# Patient Record
Sex: MALE | Race: WHITE | URBAN - METROPOLITAN AREA
[De-identification: names, ages, dates, MRNs, and addresses within clinical notes are randomized per-mention and may not be internally consistent; named-entity substitution may affect disease eponyms.]

---

## 2018-09-06 ENCOUNTER — INPATIENT (INPATIENT)
Facility: HOSPITAL | Age: 63
LOS: 0 days | Discharge: HOME | DRG: 74 | End: 2018-09-07
Attending: INTERNAL MEDICINE | Admitting: INTERNAL MEDICINE
Payer: COMMERCIAL

## 2018-09-06 VITALS
RESPIRATION RATE: 16 BRPM | SYSTOLIC BLOOD PRESSURE: 170 MMHG | HEART RATE: 72 BPM | TEMPERATURE: 98 F | OXYGEN SATURATION: 98 % | DIASTOLIC BLOOD PRESSURE: 90 MMHG | HEIGHT: 72 IN | WEIGHT: 279.99 LBS

## 2018-09-06 PROCEDURE — 70450 CT HEAD/BRAIN W/O DYE: CPT | Mod: 26

## 2018-09-07 ENCOUNTER — TRANSCRIPTION ENCOUNTER (OUTPATIENT)
Age: 63
End: 2018-09-07

## 2018-09-07 VITALS
HEART RATE: 94 BPM | RESPIRATION RATE: 18 BRPM | OXYGEN SATURATION: 100 % | TEMPERATURE: 97 F | DIASTOLIC BLOOD PRESSURE: 70 MMHG | SYSTOLIC BLOOD PRESSURE: 134 MMHG

## 2018-09-07 DIAGNOSIS — G45.9 TRANSIENT CEREBRAL ISCHEMIC ATTACK, UNSPECIFIED: ICD-10-CM

## 2018-09-07 DIAGNOSIS — Z29.9 ENCOUNTER FOR PROPHYLACTIC MEASURES, UNSPECIFIED: ICD-10-CM

## 2018-09-07 DIAGNOSIS — F17.200 NICOTINE DEPENDENCE, UNSPECIFIED, UNCOMPLICATED: ICD-10-CM

## 2018-09-07 LAB
ALBUMIN SERPL ELPH-MCNC: 3.5 G/DL — SIGNIFICANT CHANGE UP (ref 3.5–5)
ALBUMIN SERPL ELPH-MCNC: 3.6 G/DL — SIGNIFICANT CHANGE UP (ref 3.5–5)
ALP SERPL-CCNC: 71 U/L — SIGNIFICANT CHANGE UP (ref 40–120)
ALP SERPL-CCNC: 76 U/L — SIGNIFICANT CHANGE UP (ref 40–120)
ALT FLD-CCNC: 35 U/L DA — SIGNIFICANT CHANGE UP (ref 10–60)
ALT FLD-CCNC: 37 U/L DA — SIGNIFICANT CHANGE UP (ref 10–60)
ANION GAP SERPL CALC-SCNC: 10 MMOL/L — SIGNIFICANT CHANGE UP (ref 5–17)
ANION GAP SERPL CALC-SCNC: 9 MMOL/L — SIGNIFICANT CHANGE UP (ref 5–17)
APTT BLD: 28.1 SEC — SIGNIFICANT CHANGE UP (ref 27.5–37.4)
AST SERPL-CCNC: 26 U/L — SIGNIFICANT CHANGE UP (ref 10–40)
AST SERPL-CCNC: 36 U/L — SIGNIFICANT CHANGE UP (ref 10–40)
BASOPHILS # BLD AUTO: 0.1 K/UL — SIGNIFICANT CHANGE UP (ref 0–0.2)
BASOPHILS # BLD AUTO: 0.1 K/UL — SIGNIFICANT CHANGE UP (ref 0–0.2)
BASOPHILS NFR BLD AUTO: 1.3 % — SIGNIFICANT CHANGE UP (ref 0–2)
BASOPHILS NFR BLD AUTO: 1.7 % — SIGNIFICANT CHANGE UP (ref 0–2)
BILIRUB SERPL-MCNC: 0.3 MG/DL — SIGNIFICANT CHANGE UP (ref 0.2–1.2)
BILIRUB SERPL-MCNC: 0.6 MG/DL — SIGNIFICANT CHANGE UP (ref 0.2–1.2)
BUN SERPL-MCNC: 11 MG/DL — SIGNIFICANT CHANGE UP (ref 7–18)
BUN SERPL-MCNC: 13 MG/DL — SIGNIFICANT CHANGE UP (ref 7–18)
CALCIUM SERPL-MCNC: 8.7 MG/DL — SIGNIFICANT CHANGE UP (ref 8.4–10.5)
CALCIUM SERPL-MCNC: 9.1 MG/DL — SIGNIFICANT CHANGE UP (ref 8.4–10.5)
CHLORIDE SERPL-SCNC: 101 MMOL/L — SIGNIFICANT CHANGE UP (ref 96–108)
CHLORIDE SERPL-SCNC: 102 MMOL/L — SIGNIFICANT CHANGE UP (ref 96–108)
CHOLEST SERPL-MCNC: 179 MG/DL — SIGNIFICANT CHANGE UP (ref 10–199)
CO2 SERPL-SCNC: 24 MMOL/L — SIGNIFICANT CHANGE UP (ref 22–31)
CO2 SERPL-SCNC: 28 MMOL/L — SIGNIFICANT CHANGE UP (ref 22–31)
CREAT SERPL-MCNC: 0.96 MG/DL — SIGNIFICANT CHANGE UP (ref 0.5–1.3)
CREAT SERPL-MCNC: 1.02 MG/DL — SIGNIFICANT CHANGE UP (ref 0.5–1.3)
EOSINOPHIL # BLD AUTO: 0.1 K/UL — SIGNIFICANT CHANGE UP (ref 0–0.5)
EOSINOPHIL # BLD AUTO: 0.1 K/UL — SIGNIFICANT CHANGE UP (ref 0–0.5)
EOSINOPHIL NFR BLD AUTO: 1 % — SIGNIFICANT CHANGE UP (ref 0–6)
EOSINOPHIL NFR BLD AUTO: 1.6 % — SIGNIFICANT CHANGE UP (ref 0–6)
ETHANOL SERPL-MCNC: 41 MG/DL — HIGH (ref 0–10)
GLUCOSE SERPL-MCNC: 132 MG/DL — HIGH (ref 70–99)
GLUCOSE SERPL-MCNC: 140 MG/DL — HIGH (ref 70–99)
HBA1C BLD-MCNC: 6.5 % — HIGH (ref 4–5.6)
HCT VFR BLD CALC: 45.8 % — SIGNIFICANT CHANGE UP (ref 39–50)
HCT VFR BLD CALC: 46 % — SIGNIFICANT CHANGE UP (ref 39–50)
HDLC SERPL-MCNC: 45 MG/DL — SIGNIFICANT CHANGE UP
HGB BLD-MCNC: 13.8 G/DL — SIGNIFICANT CHANGE UP (ref 13–17)
HGB BLD-MCNC: 13.8 G/DL — SIGNIFICANT CHANGE UP (ref 13–17)
INR BLD: 0.94 RATIO — SIGNIFICANT CHANGE UP (ref 0.88–1.16)
LIPID PNL WITH DIRECT LDL SERPL: 90 MG/DL — SIGNIFICANT CHANGE UP
LYMPHOCYTES # BLD AUTO: 2.2 K/UL — SIGNIFICANT CHANGE UP (ref 1–3.3)
LYMPHOCYTES # BLD AUTO: 2.3 K/UL — SIGNIFICANT CHANGE UP (ref 1–3.3)
LYMPHOCYTES # BLD AUTO: 25.9 % — SIGNIFICANT CHANGE UP (ref 13–44)
LYMPHOCYTES # BLD AUTO: 29.1 % — SIGNIFICANT CHANGE UP (ref 13–44)
MAGNESIUM SERPL-MCNC: 2.4 MG/DL — SIGNIFICANT CHANGE UP (ref 1.6–2.6)
MCHC RBC-ENTMCNC: 21.9 PG — LOW (ref 27–34)
MCHC RBC-ENTMCNC: 22 PG — LOW (ref 27–34)
MCHC RBC-ENTMCNC: 30.1 GM/DL — LOW (ref 32–36)
MCHC RBC-ENTMCNC: 30.1 GM/DL — LOW (ref 32–36)
MCV RBC AUTO: 72.8 FL — LOW (ref 80–100)
MCV RBC AUTO: 73.1 FL — LOW (ref 80–100)
MONOCYTES # BLD AUTO: 0.6 K/UL — SIGNIFICANT CHANGE UP (ref 0–0.9)
MONOCYTES # BLD AUTO: 0.6 K/UL — SIGNIFICANT CHANGE UP (ref 0–0.9)
MONOCYTES NFR BLD AUTO: 6.5 % — SIGNIFICANT CHANGE UP (ref 2–14)
MONOCYTES NFR BLD AUTO: 7.4 % — SIGNIFICANT CHANGE UP (ref 2–14)
NEUTROPHILS # BLD AUTO: 4.8 K/UL — SIGNIFICANT CHANGE UP (ref 1.8–7.4)
NEUTROPHILS # BLD AUTO: 5.5 K/UL — SIGNIFICANT CHANGE UP (ref 1.8–7.4)
NEUTROPHILS NFR BLD AUTO: 60.6 % — SIGNIFICANT CHANGE UP (ref 43–77)
NEUTROPHILS NFR BLD AUTO: 64.8 % — SIGNIFICANT CHANGE UP (ref 43–77)
PCP SPEC-MCNC: SIGNIFICANT CHANGE UP
PHOSPHATE SERPL-MCNC: 2.6 MG/DL — SIGNIFICANT CHANGE UP (ref 2.5–4.5)
PLATELET # BLD AUTO: 195 K/UL — SIGNIFICANT CHANGE UP (ref 150–400)
PLATELET # BLD AUTO: 212 K/UL — SIGNIFICANT CHANGE UP (ref 150–400)
POTASSIUM SERPL-MCNC: 4.5 MMOL/L — SIGNIFICANT CHANGE UP (ref 3.5–5.3)
POTASSIUM SERPL-MCNC: 4.6 MMOL/L — SIGNIFICANT CHANGE UP (ref 3.5–5.3)
POTASSIUM SERPL-SCNC: 4.5 MMOL/L — SIGNIFICANT CHANGE UP (ref 3.5–5.3)
POTASSIUM SERPL-SCNC: 4.6 MMOL/L — SIGNIFICANT CHANGE UP (ref 3.5–5.3)
PROT SERPL-MCNC: 7.7 G/DL — SIGNIFICANT CHANGE UP (ref 6–8.3)
PROT SERPL-MCNC: 7.9 G/DL — SIGNIFICANT CHANGE UP (ref 6–8.3)
PROTHROM AB SERPL-ACNC: 10.2 SEC — SIGNIFICANT CHANGE UP (ref 9.8–12.7)
RBC # BLD: 6.29 M/UL — HIGH (ref 4.2–5.8)
RBC # BLD: 6.29 M/UL — HIGH (ref 4.2–5.8)
RBC # FLD: 13.7 % — SIGNIFICANT CHANGE UP (ref 10.3–14.5)
RBC # FLD: 13.7 % — SIGNIFICANT CHANGE UP (ref 10.3–14.5)
SODIUM SERPL-SCNC: 135 MMOL/L — SIGNIFICANT CHANGE UP (ref 135–145)
SODIUM SERPL-SCNC: 139 MMOL/L — SIGNIFICANT CHANGE UP (ref 135–145)
TOTAL CHOLESTEROL/HDL RATIO MEASUREMENT: 4 RATIO — SIGNIFICANT CHANGE UP (ref 3.4–9.6)
TRIGL SERPL-MCNC: 220 MG/DL — HIGH (ref 10–149)
TROPONIN I SERPL-MCNC: <0.015 NG/ML — SIGNIFICANT CHANGE UP (ref 0–0.04)
TROPONIN I SERPL-MCNC: <0.015 NG/ML — SIGNIFICANT CHANGE UP (ref 0–0.04)
TSH SERPL-MCNC: 1.58 UU/ML — SIGNIFICANT CHANGE UP (ref 0.34–4.82)
WBC # BLD: 7.9 K/UL — SIGNIFICANT CHANGE UP (ref 3.8–10.5)
WBC # BLD: 8.4 K/UL — SIGNIFICANT CHANGE UP (ref 3.8–10.5)
WBC # FLD AUTO: 7.9 K/UL — SIGNIFICANT CHANGE UP (ref 3.8–10.5)
WBC # FLD AUTO: 8.4 K/UL — SIGNIFICANT CHANGE UP (ref 3.8–10.5)

## 2018-09-07 PROCEDURE — 93005 ELECTROCARDIOGRAM TRACING: CPT

## 2018-09-07 PROCEDURE — 86900 BLOOD TYPING SEROLOGIC ABO: CPT

## 2018-09-07 PROCEDURE — 80307 DRUG TEST PRSMV CHEM ANLYZR: CPT

## 2018-09-07 PROCEDURE — 70450 CT HEAD/BRAIN W/O DYE: CPT

## 2018-09-07 PROCEDURE — 84443 ASSAY THYROID STIM HORMONE: CPT

## 2018-09-07 PROCEDURE — 86850 RBC ANTIBODY SCREEN: CPT

## 2018-09-07 PROCEDURE — 85730 THROMBOPLASTIN TIME PARTIAL: CPT

## 2018-09-07 PROCEDURE — 82962 GLUCOSE BLOOD TEST: CPT

## 2018-09-07 PROCEDURE — 85027 COMPLETE CBC AUTOMATED: CPT

## 2018-09-07 PROCEDURE — 99285 EMERGENCY DEPT VISIT HI MDM: CPT | Mod: 25

## 2018-09-07 PROCEDURE — 84484 ASSAY OF TROPONIN QUANT: CPT

## 2018-09-07 PROCEDURE — 80061 LIPID PANEL: CPT

## 2018-09-07 PROCEDURE — 85610 PROTHROMBIN TIME: CPT

## 2018-09-07 PROCEDURE — 99223 1ST HOSP IP/OBS HIGH 75: CPT

## 2018-09-07 PROCEDURE — 93880 EXTRACRANIAL BILAT STUDY: CPT

## 2018-09-07 PROCEDURE — 82607 VITAMIN B-12: CPT

## 2018-09-07 PROCEDURE — 93880 EXTRACRANIAL BILAT STUDY: CPT | Mod: 26

## 2018-09-07 PROCEDURE — 80053 COMPREHEN METABOLIC PANEL: CPT

## 2018-09-07 PROCEDURE — 83036 HEMOGLOBIN GLYCOSYLATED A1C: CPT

## 2018-09-07 PROCEDURE — 82746 ASSAY OF FOLIC ACID SERUM: CPT

## 2018-09-07 PROCEDURE — 99285 EMERGENCY DEPT VISIT HI MDM: CPT

## 2018-09-07 PROCEDURE — 93306 TTE W/DOPPLER COMPLETE: CPT

## 2018-09-07 PROCEDURE — 84100 ASSAY OF PHOSPHORUS: CPT

## 2018-09-07 PROCEDURE — 86901 BLOOD TYPING SEROLOGIC RH(D): CPT

## 2018-09-07 PROCEDURE — 83735 ASSAY OF MAGNESIUM: CPT

## 2018-09-07 RX ORDER — SODIUM CHLORIDE 9 MG/ML
1000 INJECTION, SOLUTION INTRAVENOUS
Qty: 0 | Refills: 0 | Status: DISCONTINUED | OUTPATIENT
Start: 2018-09-07 | End: 2018-09-07

## 2018-09-07 RX ORDER — ATORVASTATIN CALCIUM 80 MG/1
1 TABLET, FILM COATED ORAL
Qty: 30 | Refills: 0 | OUTPATIENT
Start: 2018-09-07 | End: 2018-10-06

## 2018-09-07 RX ORDER — NICOTINE POLACRILEX 2 MG
1 GUM BUCCAL
Qty: 30 | Refills: 0 | OUTPATIENT
Start: 2018-09-07 | End: 2018-10-06

## 2018-09-07 RX ORDER — THIAMINE MONONITRATE (VIT B1) 100 MG
100 TABLET ORAL DAILY
Qty: 0 | Refills: 0 | Status: DISCONTINUED | OUTPATIENT
Start: 2018-09-07 | End: 2018-09-07

## 2018-09-07 RX ORDER — ENOXAPARIN SODIUM 100 MG/ML
40 INJECTION SUBCUTANEOUS DAILY
Qty: 0 | Refills: 0 | Status: DISCONTINUED | OUTPATIENT
Start: 2018-09-07 | End: 2018-09-07

## 2018-09-07 RX ORDER — ASPIRIN/CALCIUM CARB/MAGNESIUM 324 MG
1 TABLET ORAL
Qty: 30 | Refills: 0 | OUTPATIENT
Start: 2018-09-07 | End: 2018-10-06

## 2018-09-07 RX ORDER — ASPIRIN/CALCIUM CARB/MAGNESIUM 324 MG
325 TABLET ORAL ONCE
Qty: 0 | Refills: 0 | Status: COMPLETED | OUTPATIENT
Start: 2018-09-07 | End: 2018-09-07

## 2018-09-07 RX ORDER — ASPIRIN/CALCIUM CARB/MAGNESIUM 324 MG
81 TABLET ORAL DAILY
Qty: 0 | Refills: 0 | Status: DISCONTINUED | OUTPATIENT
Start: 2018-09-07 | End: 2018-09-07

## 2018-09-07 RX ORDER — ATORVASTATIN CALCIUM 80 MG/1
40 TABLET, FILM COATED ORAL AT BEDTIME
Qty: 0 | Refills: 0 | Status: DISCONTINUED | OUTPATIENT
Start: 2018-09-07 | End: 2018-09-07

## 2018-09-07 RX ORDER — THIAMINE MONONITRATE (VIT B1) 100 MG
1 TABLET ORAL
Qty: 30 | Refills: 0 | OUTPATIENT
Start: 2018-09-07 | End: 2018-10-06

## 2018-09-07 RX ORDER — FOLIC ACID 0.8 MG
1 TABLET ORAL DAILY
Qty: 0 | Refills: 0 | Status: DISCONTINUED | OUTPATIENT
Start: 2018-09-07 | End: 2018-09-07

## 2018-09-07 RX ORDER — FLUTICASONE PROPIONATE 50 MCG
1 SPRAY, SUSPENSION NASAL
Qty: 0 | Refills: 0 | Status: DISCONTINUED | OUTPATIENT
Start: 2018-09-07 | End: 2018-09-07

## 2018-09-07 RX ORDER — NICOTINE POLACRILEX 2 MG
1 GUM BUCCAL DAILY
Qty: 0 | Refills: 0 | Status: DISCONTINUED | OUTPATIENT
Start: 2018-09-07 | End: 2018-09-07

## 2018-09-07 RX ORDER — FOLIC ACID 0.8 MG
1 TABLET ORAL
Qty: 30 | Refills: 0 | OUTPATIENT
Start: 2018-09-07 | End: 2018-10-06

## 2018-09-07 RX ORDER — SODIUM CHLORIDE 9 MG/ML
1000 INJECTION INTRAMUSCULAR; INTRAVENOUS; SUBCUTANEOUS
Qty: 0 | Refills: 0 | Status: DISCONTINUED | OUTPATIENT
Start: 2018-09-07 | End: 2018-09-07

## 2018-09-07 RX ADMIN — Medication 325 MILLIGRAM(S): at 02:13

## 2018-09-07 RX ADMIN — Medication 1 PATCH: at 12:05

## 2018-09-07 RX ADMIN — Medication 81 MILLIGRAM(S): at 12:05

## 2018-09-07 RX ADMIN — Medication 100 MILLIGRAM(S): at 12:06

## 2018-09-07 RX ADMIN — Medication 1 MILLIGRAM(S): at 12:05

## 2018-09-07 RX ADMIN — ENOXAPARIN SODIUM 40 MILLIGRAM(S): 100 INJECTION SUBCUTANEOUS at 12:05

## 2018-09-07 NOTE — DISCHARGE NOTE ADULT - CARE PROVIDER_API CALL
MD Christopher, Senthil  [address] [place] [address], [place] [address]  Phone: (   )    -  Fax: (   )    -

## 2018-09-07 NOTE — H&P ADULT - HISTORY OF PRESENT ILLNESS
63 yo M pmh of TIA and spinal stenosis presents from home c/o episode of numbness and weakness to LUE. Pt was watching TV and states that he could not use his Lt hand to use the remote. He called EMS and his symptoms had resolved. Patient said right now he is feeling fine. He had similar episode like that 8 years ago.  Stroke code was called, however given pt's rapidly improving symptoms and NIHSS of 0 he is not a tPA candidate. He currently denies focal weakness, numbness, blurred vision, vomiting, fever, urinary symptoms, chest pain, abdominal pain or any other complaints. He is smoking 1/2 PPD/35 years and willing to quit and endorses drinking 6 beers/night. NKDA     In Ed His vitals were stable.   NIHSS score is 0.   Alcohol in blood

## 2018-09-07 NOTE — ED ADULT NURSE NOTE - NSIMPLEMENTINTERV_GEN_ALL_ED
Implemented All Universal Safety Interventions:  [place] to call system. Call bell, personal items and telephone within reach. Instruct patient to call for assistance. Room bathroom lighting operational. Non-slip footwear when patient is off stretcher. Physically safe environment: no spills, clutter or unnecessary equipment. Stretcher in lowest position, wheels locked, appropriate side rails in place.

## 2018-09-07 NOTE — CONSULT NOTE ADULT - SUBJECTIVE AND OBJECTIVE BOX
Patient is a 62y old  Male who presents with a chief complaint of Transient Ischemic Attack (07 Sep 2018 03:50)      HPI:  Patient states he was drinking beer yesterday and fell asleep on his left side, when he awoke he had tingling and weakness left arm and hand from elbow to fingers.  This resolved after an hour.  He is now at baseline and without symptoms.  He denies visual changes, LE weakness or dysarthria.     PAST MEDICAL & SURGICAL HISTORY:  HTN (hypertension)  TIA (transient ischemic attack)      FAMILY HISTORY:        Social Hx:  Nonsmoker, no drug or alcohol use    MEDICATIONS  (STANDING):  aspirin  chewable 81 milliGRAM(s) Oral daily  atorvastatin 40 milliGRAM(s) Oral at bedtime  enoxaparin Injectable 40 milliGRAM(s) SubCutaneous daily  folic acid 1 milliGRAM(s) Oral daily  multivitamin/thiamine/folic acid in sodium chloride 0.9% 1000 milliLiter(s) (100 mL/Hr) IV Continuous <Continuous>  nicotine -  14 mG/24Hr(s) Patch 1 patch Transdermal daily  thiamine 100 milliGRAM(s) Oral daily       Allergies    No Known Allergies    Intolerances        ROS: Pertinent positives in HPI, all other ROS were reviewed and are negative.      Vital Signs Last 24 Hrs  T(C): 36.4 (07 Sep 2018 07:31), Max: 36.7 (06 Sep 2018 22:12)  T(F): 97.6 (07 Sep 2018 07:31), Max: 98 (06 Sep 2018 22:12)  HR: 78 (07 Sep 2018 07:31) (72 - 87)  BP: 180/68 (07 Sep 2018 07:31) (136/68 - 180/68)  BP(mean): --  RR: 18 (07 Sep 2018 07:31) (16 - 18)  SpO2: 100% (07 Sep 2018 07:31) (95% - 100%)        Constitutional: awake and alert.  HEENT: PERRLA, EOMI,   Neck: Supple.  Respiratory: Breath sounds are clear bilaterally  Cardiovascular: S1 and S2, regular / irregular rhythm  Gastrointestinal: soft, nontender  Extremities:  no edema  Vascular: Carotid Bruit - no  Musculoskeletal: no joint swelling/tenderness, no abnormal movements  Skin: No rashes    Neurological exam:  Mental status: A x O x 3. Appropriately interactive, normal affect. Speech fluent, No Aphasia or paraphasic errors. Naming /repetition intact   CN: PERRL, EOMI, VFF, facial sensation normal, no NLFD, tongue midline, Palate moves equally, SCM equal bilaterally  Motor: No pronator drift, Strength 5/5 in all 4 ext, normal bulk and tone, no tremor, rigidity or bradykinesia.    Sens: Intact to light touch, JPS, vib   Reflexes: Symmetric and normal, BJ 2+, TJ 2+, BR 2+, KJ 2+, AJ 2+, downgoing toes b/l  Coord:  No FNFA, dysmetria, SKYLAR intact   Gait/Balance: Normal/Cannot test    NIHSS:  0        Labs:                        13.8   8.4   )-----------( 212      ( 07 Sep 2018 00:46 )             46.0     09-07    135  |  101  |  13  ----------------------------<  132<H>  4.5   |  24  |  1.02    Ca    8.7      07 Sep 2018 00:46    TPro  7.9  /  Alb  3.5  /  TBili  0.3  /  DBili  x   /  AST  36  /  ALT  37  /  AlkPhos  71  09-07        PT/INR - ( 07 Sep 2018 00:46 )   PT: 10.2 sec;   INR: 0.94 ratio         PTT - ( 07 Sep 2018 00:46 )  PTT:28.1 sec    Radiology report:  - CT head:  < from: CT Brain Stroke Protocol (09.06.18 @ 23:21) >  INTERPRETATION:  CLINICAL INFORMATION: Left upper extremity diminished   sensation to light touch.    COMPARISON: None available.    PROCEDURE:   Noncontrast CT of the Head was performed from the skull base to the   vertex. Coronal and Sagittal reformats were obtained.    FINDINGS:    There is no acute intracranial hemorrhage, mass effect, midline shift,   extra-axial collection, hydrocephalus, or evidence of acute vascular   territorial infarction.    The visualized paranasal sinuses and mastoid air cells are clear. Status   post bilateral cataract surgery.    IMPRESSION:     No acute intracranial hemorrhage, mass effect, or evidence of acute   vascular territorial infarction.    If clinical symptoms persist or worsen, more sensitive evaluation with   brain MRI may be obtained, if no contraindications exist.    Dr. Garcia discussed the above findings with Dr. Strong at 11:20 PM on   9/6/2018 with read back.    < end of copied text >      A/P:  Patient likely had a "Saturday night palsy" due to prolonged compression of the distal brachial plexus or peripheral nerves in the left arm, due to ETOH use.  However, as he has risk factor of HTN and smoking, TIA is in DDx.   Would get carotid dopplers and continue ASA 81mg.   No need for MRI C spine as this was not a radiculopathy on clinical basis. home

## 2018-09-07 NOTE — DISCHARGE NOTE ADULT - PROVIDER TOKENS
FREE:[LAST:[MD Christopher],FIRST:[Senthil],PHONE:[(   )    -],FAX:[(   )    -],ADDRESS:[[address] [place] [address]]]

## 2018-09-07 NOTE — H&P ADULT - PROBLEM SELECTOR PLAN 2
- Patient refers smoking 1/2 PPD x 35 yrs  - Patient counseled about smoking cessation, along with changes in lifestyle including healthier diet (limited red meat and dairy consumption) and exercise. Patient is interested in smoking cessation and offered Nicotine Patch.

## 2018-09-07 NOTE — DISCHARGE NOTE ADULT - HOSPITAL COURSE
61 yo M pmh of TIA and spinal stenosis presented from home c/o episode of numbness and weakness to ENDERE. Pt was watching TV and stateed that he could not use his Lt hand to use the remote. He called EMS and his symptoms had resolved. Patient stated that currently he is feeling fine. He had similar episode like that 8 years ago.      In the ED, Stroke code was called, however, given pt's rapidly improving symptoms and NIHSS of 0, he was not a tPA candidate. He currently denied focal weakness, numbness, blurred vision, vomiting, fever, urinary symptoms, chest pain, abdominal pain or any other complaints. He is smokes 1/2 PPD/35 years and willing to quit and endorses drinking 6 beers/night. NKDA     In Ed His vitals were stable.   NIHSS score is 0.   Alcohol blood level was 41.  CIWA score 5.    Pt was admitted to telementry to rule out TIA and placed on a CIWA protocol for alcohol abuse.    TIA (transient ischemic attack).    - NIHSS 0 on admission  - CT head showed no acute intracranial hemorrhage, mass effect, or evidence of acute vascular territorial infarction.  - s/p asa 325mg x1 in ER  - c/w asa and statin  - Troponin x1 negative  - CT of brain was negative.    - Dr. Edwards, neurologist, consulted and stated that patient likely had a "Saturday night palsy" due to prolonged compression of the distal brachial plexus or peripheral nerves in the left arm, due to ETOH use.  However, as he has risk factor of HTN and smoking, TIA is in DDx.   He recommended carotid doppler and echo, but not an MRI of the brain/cervical spine.  He further stated to continue ASA 81mg.   - Carotid doppler showed no evidence of hemodynamically significant internal carotid stenosis.    - Pt requested to leave AMA without echo or followup with cardiologist.      Tobacco Abuse  - Pt counseled on smoking cessation.    - Nicotine patch prescribed.      Alcohol Withdraw  - Pt given MVI IV bag   - Ativan prn  - CIWA score 0    Prophylactic measure.    - IMPROVE VTE Score : 2  -need for DVT ppx, on Lovenox  - no need for GI ppx.     Pt requested to leave AMA without further studies or being seen by cardiology.  Pt was explained to followup with his doctor immediately upon discharge and was given a copy of studies performed to bring to his PCP. 61 yo M pmh of TIA and spinal stenosis presented from home c/o episode of numbness and weakness to ENDERE. Pt was watching TV and stateed that he could not use his Lt hand to use the remote. He called EMS and his symptoms had resolved. Patient stated that currently he is feeling fine. He had similar episode like that 8 years ago.      In the ED, Stroke code was called, however, given pt's rapidly improving symptoms and NIHSS of 0, he was not a tPA candidate. He currently denied focal weakness, numbness, blurred vision, vomiting, fever, urinary symptoms, chest pain, abdominal pain or any other complaints. He is smokes 1/2 PPD/35 years and willing to quit and endorses drinking 6 beers/night. NKDA     In Ed His vitals were stable.   NIHSS score is 0.   Alcohol blood level was 41.  CIWA score 5.    Pt was admitted to telementry to rule out TIA and placed on a CIWA protocol for alcohol abuse.    TIA (transient ischemic attack).    - NIHSS 0 on admission  - CT head showed no acute intracranial hemorrhage, mass effect, or evidence of acute vascular territorial infarction.  - s/p asa 325mg x1 in ER  - c/w asa and statin  - Troponin x1 negative  - CT of brain was negative.    - Dr. Edwards, neurologist, consulted and stated that patient likely had a "Saturday night palsy" due to prolonged compression of the distal brachial plexus or peripheral nerves in the left arm, due to ETOH use.  However, as he has risk factor of HTN and smoking, TIA is in DDx.   He recommended carotid doppler and echo, but not an MRI of the brain/cervical spine.  He further stated to continue ASA 81mg.   - Carotid doppler showed no evidence of hemodynamically significant internal carotid stenosis.    - Pt requested to leave AMA without echo results or followup with cardiologist.      Tobacco Abuse  - Pt counseled on smoking cessation.    - Nicotine patch prescribed.      Alcohol Withdraw  - Pt given MVI IV bag   - Ativan prn  - CIWA score 0    Prophylactic measure.    - IMPROVE VTE Score : 2  -need for DVT ppx, on Lovenox  - no need for GI ppx.     Pt requested to leave AMA without further studies/results or being seen by cardiology.  Pt was explained to followup with his doctor immediately upon discharge and was given a copy of studies/lab results performed to bring to his PCP. 61 yo M pmh of TIA and spinal stenosis presented from home c/o episode of numbness and weakness to ENDERE. Pt was watching TV and stateed that he could not use his Lt hand to use the remote. He called EMS and his symptoms had resolved. Patient stated that currently he is feeling fine. He had similar episode like that 8 years ago.      In the ED, Stroke code was called, however, given pt's rapidly improving symptoms and NIHSS of 0, he was not a tPA candidate. He currently denied focal weakness, numbness, blurred vision, vomiting, fever, urinary symptoms, chest pain, abdominal pain or any other complaints. He is smokes 1/2 PPD/35 years and willing to quit and endorses drinking 6 beers/night. NKDA     In Ed His vitals were stable.   NIHSS score is 0.   Alcohol blood level was 41.  CIWA score 5.    Pt was admitted to telementry to rule out TIA and placed on a CIWA protocol for alcohol abuse.    TIA (transient ischemic attack).    - NIHSS 0 on admission  - CT head showed no acute intracranial hemorrhage, mass effect, or evidence of acute vascular territorial infarction.  - s/p asa 325mg x1 in ER  - c/w asa and statin  - Troponin x2 negative  - CT of brain was negative.    - Dr. Edwards, neurologist, consulted and stated that patient likely had a "Saturday night palsy" due to prolonged compression of the distal brachial plexus or peripheral nerves in the left arm, due to ETOH use.  However, as he has risk factor of HTN and smoking, TIA is in DDx.   He recommended carotid doppler and echo, but not an MRI of the brain/cervical spine.  He further stated to continue ASA 81mg.   - Carotid doppler showed no evidence of hemodynamically significant internal carotid stenosis.    - Pt requested to leave AMA without echo results or followup with cardiologist.      Tobacco Abuse  - Pt counseled on smoking cessation.    - Nicotine patch prescribed.      Alcohol Withdraw  - Pt given MVI IV bag   - Ativan prn  - CIWA score 0    Prophylactic measure.    - IMPROVE VTE Score : 2  -need for DVT ppx, on Lovenox  - no need for GI ppx.     Pt requested to leave AMA without further studies/results or being seen by cardiology.  Pt was explained to followup with his doctor immediately upon discharge and was given a copy of studies/lab results performed to bring to his PCP.

## 2018-09-07 NOTE — ED PROVIDER NOTE - OBJECTIVE STATEMENT
61 yo M pmh of TIA, HTN, and spinal stenosis presents from home c/o episode of numbness to LUE. Pt was watching TV and states that he could not use his L hand to use the remote. He called EMS and his symptoms had resolved prior to my evaluation. Denies other acute complaints. Endorses drinking 6 beers tonight. Stroke code was called, however given pt's rapidly improving symptoms and NIHSS of 0 he is not a tPA candidate. 61 yo M pmh of TIA, HTN, and spinal stenosis presents from home c/o episode of numbness to LUE. Pt was watching TV and states that he could not use his L hand to use the remote. He called EMS and his symptoms had resolved prior to my evaluation. Denies other acute complaints. Endorses drinking 6 beers tonight. Stroke code was initially called, however given pt's rapidly improving symptoms and NIHSS of 0 he is not a tPA candidate at this time.

## 2018-09-07 NOTE — ED PROVIDER NOTE - PHYSICAL EXAMINATION
GENERAL: wells appearing, no acute distress, anxious   HEAD: atraumatic   EYES: EOMI, pink conjunctiva   ENT: moist oral mucosa   CARDIAC: RRR, no edema, distal pulses present   RESPIRATORY: lungs CTAB, no increased work of breathing   GASTROINTESTINAL: no abdominal tenderness, no rebound or guarding, bowel sounds presents  GENITOURINARY: no CVA tenderness   MUSCULOSKELETAL: no deformity   NEUROLOGICAL: AAOx3, CN's II-XII intact, strength 5/5 bilateral UE and LE, finger to nose intact, inconsistent sensory exam (initially decreased sensation LUE, then sensation intact and equal bilaterally)   SKIN: intact   PSYCHIATRIC: cooperative  HEME LYMPH: no lymphadenopathy

## 2018-09-07 NOTE — H&P ADULT - PROBLEM SELECTOR PLAN 3
IMPROVE VTE Individual Risk Assessment    RISK                                                          Points  [] Previous VTE                                           3  [] Thrombophilia                                        2  [] Lower limb paralysis                              2   [] Current Cancer                                       2   [x] Immobilization > 24 hrs                        1  [] ICU/CCU stay > 24 hours                       1  [x] Age > 60                                                   1    IMPROVE VTE Score : 2  need for DVT ppx, on Lovenox  no need for GI ppx.

## 2018-09-07 NOTE — ED PROVIDER NOTE - MEDICAL DECISION MAKING DETAILS
63 yo M with episode of LUE numbness that has now resolved. Concerning for stroke, TIA, hypoglycemia, intox, radiculopathy.     Plan - [facility], CT head, labs, admit

## 2018-09-07 NOTE — H&P ADULT - NSHPLABSRESULTS_GEN_ALL_CORE
LABS:                          13.8   8.4   )-----------( 212      ( 07 Sep 2018 00:46 )             46.0     09-07    135  |  101  |  13  ----------------------------<  132<H>  4.5   |  24  |  1.02    Ca    8.7      07 Sep 2018 00:46    TPro  7.9  /  Alb  3.5  /  TBili  0.3  /  DBili  x   /  AST  36  /  ALT  37  /  AlkPhos  71  09-07    PT/INR - ( 07 Sep 2018 00:46 )   PT: 10.2 sec;   INR: 0.94 ratio         PTT - ( 07 Sep 2018 00:46 )  PTT:28.1 sec    CAPILLARY BLOOD GLUCOSE      POCT Blood Glucose.: 142 mg/dL (06 Sep 2018 22:31)

## 2018-09-07 NOTE — H&P ADULT - ATTENDING COMMENTS
pt seen and evalauted  likely saturday night palsy  symptoms now resolved  neuro eval noted. no evid of cva or tia  f/u echo  f/u carotid duplex  dc planning if above negative and cleared by neuro and cards pt seen and evalauted  likely Saturday night palsy  symptoms now resolved  neuro eval noted. unlikely cva or tia  f/u echo  f/u carotid duplex  dc planning if above negative and cleared by neuro and cards    if pt refuses to stay for above studies, then he is leaving AMA

## 2018-09-07 NOTE — DISCHARGE NOTE ADULT - PLAN OF CARE
NO FURTHER EPISODES - NIHSS 0 on admission  - CT head showed no acute intracranial hemorrhage, mass effect, or evidence of acute vascular territorial infarction.  - s/p asa 325mg x1 in ER  - c/w asa and statin  - Troponin x1 negative  - CT of brain was negative.    - Dr. Edwards, neurologist, consulted and stated that patient likely had a "Saturday night palsy" due to prolonged compression of the distal brachial plexus or peripheral nerves in the left arm, due to ETOH use.  However, as he has risk factor of HTN and smoking, TIA is in DDx.   He recommended carotid doppler and echo, but not an MRI of the brain/cervical spine.  He further stated to continue ASA 81mg.   - Carotid doppler showed no evidence of hemodynamically significant internal carotid stenosis.    - Pt requested to leave AMA without echo or followup with cardiologist. Tobacco Abuse  - Pt counseled on smoking cessation.    - Nicotine patch prescribed. DASH diet  Followup with your PCP immediately upon discharge - NIHSS 0 on admission  - CT head showed no acute intracranial hemorrhage, mass effect, or evidence of acute vascular territorial infarction.  - s/p asa 325mg x1 in ER  - c/w asa and statin  - Troponin x1 negative  - CT of brain was negative.    - Dr. Edwards, neurologist, consulted and stated that patient likely had a "Saturday night palsy" due to prolonged compression of the distal brachial plexus or peripheral nerves in the left arm, due to ETOH use.  However, as he has risk factor of HTN and smoking, TIA is in DDx.   He recommended carotid doppler and echo, but not an MRI of the brain/cervical spine.  He further stated to continue ASA 81mg.   - Carotid doppler showed no evidence of hemodynamically significant internal carotid stenosis.  Echo was performed, but results not in.  - Pt requested to leave AMA without echo results or followup with cardiologist at hospital. - NIHSS 0 on admission  - CT head showed no acute intracranial hemorrhage, mass effect, or evidence of acute vascular territorial infarction.  - s/p asa 325mg x1 in ER  - c/w asa and statin  - Troponin x2 negative  - CT of brain was negative.    - Dr. Edwards, neurologist, consulted and stated that patient likely had a "Saturday night palsy" due to prolonged compression of the distal brachial plexus or peripheral nerves in the left arm, due to ETOH use.  However, as he has risk factor of HTN and smoking, TIA is in DDx.   He recommended carotid doppler and echo, but not an MRI of the brain/cervical spine.  He further stated to continue ASA 81mg.   - Carotid doppler showed no evidence of hemodynamically significant internal carotid stenosis.  Echo was performed, but results not in.  - Pt requested to leave AMA without echo results or followup with cardiologist at hospital.

## 2018-09-07 NOTE — DISCHARGE NOTE ADULT - MEDICATION SUMMARY - MEDICATIONS TO TAKE
I will START or STAY ON the medications listed below when I get home from the hospital:    aspirin 81 mg oral tablet, chewable  -- 1 tab(s) by mouth once a day  -- Indication: For TIA (transient ischemic attack)    atorvastatin 40 mg oral tablet  -- 1 tab(s) by mouth once a day (at bedtime)  -- Indication: For TIA (transient ischemic attack)    nicotine 14 mg/24 hr transdermal film, extended release  -- 1 patch by transdermal patch once a day  -- Indication: For Smoking    folic acid 1 mg oral tablet  -- 1 tab(s) by mouth once a day  -- Indication: For alcohol withdraw    thiamine 100 mg oral tablet  -- 1 tab(s) by mouth once a day  -- Indication: For alcohol withdraw

## 2018-09-07 NOTE — H&P ADULT - NSHPREVIEWOFSYSTEMS_GEN_ALL_CORE
REVIEW OF SYSTEMS :  * CONSTITUTIONAL      : No Fever, Weight loss, or Fatigue  * EYES                             : No eye pain , Visual disturbances or Discharge  * RESPIRATORY             : No Cough, Wheezing, Chills or Hemoptysis; No shortness of breath  * CARDIOVASCULAR     : No Chest pain, Palpitations, Dizziness, or Leg swelling  * GASTROINTESTINAL  : No Abdominal or Epigastric pain. No Nausea, Vomiting or Hematemesis; No Diarrhea or Constipation. No Melena or Hematochezia.  * GENITOURINARY        : No Dysuria , Frequency , Haematuria   * NEUROLOGICAL          : No Headaches, Memory loss, Loss of strength, Numbness, or Tremors  * MUSCULOSKELETAL   : No Joint pain  * PSYCHIATRY                 : No Depression or Anxiety   * HEME/LYMPH              : No Easy Bruising or Bleeding gums  * SKIN                               : No Itching, Burning, Rashes, or Lesions

## 2018-09-07 NOTE — DISCHARGE NOTE ADULT - PATIENT PORTAL LINK FT
You can access the [facility] Patient Portal, offered by [facility], by registering with the following website: [email or web address]

## 2018-09-07 NOTE — H&P ADULT - ASSESSMENT
61 yo M pmh of TIA and spinal stenosis presents from home c/o episode of numbness and weakness to LUE. He mentions that he could not use his Lt hand to use the remote. His symptoms had resolved.

## 2018-09-07 NOTE — ED ADULT NURSE NOTE - ED STAT RN HANDOFF DETAILS
Handover report given to Nurse Kwame Meza. Patient alert and awake. Remains being nursed on Tele Box O-SR noted. Dysphagia screen done. Patient is being admitted. awaiting bed availability.

## 2018-09-07 NOTE — ED PROVIDER NOTE - NS ED ROS FT
CONSTITUTIONAL: no fever, no chills   EYES: no visual changes, no eye pain   ENMT: no nasal congestion, no throat pain  CARDIOVASCULAR: no chest pain, no edema, no palpitations   RESPIRATORY: no shortness of breath, no cough   GASTROINTESTINAL: no abdominal pain, no nausea, no vomiting, no diarrhea, no constipation   GENITOURINARY: no dysuria, no frequency  MUSCULOSKELETAL: no joint pains, no myalgias, no back pain   SKIN: no rashes  NEUROLOGICAL: +weakness, no headache, no dizziness, no slurred speech, no syncope   PSYCHIATRIC: no known mental health illness   HEME/LYMPH: no lymphadenopathy      All other ROS negative except as per HPI

## 2018-09-07 NOTE — ED ADULT NURSE REASSESSMENT NOTE - NS ED NURSE REASSESS COMMENT FT1
Patient signed out AMA. Instructions discussed with patient. Verbalized understanding. AA&Ox3. IVHL removed.

## 2018-09-07 NOTE — H&P ADULT - NSHPSOCIALHISTORY_GEN_ALL_CORE
He is smoking 1/2 PPD/35 years and willing to quit and endorses drinking 6 beers/night.    but lives alone , retired was working as a

## 2018-09-07 NOTE — ED PROVIDER NOTE - PROGRESS NOTE DETAILS
CT head - negative for acute infarct or hemorrhage  labs - etoh 41, hyperglycemia   Will admit for possible TIA. Endorsed to MAR.

## 2018-09-07 NOTE — DISCHARGE NOTE ADULT - CARE PLAN
Principal Discharge DX:	TIA (transient ischemic attack)  Goal:	NO FURTHER EPISODES  Assessment and plan of treatment:	- NIHSS 0 on admission  - CT head showed no acute intracranial hemorrhage, mass effect, or evidence of acute vascular territorial infarction.  - s/p asa 325mg x1 in ER  - c/w asa and statin  - Troponin x1 negative  - CT of brain was negative.    - Dr. Edwards, neurologist, consulted and stated that patient likely had a "Saturday night palsy" due to prolonged compression of the distal brachial plexus or peripheral nerves in the left arm, due to ETOH use.  However, as he has risk factor of HTN and smoking, TIA is in DDx.   He recommended carotid doppler and echo, but not an MRI of the brain/cervical spine.  He further stated to continue ASA 81mg.   - Carotid doppler showed no evidence of hemodynamically significant internal carotid stenosis.    - Pt requested to leave AMA without echo or followup with cardiologist.  Secondary Diagnosis:	Smoking  Assessment and plan of treatment:	Tobacco Abuse  - Pt counseled on smoking cessation.    - Nicotine patch prescribed.  Secondary Diagnosis:	HTN (hypertension)  Assessment and plan of treatment:	DASH diet  Followup with your PCP immediately upon discharge Principal Discharge DX:	TIA (transient ischemic attack)  Goal:	NO FURTHER EPISODES  Assessment and plan of treatment:	- NIHSS 0 on admission  - CT head showed no acute intracranial hemorrhage, mass effect, or evidence of acute vascular territorial infarction.  - s/p asa 325mg x1 in ER  - c/w asa and statin  - Troponin x1 negative  - CT of brain was negative.    - Dr. Edwards, neurologist, consulted and stated that patient likely had a "Saturday night palsy" due to prolonged compression of the distal brachial plexus or peripheral nerves in the left arm, due to ETOH use.  However, as he has risk factor of HTN and smoking, TIA is in DDx.   He recommended carotid doppler and echo, but not an MRI of the brain/cervical spine.  He further stated to continue ASA 81mg.   - Carotid doppler showed no evidence of hemodynamically significant internal carotid stenosis.  Echo was performed, but results not in.  - Pt requested to leave AMA without echo results or followup with cardiologist at hospital.  Secondary Diagnosis:	Smoking  Assessment and plan of treatment:	Tobacco Abuse  - Pt counseled on smoking cessation.    - Nicotine patch prescribed.  Secondary Diagnosis:	HTN (hypertension)  Assessment and plan of treatment:	DASH diet  Followup with your PCP immediately upon discharge Principal Discharge DX:	TIA (transient ischemic attack)  Goal:	NO FURTHER EPISODES  Assessment and plan of treatment:	- NIHSS 0 on admission  - CT head showed no acute intracranial hemorrhage, mass effect, or evidence of acute vascular territorial infarction.  - s/p asa 325mg x1 in ER  - c/w asa and statin  - Troponin x2 negative  - CT of brain was negative.    - Dr. Edwards, neurologist, consulted and stated that patient likely had a "Saturday night palsy" due to prolonged compression of the distal brachial plexus or peripheral nerves in the left arm, due to ETOH use.  However, as he has risk factor of HTN and smoking, TIA is in DDx.   He recommended carotid doppler and echo, but not an MRI of the brain/cervical spine.  He further stated to continue ASA 81mg.   - Carotid doppler showed no evidence of hemodynamically significant internal carotid stenosis.  Echo was performed, but results not in.  - Pt requested to leave AMA without echo results or followup with cardiologist at hospital.  Secondary Diagnosis:	Smoking  Assessment and plan of treatment:	Tobacco Abuse  - Pt counseled on smoking cessation.    - Nicotine patch prescribed.  Secondary Diagnosis:	HTN (hypertension)  Assessment and plan of treatment:	DASH diet  Followup with your PCP immediately upon discharge

## 2018-09-07 NOTE — H&P ADULT - NSHPPHYSICALEXAM_GEN_ALL_CORE
PHYSICAL EXAM :  * GENERAL                 : NAD, Well-groomed, Well-developed  * HEAD                       :  Atraumatic, Normocephalic  * EYES                         : EOMI, PERRLA, Conjunctiva and Sclera clear  * ENT                           : Moist Mucous Membranes  * NECK                         : Supple, No JVD, Normal Thyroid  * CHEST/LUNG           : Clear to Auscultation bilaterally; No Rales, Rhonchi, Wheezing or Rubs  * HEART                       : Regular Rate and Rhythm; No murmurs, Rubs or gallops  * ABDOMEN                : Soft, Non-tender, Non-distended; Bowel Sounds present  * NERVOUS SYSTEM  :  Alert & Oriented X3, Good Concentration; Motor Strength 5/5 B/L UL LL ; DTRs 2+ Intact and Symmetric  * EXTREMITIES            :  2+ Peripheral Pulses, No clubbing, cyanosis, or edema  * SKIN                           : No Rashes or Lesions      Vital Signs Last 24 Hrs  T(C): 36.7 (07 Sep 2018 04:46), Max: 36.7 (06 Sep 2018 22:12)  T(F): 98 (07 Sep 2018 04:46), Max: 98 (06 Sep 2018 22:12)  HR: 87 (07 Sep 2018 04:46) (72 - 87)  BP: 148/69 (07 Sep 2018 04:46) (136/68 - 170/90)  BP(mean): --  RR: 16 (07 Sep 2018 04:46) (16 - 16)  SpO2: 95% (07 Sep 2018 04:46) (95% - 98%)

## 2018-09-07 NOTE — H&P ADULT - PROBLEM SELECTOR PLAN 1
- pt had an epidose episode of numbness and weakness to LUE for 10 mins then resolved.  - NIHSS 0 on admission  - CT head shows No acute intracranial hemorrhage, mass effect, or evidence of acute vascular territorial infarction.  - s/p asa 325mg x1 in ER  - c/w asa and statin  - Troponin x1 negative, f/u T2  - f/u ECHO, HbA1C, lipid profile,TSH, B12, Folate  - f/u MRI head , MRI Cervical spines , carotid doppler and TTE  - consider getting CT angio head and neck and EEG  - Neuro consult   - +- cardio Dr. Moran.  - +- on tele monitor - pt had an episode of numbness and weakness to LUE for 10 minutes then resolved.  - NIHSS 0 on admission  - CT head shows No acute intracranial hemorrhage, mass effect, or evidence of acute vascular territorial infarction.  - s/p asa 325mg x1 in ER  - c/w asa and statin  - Troponin x1 negative, f/u T2  - f/u ECHO, HbA1C, lipid profile, TSH, B12, Folate  - f/u MRI head , MRI Cervical spines , carotid doppler and TTE.  - Neuro consult   - consider getting CT angio head and neck and EEG  - +- Cardio Consult  - +- on Tele monitor - pt had an episode of numbness and weakness to LUE for 10 minutes then resolved.  - NIHSS 0 on admission  - CT head shows No acute intracranial hemorrhage, mass effect, or evidence of acute vascular territorial infarction.  - s/p asa 325mg x1 in ER  - c/w asa and statin  - Troponin x1 negative, f/u T2  - f/u ECHO, HbA1C, lipid profile, TSH, B12, Folate and US Abdomen for concerns of Liver Cirrhisis given history of Alcohol abuse can cause a lot of side effects in your body. It can cause cancer, liver failure, ulcer, pancreatitis, tremors, withdrawal effects, dependence, We strongly recommend to stop using alcohol, as it poses a lot of health effects abuse  - f/u MRI head , MRI Cervical spines , carotid doppler and TTE.  - Neuro consult   - Cardio Consult  - Tele monitor - pt had an episode of numbness and weakness to LUE for 10 minutes then resolved.  - NIHSS 0 on admission  - CT head shows No acute intracranial hemorrhage, mass effect, or evidence of acute vascular territorial infarction.  - s/p asa 325mg x1 in ER  - c/w asa and statin  - Troponin x1 negative, f/u T2  - f/u HbA1C, lipid profile, TSH, B12, Folate and US Abdomen for concerns of Liver Cirrhisis given history of Alcohol use  - f/u MRI head , MRI Cervical spines , carotid doppler and TTE.  - Neuro consult   - Cardio Consult  - Tele monitor - pt had an episode of numbness and weakness to LUE for 10 minutes then resolved.  - NIHSS 0 on admission  - CT head shows No acute intracranial hemorrhage, mass effect, or evidence of acute vascular territorial infarction.  - s/p asa 325mg x1 in ER  - c/w asa and statin  - Troponin x1 negative, f/u T2  - f/u HbA1C, lipid profile, TSH, B12, Folate and US Abdomen for concerns of Liver Cirrhisis given history of Alcohol use  - f/u MRI head , MRI Cervical spines , carotid doppler and TTE with bubble study  - Neuro consult Dr. Edwards  - Cardio Consult Dr. Moran  - Tele monitor

## 2018-09-07 NOTE — DISCHARGE NOTE ADULT - ADDITIONAL INSTRUCTIONS
Pt requested to leave AMA without further studies or being seen by cardiology.  Pt was explained to followup with his doctor immediately upon discharge and was given a copy of studies performed to bring to his PCP. Pt requested to leave AMA without further studies/results or being seen by cardiology.  Pt was explained to followup with his doctor immediately upon discharge and was given a copy of studies/lab results  performed to bring to his PCP. Pt requested to leave AMA without further studies/results or being seen by cardiology.   Pt was explained risks of refusing treatment at this time, and he verbalized understanding and requested AMA.  Pt was explained to followup with his doctor immediately upon discharge and was given a copy of studies/lab results  performed to bring to his PCP.

## 2018-09-08 LAB
FOLATE SERPL-MCNC: 6.4 NG/ML — SIGNIFICANT CHANGE UP
VIT B12 SERPL-MCNC: 255 PG/ML — SIGNIFICANT CHANGE UP (ref 232–1245)

## 2018-09-22 ENCOUNTER — EMERGENCY (EMERGENCY)
Facility: HOSPITAL | Age: 63
LOS: 1 days | Discharge: ANOTHER HOSPITAL | End: 2018-09-22
Attending: EMERGENCY MEDICINE
Payer: COMMERCIAL

## 2018-09-22 VITALS
SYSTOLIC BLOOD PRESSURE: 155 MMHG | TEMPERATURE: 98 F | WEIGHT: 255.07 LBS | HEART RATE: 88 BPM | OXYGEN SATURATION: 96 % | DIASTOLIC BLOOD PRESSURE: 84 MMHG | RESPIRATION RATE: 18 BRPM | HEIGHT: 72 IN

## 2018-09-22 PROCEDURE — 99292 CRITICAL CARE ADDL 30 MIN: CPT

## 2018-09-22 PROCEDURE — 99291 CRITICAL CARE FIRST HOUR: CPT

## 2018-09-22 NOTE — ED ADULT TRIAGE NOTE - CHIEF COMPLAINT QUOTE
biba from home with c/o s/p fall while fixing something in his house,etoh as per ems biba from home with c/o s/p fall while fixing something in his house,etoh as per ems ,fs 132 as per ems

## 2018-09-23 ENCOUNTER — APPOINTMENT (OUTPATIENT)
Dept: NEUROSURGERY | Facility: HOSPITAL | Age: 63
End: 2018-09-23
Payer: COMMERCIAL

## 2018-09-23 ENCOUNTER — INPATIENT (INPATIENT)
Facility: HOSPITAL | Age: 63
LOS: 15 days | Discharge: HOME | DRG: 24 | End: 2018-10-09
Attending: PSYCHIATRY & NEUROLOGY | Admitting: PSYCHIATRY & NEUROLOGY
Payer: COMMERCIAL

## 2018-09-23 VITALS
SYSTOLIC BLOOD PRESSURE: 191 MMHG | TEMPERATURE: 98 F | DIASTOLIC BLOOD PRESSURE: 102 MMHG | OXYGEN SATURATION: 99 % | HEART RATE: 89 BPM | RESPIRATION RATE: 22 BRPM

## 2018-09-23 DIAGNOSIS — I63.9 CEREBRAL INFARCTION, UNSPECIFIED: ICD-10-CM

## 2018-09-23 PROBLEM — G45.9 TRANSIENT CEREBRAL ISCHEMIC ATTACK, UNSPECIFIED: Chronic | Status: ACTIVE | Noted: 2018-09-07

## 2018-09-23 LAB
ALBUMIN SERPL ELPH-MCNC: 3.4 G/DL — LOW (ref 3.5–5)
ALBUMIN SERPL ELPH-MCNC: 3.7 G/DL — SIGNIFICANT CHANGE UP (ref 3.3–5)
ALBUMIN SERPL ELPH-MCNC: 4.2 G/DL — SIGNIFICANT CHANGE UP (ref 3.3–5)
ALP SERPL-CCNC: 71 U/L — SIGNIFICANT CHANGE UP (ref 40–120)
ALP SERPL-CCNC: 76 U/L — SIGNIFICANT CHANGE UP (ref 40–120)
ALP SERPL-CCNC: 77 U/L — SIGNIFICANT CHANGE UP (ref 40–120)
ALT FLD-CCNC: 22 U/L — SIGNIFICANT CHANGE UP (ref 10–45)
ALT FLD-CCNC: 24 U/L — SIGNIFICANT CHANGE UP (ref 10–45)
ALT FLD-CCNC: 34 U/L DA — SIGNIFICANT CHANGE UP (ref 10–60)
ANION GAP SERPL CALC-SCNC: 11 MMOL/L — SIGNIFICANT CHANGE UP (ref 5–17)
ANION GAP SERPL CALC-SCNC: 7 MMOL/L — SIGNIFICANT CHANGE UP (ref 5–17)
ANION GAP SERPL CALC-SCNC: 8 MMOL/L — SIGNIFICANT CHANGE UP (ref 5–17)
ANION GAP SERPL CALC-SCNC: 9 MMOL/L — SIGNIFICANT CHANGE UP (ref 5–17)
APTT BLD: 24.1 SEC — LOW (ref 27.5–37.4)
APTT BLD: 26.5 SEC — LOW (ref 27.5–37.4)
APTT BLD: 26.7 SEC — LOW (ref 27.5–37.4)
AST SERPL-CCNC: 26 U/L — SIGNIFICANT CHANGE UP (ref 10–40)
AST SERPL-CCNC: 36 U/L — SIGNIFICANT CHANGE UP (ref 10–40)
AST SERPL-CCNC: 36 U/L — SIGNIFICANT CHANGE UP (ref 10–40)
BASE EXCESS BLDA CALC-SCNC: -4.1 MMOL/L — LOW (ref -2–2)
BASOPHILS # BLD AUTO: 0.1 K/UL — SIGNIFICANT CHANGE UP (ref 0–0.2)
BASOPHILS # BLD AUTO: 0.1 K/UL — SIGNIFICANT CHANGE UP (ref 0–0.2)
BASOPHILS NFR BLD AUTO: 0.6 % — SIGNIFICANT CHANGE UP (ref 0–2)
BASOPHILS NFR BLD AUTO: 0.8 % — SIGNIFICANT CHANGE UP (ref 0–2)
BILIRUB DIRECT SERPL-MCNC: 0.2 MG/DL — SIGNIFICANT CHANGE UP (ref 0–0.2)
BILIRUB INDIRECT FLD-MCNC: 0.2 MG/DL — SIGNIFICANT CHANGE UP (ref 0.2–1)
BILIRUB SERPL-MCNC: 0.4 MG/DL — SIGNIFICANT CHANGE UP (ref 0.2–1.2)
BILIRUB SERPL-MCNC: 0.6 MG/DL — SIGNIFICANT CHANGE UP (ref 0.2–1.2)
BILIRUB SERPL-MCNC: 0.7 MG/DL — SIGNIFICANT CHANGE UP (ref 0.2–1.2)
BLD GP AB SCN SERPL QL: NEGATIVE — SIGNIFICANT CHANGE UP
BUN SERPL-MCNC: 10 MG/DL — SIGNIFICANT CHANGE UP (ref 7–23)
BUN SERPL-MCNC: 12 MG/DL — SIGNIFICANT CHANGE UP (ref 7–18)
CALCIUM SERPL-MCNC: 8.7 MG/DL — SIGNIFICANT CHANGE UP (ref 8.4–10.5)
CALCIUM SERPL-MCNC: 8.8 MG/DL — SIGNIFICANT CHANGE UP (ref 8.4–10.5)
CALCIUM SERPL-MCNC: 9.2 MG/DL — SIGNIFICANT CHANGE UP (ref 8.4–10.5)
CALCIUM SERPL-MCNC: 9.4 MG/DL — SIGNIFICANT CHANGE UP (ref 8.4–10.5)
CHLORIDE SERPL-SCNC: 91 MMOL/L — LOW (ref 96–108)
CHLORIDE SERPL-SCNC: 94 MMOL/L — LOW (ref 96–108)
CHLORIDE SERPL-SCNC: 97 MMOL/L — SIGNIFICANT CHANGE UP (ref 96–108)
CHLORIDE SERPL-SCNC: 99 MMOL/L — SIGNIFICANT CHANGE UP (ref 96–108)
CHOLEST SERPL-MCNC: 110 MG/DL — SIGNIFICANT CHANGE UP (ref 10–199)
CHOLEST SERPL-MCNC: 112 MG/DL — SIGNIFICANT CHANGE UP (ref 10–199)
CO2 BLDA-SCNC: 27 MMOL/L — SIGNIFICANT CHANGE UP (ref 22–30)
CO2 SERPL-SCNC: 25 MMOL/L — SIGNIFICANT CHANGE UP (ref 22–31)
CO2 SERPL-SCNC: 28 MMOL/L — SIGNIFICANT CHANGE UP (ref 22–31)
CO2 SERPL-SCNC: 30 MMOL/L — SIGNIFICANT CHANGE UP (ref 22–31)
CO2 SERPL-SCNC: 30 MMOL/L — SIGNIFICANT CHANGE UP (ref 22–31)
CREAT SERPL-MCNC: 0.85 MG/DL — SIGNIFICANT CHANGE UP (ref 0.5–1.3)
CREAT SERPL-MCNC: 0.88 MG/DL — SIGNIFICANT CHANGE UP (ref 0.5–1.3)
CREAT SERPL-MCNC: 0.9 MG/DL — SIGNIFICANT CHANGE UP (ref 0.5–1.3)
CREAT SERPL-MCNC: 0.94 MG/DL — SIGNIFICANT CHANGE UP (ref 0.5–1.3)
EOSINOPHIL # BLD AUTO: 0 K/UL — SIGNIFICANT CHANGE UP (ref 0–0.5)
EOSINOPHIL # BLD AUTO: 0.1 K/UL — SIGNIFICANT CHANGE UP (ref 0–0.5)
EOSINOPHIL NFR BLD AUTO: 0.3 % — SIGNIFICANT CHANGE UP (ref 0–6)
EOSINOPHIL NFR BLD AUTO: 0.4 % — SIGNIFICANT CHANGE UP (ref 0–6)
ETHANOL SERPL-MCNC: 7 MG/DL — SIGNIFICANT CHANGE UP (ref 0–10)
GAS PNL BLDA: SIGNIFICANT CHANGE UP
GLUCOSE SERPL-MCNC: 119 MG/DL — HIGH (ref 70–99)
GLUCOSE SERPL-MCNC: 125 MG/DL — HIGH (ref 70–99)
GLUCOSE SERPL-MCNC: 141 MG/DL — HIGH (ref 70–99)
GLUCOSE SERPL-MCNC: 154 MG/DL — HIGH (ref 70–99)
HBA1C BLD-MCNC: 6.6 % — HIGH (ref 4–5.6)
HCO3 BLDA-SCNC: 25 MMOL/L — SIGNIFICANT CHANGE UP (ref 21–29)
HCT VFR BLD CALC: 35.3 % — LOW (ref 39–50)
HCT VFR BLD CALC: 41.7 % — SIGNIFICANT CHANGE UP (ref 39–50)
HCT VFR BLD CALC: 42.5 % — SIGNIFICANT CHANGE UP (ref 39–50)
HCT VFR BLD CALC: 44.5 % — SIGNIFICANT CHANGE UP (ref 39–50)
HDLC SERPL-MCNC: 27 MG/DL — LOW
HDLC SERPL-MCNC: 36 MG/DL — LOW
HGB BLD-MCNC: 12 G/DL — LOW (ref 13–17)
HGB BLD-MCNC: 12.9 G/DL — LOW (ref 13–17)
HGB BLD-MCNC: 13.4 G/DL — SIGNIFICANT CHANGE UP (ref 13–17)
HGB BLD-MCNC: 13.8 G/DL — SIGNIFICANT CHANGE UP (ref 13–17)
HOROWITZ INDEX BLDA+IHG-RTO: 100 — SIGNIFICANT CHANGE UP
INR BLD: 1.03 RATIO — SIGNIFICANT CHANGE UP (ref 0.88–1.16)
INR BLD: 1.04 RATIO — SIGNIFICANT CHANGE UP (ref 0.88–1.16)
INR BLD: 1.09 RATIO — SIGNIFICANT CHANGE UP (ref 0.88–1.16)
LIPID PNL WITH DIRECT LDL SERPL: 17 MG/DL — SIGNIFICANT CHANGE UP
LIPID PNL WITH DIRECT LDL SERPL: SIGNIFICANT CHANGE UP
LYMPHOCYTES # BLD AUTO: 1.8 K/UL — SIGNIFICANT CHANGE UP (ref 1–3.3)
LYMPHOCYTES # BLD AUTO: 16 % — SIGNIFICANT CHANGE UP (ref 13–44)
LYMPHOCYTES # BLD AUTO: 2.7 K/UL — SIGNIFICANT CHANGE UP (ref 1–3.3)
LYMPHOCYTES # BLD AUTO: 21.7 % — SIGNIFICANT CHANGE UP (ref 13–44)
MAGNESIUM SERPL-MCNC: 2.2 MG/DL — SIGNIFICANT CHANGE UP (ref 1.6–2.6)
MAGNESIUM SERPL-MCNC: 2.2 MG/DL — SIGNIFICANT CHANGE UP (ref 1.6–2.6)
MCHC RBC-ENTMCNC: 21 PG — LOW (ref 27–34)
MCHC RBC-ENTMCNC: 21.9 PG — LOW (ref 27–34)
MCHC RBC-ENTMCNC: 22.8 PG — LOW (ref 27–34)
MCHC RBC-ENTMCNC: 23.4 PG — LOW (ref 27–34)
MCHC RBC-ENTMCNC: 30.2 GM/DL — LOW (ref 32–36)
MCHC RBC-ENTMCNC: 30.4 GM/DL — LOW (ref 32–36)
MCHC RBC-ENTMCNC: 33.1 GM/DL — SIGNIFICANT CHANGE UP (ref 32–36)
MCHC RBC-ENTMCNC: 34 GM/DL — SIGNIFICANT CHANGE UP (ref 32–36)
MCV RBC AUTO: 68.6 FL — LOW (ref 80–100)
MCV RBC AUTO: 68.7 FL — LOW (ref 80–100)
MCV RBC AUTO: 69 FL — LOW (ref 80–100)
MCV RBC AUTO: 72.4 FL — LOW (ref 80–100)
MONOCYTES # BLD AUTO: 0.7 K/UL — SIGNIFICANT CHANGE UP (ref 0–0.9)
MONOCYTES # BLD AUTO: 0.8 K/UL — SIGNIFICANT CHANGE UP (ref 0–0.9)
MONOCYTES NFR BLD AUTO: 6.1 % — SIGNIFICANT CHANGE UP (ref 2–14)
MONOCYTES NFR BLD AUTO: 6.5 % — SIGNIFICANT CHANGE UP (ref 2–14)
NEUTROPHILS # BLD AUTO: 8.8 K/UL — HIGH (ref 1.8–7.4)
NEUTROPHILS # BLD AUTO: 8.9 K/UL — HIGH (ref 1.8–7.4)
NEUTROPHILS NFR BLD AUTO: 70.8 % — SIGNIFICANT CHANGE UP (ref 43–77)
NEUTROPHILS NFR BLD AUTO: 76.9 % — SIGNIFICANT CHANGE UP (ref 43–77)
PCO2 BLDA: 66 MMHG — HIGH (ref 32–46)
PCP SPEC-MCNC: SIGNIFICANT CHANGE UP
PH BLDA: 7.2 — CRITICAL LOW (ref 7.35–7.45)
PHOSPHATE SERPL-MCNC: 3.7 MG/DL — SIGNIFICANT CHANGE UP (ref 2.5–4.5)
PHOSPHATE SERPL-MCNC: 5.1 MG/DL — HIGH (ref 2.5–4.5)
PLATELET # BLD AUTO: 145 K/UL — LOW (ref 150–400)
PLATELET # BLD AUTO: 190 K/UL — SIGNIFICANT CHANGE UP (ref 150–400)
PLATELET # BLD AUTO: 192 K/UL — SIGNIFICANT CHANGE UP (ref 150–400)
PLATELET # BLD AUTO: 206 K/UL — SIGNIFICANT CHANGE UP (ref 150–400)
PO2 BLDA: 263 MMHG — HIGH (ref 74–108)
POTASSIUM SERPL-MCNC: 4.1 MMOL/L — SIGNIFICANT CHANGE UP (ref 3.5–5.3)
POTASSIUM SERPL-MCNC: 4.3 MMOL/L — SIGNIFICANT CHANGE UP (ref 3.5–5.3)
POTASSIUM SERPL-MCNC: 4.6 MMOL/L — SIGNIFICANT CHANGE UP (ref 3.5–5.3)
POTASSIUM SERPL-MCNC: 5.1 MMOL/L — SIGNIFICANT CHANGE UP (ref 3.5–5.3)
POTASSIUM SERPL-SCNC: 4.1 MMOL/L — SIGNIFICANT CHANGE UP (ref 3.5–5.3)
POTASSIUM SERPL-SCNC: 4.3 MMOL/L — SIGNIFICANT CHANGE UP (ref 3.5–5.3)
POTASSIUM SERPL-SCNC: 4.6 MMOL/L — SIGNIFICANT CHANGE UP (ref 3.5–5.3)
POTASSIUM SERPL-SCNC: 5.1 MMOL/L — SIGNIFICANT CHANGE UP (ref 3.5–5.3)
PROT SERPL-MCNC: 6.5 G/DL — SIGNIFICANT CHANGE UP (ref 6–8.3)
PROT SERPL-MCNC: 6.9 G/DL — SIGNIFICANT CHANGE UP (ref 6–8.3)
PROT SERPL-MCNC: 7.2 G/DL — SIGNIFICANT CHANGE UP (ref 6–8.3)
PROTHROM AB SERPL-ACNC: 11.2 SEC — SIGNIFICANT CHANGE UP (ref 9.8–12.7)
PROTHROM AB SERPL-ACNC: 11.3 SEC — SIGNIFICANT CHANGE UP (ref 9.8–12.7)
PROTHROM AB SERPL-ACNC: 11.8 SEC — SIGNIFICANT CHANGE UP (ref 9.8–12.7)
RBC # BLD: 5.15 M/UL — SIGNIFICANT CHANGE UP (ref 4.2–5.8)
RBC # BLD: 6.07 M/UL — HIGH (ref 4.2–5.8)
RBC # BLD: 6.15 M/UL — HIGH (ref 4.2–5.8)
RBC # BLD: 6.16 M/UL — HIGH (ref 4.2–5.8)
RBC # FLD: 13.5 % — SIGNIFICANT CHANGE UP (ref 10.3–14.5)
RBC # FLD: 13.8 % — SIGNIFICANT CHANGE UP (ref 10.3–14.5)
RBC # FLD: 13.8 % — SIGNIFICANT CHANGE UP (ref 10.3–14.5)
RBC # FLD: 14.2 % — SIGNIFICANT CHANGE UP (ref 10.3–14.5)
RH IG SCN BLD-IMP: NEGATIVE — SIGNIFICANT CHANGE UP
SAO2 % BLDA: 99 % — HIGH (ref 92–96)
SODIUM SERPL-SCNC: 130 MMOL/L — LOW (ref 135–145)
SODIUM SERPL-SCNC: 132 MMOL/L — LOW (ref 135–145)
SODIUM SERPL-SCNC: 133 MMOL/L — LOW (ref 135–145)
SODIUM SERPL-SCNC: 134 MMOL/L — LOW (ref 135–145)
T3 SERPL-MCNC: 98 NG/DL — SIGNIFICANT CHANGE UP (ref 80–200)
T4 AB SER-ACNC: 6.1 UG/DL — SIGNIFICANT CHANGE UP (ref 4.6–12)
TOTAL CHOLESTEROL/HDL RATIO MEASUREMENT: 3.1 RATIO — LOW (ref 3.4–9.6)
TOTAL CHOLESTEROL/HDL RATIO MEASUREMENT: 4.1 RATIO — SIGNIFICANT CHANGE UP (ref 3.4–9.6)
TRIGL SERPL-MCNC: 293 MG/DL — HIGH (ref 10–149)
TRIGL SERPL-MCNC: 402 MG/DL — HIGH (ref 10–149)
TROPONIN I SERPL-MCNC: <0.015 NG/ML — SIGNIFICANT CHANGE UP (ref 0–0.04)
TROPONIN T, HIGH SENSITIVITY RESULT: 8 NG/L — SIGNIFICANT CHANGE UP (ref 0–51)
TSH SERPL-MCNC: 2.95 UIU/ML — SIGNIFICANT CHANGE UP (ref 0.27–4.2)
WBC # BLD: 11.6 K/UL — HIGH (ref 3.8–10.5)
WBC # BLD: 12.4 K/UL — HIGH (ref 3.8–10.5)
WBC # BLD: 13.2 K/UL — HIGH (ref 3.8–10.5)
WBC # BLD: 9.5 K/UL — SIGNIFICANT CHANGE UP (ref 3.8–10.5)
WBC # FLD AUTO: 11.6 K/UL — HIGH (ref 3.8–10.5)
WBC # FLD AUTO: 12.4 K/UL — HIGH (ref 3.8–10.5)
WBC # FLD AUTO: 13.2 K/UL — HIGH (ref 3.8–10.5)
WBC # FLD AUTO: 9.5 K/UL — SIGNIFICANT CHANGE UP (ref 3.8–10.5)

## 2018-09-23 PROCEDURE — 43752 NASAL/OROGASTRIC W/TUBE PLMT: CPT

## 2018-09-23 PROCEDURE — 93005 ELECTROCARDIOGRAM TRACING: CPT

## 2018-09-23 PROCEDURE — 99291 CRITICAL CARE FIRST HOUR: CPT | Mod: 25

## 2018-09-23 PROCEDURE — 0042T: CPT

## 2018-09-23 PROCEDURE — 70450 CT HEAD/BRAIN W/O DYE: CPT | Mod: 26,59

## 2018-09-23 PROCEDURE — 99285 EMERGENCY DEPT VISIT HI MDM: CPT | Mod: 25

## 2018-09-23 PROCEDURE — 82962 GLUCOSE BLOOD TEST: CPT

## 2018-09-23 PROCEDURE — 80307 DRUG TEST PRSMV CHEM ANLYZR: CPT

## 2018-09-23 PROCEDURE — 85027 COMPLETE CBC AUTOMATED: CPT

## 2018-09-23 PROCEDURE — 71045 X-RAY EXAM CHEST 1 VIEW: CPT | Mod: 26,76

## 2018-09-23 PROCEDURE — 61645 PERQ ART M-THROMBECT &/NFS: CPT | Mod: RT

## 2018-09-23 PROCEDURE — 70498 CT ANGIOGRAPHY NECK: CPT | Mod: 26

## 2018-09-23 PROCEDURE — 99292 CRITICAL CARE ADDL 30 MIN: CPT | Mod: 25

## 2018-09-23 PROCEDURE — 36620 INSERTION CATHETER ARTERY: CPT

## 2018-09-23 PROCEDURE — 84484 ASSAY OF TROPONIN QUANT: CPT

## 2018-09-23 PROCEDURE — 70496 CT ANGIOGRAPHY HEAD: CPT | Mod: 26

## 2018-09-23 PROCEDURE — 80053 COMPREHEN METABOLIC PANEL: CPT

## 2018-09-23 PROCEDURE — 99292 CRITICAL CARE ADDL 30 MIN: CPT

## 2018-09-23 PROCEDURE — 70496 CT ANGIOGRAPHY HEAD: CPT

## 2018-09-23 PROCEDURE — 85610 PROTHROMBIN TIME: CPT

## 2018-09-23 PROCEDURE — 70450 CT HEAD/BRAIN W/O DYE: CPT

## 2018-09-23 PROCEDURE — 93970 EXTREMITY STUDY: CPT | Mod: 26

## 2018-09-23 PROCEDURE — 70498 CT ANGIOGRAPHY NECK: CPT

## 2018-09-23 PROCEDURE — 85730 THROMBOPLASTIN TIME PARTIAL: CPT

## 2018-09-23 PROCEDURE — 70450 CT HEAD/BRAIN W/O DYE: CPT | Mod: 26,77

## 2018-09-23 RX ORDER — CALCIUM GLUCONATE 100 MG/ML
2 VIAL (ML) INTRAVENOUS ONCE
Qty: 0 | Refills: 0 | Status: COMPLETED | OUTPATIENT
Start: 2018-09-23 | End: 2018-09-23

## 2018-09-23 RX ORDER — INSULIN LISPRO 100/ML
VIAL (ML) SUBCUTANEOUS EVERY 6 HOURS
Qty: 0 | Refills: 0 | Status: DISCONTINUED | OUTPATIENT
Start: 2018-09-23 | End: 2018-09-24

## 2018-09-23 RX ORDER — DOCUSATE SODIUM 100 MG
100 CAPSULE ORAL THREE TIMES A DAY
Qty: 0 | Refills: 0 | Status: DISCONTINUED | OUTPATIENT
Start: 2018-09-23 | End: 2018-09-23

## 2018-09-23 RX ORDER — SODIUM CHLORIDE 9 MG/ML
1000 INJECTION INTRAMUSCULAR; INTRAVENOUS; SUBCUTANEOUS
Qty: 0 | Refills: 0 | Status: DISCONTINUED | OUTPATIENT
Start: 2018-09-23 | End: 2018-09-23

## 2018-09-23 RX ORDER — SODIUM CHLORIDE 5 G/100ML
1000 INJECTION, SOLUTION INTRAVENOUS
Qty: 0 | Refills: 0 | Status: DISCONTINUED | OUTPATIENT
Start: 2018-09-23 | End: 2018-09-24

## 2018-09-23 RX ORDER — PROPOFOL 10 MG/ML
10 INJECTION, EMULSION INTRAVENOUS
Qty: 1000 | Refills: 0 | Status: DISCONTINUED | OUTPATIENT
Start: 2018-09-23 | End: 2018-09-23

## 2018-09-23 RX ORDER — PANTOPRAZOLE SODIUM 20 MG/1
40 TABLET, DELAYED RELEASE ORAL DAILY
Qty: 0 | Refills: 0 | Status: DISCONTINUED | OUTPATIENT
Start: 2018-09-23 | End: 2018-09-24

## 2018-09-23 RX ORDER — DOCUSATE SODIUM 100 MG
100 CAPSULE ORAL
Qty: 0 | Refills: 0 | Status: DISCONTINUED | OUTPATIENT
Start: 2018-09-23 | End: 2018-09-26

## 2018-09-23 RX ORDER — FENTANYL CITRATE 50 UG/ML
25 INJECTION INTRAVENOUS
Qty: 0 | Refills: 0 | Status: DISCONTINUED | OUTPATIENT
Start: 2018-09-23 | End: 2018-09-23

## 2018-09-23 RX ORDER — DEXMEDETOMIDINE HYDROCHLORIDE IN 0.9% SODIUM CHLORIDE 4 UG/ML
0.3 INJECTION INTRAVENOUS
Qty: 200 | Refills: 0 | Status: DISCONTINUED | OUTPATIENT
Start: 2018-09-23 | End: 2018-09-24

## 2018-09-23 RX ORDER — THIAMINE MONONITRATE (VIT B1) 100 MG
100 TABLET ORAL DAILY
Qty: 0 | Refills: 0 | Status: DISCONTINUED | OUTPATIENT
Start: 2018-09-23 | End: 2018-09-26

## 2018-09-23 RX ORDER — SENNA PLUS 8.6 MG/1
2 TABLET ORAL AT BEDTIME
Qty: 0 | Refills: 0 | Status: DISCONTINUED | OUTPATIENT
Start: 2018-09-23 | End: 2018-09-26

## 2018-09-23 RX ORDER — ACETAMINOPHEN 500 MG
1000 TABLET ORAL ONCE
Qty: 0 | Refills: 0 | Status: COMPLETED | OUTPATIENT
Start: 2018-09-23 | End: 2018-09-23

## 2018-09-23 RX ORDER — FOLIC ACID 0.8 MG
1 TABLET ORAL DAILY
Qty: 0 | Refills: 0 | Status: DISCONTINUED | OUTPATIENT
Start: 2018-09-23 | End: 2018-09-26

## 2018-09-23 RX ORDER — FENTANYL CITRATE 50 UG/ML
50 INJECTION INTRAVENOUS
Qty: 0 | Refills: 0 | Status: DISCONTINUED | OUTPATIENT
Start: 2018-09-23 | End: 2018-09-24

## 2018-09-23 RX ADMIN — Medication 2: at 18:59

## 2018-09-23 RX ADMIN — Medication 400 MILLIGRAM(S): at 20:33

## 2018-09-23 RX ADMIN — Medication 1 MILLIGRAM(S): at 22:57

## 2018-09-23 RX ADMIN — FENTANYL CITRATE 50 MICROGRAM(S): 50 INJECTION INTRAVENOUS at 22:20

## 2018-09-23 RX ADMIN — Medication 1000 MILLIGRAM(S): at 20:48

## 2018-09-23 RX ADMIN — SODIUM CHLORIDE 75 MILLILITER(S): 5 INJECTION, SOLUTION INTRAVENOUS at 20:33

## 2018-09-23 RX ADMIN — PANTOPRAZOLE SODIUM 40 MILLIGRAM(S): 20 TABLET, DELAYED RELEASE ORAL at 16:33

## 2018-09-23 RX ADMIN — Medication 100 MILLIGRAM(S): at 22:57

## 2018-09-23 RX ADMIN — FENTANYL CITRATE 50 MICROGRAM(S): 50 INJECTION INTRAVENOUS at 22:35

## 2018-09-23 RX ADMIN — Medication 1 TABLET(S): at 22:57

## 2018-09-23 RX ADMIN — Medication 200 GRAM(S): at 20:57

## 2018-09-23 RX ADMIN — DEXMEDETOMIDINE HYDROCHLORIDE IN 0.9% SODIUM CHLORIDE 9.14 MICROGRAM(S)/KG/HR: 4 INJECTION INTRAVENOUS at 20:33

## 2018-09-23 NOTE — PROGRESS NOTE ADULT - SUBJECTIVE AND OBJECTIVE BOX
HPI:  63 y/o male with past medical history of HTN presented initially to [facility] ED for AMS with suspicion of EtOH intoxication. Patient's mental status did not improve and CT/CTA head was done demonstrating Large area acute Right MCA infarct with occluded proximal M1 segment R MCA. Patient subsequently transferred to [facility] for possible Endovascular intervention. LKN is unknown but suspected to be 11pm (9/22/2018). tPA deferred due to unclear LKN time. (23 Sep 2018 05:05)    SURGERY:   PAST MEDICAL HISTORY:   PAST SURGICAL HISTORY:   FAMILY HISTORY:    ALLERGIES: No Known Allergies    **************************************  **************************************    OVERNIGHT EVENTS: [] None  admitted to nsicu     ROS  Unobtainable due to mental status[x] Negative []  Positives:    ADMISSION SCORES: GCS: HH: MF: NIHSS: RASS: CAM-ICU: ICP:    ICU Vital Signs Last 24 Hrs  T(C): 36.7 (23 Sep 2018 07:00), Max: 36.9 (23 Sep 2018 04:15)  T(F): 98.1 (23 Sep 2018 07:00), Max: 98.5 (23 Sep 2018 04:15)  HR: 67 (23 Sep 2018 11:00) (65 - 89)  BP: 191/102 (23 Sep 2018 04:15) (191/102 - 191/102)  BP(mean): --  ABP: 106/60 (23 Sep 2018 11:00) (102/48 - 151/70)  ABP(mean): 76 (23 Sep 2018 11:00) (65 - 130)  RR: 18 (23 Sep 2018 11:00) (11 - 33)  SpO2: 100% (23 Sep 2018 11:00) (92% - 100%)     09-22 @ 07:01  -  09-23 @ 07:00  --------------------------------------------------------  IN: 70 mL / OUT: 125 mL / NET: -55 mL    09-23 @ 07:01  -  09-23 @ 11:15  --------------------------------------------------------  IN: 280 mL / OUT: 190 mL / NET: 90 mL       Mode: AC/ CMV (Assist Control/ Continuous Mandatory Ventilation)  RR (machine): 16  TV (machine): 600  FiO2: 100  PEEP: 8  ITime: 1  MAP: 15  PIP: 36      DEVICES: [] Restraints [] ALIYAH/HMV []LD [x] ET tube [] Trach [] Chest Tube [x] A-line [x] Strong [] NGT [] Rectal Tube [] EVD [] CVL  [] ICP/LiCOx    NEUROIMAGING:     EEG REPORT:     MEDICATIONS:  sodium chloride 0.9%. 1000 milliLiter(s) IV Continuous <Continuous>      PHYSICAL EXAM:  General: intubated  Neurological: eyes open with stimlation; right gaze preference; shows 2 fingers with right hand; left arm plegic; left leg triple flexion  pupils=  Lungs: clear  Heart: regular  Abdomen: soft  Extremities: no edema  Skin: no rash      LABS:                        12.9   13.2  )-----------( 206      ( 23 Sep 2018 08:33 )             42.5    09-23    133<L>  |  94<L>  |  10  ----------------------------<  154<H>  4.6   |  28  |  0.90    Ca    8.7      23 Sep 2018 08:33  Phos  5.1     09-23  Mg     2.2     09-23    TPro  6.9  /  Alb  4.2  /  TBili  0.7  /  DBili  x   /  AST  36  /  ALT  24  /  AlkPhos  76  09-23   ABG - ( 23 Sep 2018 08:17 )  pH, Arterial: 7.20  pH, Blood: x     /  pCO2: 66    /  pO2: 263   / HCO3: 25    / Base Excess: -4.1  /  SaO2: 99               Lipids and LFTs 09-23 @ 04:18  --  --  --  --  --  24  4.2  76  36  --  0.7  --  6.9

## 2018-09-23 NOTE — CHART NOTE - NSCHARTNOTEFT_GEN_A_CORE
Interventional Neuro Radiology  Pre-Procedure Note     This is a 62y ____ hand dominant Male      HPI:      NIH Stroke Scale:    Allergies: No Known Allergies      PAST MEDICAL & SURGICAL HISTORY:      Social History:     FAMILY HISTORY:        Current Medications:     Labs:               HCG:     Blood Bank:   Blood Available Until:    Assessment/Plan:   This is a 62y ____ hand dominant Male  presents with   Patient presents to neuro-IR for selective cerebral angiography and possible endovascular stroke intervention.   Procedure, goals, risks, benefits and alternatives were discussed with patient and patient's family.   Risks include but are not limited to stroke, vessel injury, hemorrhage,  groin hematoma.  All questions were  answered. Patient's family signed appropriate consent and consent is in the chart. Interventional Neuro Radiology  Pre-Procedure Note       HPI:  This is a 62 year-old  male with past medical history of HTN presented with AMS. Upon further evaluation by neurology he was found with mild left facial droop, left upper and lower extremity weakness, significant dysarthria and left hemibody hypoesthesia, NIHSS=8. Head CT showed area of hypodensity at right fronto parietal lobe and acute/ subacute hypodensity at the inferior temporal lobe middle cerebral artery territory, (ASPECTS score deemed to be 5). CT angiography demonstrated right middle cerebral artery proximal M1 segment occlusion. Patient not candidate for IV-rtPA given unknown last known well. He was transferred to [facility] for evaluation for endovascular therapy. CT perfusion showed a small core infarct with large area of penumbra. He was deemed candidate for endovascular therapy with mechanical thrombectomy.      NIH Stroke Scale: 10    1A: Level of consciousness —> 0 = Alert; keenly responsive  1B: Ask month and age —> 0 = Both questions right  1C: 'Blink eyes' & 'squeeze hands' —> 0 = Performs both tasks  2: Horizontal extraocular movements —> 0 = Normal  3: Visual fields —> 0 = No visual loss  4: Facial palsy —> 1 = Minor paralysis (flat nasolabial fold, smile asymetry)  5A: Left arm motor drift —> 4 = No movement  5B: Right arm motor drift —> 0 = No drift for 10 seconds  6A: Left leg motor drift —> 2 = Drift, hits bed  6B: Right leg motor drift —> 0 = No drift for 5 seconds  7: Limb Ataxia —> 0 = No ataxia  8: Sensation —> 2 = Complete loss: cannot sense being touched at all  9: Language/aphasia —> 0 = Normal; no aphasia  10: Dysarthria —> 1 = Mild-moderate dysarthria: slurring but can be understood  11: Extinction/inattention —> 0 = No abnormality      Allergies: No Known Allergies      PAST MEDICAL & SURGICAL HISTORY:  Hypertension      Social History: Drinks >6 beers daily      Assessment/Plan:   This is a 62 year-old  male with past medical history of HTN presented with AMS. He was found to have left side weakness, sensory loss and dysarthria, NIHSS=8. Head CT showed area of hypodensity at right fronto parietal lobe and acute/ subacute hypodensity at the inferior temporal lobe middle cerebral artery territory, (ASPECTS score deemed to be 5). CT angiography demonstrated right middle cerebral artery proximal M1 segment occlusion. Patient not candidate for IV-rtPA given unknown last known well. CT perfusion showed a small core infarct with large area of penumbra. He was deemed candidate for endovascular therapy with mechanical thrombectomy.  Patient presents to neuro-IR for selective cerebral angiography and possible endovascular stroke intervention.   Procedure, goals, risks, benefits and alternatives were discussed with patient and patient's family.   Risks include but are not limited to stroke, vessel injury, hemorrhage, groin hematoma.  All questions were  answered. Patient's wife Tresa signed appropriate consent and consent is in the chart.

## 2018-09-23 NOTE — PROGRESS NOTE ADULT - ASSESSMENT
ASSESSMENT/PLAN:  right M1 s/p embolectomy-TICI 2B with multiple risk factors    NEURO:hold propofol; precedex for sedation; fentanyl prn; start 2% at 75/hr;  stroke consulted; mechanism likely secondary to afib  check lipids; a1c  neuro checks q1hr    Activity: [] mobilize as tolerated [] Bedrest [] PT [] OT [] PMNR    PULM: repeat abg and cxr now; change to PS if hypercapnia has resolved    CV: echo  SBP goal 100-180; check enzymes; ecg  cardiology consulted    RENAL: cr normal; DC chanel  Fluids: 2%    GI: place NGT  Diet: NPO fopr now  GI prophylaxis [] not indicated [x] PPI [] other:  Bowel regimen [x] colace [x] senna [] other:    ENDO: hgba1c = 6.6; start SSI and checks FSBS  Goal euglycemia (-180)    HEME/ONC:  VTE prophylaxis: [x] SCDs [] chemoprophylaxis [x] high risk of DVT/PE on admission due to: stroke  will check LE dopplers for suspected DVT on admission  small contrast extravasation on CT-will start DVT prophylaxis tomorrow    ID: afebrile    MISC:    SOCIAL/FAMILY:  [] awaiting [x] updated at bedside [] family meeting    CODE STATUS:  [x Full Code [] DNR [] DNI [] Palliative/Comfort Care    DISPOSITION:  [x] ICU [] Stroke Unit [] Floor [] EMU [] RCU [] PCU    [x] Patient is at high risk of neurologic deterioration/death due to: acute ischemic stroke, afib, brain compression, cerebral edema    Time seen:  Time spent: _45__ [x] critical care minutes

## 2018-09-23 NOTE — CONSULT NOTE ADULT - SUBJECTIVE AND OBJECTIVE BOX
CHIEF COMPLAINT:Patient is a 62y old  Male who presents with a chief complaint of R MCA Infarct now s/p Endovascular (23 Sep 2018 05:05)      HISTORY OF PRESENT ILLNESS:    ROS is limited as pt currently sedated on ventilator.   history from chart and family   62 male with HTN admitted with AMS found to have R mCA cva s/p thrombectomy       PAST MEDICAL & SURGICAL HISTORY:  HTN        MEDICATIONS:    reviewed on sunrise           sodium chloride 0.9%. 1000 milliLiter(s) IV Continuous <Continuous>      FAMILY HISTORY:      Non-contributory    SOCIAL HISTORY:    etoh  Allergies    No Known Allergies    Intolerances    	    REVIEW OF SYSTEMS:  as above  The rest of the 14 points ROS reviewed and except above they are unremarkable.        PHYSICAL EXAM:  T(C): 36.7 (09-23-18 @ 07:00), Max: 36.9 (09-23-18 @ 04:15)  HR: 65 (09-23-18 @ 09:00) (65 - 89)  BP: 191/102 (09-23-18 @ 04:15) (191/102 - 191/102)  RR: 21 (09-23-18 @ 09:00) (15 - 33)  SpO2: 98% (09-23-18 @ 09:00) (92% - 100%)  Wt(kg): --  I&O's Summary    22 Sep 2018 07:01  -  23 Sep 2018 07:00  --------------------------------------------------------  IN: 70 mL / OUT: 125 mL / NET: -55 mL    23 Sep 2018 07:01  -  23 Sep 2018 09:48  --------------------------------------------------------  IN: 140 mL / OUT: 0 mL / NET: 140 mL      Appearance: on vent 	  Cardiovascular: Normal S1 S2,    Murmur:   Neck: JVP limited to be evaluated   Respiratory: Lungs few rhonchi   Gastrointestinal:  Soft  Skin: normal   Neuro: limited as pt on vent     LABS/DATA:    TELEMETRY: 	    ECG:  	   	  CARDIAC MARKERS:                        8 <<== 09-23-18 @ 04:18                              12.9   13.2  )-----------( 206      ( 23 Sep 2018 08:33 )             42.5     09-23    133<L>  |  94<L>  |  10  ----------------------------<  154<H>  4.6   |  28  |  0.90    Ca    8.7      23 Sep 2018 08:33  Phos  5.1     09-23  Mg     2.2     09-23    TPro  6.9  /  Alb  4.2  /  TBili  0.7  /  DBili  x   /  AST  36  /  ALT  24  /  AlkPhos  76  09-23    proBNP:   Lipid Profile:   HgA1c:   TSH:

## 2018-09-23 NOTE — STROKE CODE NOTE - SCRIBE SECTION
PROVIDER ASSESSMENT/DIAGNOSTIC ASSESSMENT/THROMBOLYTIC THERAPY/FOR ED PROVIDER ONLY/[facility] STROKES/INITIAL DYSPHAGIA SCREEN/COMPLETE BY RESIDENT OR NURSE/DISPOSITION

## 2018-09-23 NOTE — CHART NOTE - NSCHARTNOTEFT_GEN_A_CORE
The patient is seen and examined via telestroke. In brief he is a 62 year old gentleman who presents with L facial and LUE weakness and dysarthria. His LKN is unclear and he is intoxicated. Found down by neighbor at about 11 PM on 9/22/18. His presumed time of onset is between 6 hours ago His CTA has an RM1 occlusion with R parietal hypodensity and earlier signs of MCA infarction. Exam with moderate dysarthria L facial L UE 0/5. His NIHSS is about 8. Will defer IVtpa given unclear LKW and evidence of subacute infarct on CT. On transfer to NS perfusion with large penumbra. Discussed with Dr. Carlton plan for intervention.     No family present two physician consent needed. I concur that endovascular is indicated and potentially life saving in this patient with RM1 occlusion and large perfusion deficit.

## 2018-09-23 NOTE — ED ADULT NURSE NOTE - CHIEF COMPLAINT QUOTE
biba from home with c/o s/p fall while fixing something in his house,etoh as per ems ,fs 132 as per ems

## 2018-09-23 NOTE — PROGRESS NOTE ADULT - ASSESSMENT
R MCA stroke status post R M1 thrombectomy  - Stroke w/u  - CT head in AM  - ASA if heme versus contrast stable/improved on imaging in AM  - Wean to extubate    35 minutes critical care time

## 2018-09-23 NOTE — CHART NOTE - NSCHARTNOTEFT_GEN_A_CORE
Interventional Neuro Radiology  Pre-Procedure Note     HPI:  63 y/o male with past medical history of HTN presented initially to [facility] ED for AMS with suspicion of EtOH intoxication. Patient's mental status did not improve and CT/CTA head was done demonstrating Large area acute Right MCA infarct with occluded proximal M1 segment R MCA. Patient subsequently transferred to [facility] for possible Endovascular intervention. LKN is unknown but suspected to be 11pm (9/22/2018). tPA deferred due to unclear LKN time. (23 Sep 2018 05:05)      Neuro Exam: Awake and alert, oriented x3, fluent, normal naming and repetition, follows 3 step commands. Extraocular movements intact, no nystagmus, visual fields full, face symmetric, tongue midline. No drift, 5/5 power x 4 extremities. Normal sensation to LT. Normal finger-to-nose and rapid alternating movements.    PAST MEDICAL & SURGICAL HISTORY:      Social History:     FAMILY HISTORY:      Allergies: No Known Allergies      Current Medications: sodium chloride 0.9%. 1000 milliLiter(s) IV Continuous <Continuous>      Labs:                         13.8   12.4  )-----------( 190      ( 23 Sep 2018 04:18 )             41.7       09-23    130<L>  |  91<L>  |  10  ----------------------------<  119<H>  4.1   |  30  |  0.88    Ca    9.4      23 Sep 2018 04:18    TPro  6.9  /  Alb  4.2  /  TBili  0.7  /  DBili  x   /  AST  36  /  ALT  24  /  AlkPhos  76  09-23      HCG:     Blood Bank: 09-23-18  A  --  Negative      Assessment/Plan:   This is a 62y ____ hand dominant Male  presents with   Patient presents to neuro-IR for selective cerebral angiography.   Procedure/ risks/ benefits/ goals/ alternatives were explained. Risks include but are not limited to stroke/ vessel injury/ hemorrhage/ groin hematoma. All questions answered and concerns addressed. Informed content obtained from patient____ who verbaliezes /expresses full understanding. Consent placed in chart. Procedure: Selective Cerebral Angiography and Mechanical Thrombectomy    Pre-Procedure Diagnosis: Right middle cerebral artery M1 segment occlusion  Post-Procedure Diagnosis: Successful revascularization of right middle cerebral artery occlusion      : Justin Koo MD    Assistants: Lorene Carlton MD, Gautam Cash    RN: Miguel Simpson    Anesthesia: Hardik Poon MD (general anesthesia)    Sheath: 6 Tongan NeuronMax    Preliminary Report:  Under general anesthesia, using a 6Fr NeuroMax 90 long sheath to the right groin, examination of right internal carotid artery via selective cerebral angiography demonstrates proximal middle cerebral artery M1 segment occlusion. Under a  triaxial system a stent retriever was deployed across the occlusion and after the first pull partial recanalization was obtained. The M2 inferior division was still occluded. The stent retriever was deployed across the occluded area and after pulling the clot successful recanalization was attain. Resultant reperfusion TICI 2b(Official note to follow).  Patient tolerated procedure well, vital signs stable, hemodynamically stable, remained intubated as per anesthesia recommendations. Results discussed with neurosurgery patient's family. Groin sheath was discontinued using manual compression that was held to hemostasis. No active bleeding or hematoma observed. Quick clot and safeguard balloon dressing applied. Patient transferred to NSCU for further care and monitoring. Procedure: Selective Cerebral Angiography and Mechanical Thrombectomy    Pre-Procedure Diagnosis: Right middle cerebral artery M1 segment occlusion  Post-Procedure Diagnosis: Successful revascularization of right middle cerebral artery occlusion      : Justin Koo MD    Assistants: Lorene Carlton MD, Gautam Cash MD    RN: Miguel Simpson    Anesthesia: Hardik Poon MD (general anesthesia)    Sheath: 6 Turkish NeuronMax    Preliminary Report:  Under general anesthesia, using a 6Fr NeuroMax 90 long sheath to the right groin, examination of right internal carotid artery via selective cerebral angiography demonstrates proximal middle cerebral artery M1 segment occlusion. Under a  triaxial system a stent retriever was deployed across the occlusion and after the first pull partial recanalization was obtained. The M2 inferior division was still occluded. The stent retriever was deployed across the occluded area and after pulling the clot successful recanalization was attain. Resultant reperfusion TICI 2b(Official note to follow).  Patient tolerated procedure well, vital signs stable, hemodynamically stable, remained intubated as per anesthesia recommendations. Results discussed with neurosurgery patient's family. Groin sheath was discontinued using manual compression that was held to hemostasis. No active bleeding or hematoma observed. Quick clot and safeguard balloon dressing applied. Patient transferred to NSCU for further care and monitoring.

## 2018-09-23 NOTE — ED PROVIDER NOTE - OBJECTIVE STATEMENT
61yo M w HTN and high cholesterol, BIBEMS for poss alcohol intox, found down on the floor by neighbor, unable to get up. Pt states he had 6 beers before, felt weak, lower himself down to the ground but was unable to get back up. No trauma. Denies any other stx.

## 2018-09-23 NOTE — CONSULT NOTE ADULT - ASSESSMENT
Acute CVA  HTN    permissive hypertension as per neurology   echo recently at [facility] unremarkable  obtain AYO  will consider ILR if no source for etiology of cva found

## 2018-09-23 NOTE — ED PROVIDER NOTE - ATTENDING CONTRIBUTION TO CARE
62 yom pmhx hnt presents as transfer from [facility] after noted to have left sided weakness after presenting to ED w cc of ETOH intoxication. unkn last known normal. ct/cta was done at outside ED showing right mca infarct w likely occlusion - transferred to [facility] for poss endovascular intervention. tpa was not given at outside ED due to unk last known normal. pt unable to provide signif hx.     ros: unable to obtain due to ams    Physical Exam:   constitutional - ams, answers yes/ no questions , a&ox2, some dysarthria. follows commands.   head - no external evidence of trauma  cvs - rrr, no murmurs, no peripheral edema  resp - breath sounds clear and equal bilat  gi - abdomen soft and nontender, no rigidity, guarding or rebound, bowel sounds present  msk - moving all extremities spontaneously  neuro - + left facial droop. 2/5 strength left upper, 3/5 lower. right upper and lower 5/5.   skin- no jaundice, warm and dry  psych - mood and affect wnl, no apparent risk to self or others     code stroke called on arrival - ct/cta reviewed from outisde ED. ct perfusion ordered in concert w neuro recs. will admit for likely neurointerventional therapy. LORI Sumner MD

## 2018-09-23 NOTE — ED PROVIDER NOTE - MEDICAL DECISION MAKING DETAILS
61yo M w HTN and high cholesterol in the ER for alcohol intox and L sided hemiparesis. Stroke code called at 1.01. Able to protect airway, stable

## 2018-09-23 NOTE — H&P ADULT - ASSESSMENT
63 y/o male with past medical history of HTN presented initially to [facility] ED for AMS with suspicion of EtOH intoxication. Patient's mental status did not improve and CT/CTA head was done demonstrating Large area acute Right MCA infarct with occluded proximal M1 segment R MCA. Patient subsequently transferred to [facility] for possible Endovascular intervention. LKN is unknown but suspected to be 11pm (9/22/2018). tPA deferred due to unclear LKN time. NIHSS 12. Pre-MRS 0. Patient is a candidate endovascular procedure. CT Perfusion demonstrates Large right-sided penumbra  with a small infarct in the large mismatch   volume.     Impression: Acute R MCA stroke with R M1 Occlusion  [] Endovascular Mechanical Thrombectomy   [] Admit to STROKE Service (Dr. Wero Lewis) in the NSCU  [] BP goal<140/90 mmHg in this setting of recent successful revascularization as per protocol  [] MRI brain without contrast / MRA head and neck with and without contrast once extubated and medically stable  [] SCDs for DVT prophylaxis in the interim  [] Lipitor 80 mg at bedtime after establishing enteral access or passing swallow evaluation  [] TTE with bubble study and telemetry to screen for possible cardiac source of embolism  [] Prolonged cardiac monitoring with ICM to screen for occult cardiac arrythmia being the cardiac source of embolism, to be considered based on results of above mentioned tests   [] HbA1C and LDL, continue with aggressive vascular risk factors modifications   [] PT/OT/Speech and swallow evaluation   [] Supportive care including management of anticoagulation as per NSCU

## 2018-09-23 NOTE — ED PROVIDER NOTE - NS ED ROS FT
CONSTITUTIONAL: No fevers, no chills  Eyes: no visual changes  Ears: no ear drainage, no ear pain  Nose: no nasal congestion  Mouth/Throat: no sore throat  Cardiovascular: No Chest pain  Respiratory: No SOB  Gastrointestinal: No n/v/d, no abd pain  Genitourinary: no dysuria, no hematuria  SKIN: no rashes.  NEURO: +weakness

## 2018-09-23 NOTE — STROKE CODE NOTE - SUBJECTIVE
The patient is seen and examined via telestroke. In brief he is a 62 year old gentleman who presents with L facial and LUE weakness and dysarthria. His LKN is unclear and he is intoxicated. Found down by neighbor at about 11 PM on 9/22/18. His presumed time of onset is between 6 hours ago His CTA has an RM1 occlusion with R parietal hypodensity and earlier signs of MCA infarction. Exam with moderate dysarthria L facial L UE 0/5. His NIHSS is about 8. Will defer IVtpa given unclear LKW and evidence of subacute infarct on CT. Transfer to NS for perfusion and potential intervention.

## 2018-09-23 NOTE — ED PROVIDER NOTE - PROGRESS NOTE DETAILS
pt evaluated at bedside for AMS/intox, noted to have significant hemiplegia to L side, w L facial droop. Stroke code called at 1.03. Line placed, brought to CT CT w R MCA sign/stroke w a subacute component and poss petechial hemorrhages. D/w Dr Rwoe, Neuro attending, accepted to [facility] for poss endovascular intervention. No ASA/Heparin/tPA recommended at this point. Uncertain onset of stx pt evaluated at bedside for AMS/intox, noted to have significant hemiplegia to L side, w L facial droop. Stroke code called at 1.01. Line placed, brought to CT CT w R MCA sign/stroke w a subacute component and poss petechial hemorrhages. D/w Dr Rowe, Neuro attending, accepted to [facility] for poss endovascular intervention. No ASA/Heparin/tPA recommended at this point given uncertain onset of stx and confounding findings on CT

## 2018-09-23 NOTE — ED ADULT NURSE NOTE - OBJECTIVE STATEMENT
62 y.o M presents to the ED as a transfer from [facility] for MCA. As per EMS, the patient was found "down" at this home at 2300 on 9/22 and it was assumed he was drinking; patient was brought to [facility] for altered mental status. Patient transferred here for neuro evaluation.      Code stroke called at 0310- patient went straight to CT and then to IR.

## 2018-09-23 NOTE — ED PROVIDER NOTE - CHIEF COMPLAINT
The patient is a 62y Male complaining of The patient is a 62y Male complaining of left sided weakness

## 2018-09-23 NOTE — H&P ADULT - NSHPPHYSICALEXAM_GEN_ALL_CORE
Vital Signs Last 24 Hrs  T(C): 36.9 (23 Sep 2018 04:15), Max: 36.9 (23 Sep 2018 04:15)  T(F): 98.5 (23 Sep 2018 04:15), Max: 98.5 (23 Sep 2018 04:15)  HR: 89 (23 Sep 2018 04:15) (89 - 89)  BP: 191/102 (23 Sep 2018 04:15) (191/102 - 191/102)  BP(mean): --  RR: 22 (23 Sep 2018 04:15) (22 - 22)  SpO2: 99% (23 Sep 2018 04:15) (99% - 99%)    General Exam:   General appearance: +Mild distress and agitated                  Neurological Exam:  Mental Status: Orientated to self, date and place.  Attention intact.  +Moderate dysarthria. +Impaired Repeating.   Cranial Nerves:   +Left pupil 5mm, Right pupil 3 mm both reactive, EOMI, +No blink to threat on the left. No nystagmus. CN V1-3 intact to light touch. +Left facial droop. Tongue, uvula and palate midline.      Motor:   Tone: normal.                  Strength:     [] Upper extremity                                                                     R        5/5                                               L         0/5  [] Lower extremity                                                                      R        At least antigravity w/o drift                                               L         +Drift but doesn't hit bed  Pronator drift: none                 Dysmetria: unable to assess  Tremor: No resting, postural or action tremor.  No myoclonus.    Sensation: +Hemisensory loss on the L      Gait: Deferred

## 2018-09-23 NOTE — PROCEDURE NOTE - NSPOSTCAREGUIDE_GEN_A_CORE
Instructed patient/caregiver to follow-up with primary care physician/Instructed patient/caregiver regarding signs and symptoms of infection/Verbal/written post procedure instructions were given to patient/caregiver/Keep the cast/splint/dressing clean and dry

## 2018-09-23 NOTE — H&P ADULT - HISTORY OF PRESENT ILLNESS
63 y/o male with past medical history of HTN presented initially to [facility] ED for AMS with suspicion of EtOH intoxication. Patient's mental status did not improve and CT/CTA head was done demonstrating Large area acute Right MCA infarct with occluded proximal M1 segment R MCA. Patient subsequently transferred to [facility] for possible Endovascular intervention. LKN is unknown but suspected to be 11pm (9/22/2018). tPA deferred due to unclear LKN time.

## 2018-09-23 NOTE — CONSULT NOTE ADULT - SUBJECTIVE AND OBJECTIVE BOX
LALIT [id number] is a 62y old  Male who presents with a chief complaint of     HPI: 61 y/o male with past medical history of HTN presented initially to [facility] ED for AMS with suspicion of EtOH intoxication. Patient's mental status did not improve and CT/CTA head was done demonstrating Large area acute Right MCA infarct with occluded proximal M1 segment R MCA. Patient subsequently transferred to [facility] for possible Endovascular intervention. LKN is unknown.           MEDICATIONS  (STANDING):    MEDICATIONS  (PRN):    PAST MEDICAL & SURGICAL HISTORY:    FAMILY HISTORY:    Allergies    No Known Allergies    Intolerances        SHx - No smoking, No ETOH, No drug abuse      Review of Systems:  As per HPI        Vital Signs Last 24 Hrs  T(C): --  T(F): --  HR: --  BP: --  BP(mean): --  RR: --  SpO2: --    General Exam:   General appearance: No acute distress                    Neurological Exam:  Mental Status: Orientated to self, date and place.  Attention intact.  +Mild dysarthria. +Impaired Repeating.   Cranial Nerves:   PERRL, EOMI, VFF, no nystagmus.    CN V1-3 intact to light touch .  No facial asymmetry.  Hearing intact to finger rub bilaterally.  Tongue, uvula and palate midline.  Sternocleidomastoid and Trapezius intact bilaterally.    Motor:   Tone: normal.                  Strength:     [] Upper extremity                      Delt       Bicep    Tricep                                                  R                                                       L           [] Lower extremity                       HF          KE          KF        DF         PF                                               R        At least antigravity w/o drift                                               L         +Drift and hits the bed  Pronator drift: none                 Dysmetria: None to finger-nose-finger or heel-shin-heel  Tremor: No resting, postural or action tremor.  No myoclonus.    Sensation: +Hemisensory loss on the L    Deep Tendon Reflexes:     Biceps          Triceps      BR        Patellar        Ankle         Babinski                                         R       2+                   2+           2+            2+               2+           downgoing                                         L        2+                  2+           2+            2+               2+           downgoing    Gait: Deferred    Other:                Radiology    CT:   MRI  EKG:  tele:  TTE:  EEG: LALIT [id number] is a 62y old  Male who presents with a chief complaint of     HPI: 63 y/o male with past medical history of HTN presented initially to [facility] ED for AMS with suspicion of EtOH intoxication. Patient's mental status did not improve and CT/CTA head was done demonstrating Large area acute Right MCA infarct with occluded proximal M1 segment R MCA. Patient subsequently transferred to [facility] for possible Endovascular intervention. LKN is unknown but suspected to be 11pm (9/22/2018). tPA deferred due to unclear LKN time.          MEDICATIONS  (STANDING):    MEDICATIONS  (PRN):    PAST MEDICAL & SURGICAL HISTORY:    FAMILY HISTORY:    Allergies    No Known Allergies    Intolerances        SHx - No smoking, No ETOH, No drug abuse      Review of Systems:  As per HPI        Vital Signs Last 24 Hrs  T(C): --  T(F): --  HR: --  BP: --  BP(mean): --  RR: --  SpO2: --    General Exam:   General appearance: +Mild distress and agitated                  Neurological Exam:  Mental Status: Orientated to self, date and place.  Attention intact.  +Moderate dysarthria. +Impaired Repeating.   Cranial Nerves:   +Left pupil 5mm, Right pupil 3 mm both reactive, EOMI, +No blink to threat on the left. No nystagmus. CN V1-3 intact to light touch. +Left facial droop. Tongue, uvula and palate midline.      Motor:   Tone: normal.                  Strength:     [] Upper extremity                                                                     R        5/5                                               L         0/5  [] Lower extremity                                                                      R        At least antigravity w/o drift                                               L         +Drift but doesn't hit bed  Pronator drift: none                 Dysmetria: unable to assess  Tremor: No resting, postural or action tremor.  No myoclonus.    Sensation: +Hemisensory loss on the L      Gait: Deferred    Other:                Radiology    CT:   MRI  EKG:  tele:  TTE:  EEG:

## 2018-09-23 NOTE — H&P ADULT - NSHPLABSRESULTS_GEN_ALL_CORE
13.8   12.4  )-----------( 190      ( 23 Sep 2018 04:18 )             41.7   09-23    130<L>  |  91<L>  |  10  ----------------------------<  119<H>  4.1   |  30  |  0.88    Ca    9.4      23 Sep 2018 04:18    TPro  6.9  /  Alb  4.2  /  TBili  0.7  /  DBili  x   /  AST  36  /  ALT  24  /  AlkPhos  76  09-23    < from: CT Perfusion w/ Maps w/ IV Cont (09.23.18 @ 03:51) >    IMPRESSION:  Large right-sided penumbra  with a small infarct in the large mismatch   volume    < end of copied text >

## 2018-09-23 NOTE — PROGRESS NOTE ADULT - SUBJECTIVE AND OBJECTIVE BOX
SUMMARY:  62 year-old male smoker with history of HTN, alcohol abuse and prior TIA who was admitted for R MCA syndrome for which he underwent right M1 thrombectomy with resultant TICI 2b flow.    24 HOUR EVENTS:  Underwent thrombectomy.    VITALS/DATA/ORDERS: [x] Reviewed    EXAMINATION:  Follows on the right, LUE extends, LLE TF, full strength on the right

## 2018-09-23 NOTE — ED PROVIDER NOTE - OBJECTIVE STATEMENT
62 YOM pmh HTN transfer from [facility] with right MCA infarct. Pt initially presented to ED with possible ETOH intoxication. Then was found to have left sided weakness and AMS. Last known normal unknown. Pt had a Right MCA infarct with occluded proximal M1 segment R MCA. Sent to [facility] for possible Endovascular intervention.

## 2018-09-23 NOTE — CONSULT NOTE ADULT - ASSESSMENT
61 y/o male with past medical history of HTN presented initially to [facility] ED for AMS with suspicion of EtOH intoxication. Patient's mental status did not improve and CT/CTA head was done demonstrating Large area acute Right MCA infarct with occluded proximal M1 segment R MCA. Patient subsequently transferred to [facility] for possible Endovascular intervention. LKN is unknown but suspected to be 11pm (9/22/2018). tPA deferred due to unclear LKN time. NIHSS 12. Pre-MRS 0. Patient is a candidate endovascular procedure. CT Perfusion 63 y/o male with past medical history of HTN presented initially to [facility] ED for AMS with suspicion of EtOH intoxication. Patient's mental status did not improve and CT/CTA head was done demonstrating Large area acute Right MCA infarct with occluded proximal M1 segment R MCA. Patient subsequently transferred to [facility] for possible Endovascular intervention. LKN is unknown but suspected to be 11pm (9/22/2018). tPA deferred due to unclear LKN time. NIHSS 12. Pre-MRS 0. Patient is a candidate endovascular procedure. CT Perfusion demonstrates Large right-sided penumbra  with a small infarct in the large mismatch   volume.     Impression: Acute R MCA stroke with R M1 Occlusion    Recommendations:  [] Endovascular Mechanical Thrombectomy   [] Admit to STROKE Service (Dr. Wero Lewis) in the NSCU  [] BP goal<140/90 mmHg in this setting of recent successful revascularization as per protocol  [] MRI brain without contrast / MRA head and neck with and without contrast once extubated and medically stable  [] SCDs for DVT prophylaxis in the interim  [] Lipitor 80 mg at bedtime after establishing enteral access or passing swallow evaluation  [] TTE with bubble study and telemetry to screen for possible cardiac source of embolism  [] Prolonged cardiac monitoring with ICM to screen for occult cardiac arrythmia being the cardiac source of embolism, to be considered based on results of above mentioned tests   [] HbA1C and LDL, continue with aggressive vascular risk factors modifications   [] PT/OT/Speech and swallow evaluation   [] Supportive care including management of anticoagulation as per NSCU

## 2018-09-23 NOTE — H&P ADULT - ATTENDING COMMENTS
VASCULAR NEUROLOGY ATTENDING  The patient is seen and examined the history and imaging are reviewed. I agree with the resident note unless otherwise noted. In brief this is a 62 year-old  male with past medical history of HTN and ETOH abuse. On 9/23/18 he presented to [facility] with AMS and intoxication. Upon further evaluation by the ED he wasn't moving L arm. On telestroke eval he was found with  left facial droop, left upper extremity monoplegia and lower extremity weakness, significant dysarthria and left hemibody hypoesthesia. Head CT showed area of hypodensity at right fronto parietal lobe and acute/ subacute hypodensity at the inferior temporal/parietal lobe middle cerebral artery territory. CT angiography demonstrated right middle cerebral artery proximal M1 segment occlusion. Patient not candidate for IV-tPA given unknown last known well as well as appearance of subacute infarct. He was transferred to [facility] for evaluation for endovascular therapy. CT perfusion showed a small core infarct with large area of penumbra. He was deemed candidate for endovascular therapy with mechanical thrombectomy with TICI 2b recanalization. On exam this AM he is intubated heavy sedation. Minimal noted spontaneous movement of L UE. Impression: Presumed cardio embolism. Will need repeat CT head, MRI brain TTE/AYO. ASA SC for now. Possible ILR. PT/OT/SS evals. Supportive ICU care with sedation wean. [facility] protocol.

## 2018-09-24 LAB
ANION GAP SERPL CALC-SCNC: 8 MMOL/L — SIGNIFICANT CHANGE UP (ref 5–17)
ANION GAP SERPL CALC-SCNC: 9 MMOL/L — SIGNIFICANT CHANGE UP (ref 5–17)
BUN SERPL-MCNC: 11 MG/DL — SIGNIFICANT CHANGE UP (ref 7–23)
BUN SERPL-MCNC: 13 MG/DL — SIGNIFICANT CHANGE UP (ref 7–23)
CALCIUM SERPL-MCNC: 8.4 MG/DL — SIGNIFICANT CHANGE UP (ref 8.4–10.5)
CALCIUM SERPL-MCNC: 8.5 MG/DL — SIGNIFICANT CHANGE UP (ref 8.4–10.5)
CHLORIDE SERPL-SCNC: 106 MMOL/L — SIGNIFICANT CHANGE UP (ref 96–108)
CHLORIDE SERPL-SCNC: 106 MMOL/L — SIGNIFICANT CHANGE UP (ref 96–108)
CO2 SERPL-SCNC: 25 MMOL/L — SIGNIFICANT CHANGE UP (ref 22–31)
CO2 SERPL-SCNC: 26 MMOL/L — SIGNIFICANT CHANGE UP (ref 22–31)
CREAT SERPL-MCNC: 0.82 MG/DL — SIGNIFICANT CHANGE UP (ref 0.5–1.3)
CREAT SERPL-MCNC: 0.86 MG/DL — SIGNIFICANT CHANGE UP (ref 0.5–1.3)
GAS PNL BLDA: SIGNIFICANT CHANGE UP
GLUCOSE SERPL-MCNC: 104 MG/DL — HIGH (ref 70–99)
GLUCOSE SERPL-MCNC: 139 MG/DL — HIGH (ref 70–99)
HCT VFR BLD CALC: 35.2 % — LOW (ref 39–50)
HGB BLD-MCNC: 11.5 G/DL — LOW (ref 13–17)
MAGNESIUM SERPL-MCNC: 2.2 MG/DL — SIGNIFICANT CHANGE UP (ref 1.6–2.6)
MCHC RBC-ENTMCNC: 22.6 PG — LOW (ref 27–34)
MCHC RBC-ENTMCNC: 32.6 GM/DL — SIGNIFICANT CHANGE UP (ref 32–36)
MCV RBC AUTO: 69.3 FL — LOW (ref 80–100)
PHOSPHATE SERPL-MCNC: 2 MG/DL — LOW (ref 2.5–4.5)
PLATELET # BLD AUTO: 137 K/UL — LOW (ref 150–400)
POTASSIUM SERPL-MCNC: 3.9 MMOL/L — SIGNIFICANT CHANGE UP (ref 3.5–5.3)
POTASSIUM SERPL-MCNC: 4.3 MMOL/L — SIGNIFICANT CHANGE UP (ref 3.5–5.3)
POTASSIUM SERPL-SCNC: 3.9 MMOL/L — SIGNIFICANT CHANGE UP (ref 3.5–5.3)
POTASSIUM SERPL-SCNC: 4.3 MMOL/L — SIGNIFICANT CHANGE UP (ref 3.5–5.3)
RBC # BLD: 5.09 M/UL — SIGNIFICANT CHANGE UP (ref 4.2–5.8)
RBC # FLD: 13.8 % — SIGNIFICANT CHANGE UP (ref 10.3–14.5)
SODIUM SERPL-SCNC: 139 MMOL/L — SIGNIFICANT CHANGE UP (ref 135–145)
SODIUM SERPL-SCNC: 141 MMOL/L — SIGNIFICANT CHANGE UP (ref 135–145)
WBC # BLD: 9.5 K/UL — SIGNIFICANT CHANGE UP (ref 3.8–10.5)
WBC # FLD AUTO: 9.5 K/UL — SIGNIFICANT CHANGE UP (ref 3.8–10.5)

## 2018-09-24 PROCEDURE — 70450 CT HEAD/BRAIN W/O DYE: CPT | Mod: 26

## 2018-09-24 PROCEDURE — 93010 ELECTROCARDIOGRAM REPORT: CPT

## 2018-09-24 PROCEDURE — 71045 X-RAY EXAM CHEST 1 VIEW: CPT | Mod: 26

## 2018-09-24 PROCEDURE — 93306 TTE W/DOPPLER COMPLETE: CPT | Mod: 26

## 2018-09-24 PROCEDURE — 99291 CRITICAL CARE FIRST HOUR: CPT

## 2018-09-24 RX ORDER — FLUTICASONE PROPIONATE 50 MCG
1 SPRAY, SUSPENSION NASAL
Qty: 0 | Refills: 0 | Status: DISCONTINUED | OUTPATIENT
Start: 2018-09-24 | End: 2018-10-09

## 2018-09-24 RX ORDER — ATORVASTATIN CALCIUM 80 MG/1
80 TABLET, FILM COATED ORAL AT BEDTIME
Qty: 0 | Refills: 0 | Status: DISCONTINUED | OUTPATIENT
Start: 2018-09-24 | End: 2018-10-09

## 2018-09-24 RX ORDER — ENOXAPARIN SODIUM 100 MG/ML
30 INJECTION SUBCUTANEOUS EVERY 12 HOURS
Qty: 0 | Refills: 0 | Status: DISCONTINUED | OUTPATIENT
Start: 2018-09-24 | End: 2018-10-02

## 2018-09-24 RX ORDER — ASPIRIN/CALCIUM CARB/MAGNESIUM 324 MG
81 TABLET ORAL DAILY
Qty: 0 | Refills: 0 | Status: DISCONTINUED | OUTPATIENT
Start: 2018-09-24 | End: 2018-09-26

## 2018-09-24 RX ORDER — INSULIN LISPRO 100/ML
VIAL (ML) SUBCUTANEOUS
Qty: 0 | Refills: 0 | Status: DISCONTINUED | OUTPATIENT
Start: 2018-09-24 | End: 2018-09-25

## 2018-09-24 RX ORDER — POTASSIUM PHOSPHATE, MONOBASIC POTASSIUM PHOSPHATE, DIBASIC 236; 224 MG/ML; MG/ML
15 INJECTION, SOLUTION INTRAVENOUS ONCE
Qty: 0 | Refills: 0 | Status: COMPLETED | OUTPATIENT
Start: 2018-09-24 | End: 2018-09-25

## 2018-09-24 RX ORDER — INFLUENZA VIRUS VACCINE 15; 15; 15; 15 UG/.5ML; UG/.5ML; UG/.5ML; UG/.5ML
0.5 SUSPENSION INTRAMUSCULAR ONCE
Qty: 0 | Refills: 0 | Status: DISCONTINUED | OUTPATIENT
Start: 2018-09-24 | End: 2018-10-09

## 2018-09-24 RX ORDER — ROBINUL 0.2 MG/ML
0.1 INJECTION INTRAMUSCULAR; INTRAVENOUS ONCE
Qty: 0 | Refills: 0 | Status: COMPLETED | OUTPATIENT
Start: 2018-09-24 | End: 2018-09-24

## 2018-09-24 RX ORDER — IPRATROPIUM/ALBUTEROL SULFATE 18-103MCG
3 AEROSOL WITH ADAPTER (GRAM) INHALATION EVERY 6 HOURS
Qty: 0 | Refills: 0 | Status: DISCONTINUED | OUTPATIENT
Start: 2018-09-24 | End: 2018-10-05

## 2018-09-24 RX ORDER — DEXTROSE MONOHYDRATE, SODIUM CHLORIDE, AND POTASSIUM CHLORIDE 50; .745; 4.5 G/1000ML; G/1000ML; G/1000ML
1000 INJECTION, SOLUTION INTRAVENOUS
Qty: 0 | Refills: 0 | Status: DISCONTINUED | OUTPATIENT
Start: 2018-09-24 | End: 2018-09-29

## 2018-09-24 RX ORDER — SODIUM CHLORIDE 9 MG/ML
2 INJECTION INTRAMUSCULAR; INTRAVENOUS; SUBCUTANEOUS EVERY 6 HOURS
Qty: 0 | Refills: 0 | Status: DISCONTINUED | OUTPATIENT
Start: 2018-09-24 | End: 2018-10-03

## 2018-09-24 RX ADMIN — FENTANYL CITRATE 50 MICROGRAM(S): 50 INJECTION INTRAVENOUS at 02:15

## 2018-09-24 RX ADMIN — Medication 2: at 00:35

## 2018-09-24 RX ADMIN — FENTANYL CITRATE 50 MICROGRAM(S): 50 INJECTION INTRAVENOUS at 02:00

## 2018-09-24 RX ADMIN — ROBINUL 0.1 MILLIGRAM(S): 0.2 INJECTION INTRAMUSCULAR; INTRAVENOUS at 09:59

## 2018-09-24 RX ADMIN — Medication 1 SPRAY(S): at 18:32

## 2018-09-24 RX ADMIN — FENTANYL CITRATE 50 MICROGRAM(S): 50 INJECTION INTRAVENOUS at 05:45

## 2018-09-24 RX ADMIN — ENOXAPARIN SODIUM 30 MILLIGRAM(S): 100 INJECTION SUBCUTANEOUS at 18:30

## 2018-09-24 RX ADMIN — Medication 100 MILLIGRAM(S): at 05:28

## 2018-09-24 RX ADMIN — SODIUM CHLORIDE 2 GRAM(S): 9 INJECTION INTRAMUSCULAR; INTRAVENOUS; SUBCUTANEOUS at 05:29

## 2018-09-24 RX ADMIN — SENNA PLUS 2 TABLET(S): 8.6 TABLET ORAL at 21:23

## 2018-09-24 RX ADMIN — Medication 100 MILLIGRAM(S): at 18:31

## 2018-09-24 RX ADMIN — Medication 100 MILLIGRAM(S): at 18:30

## 2018-09-24 RX ADMIN — ATORVASTATIN CALCIUM 80 MILLIGRAM(S): 80 TABLET, FILM COATED ORAL at 21:23

## 2018-09-24 RX ADMIN — SODIUM CHLORIDE 2 GRAM(S): 9 INJECTION INTRAMUSCULAR; INTRAVENOUS; SUBCUTANEOUS at 18:30

## 2018-09-24 RX ADMIN — FENTANYL CITRATE 50 MICROGRAM(S): 50 INJECTION INTRAVENOUS at 07:45

## 2018-09-24 RX ADMIN — SODIUM CHLORIDE 100 MILLILITER(S): 5 INJECTION, SOLUTION INTRAVENOUS at 05:28

## 2018-09-24 RX ADMIN — DEXMEDETOMIDINE HYDROCHLORIDE IN 0.9% SODIUM CHLORIDE 9.14 MICROGRAM(S)/KG/HR: 4 INJECTION INTRAVENOUS at 05:29

## 2018-09-24 RX ADMIN — Medication 81 MILLIGRAM(S): at 18:31

## 2018-09-24 RX ADMIN — Medication 3 MILLILITER(S): at 23:23

## 2018-09-24 RX ADMIN — Medication 1 TABLET(S): at 18:31

## 2018-09-24 RX ADMIN — Medication 1 MILLIGRAM(S): at 18:30

## 2018-09-24 RX ADMIN — FENTANYL CITRATE 50 MICROGRAM(S): 50 INJECTION INTRAVENOUS at 05:30

## 2018-09-24 RX ADMIN — FENTANYL CITRATE 50 MICROGRAM(S): 50 INJECTION INTRAVENOUS at 07:30

## 2018-09-24 RX ADMIN — Medication 3 MILLILITER(S): at 17:19

## 2018-09-24 NOTE — PROGRESS NOTE ADULT - SUBJECTIVE AND OBJECTIVE BOX
HPI:  61 y/o male with past medical history of HTN presented initially to [facility] ED for AMS with suspicion of EtOH intoxication. Patient's mental status did not improve and CT/CTA head was done demonstrating Large area acute Right MCA infarct with occluded proximal M1 segment R MCA. Patient subsequently transferred to [facility] for possible Endovascular intervention. LKN is unknown but suspected to be 11pm (9/22/2018). tPA deferred due to unclear LKN time. (23 Sep 2018 05:05)    SURGERY:   PAST MEDICAL HISTORY:   PAST SURGICAL HISTORY:   FAMILY HISTORY:    ALLERGIES: No Known Allergies    **************************************    Overnight Events:     ROS: negative [] unable to obtain as patient is comatose/intubated/aphasic []   VITALS:   T(C): 36.8 (09-24-18 @ 03:00), Max: 37.1 (09-23-18 @ 19:00)  HR: 61 (09-24-18 @ 05:00) (59 - 89)  BP: --  RR: 16 (09-24-18 @ 05:00) (11 - 33)  SpO2: 99% (09-24-18 @ 05:00) (92% - 100%)    09-22-18 @ 07:01  -  09-23-18 @ 07:00  --------------------------------------------------------  IN: 70 mL / OUT: 125 mL / NET: -55 mL    09-23-18 @ 07:01  -  09-24-18 @ 06:00  --------------------------------------------------------  IN: 2178.3 mL / OUT: 880 mL / NET: 1298.3 mL      LABS:  ABG - ( 23 Sep 2018 21:43 )  pH, Arterial: 7.42  pH, Blood: x     /  pCO2: 41    /  pO2: 99    / HCO3: 26    / Base Excess: 2.1   /  SaO2: 97                              12.0   9.5   )-----------( 145      ( 23 Sep 2018 21:54 )             35.3     09-23    132<L>  |  99  |  10  ----------------------------<  141<H>  4.3   |  25  |  0.85    Ca    9.2      23 Sep 2018 21:54  Phos  3.7     09-23  Mg     2.2     09-23    TPro  6.5  /  Alb  3.7  /  TBili  0.4  /  DBili  0.2  /  AST  36  /  ALT  22  /  AlkPhos  71  09-23    PT/INR - ( 23 Sep 2018 08:33 )   PT: 11.8 sec;   INR: 1.09 ratio         PTT - ( 23 Sep 2018 08:33 )  PTT:26.7 sec  MEDS:  MEDICATIONS  (STANDING):  dexmedetomidine Infusion 0.3 MICROgram(s)/kG/Hr (9.143 mL/Hr) IV Continuous <Continuous>  docusate sodium Liquid 100 milliGRAM(s) Oral two times a day  folic acid 1 milliGRAM(s) Oral daily  insulin lispro (HumaLOG) corrective regimen sliding scale   SubCutaneous every 6 hours  multivitamin 1 Tablet(s) Oral daily  pantoprazole  Injectable 40 milliGRAM(s) IV Push daily  senna 2 Tablet(s) Oral at bedtime  sodium chloride 2 Gram(s) Oral every 6 hours  sodium chloride 2% . 1000 milliLiter(s) (100 mL/Hr) IV Continuous <Continuous>  thiamine 100 milliGRAM(s) Oral daily      [All pertinent recent Imaging/Reports reviewed]    DEVICES: [] Restraints [] ALIYAH/HMV []LD [] ET tube [] Trach [] A-line [] Strong [] NGT [] Rectal Tube   Mode: AC/ CMV (Assist Control/ Continuous Mandatory Ventilation)  RR (machine): 16  TV (machine): 600  FiO2: 30  PEEP: 5  ITime: 1  MAP: 11  PIP: 27          PHYSICAL EXAM:  General: intubated  Neurological: eyes open with stimlation; right gaze preference; shows 2 fingers with right hand; left arm plegic; left leg triple flexion  pupils=  Lungs: clear  Heart: regular  Abdomen: soft  Extremities: no edema  Skin: no rash HPI:  61 y/o male with past medical history of HTN presented initially to [facility] ED for AMS with suspicion of EtOH intoxication. Patient's mental status did not improve and CT/CTA head was done demonstrating Large area acute Right MCA infarct with occluded proximal M1 segment R MCA. Patient subsequently transferred to [facility] for possible Endovascular intervention. LKN is unknown but suspected to be 11pm (9/22/2018). tPA deferred due to unclear LKN time. (23 Sep 2018 05:05)    SURGERY:   PAST MEDICAL HISTORY:   PAST SURGICAL HISTORY:   FAMILY HISTORY:    ALLERGIES: No Known Allergies    **************************************    Overnight Events:     ROS: negative [] unable to obtain as patient is comatose/intubated/aphasic []   VITALS:   T(C): 36.8 (09-24-18 @ 03:00), Max: 37.1 (09-23-18 @ 19:00)  HR: 61 (09-24-18 @ 05:00) (59 - 89)  BP: --  RR: 16 (09-24-18 @ 05:00) (11 - 33)  SpO2: 99% (09-24-18 @ 05:00) (92% - 100%)    09-22-18 @ 07:01  -  09-23-18 @ 07:00  --------------------------------------------------------  IN: 70 mL / OUT: 125 mL / NET: -55 mL    09-23-18 @ 07:01  -  09-24-18 @ 06:00  --------------------------------------------------------  IN: 2178.3 mL / OUT: 880 mL / NET: 1298.3 mL      LABS:  ABG - ( 23 Sep 2018 21:43 )  pH, Arterial: 7.42  pH, Blood: x     /  pCO2: 41    /  pO2: 99    / HCO3: 26    / Base Excess: 2.1   /  SaO2: 97                              12.0   9.5   )-----------( 145      ( 23 Sep 2018 21:54 )             35.3     09-23    132<L>  |  99  |  10  ----------------------------<  141<H>  4.3   |  25  |  0.85    Ca    9.2      23 Sep 2018 21:54  Phos  3.7     09-23  Mg     2.2     09-23    TPro  6.5  /  Alb  3.7  /  TBili  0.4  /  DBili  0.2  /  AST  36  /  ALT  22  /  AlkPhos  71  09-23    PT/INR - ( 23 Sep 2018 08:33 )   PT: 11.8 sec;   INR: 1.09 ratio         PTT - ( 23 Sep 2018 08:33 )  PTT:26.7 sec  MEDS:  MEDICATIONS  (STANDING):  dexmedetomidine Infusion 0.3 MICROgram(s)/kG/Hr (9.143 mL/Hr) IV Continuous <Continuous>  docusate sodium Liquid 100 milliGRAM(s) Oral two times a day  folic acid 1 milliGRAM(s) Oral daily  insulin lispro (HumaLOG) corrective regimen sliding scale   SubCutaneous every 6 hours  multivitamin 1 Tablet(s) Oral daily  pantoprazole  Injectable 40 milliGRAM(s) IV Push daily  senna 2 Tablet(s) Oral at bedtime  sodium chloride 2 Gram(s) Oral every 6 hours  sodium chloride 2% . 1000 milliLiter(s) (100 mL/Hr) IV Continuous <Continuous>  thiamine 100 milliGRAM(s) Oral daily          PHYSICAL EXAM:  General: intubated  Neurological: eyes open with stimlation; A&O X 3. Moves b/l LE 5/5. LUE 0/5. RUE 5/5  pupils; Lt pupil 3mm, Rt pupil 2mm; reactive  Lungs: clear  Heart: regular  Abdomen: soft  Extremities: no edema  Skin: no rash

## 2018-09-24 NOTE — PROGRESS NOTE ADULT - SUBJECTIVE AND OBJECTIVE BOX
HPI:  61 y/o male with past medical history of HTN presented initially to [facility] ED for AMS with suspicion of EtOH intoxication. Patient's mental status did not improve and CT/CTA head was done demonstrating Large area acute Right MCA infarct with occluded proximal M1 segment R MCA. Patient subsequently transferred to [facility] for possible Endovascular intervention. LKN is unknown but suspected to be 11pm (9/22/2018). tPA deferred due to unclear LKN time. (23 Sep 2018 05:05)    EVENTS:    extubated today,     PHYSICAL EXAM:  General: tachypneic   Neurological: eyes open with stimlation; A&O X 3. Moves b/l LE 5/5. LUE 0/5 distally 2/5 biceps and triceps. RUE 5/5  pupils; Lt pupil 3mm, Rt pupil 2mm; reactive  Lungs: end expiratory wheezing throughout lung fields   Heart: regular  Abdomen: soft  Extremities: no edema  Skin: no rash

## 2018-09-24 NOTE — PROGRESS NOTE ADULT - SUBJECTIVE AND OBJECTIVE BOX
Vital Signs Last 24 Hrs  T(C): 37.1 (23 Sep 2018 19:00), Max: 37.1 (23 Sep 2018 19:00)  T(F): 98.8 (23 Sep 2018 19:00), Max: 98.8 (23 Sep 2018 19:00)  HR: 61 (24 Sep 2018 00:09) (59 - 89)  BP: 191/102 (23 Sep 2018 04:15) (191/102 - 191/102)  BP(mean): --  RR: 16 (24 Sep 2018 00:00) (11 - 33)  SpO2: 100% (24 Sep 2018 00:09) (92% - 100%)    EXAMINATION:  Follows on the right, LUE extends, LLE TF, full strength on the right

## 2018-09-24 NOTE — AIRWAY REMOVAL NOTE  ADULT & PEDS - ARTIFICAL AIRWAY REMOVAL COMMENTS
Pt was extubated as per MD order leak test was positive. Sp02 100% Pt on aerosol mask 40% 12 LPM RN Somesh was present

## 2018-09-24 NOTE — PROGRESS NOTE ADULT - ASSESSMENT
Acute CVA  HTN    permissive hypertension as per neurology   [facility]  obtain AYO  will consider ILR if no source for etiology of cva found   fu with stroke service

## 2018-09-24 NOTE — PROGRESS NOTE ADULT - ASSESSMENT
ASSESSMENT/PLAN:  right M1 s/p embolectomy-TICI 2B with multiple risk factors    NEURO:hold propofol; precedex for sedation; fentanyl prn; start 2% at 75/hr;  stroke consulted; mechanism likely secondary to afib  check lipids; a1c  neuro checks q1hr    Activity: [] mobilize as tolerated [] Bedrest [] PT [] OT [] PMNR    PULM: repeat abg and cxr now; change to PS if hypercapnia has resolved    CV: echo  SBP goal 100-180; check enzymes; ecg  cardiology consulted    RENAL: cr normal; DC chanel  Fluids: 2%    GI: place NGT  Diet: NPO fopr now  GI prophylaxis [] not indicated [x] PPI [] other:  Bowel regimen [x] colace [x] senna [] other:    ENDO: hgba1c = 6.6; start SSI and checks FSBS  Goal euglycemia (-180)    HEME/ONC:  VTE prophylaxis: [x] SCDs [] chemoprophylaxis [x] high risk of DVT/PE on admission due to: stroke  will check LE dopplers for suspected DVT on admission  small contrast extravasation on CT-will start DVT prophylaxis tomorrow    ID: afebrile    MISC:    SOCIAL/FAMILY:  [] awaiting [x] updated at bedside [] family meeting    CODE STATUS:  [x Full Code [] DNR [] DNI [] Palliative/Comfort Care    DISPOSITION:  [x] ICU [] Stroke Unit [] Floor [] EMU [] RCU [] PCU    [x] Patient is at high risk of neurologic deterioration/death due to: acute ischemic stroke, afib, brain compression, cerebral edema    Time seen:  Time spent: _45__ [x] critical care minutes ASSESSMENT/PLAN:  right M1 s/p embolectomy-TICI 2B with multiple risk factors    NEURO:  stroke consulted; mechanism likely secondary to afib  check lipids; a1c  Will start ASA 81, and Atorvastatin 80 mg po qHS  neuro checks q1hr    Activity: [] mobilize as tolerated [] Bedrest [] PT [] OT [] PMNR    PULM: repeat abg and cxr now; change to PS if hypercapnia has resolved    CV: echo  SBP goal 100-180; check enzymes; ecg  cardiology consulted    RENAL: cr normal; DC chanel  Fluids: 2%    GI: place NGT  Diet: NPO fopr now  GI prophylaxis [] not indicated [x] PPI [] other:  Bowel regimen [x] colace [x] senna [] other:    ENDO: hgba1c = 6.6; start SSI and checks FSBS  Goal euglycemia (-180)    HEME/ONC:  VTE prophylaxis: [x] SCDs [] chemoprophylaxis [x] high risk of DVT/PE on admission due to: stroke  will check LE dopplers for suspected DVT on admission  small contrast extravasation on CT-will start DVT prophylaxis tomorrow    ID: afebrile    MISC:    SOCIAL/FAMILY:  [] awaiting [x] updated at bedside [] family meeting    CODE STATUS:  [x Full Code [] DNR [] DNI [] Palliative/Comfort Care    DISPOSITION:  [x] ICU [] Stroke Unit [] Floor [] EMU [] RCU [] PCU    [x] Patient is at high risk of neurologic deterioration/death due to: acute ischemic stroke, afib, brain compression, cerebral edema    Time seen:  Time spent: _45__ [x] critical care minutes ASSESSMENT/PLAN:  right M1 s/p embolectomy-TICI 2B with multiple risk factors    NEURO:  stroke consulted; mechanism likely secondary to afib [Per cardiology, consider loop recorder if no A fib]  check lipids; a1c  Will start ASA 81, and Atorvastatin 80 mg po qHS  neuro checks q1hr  Activity: [X] mobilize as tolerated [] Bedrest [] PT [] OT [] PMNR  Hx of alcohol abuse - On folic acid and thiamine    PULM:   Extubated this AM      CV: echo  SBP goal 100-180;       RENAL: cr normal; DC chanel  Will DC 2%; and start patient on sodium chloride 2g po q6h    GI: place NGT  Diet: DM, Pureed diet w/ thin liquids  GI prophylaxis [X] not indicated [] PPI [] other:  Bowel regimen [x] colace [x] senna [] other:    ENDO: hgba1c = 6.6; start SSI and checks FSBS  Goal euglycemia (-180)    HEME/ONC:  VTE prophylaxis: [x] SCDs [X] chemoprophylaxis - start Lovenox 40 mg sc qHS [x] high risk of DVT/PE on admission due to: stroke  will check LE dopplers for suspected DVT on admission - No DVTs (R common femoral vein not visualized).     ID: afebrile    MISC:    SOCIAL/FAMILY:  [] awaiting [x] updated at bedside [] family meeting    CODE STATUS:  [x Full Code [] DNR [] DNI [] Palliative/Comfort Care    DISPOSITION:  [x] ICU [] Stroke Unit [] Floor [] EMU [] RCU [] PCU    [x] Patient is at high risk of neurologic deterioration/death due to: acute ischemic stroke, afib, brain compression, cerebral edema    Time seen:  Time spent: _45__ [x] critical care minutes

## 2018-09-24 NOTE — PROGRESS NOTE ADULT - SUBJECTIVE AND OBJECTIVE BOX
Subjective: Patient seen and examined. No new events except as noted.     SUBJECTIVE/ROS:  extubated  a bit obtunded       MEDICATIONS:  MEDICATIONS  (STANDING):  ALBUTerol/ipratropium for Nebulization 3 milliLiter(s) Nebulizer every 6 hours  aspirin  chewable 81 milliGRAM(s) Oral daily  atorvastatin 80 milliGRAM(s) Oral at bedtime  docusate sodium Liquid 100 milliGRAM(s) Oral two times a day  enoxaparin Injectable 30 milliGRAM(s) SubCutaneous every 12 hours  folic acid 1 milliGRAM(s) Oral daily  influenza   Vaccine 0.5 milliLiter(s) IntraMuscular once  insulin lispro (HumaLOG) corrective regimen sliding scale   SubCutaneous every 6 hours  multivitamin 1 Tablet(s) Oral daily  senna 2 Tablet(s) Oral at bedtime  sodium chloride 2 Gram(s) Oral every 6 hours  thiamine 100 milliGRAM(s) Oral daily      PHYSICAL EXAM:  T(C): 37.1 (09-24-18 @ 11:00), Max: 37.1 (09-23-18 @ 19:00)  HR: 68 (09-24-18 @ 12:00) (58 - 69)  BP: 150/70  RR: 24 (09-24-18 @ 12:00) (16 - 30)  SpO2: 100% (09-24-18 @ 12:00) (96% - 100%)  Wt(kg): --  I&O's Summary    23 Sep 2018 07:01  -  24 Sep 2018 07:00  --------------------------------------------------------  IN: 2420.9 mL / OUT: 1230 mL / NET: 1190.9 mL    24 Sep 2018 07:01  -  24 Sep 2018 12:33  --------------------------------------------------------  IN: 281.3 mL / OUT: 350 mL / NET: -68.7 mL      JVP: Normal  Neck: supple  Lung: upper airway rhonchi   CV: S1 S2 , Murmur:  Abd: soft  Ext: No edema  neuro: Awake   Psych: flat affect  Skin: normal       LABS/DATA:    CARDIAC MARKERS:                                12.0   9.5   )-----------( 145      ( 23 Sep 2018 21:54 )             35.3     09-24    139  |  106  |  11  ----------------------------<  139<H>  4.3   |  25  |  0.82    Ca    8.4      24 Sep 2018 06:16  Phos  3.7     09-23  Mg     2.2     09-23    TPro  6.5  /  Alb  3.7  /  TBili  0.4  /  DBili  0.2  /  AST  36  /  ALT  22  /  AlkPhos  71  09-23    proBNP:   Lipid Profile:   HgA1c:   TSH:     TELE:  EKG:

## 2018-09-24 NOTE — PROGRESS NOTE ADULT - ASSESSMENT
ASSESSMENT/PLAN:  right M1 s/p embolectomy-TICI 2B with multiple risk factors    NEURO:  neuro checks q1hr  stroke consulted; mechanism likely secondary to afib  check lipids; a1c  started on ASA 81, and Atorvastatin 80 mg po qHS  Activity: [X] mobilize as tolerated [] Bedrest [] PT [] OT [] PMNR  Hx of alcohol abuse - On folic acid and thiamine    PULM:   Extubated this AM, dyspneic - CXR, ABG PO2 69 - placed on high flow - repeat ABG in 1 hr  Duonebs for wheezing    CV: echo  SBP goal 100-180;     RENAL: cr normal; IVF as will be NPO until improved resp status     GI:   Diet: DM, Pureed - NPO as patient chokes with water, formal speech and swallow   GI prophylaxis [X] not indicated [] PPI [] other:  Bowel regimen [x] colace [x] senna [] other:    ENDO: hgba1c = 6.6; ISS   Goal euglycemia (-180)    HEME/ONC:  VTE prophylaxis: [x] SCDs [X] chemoprophylaxis - on Lovenox 40 mg sc qHS   [x] high risk of DVT/PE on admission due to: stroke  No DVTs (R common femoral vein not visualized).     ID: afebrile    SOCIAL/FAMILY:  [] awaiting [x] updated at bedside [] family meeting    CODE STATUS:  [x Full Code [] DNR [] DNI [] Palliative/Comfort Care    DISPOSITION:  [x] ICU [] Stroke Unit [] Floor [] EMU [] RCU [] PCU    [x] Patient is at high risk of neurologic deterioration/death due to: acute ischemic stroke, afib, brain compression, cerebral edema    Time seen:  Time spent: _45__ [x] critical care minutes ASSESSMENT/PLAN:  right M1 s/p embolectomy-TICI 2B with multiple risk factors    NEURO:  neuro checks q1hr  stroke consulted; mechanism likely secondary to afib  started on ASA 81, Atorvastatin 80 mg po qHS  Activity: [X] mobilize as tolerated [] Bedrest [] PT [] OT [] PMNR  Hx of alcohol abuse - On folic acid and thiamine    PULM:   Extubated this AM, dyspneic - CXR, ABG PO2 69 - placed on high flow - repeat ABG in 1 hr  Duonebs for wheezing    CV: echo  SBP goal 100-180;     RENAL: cr normal; IVF as will be NPO until improved resp status     GI:   Diet: DM, Pureed - NPO as patient chokes with water, formal speech and swallow   GI prophylaxis [X] not indicated [] PPI [] other:  Bowel regimen [x] colace [x] senna [] other:    ENDO: hgba1c = 6.6; ISS   Goal euglycemia (-180)    HEME/ONC:  VTE prophylaxis: [x] SCDs [X] chemoprophylaxis - on Lovenox 40 mg sc qHS   [x] high risk of DVT/PE on admission due to: stroke  No DVTs (R common femoral vein not visualized).     ID: afebrile    SOCIAL/FAMILY:  [] awaiting [x] updated at bedside [] family meeting    CODE STATUS:  [x Full Code [] DNR [] DNI [] Palliative/Comfort Care    DISPOSITION:  [x] ICU [] Stroke Unit [] Floor [] EMU [] RCU [] PCU    [x] Patient is at high risk of neurologic deterioration/death due to: acute ischemic stroke, afib, brain compression, cerebral edema    Time seen:  Time spent: _45__ [x] critical care minutes

## 2018-09-25 ENCOUNTER — TRANSCRIPTION ENCOUNTER (OUTPATIENT)
Age: 63
End: 2018-09-25

## 2018-09-25 DIAGNOSIS — J44.9 CHRONIC OBSTRUCTIVE PULMONARY DISEASE, UNSPECIFIED: ICD-10-CM

## 2018-09-25 DIAGNOSIS — F17.200 NICOTINE DEPENDENCE, UNSPECIFIED, UNCOMPLICATED: ICD-10-CM

## 2018-09-25 DIAGNOSIS — I63.9 CEREBRAL INFARCTION, UNSPECIFIED: ICD-10-CM

## 2018-09-25 LAB
ANION GAP SERPL CALC-SCNC: 7 MMOL/L — SIGNIFICANT CHANGE UP (ref 5–17)
BUN SERPL-MCNC: 13 MG/DL — SIGNIFICANT CHANGE UP (ref 7–23)
CALCIUM SERPL-MCNC: 8.6 MG/DL — SIGNIFICANT CHANGE UP (ref 8.4–10.5)
CHLORIDE SERPL-SCNC: 106 MMOL/L — SIGNIFICANT CHANGE UP (ref 96–108)
CO2 SERPL-SCNC: 27 MMOL/L — SIGNIFICANT CHANGE UP (ref 22–31)
CREAT SERPL-MCNC: 0.86 MG/DL — SIGNIFICANT CHANGE UP (ref 0.5–1.3)
GAS PNL BLDA: SIGNIFICANT CHANGE UP
GAS PNL BLDA: SIGNIFICANT CHANGE UP
GLUCOSE SERPL-MCNC: 107 MG/DL — HIGH (ref 70–99)
POTASSIUM SERPL-MCNC: 3.8 MMOL/L — SIGNIFICANT CHANGE UP (ref 3.5–5.3)
POTASSIUM SERPL-SCNC: 3.8 MMOL/L — SIGNIFICANT CHANGE UP (ref 3.5–5.3)
SODIUM SERPL-SCNC: 140 MMOL/L — SIGNIFICANT CHANGE UP (ref 135–145)

## 2018-09-25 PROCEDURE — 99255 IP/OBS CONSLTJ NEW/EST HI 80: CPT

## 2018-09-25 PROCEDURE — 71045 X-RAY EXAM CHEST 1 VIEW: CPT | Mod: 26

## 2018-09-25 PROCEDURE — 99233 SBSQ HOSP IP/OBS HIGH 50: CPT

## 2018-09-25 RX ORDER — ACETAMINOPHEN 500 MG
1000 TABLET ORAL ONCE
Qty: 0 | Refills: 0 | Status: COMPLETED | OUTPATIENT
Start: 2018-09-25 | End: 2018-09-25

## 2018-09-25 RX ORDER — NICOTINE POLACRILEX 2 MG
1 GUM BUCCAL DAILY
Qty: 0 | Refills: 0 | Status: DISCONTINUED | OUTPATIENT
Start: 2018-09-25 | End: 2018-10-09

## 2018-09-25 RX ORDER — CALCIUM GLUCONATE 100 MG/ML
2 VIAL (ML) INTRAVENOUS ONCE
Qty: 0 | Refills: 0 | Status: COMPLETED | OUTPATIENT
Start: 2018-09-25 | End: 2018-09-25

## 2018-09-25 RX ORDER — CALCIUM GLUCONATE 100 MG/ML
1 VIAL (ML) INTRAVENOUS ONCE
Qty: 0 | Refills: 0 | Status: DISCONTINUED | OUTPATIENT
Start: 2018-09-25 | End: 2018-09-25

## 2018-09-25 RX ORDER — INSULIN LISPRO 100/ML
VIAL (ML) SUBCUTANEOUS EVERY 6 HOURS
Qty: 0 | Refills: 0 | Status: DISCONTINUED | OUTPATIENT
Start: 2018-09-25 | End: 2018-09-25

## 2018-09-25 RX ORDER — ACETAMINOPHEN 500 MG
650 TABLET ORAL EVERY 6 HOURS
Qty: 0 | Refills: 0 | Status: DISCONTINUED | OUTPATIENT
Start: 2018-09-25 | End: 2018-10-09

## 2018-09-25 RX ORDER — ASPIRIN/CALCIUM CARB/MAGNESIUM 324 MG
300 TABLET ORAL ONCE
Qty: 0 | Refills: 0 | Status: COMPLETED | OUTPATIENT
Start: 2018-09-25 | End: 2018-09-25

## 2018-09-25 RX ORDER — CALCIUM GLUCONATE 100 MG/ML
2 VIAL (ML) INTRAVENOUS ONCE
Qty: 0 | Refills: 0 | Status: DISCONTINUED | OUTPATIENT
Start: 2018-09-25 | End: 2018-09-25

## 2018-09-25 RX ORDER — BUDESONIDE AND FORMOTEROL FUMARATE DIHYDRATE 160; 4.5 UG/1; UG/1
2 AEROSOL RESPIRATORY (INHALATION)
Qty: 0 | Refills: 0 | Status: DISCONTINUED | OUTPATIENT
Start: 2018-09-25 | End: 2018-10-09

## 2018-09-25 RX ADMIN — Medication 3 MILLILITER(S): at 17:50

## 2018-09-25 RX ADMIN — Medication 1 SPRAY(S): at 05:15

## 2018-09-25 RX ADMIN — Medication 1 SPRAY(S): at 17:19

## 2018-09-25 RX ADMIN — Medication 3 MILLILITER(S): at 12:49

## 2018-09-25 RX ADMIN — ENOXAPARIN SODIUM 30 MILLIGRAM(S): 100 INJECTION SUBCUTANEOUS at 17:17

## 2018-09-25 RX ADMIN — Medication 3 MILLILITER(S): at 07:03

## 2018-09-25 RX ADMIN — SODIUM CHLORIDE 2 GRAM(S): 9 INJECTION INTRAMUSCULAR; INTRAVENOUS; SUBCUTANEOUS at 00:53

## 2018-09-25 RX ADMIN — ENOXAPARIN SODIUM 30 MILLIGRAM(S): 100 INJECTION SUBCUTANEOUS at 05:21

## 2018-09-25 RX ADMIN — Medication 300 MILLIGRAM(S): at 21:36

## 2018-09-25 RX ADMIN — Medication 400 MILLIGRAM(S): at 08:10

## 2018-09-25 RX ADMIN — Medication 200 GRAM(S): at 05:15

## 2018-09-25 RX ADMIN — POTASSIUM PHOSPHATE, MONOBASIC POTASSIUM PHOSPHATE, DIBASIC 62.5 MILLIMOLE(S): 236; 224 INJECTION, SOLUTION INTRAVENOUS at 01:54

## 2018-09-25 RX ADMIN — Medication 1000 MILLIGRAM(S): at 08:30

## 2018-09-25 RX ADMIN — Medication 1 PATCH: at 14:00

## 2018-09-25 NOTE — CONSULT NOTE ADULT - PROBLEM SELECTOR RECOMMENDATION 9
R M1 stroke of unclear etiology  -Given his history of renal cancer, pulmonary nodules and current smoker would check CT chest non-contrast to eval for malignancy, also consider CT A/P  -Patient states his last CT chest was several months ago with his PCP, will need to call for report to compare results

## 2018-09-25 NOTE — PHYSICAL THERAPY INITIAL EVALUATION ADULT - PLANNED THERAPY INTERVENTIONS, PT EVAL
transfer training/GOAL: Pt will perform 10 stairs with or without U HR as needed within 3-4weeks./bed mobility training/gait training

## 2018-09-25 NOTE — PROGRESS NOTE ADULT - SUBJECTIVE AND OBJECTIVE BOX
Vital Signs Last 24 Hrs  T(C): 37.2 (24 Sep 2018 19:00), Max: 37.2 (24 Sep 2018 19:00)  T(F): 99 (24 Sep 2018 19:00), Max: 99 (24 Sep 2018 19:00)  HR: 80 (24 Sep 2018 23:23) (58 - 98)  BP: --  BP(mean): --  RR: 34 (24 Sep 2018 21:00) (16 - 34)  SpO2: 100% (24 Sep 2018 23:23) (95% - 100%)    PHYSICAL EXAM:  Neurological: eyes open with stimlation; A&O X 3. Moves b/l LE 5/5. LUE 0/5 distally 2/5 biceps and triceps. RUE 5/5  pupils; Lt pupil 3mm, Rt pupil 2mm; reactive

## 2018-09-25 NOTE — PROVIDER CONTACT NOTE (OTHER) - ACTION/TREATMENT ORDERED:
Patient was educated on the risk of not receiving certain medications. Patient verbalized understanding. PO medications to be switched to IV until formal speech and swallow evaluation.

## 2018-09-25 NOTE — PROGRESS NOTE ADULT - SUBJECTIVE AND OBJECTIVE BOX
HPI:  63 y/o male with past medical history of HTN presented initially to [facility] ED for AMS with suspicion of EtOH intoxication. Patient's mental status did not improve and CT/CTA head was done demonstrating Large area acute Right MCA infarct with occluded proximal M1 segment R MCA. Patient subsequently transferred to [facility] for possible Endovascular intervention. LKN is unknown but suspected to be 11pm (9/22/2018). tPA deferred due to unclear LKN time. (23 Sep 2018 05:05)    9/24: Patient extubated.     Overnight Events:  Patient was placed on CPAP overnight for suspected SHILO.     ROS: Negative unless specified.    VITALS:   T(C): 37.3 (09-25-18 @ 03:00), Max: 37.3 (09-25-18 @ 03:00)  HR: 81 (09-25-18 @ 07:03) (58 - 100)  BP: --  RR: 14 (09-25-18 @ 06:00) (14 - 39)  SpO2: 100% (09-25-18 @ 07:03) (94% - 100%)    09-24-18 @ 07:01  -  09-25-18 @ 07:00  --------------------------------------------------------  IN: 1291.3 mL / OUT: 2150 mL / NET: -858.7 mL      LABS:  ABG - ( 25 Sep 2018 02:58 )  pH, Arterial: 7.45  pH, Blood: x     /  pCO2: 41    /  pO2: 77    / HCO3: 28    / Base Excess: 4.1   /  SaO2: 96                       11.5   9.5   )-----------( 137      ( 24 Sep 2018 22:14 )             35.2     09-25    140  |  106  |  13  ----------------------------<  107<H>  3.8   |  27  |  0.86    Ca    8.6      25 Sep 2018 05:51  Phos  2.0     09-24  Mg     2.2     09-24    TPro  6.5  /  Alb  3.7  /  TBili  0.4  /  DBili  0.2  /  AST  36  /  ALT  22  /  AlkPhos  71  09-23    PT/INR - ( 23 Sep 2018 08:33 )   PT: 11.8 sec;   INR: 1.09 ratio         PTT - ( 23 Sep 2018 08:33 )  PTT:26.7 sec  MEDS:  MEDICATIONS  (STANDING):  ALBUTerol/ipratropium for Nebulization 3 milliLiter(s) Nebulizer every 6 hours  aspirin  chewable 81 milliGRAM(s) Oral daily  atorvastatin 80 milliGRAM(s) Oral at bedtime  docusate sodium Liquid 100 milliGRAM(s) Oral two times a day  enoxaparin Injectable 30 milliGRAM(s) SubCutaneous every 12 hours  fluticasone propionate 50 MICROgram(s)/spray Nasal Spray 1 Spray(s) Both Nostrils two times a day  folic acid 1 milliGRAM(s) Oral daily  influenza   Vaccine 0.5 milliLiter(s) IntraMuscular once  insulin lispro (HumaLOG) corrective regimen sliding scale   SubCutaneous every 6 hours  multivitamin 1 Tablet(s) Oral daily  nicotine -   7 mG/24Hr(s) Patch 1 patch Transdermal daily  senna 2 Tablet(s) Oral at bedtime  sodium chloride 2 Gram(s) Oral every 6 hours  sodium chloride 0.9% with potassium chloride 20 mEq/L 1000 milliLiter(s) (50 mL/Hr) IV Continuous <Continuous>  thiamine 100 milliGRAM(s) Oral daily        PHYSICAL EXAM:  General: tachypneic   Neurological: eyes open with stimlation; A&O X 3. Moves b/l LE 5/5. LUE 0/5 distally 2/5 biceps and triceps. RUE 5/5  pupils; Lt pupil 3mm, Rt pupil 2mm; reactive  Lungs: end expiratory wheezing throughout lung fields   Heart: regular  Abdomen: soft  Extremities: no edema  Skin: no rash HPI:  61 y/o male with past medical history of HTN presented initially to [facility] ED for AMS with suspicion of EtOH intoxication. Patient's mental status did not improve and CT/CTA head was done demonstrating Large area acute Right MCA infarct with occluded proximal M1 segment R MCA. Patient subsequently transferred to [facility] for possible Endovascular intervention. LKN is unknown but suspected to be 11pm (9/22/2018). tPA deferred due to unclear LKN time. (23 Sep 2018 05:05)    9/24: Patient extubated.     Overnight Events:  Patient was placed on hi-flow overnight.     ROS: Negative unless specified.    VITALS:   T(C): 37.3 (09-25-18 @ 03:00), Max: 37.3 (09-25-18 @ 03:00)  HR: 81 (09-25-18 @ 07:03) (58 - 100)  BP: --  RR: 14 (09-25-18 @ 06:00) (14 - 39)  SpO2: 100% (09-25-18 @ 07:03) (94% - 100%)    09-24-18 @ 07:01  -  09-25-18 @ 07:00  --------------------------------------------------------  IN: 1291.3 mL / OUT: 2150 mL / NET: -858.7 mL      LABS:  ABG - ( 25 Sep 2018 02:58 )  pH, Arterial: 7.45  pH, Blood: x     /  pCO2: 41    /  pO2: 77    / HCO3: 28    / Base Excess: 4.1   /  SaO2: 96                       11.5   9.5   )-----------( 137      ( 24 Sep 2018 22:14 )             35.2     09-25    140  |  106  |  13  ----------------------------<  107<H>  3.8   |  27  |  0.86    Ca    8.6      25 Sep 2018 05:51  Phos  2.0     09-24  Mg     2.2     09-24    TPro  6.5  /  Alb  3.7  /  TBili  0.4  /  DBili  0.2  /  AST  36  /  ALT  22  /  AlkPhos  71  09-23    PT/INR - ( 23 Sep 2018 08:33 )   PT: 11.8 sec;   INR: 1.09 ratio         PTT - ( 23 Sep 2018 08:33 )  PTT:26.7 sec  MEDS:  MEDICATIONS  (STANDING):  ALBUTerol/ipratropium for Nebulization 3 milliLiter(s) Nebulizer every 6 hours  aspirin  chewable 81 milliGRAM(s) Oral daily  atorvastatin 80 milliGRAM(s) Oral at bedtime  docusate sodium Liquid 100 milliGRAM(s) Oral two times a day  enoxaparin Injectable 30 milliGRAM(s) SubCutaneous every 12 hours  fluticasone propionate 50 MICROgram(s)/spray Nasal Spray 1 Spray(s) Both Nostrils two times a day  folic acid 1 milliGRAM(s) Oral daily  influenza   Vaccine 0.5 milliLiter(s) IntraMuscular once  insulin lispro (HumaLOG) corrective regimen sliding scale   SubCutaneous every 6 hours  multivitamin 1 Tablet(s) Oral daily  nicotine -   7 mG/24Hr(s) Patch 1 patch Transdermal daily  senna 2 Tablet(s) Oral at bedtime  sodium chloride 2 Gram(s) Oral every 6 hours  sodium chloride 0.9% with potassium chloride 20 mEq/L 1000 milliLiter(s) (50 mL/Hr) IV Continuous <Continuous>  thiamine 100 milliGRAM(s) Oral daily        PHYSICAL EXAM:  General: tachypneic   Neurological: eyes open with stimlation; A&O X 3. Moves b/l LE 5/5. LUE 0/5. RUE 5/5  PERRL.  Lungs: LCTAB. Anterior auscultation  Heart: regular  Abdomen: soft  Extremities: no edema  Skin: no rash

## 2018-09-25 NOTE — DISCHARGE NOTE ADULT - HOSPITAL COURSE
62 year-old  male with past medical history of HTN and ETOH abuse. On 9/23/18 he presented to [facility] with AMS and intoxication. Upon further evaluation by the ED he wasn't moving L arm. On telestroke eval he was found with  left facial droop, left upper extremity monoplegia and lower extremity weakness, significant dysarthria and left hemibody hypoesthesia. Head CT showed area of hypodensity at right fronto parietal lobe and acute/ subacute hypodensity at the inferior temporal/parietal lobe middle cerebral artery territory. CT angiography demonstrated right middle cerebral artery proximal M1 segment occlusion. Patient not candidate for IV-tPA given unknown last known well as well as appearance of subacute infarct. He was transferred to [facility] for evaluation for endovascular therapy. CT perfusion showed a small core infarct with large area of penumbra. He was deemed candidate for endovascular therapy with mechanical thrombectomy with TICI 2b recanalization. MRI brain subsequently showed right MCA distribution infarct with hemorrhagic transformation (HI 2). TTE did not show any obvious structural cardiac source of embolism. AYO did not show any obvious structural cardiac source of embolism nor showed any evidence of a PFO. 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      Impression:   Cerebral embolism with cerebral infarction. R MCA distribution stroke with mild hemorrhagic concentration (HI 2) - likely etiology being cryptogenic stroke, probably related to embolism from a proximal source like cardiac source of embolism.    Since admission patient has been neurologically overall with improvement, Continued close monitoring for neurologic deterioration, BP goal gradual normotension in the setting of recent successful revascularization, continue with home statin medication if applicable considering likely non-atheroembolic etiology of his stroke and age, repeat fasting lipid profile to better characterize LDL results, Physical therapy/OT - acute TBI.      Recommend aspirin for secondary stroke prevention 62 year-old  male with past medical history of HTN and ETOH abuse. On 9/23/18 he presented to [facility] with AMS and intoxication. Upon further evaluation by the ED he wasn't moving L arm. On telestroke eval he was found with  left facial droop, left upper extremity monoplegia and lower extremity weakness, significant dysarthria and left hemibody hypoesthesia. Head CT showed area of hypodensity at right fronto parietal lobe and acute/ subacute hypodensity at the inferior temporal/parietal lobe middle cerebral artery territory. CT angiography demonstrated right middle cerebral artery proximal M1 segment occlusion. Patient not candidate for IV-tPA given unknown last known well as well as appearance of subacute infarct. He was transferred to [facility] for evaluation for endovascular therapy. CT perfusion showed a small core infarct with large area of penumbra. He was deemed candidate for endovascular therapy with mechanical thrombectomy with TICI 2b recanalization. MRI brain subsequently showed right MCA distribution infarct with hemorrhagic transformation (HI 2). TTE did not show any obvious structural cardiac source of embolism. AYO did not show any obvious structural cardiac source of embolism nor showed any evidence of a PFO. 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      Impression:   Cerebral embolism with cerebral infarction. R MCA distribution stroke with mild hemorrhagic concentration (HI 2) - likely etiology being cryptogenic stroke, probably related to embolism from a proximal source like cardiac source of embolism.    Since admission patient has been neurologically overall with improvement, Continued close monitoring for neurologic deterioration, BP goal gradual normotension in the setting of recent successful revascularization, continue with home statin medication if applicable considering likely non-atheroembolic etiology of his stroke and age, repeat fasting lipid profile to better characterize LDL results, Physical therapy/OT - acute TBI.      Recommend aspirin for secondary stroke prevention. Pancreatic mass visualized on CT of the chest/abdomen/pelvis and EUS attempted but patietn desaturated and will now follow up as outpatient - with Dr. Pearl. Diagnosed with COPD and will require sleep study as an outpatient with Dr. Nguyen. Evaluated by PT/OT and will be discharged to acute rehab. Patient had ILR placed during hospitalization. Patient is stable for discharge to rehav.

## 2018-09-25 NOTE — DISCHARGE NOTE ADULT - PLAN OF CARE
secondary stroke prevention Patient should continue ASA 81mg daily Patient should continue ASA 81mg daily  -Continue all medications  -Follow up with Neurology  -Follow up with Pulmonology  -Follow up with Dr. Mckay Patient should continue ASA 81mg daily  -Continue all medications  -Follow up with Neurology  -Follow up with Pulmonology  Will require sleep study as outpatient  -Follow up with GI as outpatient for Pancreas biopsy  -Follow up with Dr. Mckay

## 2018-09-25 NOTE — PROVIDER CONTACT NOTE (CHANGE IN STATUS NOTIFICATION) - ACTION/TREATMENT ORDERED:
Provider notified, as per provider prior to intubation patient had left facial. No intervention at this time. Will continue to monitor

## 2018-09-25 NOTE — DISCHARGE NOTE ADULT - CARE PROVIDERS DIRECT ADDRESSES
[email or web address],DirectAddress_Unknown,DirectAddress_Unknown,DirectAddress_Unknown

## 2018-09-25 NOTE — PROGRESS NOTE ADULT - ASSESSMENT
ASSESSMENT/PLAN:  right M1 s/p embolectomy-TICI 2B with multiple risk factors    NEURO:  neuro checks q1hr  stroke consulted; mechanism likely secondary to afib  started on ASA 81, Atorvastatin 80 mg po qHS  Activity: [X] mobilize as tolerated [] Bedrest [] PT [] OT [] PMNR  Hx of alcohol abuse - On folic acid and thiamine    PULM:   Extubated this AM, dyspneic - CXR, ABG PO2 69 - placed on high flow - repeat ABG in 1 hr  Duonebs for wheezing    CV: echo  SBP goal 100-180;     RENAL: cr normal; IVF as will be NPO until improved resp status     GI:   Diet: DM, Pureed - NPO as patient chokes with water, formal speech and swallow   GI prophylaxis [X] not indicated [] PPI [] other:  Bowel regimen [x] colace [x] senna [] other:    ENDO: hgba1c = 6.6; ISS   Goal euglycemia (-180)    HEME/ONC:  VTE prophylaxis: [x] SCDs [X] chemoprophylaxis - on Lovenox 40 mg sc qHS   [x] high risk of DVT/PE on admission due to: stroke  No DVTs (R common femoral vein not visualized).     ID: afebrile    SOCIAL/FAMILY:  [] awaiting [x] updated at bedside [] family meeting    CODE STATUS:  [x Full Code [] DNR [] DNI [] Palliative/Comfort Care    DISPOSITION:  [x] ICU [] Stroke Unit [] Floor [] EMU [] RCU [] PCU    [x] Patient is at high risk of neurologic deterioration/death due to: acute ischemic stroke, afib, brain compression, cerebral edema    Time seen:  Time spent: _45__ [x] critical care minutes ASSESSMENT/PLAN:  right M1 s/p embolectomy-TICI 2B with multiple risk factors. Extubated 9/24.     NEURO:  neuro checks q1hr  stroke consulted; mechanism likely secondary to afib  started on ASA 81, Atorvastatin 80 mg po qHS  Activity: [X] mobilize as tolerated [] Bedrest [X] PT [X] OT [] PMNR  Hx of alcohol abuse - On folic acid and thiamine    PULM:   CPAP/BiPAP qHS for suspected SHILO; however secretions may be an issue  Will consult Pulm for assistance with patient's SHILO/secretions  Duonebs for wheezing    CV: echo  SBP goal 100-180;     RENAL: cr normal; IVF as will be NPO     GI:   Diet: NPO; awaiting formal speech and swallow   GI prophylaxis [X] not indicated [] PPI [] other:  Bowel regimen [x] colace [x] senna [] other:    ENDO: hgba1c = 6.6; ISS   Goal euglycemia (-180)    HEME/ONC:  VTE prophylaxis: [x] SCDs [X] chemoprophylaxis - on Lovenox 40 mg sc qHS   [x] high risk of DVT/PE on admission due to: stroke  No DVTs (R common femoral vein not visualized).     ID: afebrile    SOCIAL/FAMILY:  [] awaiting [x] updated at bedside [] family meeting    CODE STATUS:  [x Full Code [] DNR [] DNI [] Palliative/Comfort Care    DISPOSITION:  [] ICU [X] Stroke Unit [] Floor [] EMU [] RCU [] PCU    [x] Patient is at high risk of neurologic deterioration/death due to: acute ischemic stroke, afib, brain compression, cerebral edema    Time seen:  Time spent: _45__ [x] critical care minutes ASSESSMENT/PLAN:  right M1 s/p embolectomy-TICI 2B with multiple risk factors. Extubated 9/24.     NEURO:  neuro checks q1hr  stroke consulted; mechanism likely secondary to afib  started on ASA 81, Atorvastatin 80 mg po qHS  Activity: [X] mobilize as tolerated [] Bedrest [X] PT [X] OT [] PMNR  Hx of alcohol abuse - On folic acid and thiamine    PULM:   CPAP/BiPAP qHS for suspected SHILO; however secretions may be an issue  Will consult Pulm for assistance with patient's SHILO/secretions  Duonebs for wheezing  On flonase for nasal congestion    CV: echo  SBP goal 100-180;     RENAL: cr normal; IVF as will be NPO     GI:   Diet: NPO; awaiting formal speech and swallow   GI prophylaxis [X] not indicated [] PPI [] other:  Bowel regimen [x] colace [x] senna [] other:    ENDO: hgba1c = 6.6; ISS   Goal euglycemia (-180)    HEME/ONC:  VTE prophylaxis: [x] SCDs [X] chemoprophylaxis - on Lovenox 40 mg sc qHS   [x] high risk of DVT/PE on admission due to: stroke  No DVTs (R common femoral vein not visualized).     ID: afebrile    SOCIAL/FAMILY:  [] awaiting [x] updated at bedside [] family meeting    CODE STATUS:  [x Full Code [] DNR [] DNI [] Palliative/Comfort Care    DISPOSITION:  [] ICU [X] Stroke Unit [] Floor [] EMU [] RCU [] PCU    [x] Patient is at high risk of neurologic deterioration/death due to: acute ischemic stroke, afib, brain compression, cerebral edema    Time seen:  Time spent: _45__ [x] critical care minutes ASSESSMENT/PLAN:  right M1 s/p embolectomy-TICI 2B with multiple risk factors. Extubated 9/24.     NEURO:  neuro checks q1hr  stroke consulted; mechanism likely secondary to afib  started on ASA 81, Atorvastatin 80 mg po qHS  Activity: [X] mobilize as tolerated [] Bedrest [X] PT [X] OT [] PMNR  Hx of alcohol abuse - On folic acid and thiamine    PULM:   CPAP/BiPAP qHS for suspected SHILO; however secretions may be an issue  Will consult Pulm for assistance with patient's SHILO/secretions  Duonebs for wheezing  On flonase for nasal congestion    CV: echo  SBP goal 100-180;     RENAL: cr normal; IVF as will be NPO     GI:   Diet: NPO; awaiting formal speech and swallow   GI prophylaxis [X] not indicated [] PPI [] other:  Bowel regimen [x] colace [x] senna [] other:    ENDO: hgba1c = 6.6; ISS   Goal euglycemia (-180)    HEME/ONC:  VTE prophylaxis: [x] SCDs [X] chemoprophylaxis - on Lovenox 40 mg sc qHS   [x] high risk of DVT/PE on admission due to: stroke  No DVTs (R common femoral vein not visualized).     ID: afebrile    SOCIAL/FAMILY:  [] awaiting [x] updated at bedside [] family meeting    CODE STATUS:  [x Full Code [] DNR [] DNI [] Palliative/Comfort Care    DISPOSITION:  [] ICU [X] Stroke Unit [] Floor [] EMU [] RCU [] PCU

## 2018-09-25 NOTE — DISCHARGE NOTE ADULT - MEDICATION SUMMARY - MEDICATIONS TO STOP TAKING
I will STOP taking the medications listed below when I get home from the hospital:    ARIPiprazole 5 mg oral tablet  -- 1 tab(s) by mouth once a day at bedtime    escitalopram 20 mg oral tablet  -- 1 tab(s) by mouth once a day    gabapentin 300 mg oral capsule  -- 1 cap(s) by mouth once a day (at bedtime)

## 2018-09-25 NOTE — DISCHARGE NOTE ADULT - PATIENT PORTAL LINK FT
You can access the [facility] Patient Portal, offered by [facility], by registering with the following website: [email or web address]

## 2018-09-25 NOTE — PROVIDER CONTACT NOTE (OTHER) - SITUATION
Patient is NPO pending speech and swallow evaluation, but will not be seen today as per Speech and Swallow. Patient is refusing NG tube placement for medication administration.

## 2018-09-25 NOTE — PROGRESS NOTE ADULT - ASSESSMENT
62 year-old  male with past medical history of HTN and ETOH abuse. On 9/23/18 he presented to [facility] with AMS and intoxication. Upon further evaluation by the ED he wasn't moving L arm. On telestroke eval he was found with  left facial droop, left upper extremity monoplegia and lower extremity weakness, significant dysarthria and left hemibody hypoesthesia. Head CT showed area of hypodensity at right fronto parietal lobe and acute/ subacute hypodensity at the inferior temporal/parietal lobe middle cerebral artery territory. CT angiography demonstrated right middle cerebral artery proximal M1 segment occlusion. Patient not candidate for IV-tPA given unknown last known well as well as appearance of subacute infarct. He was transferred to [facility] for evaluation for endovascular therapy. CT perfusion showed a small core infarct with large area of penumbra. He was deemed candidate for endovascular therapy with mechanical thrombectomy with TICI 2b recanalization.     Impression: Cerebral embolism with cerebral infarction - R MCA distribution infarct with M1 occlusion.  Etiology unknown, cardioembolic needs to be ruled out.     NEURO: Neurologically with improvement, remains with left hemiparesis.  Continue close monitoring for neurologic deterioration, BP goal <140/90, no need for statin as admission LDL is 17. Physical therapy/OT - recommends acute TBI. Speech eval/treatment - pending.     ANTITHROMBOTIC THERAPY: Aspirin MS    PULMONARY: CXR clear (09.25), protecting airway, saturating well     CARDIOVASCULAR: TTE showed - EF: 63%, Grossly normal left ventricular internal dimensions and wall thicknesses. Grossly hyperdynamic left ventricular systolic function. Reversal of the E-A  waves of the mitral inflow pattern is consistent with diastolic LV dysfunction. Grossly normal right ventricular size and function. Cardiac monitoring without any documented events.                            SBP goal: <140/90    GASTROINTESTINAL:  dysphagia screen - failed, speech and swallow evaluation pending.      Diet: NPO    RENAL: BUN/Cr without acute change, good urine output      Na Goal: Greater than 135     Strong: yes    HEMATOLOGY: H/H without acute change, Platelets 137, no signs or symptoms of active bleeding.      DVT ppx: Heparin s.c [] LMWH [x]     ID: afebrile, no leukocytosis, no signs or symptoms of infection.      OTHER: All questions and concerns addressed.     DISPOSITION: Acute TBI once stable and workup is complete.    CORE MEASURES:        Admission NIHSS: 12     TPA: [] YES [x] NO      LDL/HDL: 17/36     Depression Screen: p     Statin Therapy: yes     Dysphagia Screen: [] PASS [x] FAIL     Smoking [] YES [x] NO      Afib [] YES [x] NO     Stroke Education [] YES [] NO - p 62 year-old  male with past medical history of HTN and ETOH abuse. On 9/23/18 he presented to [facility] with AMS and intoxication. Upon further evaluation by the ED he wasn't moving L arm. On telestroke eval he was found with  left facial droop, left upper extremity monoplegia and lower extremity weakness, significant dysarthria and left hemibody hypoesthesia. Head CT showed area of hypodensity at right fronto parietal lobe and acute/ subacute hypodensity at the inferior temporal/parietal lobe middle cerebral artery territory. CT angiography demonstrated right middle cerebral artery proximal M1 segment occlusion. Patient not candidate for IV-tPA given unknown last known well as well as appearance of subacute infarct. He was transferred to [facility] for evaluation for endovascular therapy. CT perfusion showed a small core infarct with large area of penumbra. He was deemed candidate for endovascular therapy with mechanical thrombectomy with TICI 2b recanalization.     Impression: Cerebral embolism with cerebral infarction - R MCA distribution infarct with M1 occlusion.  Etiology unknown, cardioembolism needs to be ruled out.     NEURO: Neurologically with improvement, remains with left hemiparesis.  Continue close monitoring for neurologic deterioration, BP goal <140/90, no need for statin as admission LDL is 17. Physical therapy/OT - recommends acute TBI. Speech eval/treatment - pending.     ANTITHROMBOTIC THERAPY: Aspirin SD    PULMONARY: CXR clear (09.25), protecting airway, saturating well     CARDIOVASCULAR: TTE showed - EF: 63%, Grossly normal left ventricular internal dimensions and wall thicknesses. Grossly hyperdynamic left ventricular systolic function. Reversal of the E-A  waves of the mitral inflow pattern is consistent with diastolic LV dysfunction. Grossly normal right ventricular size and function. Plan for AYO. Cardiac monitoring without any documented events.                            SBP goal: <140/90    GASTROINTESTINAL:  dysphagia screen - failed, speech and swallow evaluation pending.      Diet: NPO    RENAL: BUN/Cr without acute change, good urine output      Na Goal: Greater than 135     Strong: yes    HEMATOLOGY: H/H without acute change, Platelets 137, no signs or symptoms of active bleeding.      DVT ppx: Heparin s.c [] LMWH [x]     ID: afebrile, no leukocytosis, no signs or symptoms of infection.      OTHER: All questions and concerns addressed.     DISPOSITION: Acute TBI once stable and workup is complete.    CORE MEASURES:        Admission NIHSS: 12     TPA: [] YES [x] NO      LDL/HDL: 17/36     Depression Screen: p     Statin Therapy: yes     Dysphagia Screen: [] PASS [x] FAIL     Smoking [] YES [x] NO      Afib [] YES [x] NO     Stroke Education [] YES [] NO - p

## 2018-09-25 NOTE — DISCHARGE NOTE ADULT - CARE PLAN
Principal Discharge DX:	CVA (cerebral vascular accident)  Goal:	secondary stroke prevention  Assessment and plan of treatment:	Patient should continue ASA 81mg daily Principal Discharge DX:	CVA (cerebral vascular accident)  Goal:	secondary stroke prevention  Assessment and plan of treatment:	Patient should continue ASA 81mg daily  -Continue all medications  -Follow up with Neurology  -Follow up with Pulmonology  -Follow up with Dr. Mckay Principal Discharge DX:	CVA (cerebral vascular accident)  Goal:	secondary stroke prevention  Assessment and plan of treatment:	Patient should continue ASA 81mg daily  -Continue all medications  -Follow up with Neurology  -Follow up with Pulmonology  Will require sleep study as outpatient  -Follow up with GI as outpatient for Pancreas biopsy  -Follow up with Dr. Mckay

## 2018-09-25 NOTE — PHYSICAL THERAPY INITIAL EVALUATION ADULT - PERTINENT HX OF CURRENT PROBLEM, REHAB EVAL
Pt is a 62 y.o. Male s/p embolectomy-TICI 2B. presented initially to [facility] ED for AMS with suspicion of EtOH intoxication. Patient's mental status did not improve and CT/CTA head was done demonstrating Large area acute Right MCA infarct with occluded proximal M1 segment R MCA. Patient subsequently transferred to [facility] for possible Endovascular intervention. Pt is a 62 y.o. Male s/p embolectomy-TICI 2B. presented initially to [facility] ED for AMS with suspicion of EtOH intoxication. Patient's mental status did not improve and CT/CTA head was done demonstrating Large area acute Right MCA infarct with occluded proximal M1 segment R MCA. Patient subsequently transferred to [facility] for possible Endovascular intervention. continued below:

## 2018-09-25 NOTE — CONSULT NOTE ADULT - PROBLEM SELECTOR RECOMMENDATION 4
7/8 positive on STOP-Bang questionnaire: High risk for SHILO  -No evidence of hypercarbia on ABG  -Outpt PSG

## 2018-09-25 NOTE — PROGRESS NOTE ADULT - ASSESSMENT
Acute CVA  HTN    permissive hypertension as per neurology   obtain AYO if deemed needed as per stroke service   will consider ILR if no source for etiology of cva found   fu with stroke service

## 2018-09-25 NOTE — DISCHARGE NOTE ADULT - CARE PROVIDER_API CALL
Alexey Morrison), Neurology; Vascular Neurology  [address] [place] [address]  Phone: [phone]  Fax: [phone] Alexey Morrison), Neurology; Vascular Neurology  [address]  [place] [address]  Phone: [phone]  Fax: [phone]    Oleg Nguyen (DO), Critical Care Medicine; Internal Medicine; Pulmonary Disease  [address]  [place] [address]  Phone: [phone]  Fax: [phone]    William Al), Cardiovascular Disease; Internal Medicine  [address]  [place] [address]  Phone: [phone]  Fax: [phone]    Noe Pearl), Medicine  [address] [place] [address]  Phone: [phone]  Fax: [phone]

## 2018-09-25 NOTE — CONSULT NOTE ADULT - ASSESSMENT
61 y/o M with PMH of HTN, HLD, [place] [facility] [place] current smoker (45 pack yrs), depression, anxiety presented to [facility] 9/23 with AMS and found to have R M1 CVA s/p embolectomy. Extubated 9/24. 63 y/o M with PMH of HTN, HLD, renal cancer s/p partial R nephrectomy [place] [facility] [place] denies having received chemo/XRT), current smoker (45 pack yrs), depression, anxiety presented to [facility] 9/23 with AMS and found to have R M1 CVA s/p embolectomy. Extubated 9/24.

## 2018-09-25 NOTE — PROVIDER CONTACT NOTE (CHANGE IN STATUS NOTIFICATION) - ASSESSMENT
See assessment and intervention flowsheet for neurostatus. Prior assessments, patient did not have left facial

## 2018-09-25 NOTE — PROGRESS NOTE ADULT - SUBJECTIVE AND OBJECTIVE BOX
Subjective: Patient seen and examined. No new events except as noted.     SUBJECTIVE/ROS:  awake  denies any chest pain, sob, palpitation, dizziness or syncope.       MEDICATIONS:  MEDICATIONS  (STANDING):  ALBUTerol/ipratropium for Nebulization 3 milliLiter(s) Nebulizer every 6 hours  aspirin  chewable 81 milliGRAM(s) Oral daily  atorvastatin 80 milliGRAM(s) Oral at bedtime  docusate sodium Liquid 100 milliGRAM(s) Oral two times a day  enoxaparin Injectable 30 milliGRAM(s) SubCutaneous every 12 hours  fluticasone propionate 50 MICROgram(s)/spray Nasal Spray 1 Spray(s) Both Nostrils two times a day  folic acid 1 milliGRAM(s) Oral daily  influenza   Vaccine 0.5 milliLiter(s) IntraMuscular once  insulin lispro (HumaLOG) corrective regimen sliding scale   SubCutaneous every 6 hours  multivitamin 1 Tablet(s) Oral daily  nicotine -   7 mG/24Hr(s) Patch 1 patch Transdermal daily  senna 2 Tablet(s) Oral at bedtime  sodium chloride 2 Gram(s) Oral every 6 hours  sodium chloride 0.9% with potassium chloride 20 mEq/L 1000 milliLiter(s) (50 mL/Hr) IV Continuous <Continuous>  thiamine 100 milliGRAM(s) Oral daily      PHYSICAL EXAM:  T(C): 37 (09-25-18 @ 07:00), Max: 37.3 (09-25-18 @ 03:00)  HR: 81 (09-25-18 @ 07:03) (63 - 100)  BP: --  RR: 16 (09-25-18 @ 07:00) (14 - 39)  SpO2: 100% (09-25-18 @ 07:03) (94% - 100%)  Wt(kg): --  I&O's Summary    24 Sep 2018 07:01  -  25 Sep 2018 07:00  --------------------------------------------------------  IN: 1291.3 mL / OUT: 2150 mL / NET: -858.7 mL        JVP: Normal  Neck: supple  Lung: clear   CV: S1 S2 , Murmur:  Abd: soft  Ext: No edema  neuro: Awake / alert  Psych: flat affect  Skin: normal       LABS/DATA:    CARDIAC MARKERS:  CARDIAC MARKERS ( 23 Sep 2018 01:17 )  <0.015 ng/mL / x     / x     / x     / x                                    11.5   9.5   )-----------( 137      ( 24 Sep 2018 22:14 )             35.2     09-25    140  |  106  |  13  ----------------------------<  107<H>  3.8   |  27  |  0.86    Ca    8.6      25 Sep 2018 05:51  Phos  2.0     09-24  Mg     2.2     09-24    TPro  6.5  /  Alb  3.7  /  TBili  0.4  /  DBili  0.2  /  AST  36  /  ALT  22  /  AlkPhos  71  09-23    proBNP:   Lipid Profile:   HgA1c:   TSH:     TELE:  EKG:    < from: Transthoracic Echocardiogram (09.24.18 @ 10:26) >  ------------------------------------------------------------------------  Conclusions:  1. Grossly normal left ventricular internal dimensions and  wall thicknesses.  2. Endocardium poorly visualized, but grossly hyperdynamic  left ventricular systolic function.  3. Reversal of the E-A  waves of the mitral inflow pattern  is consistent with diastolic LV dysfunction.  4. Grossly normal right ventricular size and function.  *** No previous Echo exam.    < end of copied text >

## 2018-09-25 NOTE — CONSULT NOTE ADULT - SUBJECTIVE AND OBJECTIVE BOX
PULMONARY CONSULT    HPI: 61 y/o M with PMH of HTN, HLD, renal cancer s/p partial R nephrectomy (2016 at [facility], patient denies having received chemo/XRT), current smoker (45 pack yrs), depression, anxiety presented to [facility] 9/23 with AMS found to have R M1 CVA s/p embolectomy. Extubated 9/24. Called to eval for SHILO risk factors, secretions after extubation. Patient having productive cough with white/yellow secretions. Bilateral wheeze on exam. Denies shortness of breath, CP. 99% on RA.         PAST MEDICAL & SURGICAL HISTORY:  HTN (hypertension)  TIA (transient ischemic attack)    Allergies    No Known Allergies    Intolerances      FAMILY HISTORY: Non-contributory     Social history: Current smoker  45 pack yrs  Retired      Review of Systems:  CONSTITUTIONAL: No fever, chills, or fatigue  EYES: No eye pain, visual disturbances, or discharge  ENMT:  No difficulty hearing, tinnitus, vertigo; No sinus or throat pain  NECK: No pain or stiffness  RESPIRATORY: Per above  CARDIOVASCULAR: No chest pain, palpitations, dizziness, or leg swelling  GASTROINTESTINAL: No abdominal or epigastric pain. No nausea, vomiting, or hematemesis; No diarrhea or constipation. No melena or hematochezia.  GENITOURINARY: No dysuria, frequency, hematuria, or incontinence  NEUROLOGICAL: No headaches, memory loss, loss of strength, numbness, or tremors  SKIN: No itching, burning, rashes, or lesions   MUSCULOSKELETAL: No joint pain or swelling; No muscle, back, or extremity pain  PSYCHIATRIC: No depression, anxiety, mood swings, or difficulty sleeping      Medications:  MEDICATIONS  (STANDING):  ALBUTerol/ipratropium for Nebulization 3 milliLiter(s) Nebulizer every 6 hours  aspirin  chewable 81 milliGRAM(s) Oral daily  aspirin Suppository 300 milliGRAM(s) Rectal once  atorvastatin 80 milliGRAM(s) Oral at bedtime  docusate sodium Liquid 100 milliGRAM(s) Oral two times a day  enoxaparin Injectable 30 milliGRAM(s) SubCutaneous every 12 hours  fluticasone propionate 50 MICROgram(s)/spray Nasal Spray 1 Spray(s) Both Nostrils two times a day  folic acid 1 milliGRAM(s) Oral daily  influenza   Vaccine 0.5 milliLiter(s) IntraMuscular once  insulin lispro (HumaLOG) corrective regimen sliding scale   SubCutaneous every 6 hours  multivitamin 1 Tablet(s) Oral daily  nicotine -   7 mG/24Hr(s) Patch 1 patch Transdermal daily  senna 2 Tablet(s) Oral at bedtime  sodium chloride 2 Gram(s) Oral every 6 hours  sodium chloride 0.9% with potassium chloride 20 mEq/L 1000 milliLiter(s) (75 mL/Hr) IV Continuous <Continuous>  thiamine 100 milliGRAM(s) Oral daily    MEDICATIONS  (PRN):  acetaminophen   Tablet .. 650 milliGRAM(s) Oral every 6 hours PRN Temp greater or equal to 38C (100.4F), Mild Pain (1 - 3)            Vital Signs Last 24 Hrs  T(C): 36.9 (25 Sep 2018 15:00), Max: 37.3 (25 Sep 2018 03:00)  T(F): 98.5 (25 Sep 2018 15:00), Max: 99.1 (25 Sep 2018 03:00)  HR: 71 (25 Sep 2018 17:00) (65 - 100)  BP: 152/80 (25 Sep 2018 17:00) (142/89 - 152/80)  BP(mean): 102 (25 Sep 2018 17:00) (102 - 106)  RR: 31 (25 Sep 2018 17:00) (14 - 39)  SpO2: 100% (25 Sep 2018 17:00) (94% - 100%) on RA    ABG - ( 25 Sep 2018 02:58 )  pH, Arterial: 7.45  pH, Blood: x     /  pCO2: 41    /  pO2: 77    / HCO3: 28    / Base Excess: 4.1   /  SaO2: 96                      09-24 @ 07:01  -  09-25 @ 07:00  --------------------------------------------------------  IN: 1291.3 mL / OUT: 2150 mL / NET: -858.7 mL          LABS:                        11.5   9.5   )-----------( 137      ( 24 Sep 2018 22:14 )             35.2     09-25    140  |  106  |  13  ----------------------------<  107<H>  3.8   |  27  |  0.86    Ca    8.6      25 Sep 2018 05:51  Phos  2.0     09-24  Mg     2.2     09-24            CAPILLARY BLOOD GLUCOSE      POCT Blood Glucose.: 110 mg/dL (25 Sep 2018 13:49)                      CULTURES: (if applicable)        Physical Examination:    General: No acute distress.      HEENT: Pupils equal, reactive to light.  Symmetric.    PULM: Exp wheeze bilaterally     CVS: S1, S2    ABD: Soft, nondistended, nontender, normoactive bowel sounds, no masses    EXT: No edema, nontender    SKIN: Warm and well perfused, no rashes noted.    NEURO: Alert, oriented, interactive, LUE plegia     RADIOLOGY REVIEWED  CXR: Low lung volumes, grossly clear PULMONARY CONSULT    HPI: 61 y/o M with PMH of HTN, HLD, renal cancer s/p partial R nephrectomy (2016 at [facility], patient denies having received chemo/XRT), current smoker (45 pack yrs), depression, anxiety presented to [facility] 9/23 with AMS found to have R M1 CVA s/p embolectomy. Extubated 9/24. Called to eval for SHILO risk factors, secretions after extubation. Patient having productive cough with white/yellow secretions. Bilateral wheeze on exam. Denies shortness of breath, CP. 99% on RA.     PAST MEDICAL & SURGICAL HISTORY:  HTN (hypertension)  TIA (transient ischemic attack)    Allergies    No Known Allergies    Intolerances      FAMILY HISTORY: Non-contributory     Social history: Current smoker  45 pack yrs  Retired      Review of Systems:  CONSTITUTIONAL: No fever, chills, or fatigue  EYES: No eye pain, visual disturbances, or discharge  ENMT:  No difficulty hearing, tinnitus, vertigo; No sinus or throat pain  NECK: No pain or stiffness  RESPIRATORY: Per above  CARDIOVASCULAR: No chest pain, palpitations, dizziness, or leg swelling  GASTROINTESTINAL: No abdominal or epigastric pain. No nausea, vomiting, or hematemesis; No diarrhea or constipation. No melena or hematochezia.  GENITOURINARY: No dysuria, frequency, hematuria, or incontinence  NEUROLOGICAL: No headaches, memory loss, loss of strength, numbness, or tremors  SKIN: No itching, burning, rashes, or lesions   MUSCULOSKELETAL: No joint pain or swelling; No muscle, back, or extremity pain  PSYCHIATRIC: No depression, anxiety, mood swings, or difficulty sleeping      Medications:  MEDICATIONS  (STANDING):  ALBUTerol/ipratropium for Nebulization 3 milliLiter(s) Nebulizer every 6 hours  aspirin  chewable 81 milliGRAM(s) Oral daily  aspirin Suppository 300 milliGRAM(s) Rectal once  atorvastatin 80 milliGRAM(s) Oral at bedtime  docusate sodium Liquid 100 milliGRAM(s) Oral two times a day  enoxaparin Injectable 30 milliGRAM(s) SubCutaneous every 12 hours  fluticasone propionate 50 MICROgram(s)/spray Nasal Spray 1 Spray(s) Both Nostrils two times a day  folic acid 1 milliGRAM(s) Oral daily  influenza   Vaccine 0.5 milliLiter(s) IntraMuscular once  insulin lispro (HumaLOG) corrective regimen sliding scale   SubCutaneous every 6 hours  multivitamin 1 Tablet(s) Oral daily  nicotine -   7 mG/24Hr(s) Patch 1 patch Transdermal daily  senna 2 Tablet(s) Oral at bedtime  sodium chloride 2 Gram(s) Oral every 6 hours  sodium chloride 0.9% with potassium chloride 20 mEq/L 1000 milliLiter(s) (75 mL/Hr) IV Continuous <Continuous>  thiamine 100 milliGRAM(s) Oral daily    MEDICATIONS  (PRN):  acetaminophen   Tablet .. 650 milliGRAM(s) Oral every 6 hours PRN Temp greater or equal to 38C (100.4F), Mild Pain (1 - 3)    Vital Signs Last 24 Hrs  T(C): 36.9 (25 Sep 2018 15:00), Max: 37.3 (25 Sep 2018 03:00)  T(F): 98.5 (25 Sep 2018 15:00), Max: 99.1 (25 Sep 2018 03:00)  HR: 71 (25 Sep 2018 17:00) (65 - 100)  BP: 152/80 (25 Sep 2018 17:00) (142/89 - 152/80)  BP(mean): 102 (25 Sep 2018 17:00) (102 - 106)  RR: 31 (25 Sep 2018 17:00) (14 - 39)  SpO2: 100% (25 Sep 2018 17:00) (94% - 100%) on RA    ABG - ( 25 Sep 2018 02:58 )  pH, Arterial: 7.45  pH, Blood: x     /  pCO2: 41    /  pO2: 77    / HCO3: 28    / Base Excess: 4.1   /  SaO2: 96                      09-24 @ 07:01  -  09-25 @ 07:00  --------------------------------------------------------  IN: 1291.3 mL / OUT: 2150 mL / NET: -858.7 mL    LABS:                        11.5   9.5   )-----------( 137      ( 24 Sep 2018 22:14 )             35.2     09-25    140  |  106  |  13  ----------------------------<  107<H>  3.8   |  27  |  0.86    Ca    8.6      25 Sep 2018 05:51  Phos  2.0     09-24  Mg     2.2     09-24    CAPILLARY BLOOD GLUCOSE      POCT Blood Glucose.: 110 mg/dL (25 Sep 2018 13:49)    CULTURES: (if applicable)        Physical Examination:    General: No acute distress.      HEENT: Pupils equal, reactive to light.  Symmetric.    PULM: Exp wheeze bilaterally     CVS: S1, S2    ABD: Soft, nondistended, nontender, normoactive bowel sounds, no masses    EXT: No edema, nontender    SKIN: Warm and well perfused, no rashes noted.    NEURO: Alert, oriented, interactive, LUE plegia     RADIOLOGY REVIEWED  CXR: Low lung volumes, grossly clear

## 2018-09-25 NOTE — PHYSICAL THERAPY INITIAL EVALUATION ADULT - ADDITIONAL COMMENTS
As per CM note, pt lives alone in a private house w/ 10 steps to enter. PTA pt was independent w/ all ADLs and mobility.

## 2018-09-25 NOTE — PROVIDER CONTACT NOTE (OTHER) - RECOMMENDATIONS
Highly recommended NG tube placement, but patient is refusing. Change PO medications to IV until formal speech and swallow evaluation.

## 2018-09-25 NOTE — CONSULT NOTE ADULT - PROBLEM SELECTOR RECOMMENDATION 3
+white-yellow secretions post extubation  -Patient had AMS on admission ?aspiration event  -f/u CT chest non-contrast to r/o PNA

## 2018-09-25 NOTE — DISCHARGE NOTE ADULT - PROVIDER TOKENS
ELIZABETH:'[id number]' TOKEN:'[id number]:'[id number]:'[id number]'

## 2018-09-25 NOTE — PHYSICAL THERAPY INITIAL EVALUATION ADULT - PRECAUTIONS/LIMITATIONS, REHAB EVAL
fall precautions continued from above: CTH 9/23: Redemonstration of evolving right MCA territory infarct, faint areas of/fall precautions/sternal precautions

## 2018-09-25 NOTE — DISCHARGE NOTE ADULT - NS AS DC STROKE ED MATERIALS
Prescribed Medications/Risk Factors for Stroke/Stroke Education Booklet/Stroke Warning Signs and Symptoms/Call 911 for Stroke/Need for Followup After Discharge

## 2018-09-25 NOTE — PROGRESS NOTE ADULT - SUBJECTIVE AND OBJECTIVE BOX
THE PATIENT WAS SEEN AND EXAMINED BY ME WITH THE HOUSESTAFF AND STROKE TEAM DURING MORNING ROUNDS.     62 year-old  male with past medical history of HTN and ETOH abuse. On 9/23/18 he presented to [facility] with AMS and intoxication. Upon further evaluation by the ED he wasn't moving L arm. On telestroke eval he was found with  left facial droop, left upper extremity monoplegia and lower extremity weakness, significant dysarthria and left hemibody hypoesthesia. Head CT showed area of hypodensity at right fronto parietal lobe and acute/ subacute hypodensity at the inferior temporal/parietal lobe middle cerebral artery territory. CT angiography demonstrated right middle cerebral artery proximal M1 segment occlusion. Patient not candidate for IV-tPA given unknown last known well as well as appearance of subacute infarct. He was transferred to [facility] for evaluation for endovascular therapy. CT perfusion showed a small core infarct with large area of penumbra. He was deemed candidate for endovascular therapy with mechanical thrombectomy with TICI 2b recanalization.     SUBJECTIVE: No events overnight.  No new neurologic complaints.      acetaminophen   Tablet .. 650 milliGRAM(s) Oral every 6 hours PRN  ALBUTerol/ipratropium for Nebulization 3 milliLiter(s) Nebulizer every 6 hours  aspirin  chewable 81 milliGRAM(s) Oral daily  aspirin Suppository 300 milliGRAM(s) Rectal once  atorvastatin 80 milliGRAM(s) Oral at bedtime  docusate sodium Liquid 100 milliGRAM(s) Oral two times a day  enoxaparin Injectable 30 milliGRAM(s) SubCutaneous every 12 hours  fluticasone propionate 50 MICROgram(s)/spray Nasal Spray 1 Spray(s) Both Nostrils two times a day  folic acid 1 milliGRAM(s) Oral daily  influenza   Vaccine 0.5 milliLiter(s) IntraMuscular once  insulin lispro (HumaLOG) corrective regimen sliding scale   SubCutaneous every 6 hours  multivitamin 1 Tablet(s) Oral daily  nicotine -   7 mG/24Hr(s) Patch 1 patch Transdermal daily  senna 2 Tablet(s) Oral at bedtime  sodium chloride 2 Gram(s) Oral every 6 hours  sodium chloride 0.9% with potassium chloride 20 mEq/L 1000 milliLiter(s) IV Continuous <Continuous>  thiamine 100 milliGRAM(s) Oral daily    PHYSICAL EXAM:   Vital Signs Last 24 Hrs  T(C): 36.9 (25 Sep 2018 15:00), Max: 37.3 (25 Sep 2018 03:00)  T(F): 98.5 (25 Sep 2018 15:00), Max: 99.1 (25 Sep 2018 03:00)  HR: 70 (25 Sep 2018 17:50) (65 - 100)  BP: 152/80 (25 Sep 2018 17:00) (142/89 - 152/80)  BP(mean): 102 (25 Sep 2018 17:00) (102 - 106)  RR: 31 (25 Sep 2018 17:00) (14 - 39)  SpO2: 100% (25 Sep 2018 17:50) (94% - 100%)    General: No acute distress  HEENT: EOM intact, visual fields full, partial left gaze palsy, incomplete saccade to right   Abdomen: Soft, nontender, nondistended   Extremities: No edema    NEUROLOGICAL EXAM:  Mental status: Awake, alert, oriented to hospital, year, month, no neglect - aware of deficit.   Cranial Nerves: Moderate left facial palsy, no nystagmus, no dysarthria, tongue midline  Motor exam: Normal tone, RUE 5/5,  RLE 5/5, LUE 1/5, trace movement, not against gravity, LLE 4+/5, small drift  Sensation: Intact to light touch   Coordination/ Gait: No dysmetria, gait deferred.     LABS:                        11.5   9.5   )-----------( 137      ( 24 Sep 2018 22:14 )             35.2    09-25    140  |  106  |  13  ----------------------------<  107<H>  3.8   |  27  |  0.86    Ca    8.6      25 Sep 2018 05:51  Phos  2.0     09-24  Mg     2.2     09-24    Hemoglobin A1C, Whole Blood: 6.6 % (09-23 @ 11:05)  Hemoglobin A1C, Whole Blood: 6.5 % (09-07 @ 18:01)    IMAGING: Reviewed by me.     CT Head No Cont (09.24.18):  Previously noted acute right MCA infarct has demonstrated expected evolutionary changes with areas of hemorrhagic transformation again seen.    CT Head No Cont (09.23.18):  Redemonstration of evolving right MCA territory infarct, faint areas of increasing attenuation may reflect contrast staining, tiny foci of petechial hemorrhage not excluded.    CT perfusion w/ Maps w/ IV Cont (09.23.18):   Large right-sided penumbra  with a small infarct in the large mismatch volume.    Noncontrast head CT (09.23.18):   Large area acute right MCA territory infarct. Posterior temporal lobe infarct appears slightly more subacute with questionable petechial hemorrhage.    CTA neck (09.23.18):   No major vessel occlusion or hemodynamically significant stenosis. No evidence of dissection.    CTA head (09.23.18):  Occluded proximal M1 segment right middle cerebral artery.   Remainder of the intracranial circulation is patent. THE PATIENT WAS SEEN AND EXAMINED BY ME WITH THE HOUSESTAFF AND STROKE TEAM DURING MORNING ROUNDS.     62 year-old  male with past medical history of HTN and ETOH abuse. On 9/23/18 he presented to [facility] with AMS and intoxication. Upon further evaluation by the ED he wasn't moving L arm. On telestroke eval he was found with  left facial droop, left upper extremity plegia and lower extremity weakness, significant dysarthria and left hemibody hypoesthesia. Head CT showed area of hypodensity at right fronto parietal lobe and acute/ subacute hypodensity at the inferior temporal/parietal lobe middle cerebral artery territory. CT angiography demonstrated right middle cerebral artery proximal M1 segment occlusion. Patient not candidate for IV-tPA given unknown last known well as well as appearance of subacute infarct. He was transferred to [facility] for evaluation for endovascular therapy. CT perfusion showed a small core infarct with large area of penumbra. He was deemed candidate for endovascular therapy with mechanical thrombectomy with TICI 2b recanalization.     SUBJECTIVE: No events overnight.  No new neurologic complaints.      acetaminophen   Tablet .. 650 milliGRAM(s) Oral every 6 hours PRN  ALBUTerol/ipratropium for Nebulization 3 milliLiter(s) Nebulizer every 6 hours  aspirin  chewable 81 milliGRAM(s) Oral daily  aspirin Suppository 300 milliGRAM(s) Rectal once  atorvastatin 80 milliGRAM(s) Oral at bedtime  docusate sodium Liquid 100 milliGRAM(s) Oral two times a day  enoxaparin Injectable 30 milliGRAM(s) SubCutaneous every 12 hours  fluticasone propionate 50 MICROgram(s)/spray Nasal Spray 1 Spray(s) Both Nostrils two times a day  folic acid 1 milliGRAM(s) Oral daily  influenza   Vaccine 0.5 milliLiter(s) IntraMuscular once  insulin lispro (HumaLOG) corrective regimen sliding scale   SubCutaneous every 6 hours  multivitamin 1 Tablet(s) Oral daily  nicotine -   7 mG/24Hr(s) Patch 1 patch Transdermal daily  senna 2 Tablet(s) Oral at bedtime  sodium chloride 2 Gram(s) Oral every 6 hours  sodium chloride 0.9% with potassium chloride 20 mEq/L 1000 milliLiter(s) IV Continuous <Continuous>  thiamine 100 milliGRAM(s) Oral daily    PHYSICAL EXAM:   Vital Signs Last 24 Hrs  T(C): 36.9 (25 Sep 2018 15:00), Max: 37.3 (25 Sep 2018 03:00)  T(F): 98.5 (25 Sep 2018 15:00), Max: 99.1 (25 Sep 2018 03:00)  HR: 70 (25 Sep 2018 17:50) (65 - 100)  BP: 152/80 (25 Sep 2018 17:00) (142/89 - 152/80)  BP(mean): 102 (25 Sep 2018 17:00) (102 - 106)  RR: 31 (25 Sep 2018 17:00) (14 - 39)  SpO2: 100% (25 Sep 2018 17:50) (94% - 100%)    General: No acute distress  HEENT: EOM intact, visual fields full, partial left gaze palsy, incomplete saccade to right   Abdomen: Soft, nontender, nondistended   Extremities: No edema    NEUROLOGICAL EXAM:  Mental status: Awake, alert, oriented to hospital, year, month, no neglect - aware of deficit.   Cranial Nerves: Moderate left facial palsy, no nystagmus, no dysarthria, tongue midline  Motor exam: Normal tone, RUE 5/5,  RLE 5/5, LUE 1/5, trace movement, not against gravity, LLE 4+/5, small drift  Sensation: Intact to light touch   Coordination/ Gait: No dysmetria, gait deferred.     LABS:                        11.5   9.5   )-----------( 137      ( 24 Sep 2018 22:14 )             35.2    09-25    140  |  106  |  13  ----------------------------<  107<H>  3.8   |  27  |  0.86    Ca    8.6      25 Sep 2018 05:51  Phos  2.0     09-24  Mg     2.2     09-24    Hemoglobin A1C, Whole Blood: 6.6 % (09-23 @ 11:05)  Hemoglobin A1C, Whole Blood: 6.5 % (09-07 @ 18:01)    IMAGING: Reviewed by me.     CT Head No Cont (09.24.18):  Previously noted acute right MCA infarct has demonstrated expected evolutionary changes with areas of hemorrhagic transformation again seen.    CT Head No Cont (09.23.18):  Redemonstration of evolving right MCA territory infarct, faint areas of increasing attenuation may reflect contrast staining, tiny foci of petechial hemorrhage not excluded.    CT perfusion w/ Maps w/ IV Cont (09.23.18):   Large right-sided penumbra  with a small infarct in the large mismatch volume.    Noncontrast head CT (09.23.18):   Large area acute right MCA territory infarct. Posterior temporal lobe infarct appears slightly more subacute with questionable petechial hemorrhage.    CTA neck (09.23.18):   No major vessel occlusion or hemodynamically significant stenosis. No evidence of dissection.    CTA head (09.23.18):  Occluded proximal M1 segment right middle cerebral artery.   Remainder of the intracranial circulation is patent.

## 2018-09-25 NOTE — DISCHARGE NOTE ADULT - MEDICATION SUMMARY - MEDICATIONS TO TAKE
I will START or STAY ON the medications listed below when I get home from the hospital:    aspirin 81 mg oral tablet, chewable  -- 1 tab(s) by mouth once a day  -- Indication: For Stroke prevention    FLUoxetine 20 mg oral capsule  -- 1 cap(s) by mouth once a day  -- Indication: For Motor recovery/Depression    atorvastatin 80 mg oral tablet  -- 1 tab(s) by mouth once a day (at bedtime)  -- Indication: For High Cholesterol    albuterol 90 mcg/inh inhalation aerosol  -- 2 puff(s) inhaled every 6 hours, As needed, Shortness of Breath and/or Wheezing  -- Indication: For COPD (chronic obstructive pulmonary disease)    Symbicort 80 mcg-4.5 mcg/inh inhalation aerosol  -- 2 puff(s) inhaled 2 times a day  -- Indication: For COPD (chronic obstructive pulmonary disease)    tiotropium 18 mcg inhalation capsule  -- 1 cap(s) inhaled once a day  -- Indication: For COPD (chronic obstructive pulmonary disease)    fluticasone 50 mcg/inh nasal spray  -- 1 spray(s) into nose 2 times a day  -- Indication: For COPD (chronic obstructive pulmonary disease)    pantoprazole 40 mg oral delayed release tablet  -- 1 tab(s) by mouth once a day (before a meal)  -- Indication: For Acid reflux    nicotine 7 mg/24 hr transdermal film, extended release  -- 1 patch by transdermal patch once a day  -- Indication: For Smoking Cessation    folic acid 1 mg oral tablet  -- 1 tab(s) by mouth once a day  -- Indication: For Low folic acid    thiamine 100 mg oral tablet  -- 1 tab(s) by mouth once a day  -- Indication: For Low thiamine    Vitamin D3 50,000 intl units oral capsule  -- 1 cap(s) by mouth once a week  -- Indication: For Low Vitamin D

## 2018-09-25 NOTE — PROGRESS NOTE ADULT - ASSESSMENT
ASSESSMENT/PLAN:  right M1 s/p embolectomy-TICI 2B with multiple risk factors    NEURO:  neuro checks q1hr  stroke consulted; mechanism likely secondary to afib  started on ASA 81, Atorvastatin 80 mg po qHS

## 2018-09-26 DIAGNOSIS — R91.1 SOLITARY PULMONARY NODULE: ICD-10-CM

## 2018-09-26 LAB
ALBUMIN SERPL ELPH-MCNC: 3.4 G/DL — SIGNIFICANT CHANGE UP (ref 3.3–5)
ALP SERPL-CCNC: 61 U/L — SIGNIFICANT CHANGE UP (ref 40–120)
ALT FLD-CCNC: 17 U/L — SIGNIFICANT CHANGE UP (ref 10–45)
ANION GAP SERPL CALC-SCNC: 8 MMOL/L — SIGNIFICANT CHANGE UP (ref 5–17)
AST SERPL-CCNC: 23 U/L — SIGNIFICANT CHANGE UP (ref 10–40)
BILIRUB DIRECT SERPL-MCNC: 0.2 MG/DL — SIGNIFICANT CHANGE UP (ref 0–0.2)
BILIRUB INDIRECT FLD-MCNC: 0.4 MG/DL — SIGNIFICANT CHANGE UP (ref 0.2–1)
BILIRUB SERPL-MCNC: 0.6 MG/DL — SIGNIFICANT CHANGE UP (ref 0.2–1.2)
BUN SERPL-MCNC: 10 MG/DL — SIGNIFICANT CHANGE UP (ref 7–23)
CALCIUM SERPL-MCNC: 8.9 MG/DL — SIGNIFICANT CHANGE UP (ref 8.4–10.5)
CHLORIDE SERPL-SCNC: 105 MMOL/L — SIGNIFICANT CHANGE UP (ref 96–108)
CO2 SERPL-SCNC: 25 MMOL/L — SIGNIFICANT CHANGE UP (ref 22–31)
CREAT SERPL-MCNC: 0.74 MG/DL — SIGNIFICANT CHANGE UP (ref 0.5–1.3)
GLUCOSE SERPL-MCNC: 94 MG/DL — SIGNIFICANT CHANGE UP (ref 70–99)
HCT VFR BLD CALC: 35.2 % — LOW (ref 39–50)
HGB BLD-MCNC: 11.3 G/DL — LOW (ref 13–17)
MAGNESIUM SERPL-MCNC: 2 MG/DL — SIGNIFICANT CHANGE UP (ref 1.6–2.6)
MCHC RBC-ENTMCNC: 22.3 PG — LOW (ref 27–34)
MCHC RBC-ENTMCNC: 32.1 GM/DL — SIGNIFICANT CHANGE UP (ref 32–36)
MCV RBC AUTO: 69.5 FL — LOW (ref 80–100)
PHOSPHATE SERPL-MCNC: 2.7 MG/DL — SIGNIFICANT CHANGE UP (ref 2.5–4.5)
PLATELET # BLD AUTO: 158 K/UL — SIGNIFICANT CHANGE UP (ref 150–400)
POTASSIUM SERPL-MCNC: 3.7 MMOL/L — SIGNIFICANT CHANGE UP (ref 3.5–5.3)
POTASSIUM SERPL-SCNC: 3.7 MMOL/L — SIGNIFICANT CHANGE UP (ref 3.5–5.3)
PROT SERPL-MCNC: 6.3 G/DL — SIGNIFICANT CHANGE UP (ref 6–8.3)
RBC # BLD: 5.07 M/UL — SIGNIFICANT CHANGE UP (ref 4.2–5.8)
RBC # FLD: 13.8 % — SIGNIFICANT CHANGE UP (ref 10.3–14.5)
SODIUM SERPL-SCNC: 138 MMOL/L — SIGNIFICANT CHANGE UP (ref 135–145)
WBC # BLD: 8.4 K/UL — SIGNIFICANT CHANGE UP (ref 3.8–10.5)
WBC # FLD AUTO: 8.4 K/UL — SIGNIFICANT CHANGE UP (ref 3.8–10.5)

## 2018-09-26 PROCEDURE — 71250 CT THORAX DX C-: CPT | Mod: 26

## 2018-09-26 PROCEDURE — 99222 1ST HOSP IP/OBS MODERATE 55: CPT

## 2018-09-26 PROCEDURE — 99233 SBSQ HOSP IP/OBS HIGH 50: CPT

## 2018-09-26 RX ORDER — POTASSIUM CHLORIDE 20 MEQ
10 PACKET (EA) ORAL
Qty: 0 | Refills: 0 | Status: COMPLETED | OUTPATIENT
Start: 2018-09-26 | End: 2018-09-26

## 2018-09-26 RX ORDER — ASPIRIN/CALCIUM CARB/MAGNESIUM 324 MG
300 TABLET ORAL DAILY
Qty: 0 | Refills: 0 | Status: DISCONTINUED | OUTPATIENT
Start: 2018-09-26 | End: 2018-09-27

## 2018-09-26 RX ORDER — ASPIRIN/CALCIUM CARB/MAGNESIUM 324 MG
300 TABLET ORAL ONCE
Qty: 0 | Refills: 0 | Status: COMPLETED | OUTPATIENT
Start: 2018-09-26 | End: 2018-09-26

## 2018-09-26 RX ORDER — THIAMINE MONONITRATE (VIT B1) 100 MG
100 TABLET ORAL DAILY
Qty: 0 | Refills: 0 | Status: DISCONTINUED | OUTPATIENT
Start: 2018-09-26 | End: 2018-10-09

## 2018-09-26 RX ORDER — ACETAMINOPHEN 500 MG
1000 TABLET ORAL ONCE
Qty: 0 | Refills: 0 | Status: COMPLETED | OUTPATIENT
Start: 2018-09-26 | End: 2018-10-06

## 2018-09-26 RX ADMIN — DEXTROSE MONOHYDRATE, SODIUM CHLORIDE, AND POTASSIUM CHLORIDE 75 MILLILITER(S): 50; .745; 4.5 INJECTION, SOLUTION INTRAVENOUS at 05:37

## 2018-09-26 RX ADMIN — Medication 3 MILLILITER(S): at 00:32

## 2018-09-26 RX ADMIN — Medication 300 MILLIGRAM(S): at 12:53

## 2018-09-26 RX ADMIN — Medication 1 PATCH: at 11:30

## 2018-09-26 RX ADMIN — Medication 100 MILLIEQUIVALENT(S): at 02:09

## 2018-09-26 RX ADMIN — Medication 3 MILLILITER(S): at 12:15

## 2018-09-26 RX ADMIN — Medication 1 SPRAY(S): at 05:20

## 2018-09-26 RX ADMIN — Medication 1 PATCH: at 11:29

## 2018-09-26 RX ADMIN — BUDESONIDE AND FORMOTEROL FUMARATE DIHYDRATE 2 PUFF(S): 160; 4.5 AEROSOL RESPIRATORY (INHALATION) at 07:37

## 2018-09-26 RX ADMIN — Medication 1 SPRAY(S): at 18:11

## 2018-09-26 RX ADMIN — Medication 100 MILLIEQUIVALENT(S): at 03:17

## 2018-09-26 RX ADMIN — ENOXAPARIN SODIUM 30 MILLIGRAM(S): 100 INJECTION SUBCUTANEOUS at 05:20

## 2018-09-26 RX ADMIN — ENOXAPARIN SODIUM 30 MILLIGRAM(S): 100 INJECTION SUBCUTANEOUS at 18:11

## 2018-09-26 RX ADMIN — Medication 100 MILLIEQUIVALENT(S): at 04:46

## 2018-09-26 RX ADMIN — Medication 3 MILLILITER(S): at 05:17

## 2018-09-26 NOTE — PROGRESS NOTE ADULT - SUBJECTIVE AND OBJECTIVE BOX
61 y/o male with past medical history of HTN presented initially to [facility] ED for AMS with suspicion of EtOH intoxication. Patient's mental status did not improve and CT/CTA head was done demonstrating Large area acute Right MCA infarct with occluded proximal M1 segment R MCA. Patient subsequently transferred to [facility] for possible Endovascular intervention. LKN is unknown but suspected to be 11pm (9/22/2018). tPA deferred due to unclear LKN time. (23 Sep 2018 05:05).  S?p TICI 2B thrombectomy.  Daily ICU data reviewed.      PHYSICAL EXAM:  General: NAD.  Neurological: eyes open with verbal stimulation A&O X 3. Moves b/l LE 5/5. LUE 0/5. RUE 5/5  PERRL.  Lungs: LCTAB. Anterior auscultation  Heart: regular  Abdomen: soft  Extremities: no edema  Skin: no rash

## 2018-09-26 NOTE — SWALLOW BEDSIDE ASSESSMENT ADULT - SLP GENERAL OBSERVATIONS
Pt asleep upon contact, alerts to verbal stimulation, but initially kept eyes closed. Oriented x4, able to follow commands and answer questions. Denied dysphagia PTA, but did endorse hx of GERD, last scoped approx 5 years ago, with negative exam, per Pt. On PPI, but still gets breakthrough symptoms. Speech noted to be moderately dysarthric - slow rate and articulatory distortions. Pt states that this is his ? baseline. Noted to have cough Pt asleep upon contact, alerts to verbal stimulation, but initially kept eyes closed. Oriented x4, able to follow commands and answer questions. Denied dysphagia PTA, but did endorse hx of GERD, last scoped approx 5 years ago, with negative exam, per Pt. On PPI, but still gets breakthrough symptoms. Speech noted to be moderately dysarthric - slow rate and articulatory distortions. Impaired diadochokinetic rates and accuracy for /p/, /t/, /k/ and /ptk/. Pt states that this is his ? baseline. Noted to have cough

## 2018-09-26 NOTE — OCCUPATIONAL THERAPY INITIAL EVALUATION ADULT - VISUAL ACUITY
not tested/pt noted to prefer to the look to the right side, with verbal cues, he is able to scan to the left but limited attention is noted.

## 2018-09-26 NOTE — PROGRESS NOTE ADULT - SUBJECTIVE AND OBJECTIVE BOX
Vital Signs Last 24 Hrs  T(C): 36.6 (26 Sep 2018 00:00), Max: 37.3 (25 Sep 2018 03:00)  T(F): 97.8 (26 Sep 2018 00:00), Max: 99.1 (25 Sep 2018 03:00)  HR: 90 (26 Sep 2018 00:00) (65 - 100)  BP: 166/102 (26 Sep 2018 00:00) (142/89 - 166/102)  BP(mean): 122 (26 Sep 2018 00:00) (102 - 122)  RR: 15 (26 Sep 2018 00:00) (14 - 32)  SpO2: 98% (26 Sep 2018 00:00) (94% - 100%)    PHYSICAL EXAM:  Neurological: eyes open with stimlation; A&O X 3. Moves b/l LE 5/5. LUE 0/5 distally 2/5 biceps and triceps. RUE 5/5  pupils; Lt pupil 3mm, Rt pupil 2mm; reactive

## 2018-09-26 NOTE — CONSULT NOTE ADULT - SUBJECTIVE AND OBJECTIVE BOX
Cc: Weakness, difficulty walking    HPI:  63 y/o male with past medical history of HTN presented initially to [facility] ED for AMS with suspicion of EtOH intoxication. Patient's mental status did not improve and CT/CTA head was done demonstrating Large area acute Right MCA infarct with occluded proximal M1 segment R MCA. Patient subsequently transferred to [facility] for possible Endovascular intervention. LKN is unknown but suspected to be 11pm (9/22/2018). tPA deferred due to unclear LKN time. (23 Sep 2018 05:05)  Patient underwent thrombectomy. Still with functional deficits.       REVIEW OF SYSTEMS: No chest pain, shortness of breath, nausea, vomiting or diarhea; other ROS neg     PAST MEDICAL & SURGICAL HISTORY  HTN (hypertension)  TIA (transient ischemic attack)    FUNCTIONAL HISTORY:   Lives alone in apartment with 10 SURAJ.  PTA Independent with Ambulation and ADLs    CURRENT FUNCTIONAL STATUS:  CG    FAMILY HISTORY   N/C    RECENT LABS/IMAGING  CBC Full  -  ( 26 Sep 2018 00:53 )  WBC Count : 8.4 K/uL  Hemoglobin : 11.3 g/dL  Hematocrit : 35.2 %  Platelet Count - Automated : 158 K/uL  Mean Cell Volume : 69.5 fl  Mean Cell Hemoglobin : 22.3 pg  Mean Cell Hemoglobin Concentration : 32.1 gm/dL  Auto Neutrophil # : x  Auto Lymphocyte # : x  Auto Monocyte # : x  Auto Eosinophil # : x  Auto Basophil # : x  Auto Neutrophil % : x  Auto Lymphocyte % : x  Auto Monocyte % : x  Auto Eosinophil % : x  Auto Basophil % : x    09-26    138  |  105  |  10  ----------------------------<  94  3.7   |  25  |  0.74    Ca    8.9      26 Sep 2018 00:53  Phos  2.7     09-26  Mg     2.0     09-26    TPro  6.3  /  Alb  3.4  /  TBili  0.6  /  DBili  0.2  /  AST  23  /  ALT  17  /  AlkPhos  61  09-26        VITALS  T(C): 36.7 (09-26-18 @ 11:00), Max: 36.9 (09-25-18 @ 15:00)  HR: 68 (09-26-18 @ 12:17) (65 - 90)  BP: 158/88 (09-26-18 @ 11:00) (140/110 - 168/92)  RR: 20 (09-26-18 @ 11:00) (14 - 33)  SpO2: 99% (09-26-18 @ 12:17) (96% - 100%)  Wt(kg): --    ALLERGIES  No Known Allergies      MEDICATIONS   acetaminophen   Tablet .. 650 milliGRAM(s) Oral every 6 hours PRN  ALBUTerol/ipratropium for Nebulization 3 milliLiter(s) Nebulizer every 6 hours  aspirin  chewable 81 milliGRAM(s) Oral daily  atorvastatin 80 milliGRAM(s) Oral at bedtime  buDESOnide  80 MICROgram(s)/formoterol 4.5 MICROgram(s) Inhaler 2 Puff(s) Inhalation two times a day  docusate sodium Liquid 100 milliGRAM(s) Oral two times a day  enoxaparin Injectable 30 milliGRAM(s) SubCutaneous every 12 hours  fluticasone propionate 50 MICROgram(s)/spray Nasal Spray 1 Spray(s) Both Nostrils two times a day  folic acid 1 milliGRAM(s) Oral daily  influenza   Vaccine 0.5 milliLiter(s) IntraMuscular once  multivitamin 1 Tablet(s) Oral daily  nicotine -   7 mG/24Hr(s) Patch 1 patch Transdermal daily  senna 2 Tablet(s) Oral at bedtime  sodium chloride 2 Gram(s) Oral every 6 hours  sodium chloride 0.9% with potassium chloride 20 mEq/L 1000 milliLiter(s) IV Continuous <Continuous>  thiamine 100 milliGRAM(s) Oral daily      ----------------------------------------------------------------------------------------  PHYSICAL EXAM  Constitutional - NAD, Comfortable  HEENT - NCAT, EOMI  Neck - Supple, No limited ROM  Chest - CTA bilaterally, No wheeze, No rhonchi, No crackles  Cardiovascular - RRR, S1S2, No murmurs  Abdomen - BS+, Soft, NTND  Extremities - No C/C/E, No calf tenderness   Neurologic Exam -                    Cognitive - Awake, Alert, AAO to self, place, date, year, situation     Communication - Fluent, No dysarthria, no aphasia     Cranial Nerves - CN 2-12 intact     Motor - No focal deficits                       Sensory - Intact to LT     Reflexes - DTR Intact, No primitive reflexive     Balance - WNL Static  Psychiatric - Mood stable, Affect WNL      Impression:   61 yo with CVA with gait dysfunction.    Plan:  PT- ROM, Bed Mob, Transfers, Amb w AD and bracing as needed  OT- ADLs, bracing  SLP- Dysphagia eval and treat  Prec- Falls, Cardiac  DVT Prophylaxis- Lovenox  Skin- Turn q2 h  Dispo- Cc: Weakness, difficulty walking    HPI:  61 y/o male with past medical history of HTN presented initially to [facility] ED for AMS with suspicion of EtOH intoxication. Patient's mental status did not improve and CT/CTA head was done demonstrating Large area acute Right MCA infarct with occluded proximal M1 segment R MCA. Patient subsequently transferred to [facility] for possible Endovascular intervention. LKN is unknown but suspected to be 11pm (9/22/2018). tPA deferred due to unclear LKN time. (23 Sep 2018 05:05)  Patient underwent thrombectomy. Still with functional deficits. Has had some improvement of left sided weakness.       REVIEW OF SYSTEMS: L weakness (improving)No chest pain, shortness of breath, nausea, vomiting or diarhea; other ROS neg     PAST MEDICAL & SURGICAL HISTORY  HTN (hypertension)  TIA (transient ischemic attack)    FUNCTIONAL HISTORY:   Lives alone in apartment with 10 SURAJ.  PTA Independent with Ambulation and ADLs    CURRENT FUNCTIONAL STATUS:  CG    FAMILY HISTORY   N/C    RECENT LABS/IMAGING  CBC Full  -  ( 26 Sep 2018 00:53 )  WBC Count : 8.4 K/uL  Hemoglobin : 11.3 g/dL  Hematocrit : 35.2 %  Platelet Count - Automated : 158 K/uL  Mean Cell Volume : 69.5 fl  Mean Cell Hemoglobin : 22.3 pg  Mean Cell Hemoglobin Concentration : 32.1 gm/dL  Auto Neutrophil # : x  Auto Lymphocyte # : x  Auto Monocyte # : x  Auto Eosinophil # : x  Auto Basophil # : x  Auto Neutrophil % : x  Auto Lymphocyte % : x  Auto Monocyte % : x  Auto Eosinophil % : x  Auto Basophil % : x    09-26    138  |  105  |  10  ----------------------------<  94  3.7   |  25  |  0.74    Ca    8.9      26 Sep 2018 00:53  Phos  2.7     09-26  Mg     2.0     09-26    TPro  6.3  /  Alb  3.4  /  TBili  0.6  /  DBili  0.2  /  AST  23  /  ALT  17  /  AlkPhos  61  09-26        VITALS  T(C): 36.7 (09-26-18 @ 11:00), Max: 36.9 (09-25-18 @ 15:00)  HR: 68 (09-26-18 @ 12:17) (65 - 90)  BP: 158/88 (09-26-18 @ 11:00) (140/110 - 168/92)  RR: 20 (09-26-18 @ 11:00) (14 - 33)  SpO2: 99% (09-26-18 @ 12:17) (96% - 100%)  Wt(kg): --    ALLERGIES  No Known Allergies      MEDICATIONS   acetaminophen   Tablet .. 650 milliGRAM(s) Oral every 6 hours PRN  ALBUTerol/ipratropium for Nebulization 3 milliLiter(s) Nebulizer every 6 hours  aspirin  chewable 81 milliGRAM(s) Oral daily  atorvastatin 80 milliGRAM(s) Oral at bedtime  buDESOnide  80 MICROgram(s)/formoterol 4.5 MICROgram(s) Inhaler 2 Puff(s) Inhalation two times a day  docusate sodium Liquid 100 milliGRAM(s) Oral two times a day  enoxaparin Injectable 30 milliGRAM(s) SubCutaneous every 12 hours  fluticasone propionate 50 MICROgram(s)/spray Nasal Spray 1 Spray(s) Both Nostrils two times a day  folic acid 1 milliGRAM(s) Oral daily  influenza   Vaccine 0.5 milliLiter(s) IntraMuscular once  multivitamin 1 Tablet(s) Oral daily  nicotine -   7 mG/24Hr(s) Patch 1 patch Transdermal daily  senna 2 Tablet(s) Oral at bedtime  sodium chloride 2 Gram(s) Oral every 6 hours  sodium chloride 0.9% with potassium chloride 20 mEq/L 1000 milliLiter(s) IV Continuous <Continuous>  thiamine 100 milliGRAM(s) Oral daily      ----------------------------------------------------------------------------------------  PHYSICAL EXAM  Constitutional - NAD, Comfortable  HEENT - NCAT, EOMI  Neck - Supple, No limited ROM  Chest - CTA bilaterally, No wheeze, No rhonchi, No crackles  Cardiovascular - RRR, S1S2, No murmurs  Abdomen - BS+, Soft, NTND  Extremities - No C/C/E, No calf tenderness   Neurologic Exam -                    Cognitive - Awake, Alert, AAO to self, place, date, year, situation     Slight dysarthria but intelligible     Motor - LUE 3/5 sh abd and EF, 0/5 FF, LLE 4+/5; right side 5/5  Psychiatric - Mood stable, Affect WNL      Impression:   61 yo with CVA with left sided weakness and gait dysfunction.    Plan:  PT- ROM, Bed Mob, Transfers, Amb w AD and bracing as needed  OT- ADLs, bracing  SLP- Dysphagia eval and treat  Prec- Falls, Cardiac  DVT Prophylaxis- Lovenox  Skin- Turn q2 h  Dispo- Acute Rehab- can tolerate 3h/d PT/OT/SLP and requires daily physician visits

## 2018-09-26 NOTE — SWALLOW BEDSIDE ASSESSMENT ADULT - ORAL PHASE
Decreased anterior-posterior movement of the bolus/Delayed oral transit time/minimal oral residue for thick fluids and purees/Palatal stasis/Stasis in lateral sulci/Lingual stasis

## 2018-09-26 NOTE — OCCUPATIONAL THERAPY INITIAL EVALUATION ADULT - DIAGNOSIS, OT EVAL
Pt presents with decreased strength, transfers, endurance, vision which affects ADL/IADL performance.

## 2018-09-26 NOTE — PROGRESS NOTE ADULT - ASSESSMENT
61 y/o M with PMH of HTN, HLD, renal cancer s/p partial R nephrectomy (10/2017 at [facility], patient denies having received chemo/XRT), current smoker (45 pack yrs), depression, anxiety presented to [facility] 9/23 with AMS and found to have R MCA CVA s/p embolectomy. Extubated 9/24.

## 2018-09-26 NOTE — OCCUPATIONAL THERAPY INITIAL EVALUATION ADULT - ORIENTATION, REHAB EVAL
oriented to person, place, time and situation/reports 9/25 instead of 9/26 however all other questions correct.

## 2018-09-26 NOTE — SWALLOW BEDSIDE ASSESSMENT ADULT - NS ASR SWALLOW FINDINGS DISCUS
d/w Pt at length, reviewed with RN and Neuro service Dr. Fan d/w Pt at length, reviewed with RN and Neuro service Dr. Fan as well as CELESTE Leigh

## 2018-09-26 NOTE — PROGRESS NOTE ADULT - SUBJECTIVE AND OBJECTIVE BOX
Subjective: Patient seen and examined. No new events except as noted.     SUBJECTIVE/ROS:  feels ok       MEDICATIONS:  MEDICATIONS  (STANDING):  ALBUTerol/ipratropium for Nebulization 3 milliLiter(s) Nebulizer every 6 hours  aspirin  chewable 81 milliGRAM(s) Oral daily  atorvastatin 80 milliGRAM(s) Oral at bedtime  buDESOnide  80 MICROgram(s)/formoterol 4.5 MICROgram(s) Inhaler 2 Puff(s) Inhalation two times a day  docusate sodium Liquid 100 milliGRAM(s) Oral two times a day  enoxaparin Injectable 30 milliGRAM(s) SubCutaneous every 12 hours  fluticasone propionate 50 MICROgram(s)/spray Nasal Spray 1 Spray(s) Both Nostrils two times a day  folic acid 1 milliGRAM(s) Oral daily  influenza   Vaccine 0.5 milliLiter(s) IntraMuscular once  multivitamin 1 Tablet(s) Oral daily  nicotine -   7 mG/24Hr(s) Patch 1 patch Transdermal daily  senna 2 Tablet(s) Oral at bedtime  sodium chloride 2 Gram(s) Oral every 6 hours  sodium chloride 0.9% with potassium chloride 20 mEq/L 1000 milliLiter(s) (75 mL/Hr) IV Continuous <Continuous>  thiamine 100 milliGRAM(s) Oral daily      PHYSICAL EXAM:  T(C): 36.9 (09-26-18 @ 07:00), Max: 36.9 (09-25-18 @ 15:00)  HR: 72 (09-26-18 @ 07:00) (65 - 90)  BP: 157/95 (09-26-18 @ 07:00) (140/110 - 168/92)  RR: 27 (09-26-18 @ 07:00) (14 - 33)  SpO2: 99% (09-26-18 @ 07:00) (94% - 100%)  Wt(kg): --  I&O's Summary    25 Sep 2018 07:01  -  26 Sep 2018 07:00  --------------------------------------------------------  IN: 2050 mL / OUT: 850 mL / NET: 1200 mL        JVP: Normal  Neck: supple  Lung: clear   CV: S1 S2 , Murmur:  Abd: soft  Ext: No edema  neuro: Awake / alert  Psych: flat affect  Skin: normal        LABS/DATA:    CARDIAC MARKERS:                                11.3   8.4   )-----------( 158      ( 26 Sep 2018 00:53 )             35.2     09-26    138  |  105  |  10  ----------------------------<  94  3.7   |  25  |  0.74    Ca    8.9      26 Sep 2018 00:53  Phos  2.7     09-26  Mg     2.0     09-26    TPro  6.3  /  Alb  3.4  /  TBili  0.6  /  DBili  0.2  /  AST  23  /  ALT  17  /  AlkPhos  61  09-26    proBNP:   Lipid Profile:   HgA1c:   TSH:     TELE:  EKG:

## 2018-09-26 NOTE — PROGRESS NOTE ADULT - ASSESSMENT
Acute CVA  HTN    permissive hypertension as per neurology   obtain AYO if deemed needed as per stroke service   will consider ILR if no source for etiology of cva found   fu with stroke service     History of tobacco  on nicotine patch

## 2018-09-26 NOTE — SWALLOW BEDSIDE ASSESSMENT ADULT - DIET PRIOR TO ADMI
Take all prescribed antibiotics as directed.  Ok to try over-the counter nasal saline sprays or cold medications and decongestants unless you have a problem with high blood pressure.  Take over the counter cough medication as needed or the prescribed cough medications as directed.  Take Tylenol or Advil for headache pains or muscle aches.  May take Mucinex as directed, it is recommended that you purchase PLAIN Mucinex, not a combination medication.  Take plenty of fluids with this medication.    If you cannot find plain Mucinex, [employer] sells a product called MUCOUS RELIEF which is plain guiafenesin.    Expect improvement in one week.  If symptoms worsen or you have other concerns, contact your Primary Care Provider.     regular

## 2018-09-26 NOTE — OCCUPATIONAL THERAPY INITIAL EVALUATION ADULT - COGNITIVE, VISUAL PERCEPTUAL, OT EVAL
GOAL: Pt will independently scan environment to left side to safely navigate environment and complete ADLs 2* visual field cut in 4 weeks

## 2018-09-26 NOTE — SWALLOW BEDSIDE ASSESSMENT ADULT - PHARYNGEAL PHASE
Delayed pharyngeal swallow/increse in cough frequency and severity after above (multiple intakes) and wet vocal quality after thin liquids. Pt denying dysphagia, despite counseling that aforementioned s/s present and may be reflective of dysphagia/aspiration/Multiple swallows/Decreased laryngeal elevation

## 2018-09-26 NOTE — SWALLOW BEDSIDE ASSESSMENT ADULT - SWALLOW EVAL: DIAGNOSIS
Patient presents with ni-pharyngeal dysphagia with impaired oral management, evidence of delay in trigger of the swallow reflex/pharyngeal residue/aspiration 2) Moderate dysarthria Patient presents with: 1) ni-pharyngeal dysphagia with impaired oral management, evidence of delay in trigger of the swallow reflex/pharyngeal residue/aspiration 2) Moderate dysarthria

## 2018-09-26 NOTE — SWALLOW BEDSIDE ASSESSMENT ADULT - ASR SWALLOW RECOMMEND DIAG
Discussed with pt, who declined a FEES exam and wishes to proceed with modified barium swallow study. MD, please enter order. Will schedule exam upon d/c from [facility]

## 2018-09-26 NOTE — PROGRESS NOTE ADULT - SUBJECTIVE AND OBJECTIVE BOX
HPI:  63 y/o male with past medical history of HTN presented initially to [facility] ED for AMS with suspicion of EtOH intoxication. Patient's mental status did not improve and CT/CTA head was done demonstrating Large area acute Right MCA infarct with occluded proximal M1 segment R MCA. Patient subsequently transferred to [facility] for possible Endovascular intervention. LKN is unknown but suspected to be 11pm (9/22/2018). tPA deferred due to unclear LKN time. (23 Sep 2018 05:05)    9/24: Patient extubated.       Overnight Events:     ROS: negative [] unable to obtain as patient is comatose/intubated/aphasic []   VITALS:   T(C): 36.6 (09-26-18 @ 00:00), Max: 36.9 (09-25-18 @ 15:00)  HR: 85 (09-26-18 @ 06:00) (65 - 90)  BP: 140/110 (09-26-18 @ 06:00) (140/110 - 168/92)  RR: 23 (09-26-18 @ 06:00) (14 - 33)  SpO2: 100% (09-26-18 @ 06:00) (94% - 100%)    09-25-18 @ 07:01  -  09-26-18 @ 07:00  --------------------------------------------------------  IN: 2050 mL / OUT: 850 mL / NET: 1200 mL      LABS:  ABG - ( 25 Sep 2018 02:58 )  pH, Arterial: 7.45  pH, Blood: x     /  pCO2: 41    /  pO2: 77    / HCO3: 28    / Base Excess: 4.1   /  SaO2: 96                          11.3   8.4   )-----------( 158      ( 26 Sep 2018 00:53 )             35.2     09-26    138  |  105  |  10  ----------------------------<  94  3.7   |  25  |  0.74    Ca    8.9      26 Sep 2018 00:53  Phos  2.7     09-26  Mg     2.0     09-26    TPro  6.3  /  Alb  3.4  /  TBili  0.6  /  DBili  0.2  /  AST  23  /  ALT  17  /  AlkPhos  61  09-26      MEDS:  MEDICATIONS  (STANDING):  ALBUTerol/ipratropium for Nebulization 3 milliLiter(s) Nebulizer every 6 hours  aspirin  chewable 81 milliGRAM(s) Oral daily  atorvastatin 80 milliGRAM(s) Oral at bedtime  buDESOnide  80 MICROgram(s)/formoterol 4.5 MICROgram(s) Inhaler 2 Puff(s) Inhalation two times a day  docusate sodium Liquid 100 milliGRAM(s) Oral two times a day  enoxaparin Injectable 30 milliGRAM(s) SubCutaneous every 12 hours  fluticasone propionate 50 MICROgram(s)/spray Nasal Spray 1 Spray(s) Both Nostrils two times a day  folic acid 1 milliGRAM(s) Oral daily  influenza   Vaccine 0.5 milliLiter(s) IntraMuscular once  multivitamin 1 Tablet(s) Oral daily  nicotine -   7 mG/24Hr(s) Patch 1 patch Transdermal daily  senna 2 Tablet(s) Oral at bedtime  sodium chloride 2 Gram(s) Oral every 6 hours  sodium chloride 0.9% with potassium chloride 20 mEq/L 1000 milliLiter(s) (75 mL/Hr) IV Continuous <Continuous>  thiamine 100 milliGRAM(s) Oral daily        PHYSICAL EXAM:  General: tachypneic   Neurological: eyes open with stimlation; A&O X 3. Moves b/l LE 5/5. LUE 0/5. RUE 5/5  PERRL.  Lungs: LCTAB. Anterior auscultation  Heart: regular  Abdomen: soft  Extremities: no edema  Skin: no rash

## 2018-09-26 NOTE — PROGRESS NOTE ADULT - ASSESSMENT
ASSESSMENT/PLAN:  right M1 s/p embolectomy-TICI 2B with multiple risk factors. Extubated 9/24.     NEURO:  neuro checks q1hr  stroke consulted; mechanism likely secondary to afib  started on ASA 81, Atorvastatin 80 mg po qHS  Activity: [X] mobilize as tolerated [] Bedrest [X] PT [X] OT [] PMNR  Hx of alcohol abuse - On folic acid and thiamine    PULM:   CPAP/BiPAP qHS for suspected SHILO; however secretions may be an issue  Will consult Pulm for assistance with patient's SHILO/secretions  Duonebs for wheezing  On flonase for nasal congestion    CV: echo  SBP goal 100-180;     RENAL: cr normal; IVF as will be NPO     GI:   Diet: NPO; awaiting formal speech and swallow   GI prophylaxis [X] not indicated [] PPI [] other:  Bowel regimen [x] colace [x] senna [] other:    ENDO: hgba1c = 6.6; ISS   Goal euglycemia (-180)    HEME/ONC:  VTE prophylaxis: [x] SCDs [X] chemoprophylaxis - on Lovenox 40 mg sc qHS   [x] high risk of DVT/PE on admission due to: stroke  No DVTs (R common femoral vein not visualized).     ID: afebrile    SOCIAL/FAMILY:  [] awaiting [x] updated at bedside [] family meeting    CODE STATUS:  [x Full Code [] DNR [] DNI [] Palliative/Comfort Care    DISPOSITION:  [] ICU [X] Stroke Unit [] Floor [] EMU [] RCU [] PCU ASSESSMENT/PLAN:  right M1 s/p embolectomy-TICI 2B with multiple risk factors. Extubated 9/24.     NEURO:  neuro checks q1hr  stroke consulted; mechanism likely secondary to afib  on ASA 81, Atorvastatin 80 mg po qHS  Activity: [X] mobilize as tolerated [] Bedrest [X] PT [X] OT [] PMNR  Hx of alcohol abuse - On folic acid and thiamine    PULM:   CT Chest w/o contrast ordered per Pulm to follow up on pulm nodules.  Duonebs for wheezing  On flonase for nasal congestion    CV: echo  SBP goal 100-180;     RENAL: cr normal; IVF until patient is on feeds    GI:   Diet: Awaiting formal speech and swallow. Refusing NG tube.   GI prophylaxis [X] not indicated [] PPI [] other:  Bowel regimen [x] colace [x] senna [] other:    ENDO: hgba1c = 6.6; ISS   Goal euglycemia (-180)    HEME/ONC:  VTE prophylaxis: [x] SCDs [X] chemoprophylaxis - on Lovenox 40 mg sc qHS   [x] high risk of DVT/PE on admission due to: stroke  No DVTs (R common femoral vein not visualized).     ID: afebrile    SOCIAL/FAMILY:  [] awaiting [x] updated at bedside [] family meeting    CODE STATUS:  [x Full Code [] DNR [] DNI [] Palliative/Comfort Care    DISPOSITION:  [] ICU [] Stroke Unit [X] Floor [] EMU [] RCU [] PCU

## 2018-09-26 NOTE — PROGRESS NOTE ADULT - ASSESSMENT
62 year-old  male with past medical history of HTN and ETOH abuse. On 9/23/18 he presented to [facility] with AMS and intoxication. Upon further evaluation by the ED he wasn't moving L arm. On telestroke eval he was found with  left facial droop, left upper extremity monoplegia and lower extremity weakness, significant dysarthria and left hemibody hypoesthesia. Head CT showed area of hypodensity at right fronto parietal lobe and acute/ subacute hypodensity at the inferior temporal/parietal lobe middle cerebral artery territory. CT angiography demonstrated right middle cerebral artery proximal M1 segment occlusion. Patient not candidate for IV-tPA given unknown last known well as well as appearance of subacute infarct. He was transferred to [facility] for evaluation for endovascular therapy. CT perfusion showed a small core infarct with large area of penumbra. He was deemed candidate for endovascular therapy with mechanical thrombectomy with TICI 2b recanalization.     Impression: Cerebral embolism with cerebral infarction - R MCA distribution infarct with M1 occlusion.  Etiology unknown, cardioembolism needs to be ruled out.     NEURO: Neurologically with improvement, remains with left hemiparesis.  Continue close monitoring for neurologic deterioration, BP goal <140/90, no need for statin as admission LDL is 17. Physical therapy/OT - recommends acute TBI. Speech eval/treatment - pending.     ANTITHROMBOTIC THERAPY: Aspirin ND    PULMONARY: CXR on 9/26 Right lower lobe nodules, largest of which measures 5 mm, pulm following- plan for repeat CT chest in 6 months, probable COPD, wheezing    CARDIOVASCULAR: TTE showed - EF: 63%, Grossly normal left ventricular internal dimensions and wall thicknesses. Grossly hyperdynamic left ventricular systolic function. Reversal of the E-A  waves of the mitral inflow pattern is consistent with diastolic LV dysfunction. Grossly normal right ventricular size and function.  Cardiac monitoring without any documented events.                            SBP goal: <140/90    GASTROINTESTINAL:  dysphagia screen - failed, speech and swallow evaluation pending.      Diet: NPO    RENAL: BUN/Cr without acute change, good urine output      Na Goal: Greater than 135     Strong: yes    HEMATOLOGY: H/H without acute change, Platelets 158, no signs or symptoms of active bleeding.      DVT ppx: Heparin s.c [] LMWH [x]     ID: afebrile, no leukocytosis, no signs or symptoms of infection.      OTHER: All questions and concerns addressed.     DISPOSITION: Acute TBI once stable and workup is complete.    CORE MEASURES:        Admission NIHSS: 12     TPA: [] YES [x] NO      LDL/HDL: 17/36     Depression Screen: p     Statin Therapy: yes     Dysphagia Screen: [] PASS [x] FAIL     Smoking [] YES [x] NO      Afib [] YES [x] NO     Stroke Education [] YES [] NO - p

## 2018-09-26 NOTE — PROGRESS NOTE ADULT - SUBJECTIVE AND OBJECTIVE BOX
THE PATIENT WAS SEEN AND EXAMINED BY ME WITH THE HOUSESTAFF AND STROKE TEAM DURING MORNING ROUNDS.   HPI:62 year-old  male with past medical history of HTN and ETOH abuse. On 9/23/18 he presented to [facility] with AMS and intoxication. Upon further evaluation by the ED he wasn't moving L arm. On telestroke eval he was found with  left facial droop, left upper extremity plegia and lower extremity weakness, significant dysarthria and left hemibody hypoesthesia. Head CT showed area of hypodensity at right fronto parietal lobe and acute/ subacute hypodensity at the inferior temporal/parietal lobe middle cerebral artery territory. CT angiography demonstrated right middle cerebral artery proximal M1 segment occlusion. Patient not candidate for IV-tPA given unknown last known well as well as appearance of subacute infarct. He was transferred to [facility] for evaluation for endovascular therapy. CT perfusion showed a small core infarct with large area of penumbra. He was deemed candidate for endovascular therapy with mechanical thrombectomy with TICI 2b recanalization.     SUBJECTIVE: No events overnight.  No new neurologic complaints. " The Same "     acetaminophen   Tablet .. 650 milliGRAM(s) Oral every 6 hours PRN  ALBUTerol/ipratropium for Nebulization 3 milliLiter(s) Nebulizer every 6 hours  aspirin  chewable 81 milliGRAM(s) Oral daily  atorvastatin 80 milliGRAM(s) Oral at bedtime  buDESOnide  80 MICROgram(s)/formoterol 4.5 MICROgram(s) Inhaler 2 Puff(s) Inhalation two times a day  docusate sodium Liquid 100 milliGRAM(s) Oral two times a day  enoxaparin Injectable 30 milliGRAM(s) SubCutaneous every 12 hours  fluticasone propionate 50 MICROgram(s)/spray Nasal Spray 1 Spray(s) Both Nostrils two times a day  folic acid 1 milliGRAM(s) Oral daily  influenza   Vaccine 0.5 milliLiter(s) IntraMuscular once  multivitamin 1 Tablet(s) Oral daily  nicotine -   7 mG/24Hr(s) Patch 1 patch Transdermal daily  senna 2 Tablet(s) Oral at bedtime  sodium chloride 2 Gram(s) Oral every 6 hours  sodium chloride 0.9% with potassium chloride 20 mEq/L 1000 milliLiter(s) IV Continuous <Continuous>  thiamine 100 milliGRAM(s) Oral daily      PHYSICAL EXAM:   Vital Signs Last 24 Hrs  T(C): 36.9 (26 Sep 2018 15:00), Max: 36.9 (26 Sep 2018 07:00)  T(F): 98.4 (26 Sep 2018 15:00), Max: 98.4 (26 Sep 2018 07:00)  HR: 68 (26 Sep 2018 16:05) (65 - 90)  BP: 161/93 (26 Sep 2018 16:05) (140/110 - 168/92)  BP(mean): 105 (26 Sep 2018 16:00) (102 - 122)  RR: 28 (26 Sep 2018 16:00) (14 - 33)  SpO2: 100% (26 Sep 2018 16:05) (95% - 100%)    General: No acute distress  HEENT: EOM intact, visual fields full, partial left gaze palsy, incomplete saccade to right   Abdomen: Soft, nontender, nondistended   Extremities: No edema    NEUROLOGICAL EXAM:  Mental status: Awake, alert, oriented to hospital, year, month, no neglect - aware of deficit.   Cranial Nerves: Moderate left facial palsy, no nystagmus, no dysarthria, tongue midline  Motor exam: Normal tone, RUE 5/5,  RLE 5/5, L deltoid 3/5 and 0/5 L wrist extensor, LLE 4+/5, small drift  Sensation: Intact to light touch   Coordination/ Gait: No dysmetria, gait deferred.     LABS:                        11.3   8.4   )-----------( 158      ( 26 Sep 2018 00:53 )             35.2    09-26    138  |  105  |  10  ----------------------------<  94  3.7   |  25  |  0.74    Ca    8.9      26 Sep 2018 00:53  Phos  2.7     09-26  Mg     2.0     09-26    TPro  6.3  /  Alb  3.4  /  TBili  0.6  /  DBili  0.2  /  AST  23  /  ALT  17  /  AlkPhos  61  09-26    Hemoglobin A1C, Whole Blood: 6.6 % (09-23 @ 11:05)  Hemoglobin A1C, Whole Blood: 6.5 % (09-07 @ 18:01)      IMAGING: Reviewed by me.   CT Head No Cont (09.24.18):  Previously noted acute right MCA infarct has demonstrated expected evolutionary changes with areas of hemorrhagic transformation again seen.    CT Head No Cont (09.23.18):  Redemonstration of evolving right MCA territory infarct, faint areas of increasing attenuation may reflect contrast staining, tiny foci of petechial hemorrhage not excluded.    CT perfusion w/ Maps w/ IV Cont (09.23.18):   Large right-sided penumbra  with a small infarct in the large mismatch volume.    Noncontrast head CT (09.23.18):   Large area acute right MCA territory infarct. Posterior temporal lobe infarct appears slightly more subacute with questionable petechial hemorrhage.    CTA neck (09.23.18):   No major vessel occlusion or hemodynamically significant stenosis. No evidence of dissection.    CTA head (09.23.18):  Occluded proximal M1 segment right middle cerebral artery.   Remainder of the intracranial circulation is patent. THE PATIENT WAS SEEN AND EXAMINED BY ME WITH THE HOUSESTAFF AND STROKE TEAM DURING MORNING ROUNDS.   HPI:62 year-old  male with past medical history of HTN and ETOH abuse. On 9/23/18 he presented to [facility] with AMS and intoxication. Upon further evaluation by the ED he wasn't moving L arm. On telestroke eval he was found with  left facial droop, left upper extremity plegia and lower extremity weakness, significant dysarthria and left hemibody hypoesthesia. Head CT showed area of hypodensity at right fronto parietal lobe and acute/ subacute hypodensity at the inferior temporal/parietal lobe middle cerebral artery territory. CT angiography demonstrated right middle cerebral artery proximal M1 segment occlusion. Patient not candidate for IV-tPA given unknown last known well as well as appearance of subacute infarct. He was transferred to [facility] for evaluation for endovascular therapy. CT perfusion showed a small core infarct with large area of penumbra. He was deemed candidate for endovascular therapy with mechanical thrombectomy with TICI 2b recanalization.     SUBJECTIVE: No events overnight.  No new neurologic complaints. " The Same "     acetaminophen   Tablet .. 650 milliGRAM(s) Oral every 6 hours PRN  ALBUTerol/ipratropium for Nebulization 3 milliLiter(s) Nebulizer every 6 hours  aspirin  chewable 81 milliGRAM(s) Oral daily  atorvastatin 80 milliGRAM(s) Oral at bedtime  buDESOnide  80 MICROgram(s)/formoterol 4.5 MICROgram(s) Inhaler 2 Puff(s) Inhalation two times a day  docusate sodium Liquid 100 milliGRAM(s) Oral two times a day  enoxaparin Injectable 30 milliGRAM(s) SubCutaneous every 12 hours  fluticasone propionate 50 MICROgram(s)/spray Nasal Spray 1 Spray(s) Both Nostrils two times a day  folic acid 1 milliGRAM(s) Oral daily  influenza   Vaccine 0.5 milliLiter(s) IntraMuscular once  multivitamin 1 Tablet(s) Oral daily  nicotine -   7 mG/24Hr(s) Patch 1 patch Transdermal daily  senna 2 Tablet(s) Oral at bedtime  sodium chloride 2 Gram(s) Oral every 6 hours  sodium chloride 0.9% with potassium chloride 20 mEq/L 1000 milliLiter(s) IV Continuous <Continuous>  thiamine 100 milliGRAM(s) Oral daily      PHYSICAL EXAM:   Vital Signs Last 24 Hrs  T(C): 36.9 (26 Sep 2018 15:00), Max: 36.9 (26 Sep 2018 07:00)  T(F): 98.4 (26 Sep 2018 15:00), Max: 98.4 (26 Sep 2018 07:00)  HR: 68 (26 Sep 2018 16:05) (65 - 90)  BP: 161/93 (26 Sep 2018 16:05) (140/110 - 168/92)  BP(mean): 105 (26 Sep 2018 16:00) (102 - 122)  RR: 28 (26 Sep 2018 16:00) (14 - 33)  SpO2: 100% (26 Sep 2018 16:05) (95% - 100%)    General: No acute distress  HEENT: EOM intact, visual fields full, partial left gaze palsy, incomplete saccade to right   Abdomen: Soft, nontender, nondistended   Extremities: No edema    NEUROLOGICAL EXAM:  Mental status: Awake, alert, oriented to hospital, year, month, no neglect - aware of deficit.   Cranial Nerves: Moderate left facial palsy, no nystagmus, no dysarthria, tongue midline  Motor exam: Normal tone, RUE 5/5,  RLE 5/5, L deltoid 3-/5 and 0/5 L wrist extensor, LLE 4+/5, small drift  Sensation: Intact to light touch   Coordination/ Gait: No dysmetria, gait deferred.     LABS:                        11.3   8.4   )-----------( 158      ( 26 Sep 2018 00:53 )             35.2    09-26    138  |  105  |  10  ----------------------------<  94  3.7   |  25  |  0.74    Ca    8.9      26 Sep 2018 00:53  Phos  2.7     09-26  Mg     2.0     09-26    TPro  6.3  /  Alb  3.4  /  TBili  0.6  /  DBili  0.2  /  AST  23  /  ALT  17  /  AlkPhos  61  09-26    Hemoglobin A1C, Whole Blood: 6.6 % (09-23 @ 11:05)  Hemoglobin A1C, Whole Blood: 6.5 % (09-07 @ 18:01)      IMAGING: Reviewed by me.   CT Head No Cont (09.24.18):  Previously noted acute right MCA infarct has demonstrated expected evolutionary changes with areas of hemorrhagic transformation again seen.    CT Head No Cont (09.23.18):  Redemonstration of evolving right MCA territory infarct, faint areas of increasing attenuation may reflect contrast staining, tiny foci of petechial hemorrhage not excluded.    CT perfusion w/ Maps w/ IV Cont (09.23.18):   Large right-sided penumbra  with a small infarct in the large mismatch volume.    Noncontrast head CT (09.23.18):   Large area acute right MCA territory infarct. Posterior temporal lobe infarct appears slightly more subacute with questionable petechial hemorrhage.    CTA neck (09.23.18):   No major vessel occlusion or hemodynamically significant stenosis. No evidence of dissection.    CTA head (09.23.18):  Occluded proximal M1 segment right middle cerebral artery.   Remainder of the intracranial circulation is patent.

## 2018-09-26 NOTE — OCCUPATIONAL THERAPY INITIAL EVALUATION ADULT - RANGE OF MOTION EXAMINATION, UPPER EXTREMITY
Right UE Passive ROM was WFL  (within functional limits)/Left UE Active ROM was WFL (within functional limits)/L shoulder to 20* AROM, elbow to 15* AROM, no AROM at wrist or digits

## 2018-09-26 NOTE — OCCUPATIONAL THERAPY INITIAL EVALUATION ADULT - PHYSICAL ASSIST/NONPHYSICAL ASSIST: BED TO CHAIR, REHAB EVAL
verbal cues/cues required to scan to the left side to locate chair/nonverbal cues (demo/gestures)/1 person assist

## 2018-09-26 NOTE — PROGRESS NOTE ADULT - ASSESSMENT
ASSESSMENT/PLAN:  right M1 s/p embolectomy-TICI 2B with multiple risk factors.   COPD     neuro checks q1hr  on ASA and statins  accepted by Stroke team, pending a bed  Duonebs for wheezing, started on Symbicort  NPO; awaiting formal speech and swallow   VTE prophylaxis: [x] SCDs [X] chemoprophylaxis - on Lovenox 40 mg sc qHS   [x] high risk of DVT/PE on admission due to: stroke    CODE STATUS:  [x Full Code [] DNR [] DNI [] Palliative/Comfort Care    DISPOSITION:  [] ICU [X] Stroke Unit [] Floor [] EMU [] RCU [] PCU    Critical care time 35 min.

## 2018-09-26 NOTE — OCCUPATIONAL THERAPY INITIAL EVALUATION ADULT - PERTINENT HX OF CURRENT PROBLEM, REHAB EVAL
61 y/o M presents to [facility] for altered mental status with suspicion of EtOH intoxication. CT/CTA was done demonstrating large area acute R MCA infarct with occluded proximal M1 segment R MCA. Pt transferred for further management. CT head: large area acute R MCA territory infarct, posterior temporal lobe infarct appears slightly more subacute. CTA neack: (-). CTA head: occluded proximal M1 segment R MCA. DVT (-)

## 2018-09-26 NOTE — PROGRESS NOTE ADULT - ASSESSMENT
ASSESSMENT/PLAN:  right M1 s/p embolectomy-TICI 2B with multiple risk factors    NEURO:  neuro checks q1hr  tx stroke

## 2018-09-26 NOTE — PROGRESS NOTE ADULT - SUBJECTIVE AND OBJECTIVE BOX
Follow-up Pulm Progress Note    No new respiratory events overnight.  Denies SOB/CP.   Snoring loudly while sleeping  100% on RA  Still having productive cough     Medications:  MEDICATIONS  (STANDING):  ALBUTerol/ipratropium for Nebulization 3 milliLiter(s) Nebulizer every 6 hours  aspirin  chewable 81 milliGRAM(s) Oral daily  atorvastatin 80 milliGRAM(s) Oral at bedtime  buDESOnide  80 MICROgram(s)/formoterol 4.5 MICROgram(s) Inhaler 2 Puff(s) Inhalation two times a day  docusate sodium Liquid 100 milliGRAM(s) Oral two times a day  enoxaparin Injectable 30 milliGRAM(s) SubCutaneous every 12 hours  fluticasone propionate 50 MICROgram(s)/spray Nasal Spray 1 Spray(s) Both Nostrils two times a day  folic acid 1 milliGRAM(s) Oral daily  influenza   Vaccine 0.5 milliLiter(s) IntraMuscular once  multivitamin 1 Tablet(s) Oral daily  nicotine -   7 mG/24Hr(s) Patch 1 patch Transdermal daily  senna 2 Tablet(s) Oral at bedtime  sodium chloride 2 Gram(s) Oral every 6 hours  sodium chloride 0.9% with potassium chloride 20 mEq/L 1000 milliLiter(s) (75 mL/Hr) IV Continuous <Continuous>  thiamine 100 milliGRAM(s) Oral daily    MEDICATIONS  (PRN):  acetaminophen   Tablet .. 650 milliGRAM(s) Oral every 6 hours PRN Temp greater or equal to 38C (100.4F), Mild Pain (1 - 3)          Vital Signs Last 24 Hrs  T(C): 36.9 (26 Sep 2018 15:00), Max: 36.9 (26 Sep 2018 07:00)  T(F): 98.4 (26 Sep 2018 15:00), Max: 98.4 (26 Sep 2018 07:00)  HR: 70 (26 Sep 2018 15:00) (67 - 90)  BP: 164/93 (26 Sep 2018 15:00) (140/110 - 168/92)  BP(mean): 118 (26 Sep 2018 06:00) (102 - 122)  RR: 20 (26 Sep 2018 11:00) (14 - 33)  SpO2: 95% (26 Sep 2018 15:00) (95% - 100%) on RA    ABG - ( 25 Sep 2018 02:58 )  pH, Arterial: 7.45  pH, Blood: x     /  pCO2: 41    /  pO2: 77    / HCO3: 28    / Base Excess: 4.1   /  SaO2: 96                    09-25 @ 07:01  -  09-26 @ 07:00  --------------------------------------------------------  IN: 2125 mL / OUT: 850 mL / NET: 1275 mL          LABS:                        11.3   8.4   )-----------( 158      ( 26 Sep 2018 00:53 )             35.2     09-26    138  |  105  |  10  ----------------------------<  94  3.7   |  25  |  0.74    Ca    8.9      26 Sep 2018 00:53  Phos  2.7     09-26  Mg     2.0     09-26    TPro  6.3  /  Alb  3.4  /  TBili  0.6  /  DBili  0.2  /  AST  23  /  ALT  17  /  AlkPhos  61  09-26          CAPILLARY BLOOD GLUCOSE      POCT Blood Glucose.: 106 mg/dL (25 Sep 2018 18:55)          Physical Examination:  PULM: Decreased BS  CVS: S1, S2 heard    RADIOLOGY REVIEWED  CT chest: < from: CT Chest No Cont (09.26.18 @ 12:01) >  FINDINGS:     Tubes/Lines: None.    Lungs And Airways: Small triangular density (series 4 image 252) is   present, which may represent atelectasis.    The nodules measure:   *  A right lower lobe nodule measures 4 mm (series 4 image 213).  *  A right lower lobe nodule measures 5 mm (series 3 image 100)    The remainder of the lungs are clear. The airways are unremarkable.      Pleura:No pleural effusion.  No pneumothorax.    Mediastinum: There are no enlarged chest lymph nodes. The visualized   portion of the thyroid gland is unremarkable. The esophagus is   unremarkable.     Heart and Vasculature: The heart is normal in size.There is no   pericardial effusion. Coronary artery disease with calcified plaque   involving the left anterior descending and right coronary arteries.  The   main pulmonary artery is normal in caliber. Left-sided aortic arch and   left-sided descending thoracic aorta. There is no aortic aneurysm.     Upper Abdomen: The upper abdomen is unremarkable.    Bones And Soft Tissues: The bones are unremarkable.  The soft tissues are   unremarkable.      IMPRESSION:     1.  Right lower lobe nodules, largest of which measures 5 mm.    < end of copied text > Follow-up Pulm Progress Note    No new respiratory events overnight.  Denies SOB/CP.   Snoring loudly while sleeping  100% on RA  Still having productive cough     Medications:  MEDICATIONS  (STANDING):  ALBUTerol/ipratropium for Nebulization 3 milliLiter(s) Nebulizer every 6 hours  aspirin  chewable 81 milliGRAM(s) Oral daily  atorvastatin 80 milliGRAM(s) Oral at bedtime  buDESOnide  80 MICROgram(s)/formoterol 4.5 MICROgram(s) Inhaler 2 Puff(s) Inhalation two times a day  docusate sodium Liquid 100 milliGRAM(s) Oral two times a day  enoxaparin Injectable 30 milliGRAM(s) SubCutaneous every 12 hours  fluticasone propionate 50 MICROgram(s)/spray Nasal Spray 1 Spray(s) Both Nostrils two times a day  folic acid 1 milliGRAM(s) Oral daily  influenza   Vaccine 0.5 milliLiter(s) IntraMuscular once  multivitamin 1 Tablet(s) Oral daily  nicotine -   7 mG/24Hr(s) Patch 1 patch Transdermal daily  senna 2 Tablet(s) Oral at bedtime  sodium chloride 2 Gram(s) Oral every 6 hours  sodium chloride 0.9% with potassium chloride 20 mEq/L 1000 milliLiter(s) (75 mL/Hr) IV Continuous <Continuous>  thiamine 100 milliGRAM(s) Oral daily    MEDICATIONS  (PRN):  acetaminophen   Tablet .. 650 milliGRAM(s) Oral every 6 hours PRN Temp greater or equal to 38C (100.4F), Mild Pain (1 - 3)    Vital Signs Last 24 Hrs  T(C): 36.9 (26 Sep 2018 15:00), Max: 36.9 (26 Sep 2018 07:00)  T(F): 98.4 (26 Sep 2018 15:00), Max: 98.4 (26 Sep 2018 07:00)  HR: 70 (26 Sep 2018 15:00) (67 - 90)  BP: 164/93 (26 Sep 2018 15:00) (140/110 - 168/92)  BP(mean): 118 (26 Sep 2018 06:00) (102 - 122)  RR: 20 (26 Sep 2018 11:00) (14 - 33)  SpO2: 95% (26 Sep 2018 15:00) (95% - 100%) on RA    ABG - ( 25 Sep 2018 02:58 )  pH, Arterial: 7.45  pH, Blood: x     /  pCO2: 41    /  pO2: 77    / HCO3: 28    / Base Excess: 4.1   /  SaO2: 96          09-25 @ 07:01  -  09-26 @ 07:00  --------------------------------------------------------  IN: 2125 mL / OUT: 850 mL / NET: 1275 mL      LABS:                        11.3   8.4   )-----------( 158      ( 26 Sep 2018 00:53 )             35.2     09-26    138  |  105  |  10  ----------------------------<  94  3.7   |  25  |  0.74    Ca    8.9      26 Sep 2018 00:53  Phos  2.7     09-26  Mg     2.0     09-26    TPro  6.3  /  Alb  3.4  /  TBili  0.6  /  DBili  0.2  /  AST  23  /  ALT  17  /  AlkPhos  61  09-26    CAPILLARY BLOOD GLUCOSE    POCT Blood Glucose.: 106 mg/dL (25 Sep 2018 18:55)    Physical Examination:  PULM: Decreased BS  CVS: S1, S2 heard    RADIOLOGY REVIEWED  CT chest: < from: CT Chest No Cont (09.26.18 @ 12:01) >  FINDINGS:     Tubes/Lines: None.    Lungs And Airways: Small triangular density (series 4 image 252) is   present, which may represent atelectasis.    The nodules measure:   *  A right lower lobe nodule measures 4 mm (series 4 image 213).  *  A right lower lobe nodule measures 5 mm (series 3 image 100)    The remainder of the lungs are clear. The airways are unremarkable.      Pleura:No pleural effusion.  No pneumothorax.    Mediastinum: There are no enlarged chest lymph nodes. The visualized   portion of the thyroid gland is unremarkable. The esophagus is   unremarkable.     Heart and Vasculature: The heart is normal in size.There is no   pericardial effusion. Coronary artery disease with calcified plaque   involving the left anterior descending and right coronary arteries.  The   main pulmonary artery is normal in caliber. Left-sided aortic arch and   left-sided descending thoracic aorta. There is no aortic aneurysm.     Upper Abdomen: The upper abdomen is unremarkable.    Bones And Soft Tissues: The bones are unremarkable.  The soft tissues are   unremarkable.      IMPRESSION:     1.  Right lower lobe nodules, largest of which measures 5 mm.    < end of copied text >

## 2018-09-27 LAB
ANION GAP SERPL CALC-SCNC: 8 MMOL/L — SIGNIFICANT CHANGE UP (ref 5–17)
BUN SERPL-MCNC: 11 MG/DL — SIGNIFICANT CHANGE UP (ref 7–23)
CALCIUM SERPL-MCNC: 8.7 MG/DL — SIGNIFICANT CHANGE UP (ref 8.4–10.5)
CHLORIDE SERPL-SCNC: 103 MMOL/L — SIGNIFICANT CHANGE UP (ref 96–108)
CO2 SERPL-SCNC: 25 MMOL/L — SIGNIFICANT CHANGE UP (ref 22–31)
CREAT SERPL-MCNC: 0.74 MG/DL — SIGNIFICANT CHANGE UP (ref 0.5–1.3)
GLUCOSE SERPL-MCNC: 91 MG/DL — SIGNIFICANT CHANGE UP (ref 70–99)
HCT VFR BLD CALC: 35.5 % — LOW (ref 39–50)
HGB BLD-MCNC: 11.5 G/DL — LOW (ref 13–17)
MAGNESIUM SERPL-MCNC: 2 MG/DL — SIGNIFICANT CHANGE UP (ref 1.6–2.6)
MCHC RBC-ENTMCNC: 22.4 PG — LOW (ref 27–34)
MCHC RBC-ENTMCNC: 32.5 GM/DL — SIGNIFICANT CHANGE UP (ref 32–36)
MCV RBC AUTO: 69.1 FL — LOW (ref 80–100)
PHOSPHATE SERPL-MCNC: 3.3 MG/DL — SIGNIFICANT CHANGE UP (ref 2.5–4.5)
PLATELET # BLD AUTO: 160 K/UL — SIGNIFICANT CHANGE UP (ref 150–400)
POTASSIUM SERPL-MCNC: 4 MMOL/L — SIGNIFICANT CHANGE UP (ref 3.5–5.3)
POTASSIUM SERPL-SCNC: 4 MMOL/L — SIGNIFICANT CHANGE UP (ref 3.5–5.3)
RBC # BLD: 5.14 M/UL — SIGNIFICANT CHANGE UP (ref 4.2–5.8)
RBC # FLD: 13.9 % — SIGNIFICANT CHANGE UP (ref 10.3–14.5)
SODIUM SERPL-SCNC: 136 MMOL/L — SIGNIFICANT CHANGE UP (ref 135–145)
WBC # BLD: 7.7 K/UL — SIGNIFICANT CHANGE UP (ref 3.8–10.5)
WBC # FLD AUTO: 7.7 K/UL — SIGNIFICANT CHANGE UP (ref 3.8–10.5)

## 2018-09-27 PROCEDURE — 74230 X-RAY XM SWLNG FUNCJ C+: CPT | Mod: 26

## 2018-09-27 PROCEDURE — 93010 ELECTROCARDIOGRAM REPORT: CPT

## 2018-09-27 PROCEDURE — 99233 SBSQ HOSP IP/OBS HIGH 50: CPT

## 2018-09-27 RX ORDER — ASPIRIN/CALCIUM CARB/MAGNESIUM 324 MG
81 TABLET ORAL DAILY
Qty: 0 | Refills: 0 | Status: DISCONTINUED | OUTPATIENT
Start: 2018-09-27 | End: 2018-10-09

## 2018-09-27 RX ADMIN — Medication 1 SPRAY(S): at 05:13

## 2018-09-27 RX ADMIN — Medication 3 MILLILITER(S): at 17:45

## 2018-09-27 RX ADMIN — BUDESONIDE AND FORMOTEROL FUMARATE DIHYDRATE 2 PUFF(S): 160; 4.5 AEROSOL RESPIRATORY (INHALATION) at 05:20

## 2018-09-27 RX ADMIN — Medication 1 PATCH: at 16:51

## 2018-09-27 RX ADMIN — DEXTROSE MONOHYDRATE, SODIUM CHLORIDE, AND POTASSIUM CHLORIDE 75 MILLILITER(S): 50; .745; 4.5 INJECTION, SOLUTION INTRAVENOUS at 18:33

## 2018-09-27 RX ADMIN — Medication 81 MILLIGRAM(S): at 22:53

## 2018-09-27 RX ADMIN — Medication 1 SPRAY(S): at 18:32

## 2018-09-27 RX ADMIN — Medication 3 MILLILITER(S): at 00:41

## 2018-09-27 RX ADMIN — Medication 100 MILLIGRAM(S): at 18:31

## 2018-09-27 RX ADMIN — SODIUM CHLORIDE 2 GRAM(S): 9 INJECTION INTRAMUSCULAR; INTRAVENOUS; SUBCUTANEOUS at 18:35

## 2018-09-27 RX ADMIN — ENOXAPARIN SODIUM 30 MILLIGRAM(S): 100 INJECTION SUBCUTANEOUS at 18:32

## 2018-09-27 RX ADMIN — Medication 3 MILLILITER(S): at 05:20

## 2018-09-27 RX ADMIN — Medication 3 MILLILITER(S): at 23:41

## 2018-09-27 RX ADMIN — ATORVASTATIN CALCIUM 80 MILLIGRAM(S): 80 TABLET, FILM COATED ORAL at 22:50

## 2018-09-27 RX ADMIN — Medication 3 MILLILITER(S): at 11:50

## 2018-09-27 RX ADMIN — ENOXAPARIN SODIUM 30 MILLIGRAM(S): 100 INJECTION SUBCUTANEOUS at 05:12

## 2018-09-27 RX ADMIN — BUDESONIDE AND FORMOTEROL FUMARATE DIHYDRATE 2 PUFF(S): 160; 4.5 AEROSOL RESPIRATORY (INHALATION) at 17:48

## 2018-09-27 NOTE — SWALLOW VFSS/MBS ASSESSMENT ADULT - RECOMMENDED CONSISTENCY
Dysphagia I with nectar thickened liquids. MD/team Please enter the following as provider to RN orders : 1) Full assist with meals, 2) Crush meds or provide via alternate source, 3) Provide small single bites and sips at slow rate 4) Encourage successive swallows for oral and pharyngeal clearance 5) Alternate food and liquids to aid in oral and pharyngeal clearance 6) Aspiration precautions. Monitor for s/s aspiration/laryngeal penetration. If noted:  D/C p.o. intake, provide non-oral nutrition/hydration/meds Dysphagia I with nectar thickened liquids. MD/team Please enter the following as provider to RN orders : 1) Full assist with meals, 2) Crush meds or provide via alternate source, 3) Provide small single bites and sips at slow rate 4) Encourage successive swallows for oral and pharyngeal clearance 5) Alternate food and liquids to aid in oral and pharyngeal clearance 6) Aspiration precautions. Monitor for s/s aspiration/laryngeal penetration. If noted:  D/C p.o. intake, provide non-oral nutrition/hydration/meds. (Note: pt with baseline cough makes behavioral analysis of swallow function difficult)

## 2018-09-27 NOTE — PROGRESS NOTE ADULT - SUBJECTIVE AND OBJECTIVE BOX
Subjective: Patient seen and examined. No new events except as noted.     SUBJECTIVE/ROS:  he is lethargic   denies any cp or sob       MEDICATIONS:  MEDICATIONS  (STANDING):  ALBUTerol/ipratropium for Nebulization 3 milliLiter(s) Nebulizer every 6 hours  aspirin Suppository 300 milliGRAM(s) Rectal daily  atorvastatin 80 milliGRAM(s) Oral at bedtime  buDESOnide  80 MICROgram(s)/formoterol 4.5 MICROgram(s) Inhaler 2 Puff(s) Inhalation two times a day  enoxaparin Injectable 30 milliGRAM(s) SubCutaneous every 12 hours  fluticasone propionate 50 MICROgram(s)/spray Nasal Spray 1 Spray(s) Both Nostrils two times a day  influenza   Vaccine 0.5 milliLiter(s) IntraMuscular once  nicotine -   7 mG/24Hr(s) Patch 1 patch Transdermal daily  sodium chloride 2 Gram(s) Oral every 6 hours  sodium chloride 0.9% with potassium chloride 20 mEq/L 1000 milliLiter(s) (75 mL/Hr) IV Continuous <Continuous>  thiamine Injectable 100 milliGRAM(s) IV Push daily      PHYSICAL EXAM:  T(C): 36.6 (09-27-18 @ 07:00), Max: 36.9 (09-26-18 @ 15:00)  HR: 64 (09-27-18 @ 07:00) (62 - 75)  BP: 128/60 (09-27-18 @ 07:00) (128/60 - 175/84)  RR: 25 (09-27-18 @ 07:00) (20 - 30)  SpO2: 93% (09-27-18 @ 07:00) (93% - 100%)  Wt(kg): --  I&O's Summary    26 Sep 2018 07:01  -  27 Sep 2018 07:00  --------------------------------------------------------  IN: 1800 mL / OUT: 1445 mL / NET: 355 mL    27 Sep 2018 07:01  -  27 Sep 2018 10:05  --------------------------------------------------------  IN: 0 mL / OUT: 275 mL / NET: -275 mL        JVP: Normal  Neck: supple  Lung: clear   CV: S1 S2 , Murmur:  Abd: soft  Ext: No edema  neuro: Awake / alert  Psych: flat affect  Skin: normal        LABS/DATA:    CARDIAC MARKERS:                                11.5   7.7   )-----------( 160      ( 27 Sep 2018 00:28 )             35.5     09-27    136  |  103  |  11  ----------------------------<  91  4.0   |  25  |  0.74    Ca    8.7      27 Sep 2018 00:28  Phos  3.3     09-27  Mg     2.0     09-27    TPro  6.3  /  Alb  3.4  /  TBili  0.6  /  DBili  0.2  /  AST  23  /  ALT  17  /  AlkPhos  61  09-26    proBNP:   Lipid Profile:   HgA1c:   TSH:     TELE:  EKG:

## 2018-09-27 NOTE — PROGRESS NOTE ADULT - SUBJECTIVE AND OBJECTIVE BOX
Follow-up Pulm Progress Note    No new respiratory events overnight.  Denies SOB/CP.     Medications:  MEDICATIONS  (STANDING):  ALBUTerol/ipratropium for Nebulization 3 milliLiter(s) Nebulizer every 6 hours  aspirin  chewable 81 milliGRAM(s) Oral daily  atorvastatin 80 milliGRAM(s) Oral at bedtime  buDESOnide  80 MICROgram(s)/formoterol 4.5 MICROgram(s) Inhaler 2 Puff(s) Inhalation two times a day  enoxaparin Injectable 30 milliGRAM(s) SubCutaneous every 12 hours  fluticasone propionate 50 MICROgram(s)/spray Nasal Spray 1 Spray(s) Both Nostrils two times a day  influenza   Vaccine 0.5 milliLiter(s) IntraMuscular once  nicotine -   7 mG/24Hr(s) Patch 1 patch Transdermal daily  sodium chloride 2 Gram(s) Oral every 6 hours  sodium chloride 0.9% with potassium chloride 20 mEq/L 1000 milliLiter(s) (75 mL/Hr) IV Continuous <Continuous>  thiamine Injectable 100 milliGRAM(s) IV Push daily    MEDICATIONS  (PRN):  acetaminophen   Tablet .. 650 milliGRAM(s) Oral every 6 hours PRN Temp greater or equal to 38C (100.4F), Mild Pain (1 - 3)  acetaminophen  IVPB .. 1000 milliGRAM(s) IV Intermittent once PRN Mild Pain (1 - 3), Moderate Pain (4 - 6), Severe Pain (7 - 10)    Vital Signs Last 24 Hrs  T(C): 36.7 (27 Sep 2018 17:00), Max: 36.7 (27 Sep 2018 05:00)  T(F): 98.1 (27 Sep 2018 17:00), Max: 98.1 (27 Sep 2018 05:00)  HR: 83 (27 Sep 2018 19:00) (62 - 83)  BP: 143/82 (27 Sep 2018 19:00) (128/60 - 175/84)  BP(mean): 106 (27 Sep 2018 19:00) (86 - 121)  RR: 25 (27 Sep 2018 19:00) (17 - 30)  SpO2: 93% (27 Sep 2018 19:00) (92% - 99%)    09-26 @ 07:01  -  09-27 @ 07:00  --------------------------------------------------------  IN: 1800 mL / OUT: 1445 mL / NET: 355 mL    LABS:                        11.5   7.7   )-----------( 160      ( 27 Sep 2018 00:28 )             35.5     09-27    136  |  103  |  11  ----------------------------<  91  4.0   |  25  |  0.74    Ca    8.7      27 Sep 2018 00:28  Phos  3.3     09-27  Mg     2.0     09-27    TPro  6.3  /  Alb  3.4  /  TBili  0.6  /  DBili  0.2  /  AST  23  /  ALT  17  /  AlkPhos  61  09-26    CAPILLARY BLOOD GLUCOSE    CULTURES: (if applicable)    Physical Examination:  PULM: Clear to auscultation bilaterally, no significant sputum production  CVS: S1, S2 heard    RADIOLOGY REVIEWED  CXR:     CT chest:    TTE:

## 2018-09-27 NOTE — PROGRESS NOTE ADULT - ASSESSMENT
Acute CVA  HTN    permissive hypertension as per neurology   obtain AYO if deemed needed as per stroke service   will consider ILR if no source for etiology of cva found   fu with stroke service     History of tobacco  on nicotine patch Acute CVA  HTN    permissive hypertension as per neurology   obtain AYO if deemed needed as per stroke service   will consider ILR if no source for etiology of cva found   fu with stroke service   on asa     History of tobacco  on nicotine patch

## 2018-09-27 NOTE — SWALLOW VFSS/MBS ASSESSMENT ADULT - ADDITIONAL INFORMATION
Thyroid calcification. There was evidence of small anterior cervical osteophytes.     Disorders: noted to be impulsive with intakes of large volumes at a rapid rate, reduced awareness of deficits, reduced oral competence, reduced lingual strength/ROM/Rate of motion, reduced BOT to posterior pharyngeal wall contact with a narrow column of contrast between structures, delay in trigger of the swallow reflex with low trigger points, reduced anterior hyoid excursion, reduced laryngeal elevation, incomplete laryngeal vestibule closure, evidence of reduced velar to posterior pharyngeal wall contact, reduced supraglottic sensation, reduced subglottic sensation, evidence suggestive of a fatigue factor with decompensation of swallow function when multiple back-to-back trials were presented (this took place off fluoro to assess for this issues) Etiology of esophageal dysphagia is unclear - suggest GI consult for assessment. Thyroid calcification. There was evidence of small anterior cervical osteophytes.     Disorders: noted to be impulsive with intakes of large volumes at a rapid rate, reduced awareness of deficits, reduced oral competence, reduced lingual strength/ROM/Rate of motion, reduced BOT to posterior pharyngeal wall contact with a narrow column of contrast between structures, delay in trigger of the swallow reflex with low trigger points,, reduced laryngeal elevation, incomplete laryngeal vestibule closure, reduced supraglottic sensation, reduced subglottic sensation.     Strategies: due to impulsive nature and impaired safety awareness, pt generated swallow strategies not utilized.

## 2018-09-27 NOTE — SWALLOW VFSS/MBS ASSESSMENT ADULT - ORAL PHASE COMMENTS
Oral transit time noted to be 2.4  seconds. There was minimal lingual residue post swallow Oral transit time noted to be  2.5 seconds. There was minimal oral residue post swallow. Pt generated a successive swallow which aided in reduction of residue. Total BRIAN was 7.5 sec. Moderate spillover to the level of the valleculae Oral transit time noted to be 1.6 seconds. Moderate spillover to the level of the valleculae Oral transit time noted to be 2  seconds.  Maximal spillover to the level of the valleculae and moderate passive overflow to the hypopharynx Oral transit time noted to be  1.6 seconds. Maximal spillover to the level of the valleculae. Minimal post swallow residue was evident. Pt was noted to transport unmasticated solids to the pharynx. There was mild to moderate diffuse oral residue

## 2018-09-27 NOTE — PROGRESS NOTE ADULT - ASSESSMENT
61 y/o M with PMH of HTN, HLD, renal cancer s/p partial R nephrectomy (10/2017 at [facility] [place], patient denies having received chemo/XRT), current smoker (45 pack yrs), depression, anxiety presented to [facility] 9/23 with AMS and found to have R MCA CVA s/p embolectomy. Extubated 9/24.

## 2018-09-27 NOTE — PROGRESS NOTE ADULT - ASSESSMENT
62 year-old  male with past medical history of HTN and ETOH abuse. On 9/23/18 he presented to [facility] with AMS and intoxication. Upon further evaluation by the ED he wasn't moving L arm. On telestroke eval he was found with  left facial droop, left upper extremity monoplegia and lower extremity weakness, significant dysarthria and left hemibody hypoesthesia. Head CT showed area of hypodensity at right fronto parietal lobe and acute/ subacute hypodensity at the inferior temporal/parietal lobe middle cerebral artery territory. CT angiography demonstrated right middle cerebral artery proximal M1 segment occlusion. Patient not candidate for IV-tPA given unknown last known well as well as appearance of subacute infarct. He was transferred to [facility] for evaluation for endovascular therapy. CT perfusion showed a small core infarct with large area of penumbra. He was deemed candidate for endovascular therapy with mechanical thrombectomy with TICI 2b recanalization.     Impression: Cerebral embolism with cerebral infarction - R MCA distribution infarct with M1 occlusion.  Etiology unknown, cardioembolism needs to be ruled out.     NEURO: Neurologically with improvement, remains with left hemiparesis.  Continue close monitoring for neurologic deterioration, BP goal <140/90, no need for statin as admission LDL is 17. Physical therapy/OT - recommends acute TBI. Speech eval/treatment - recommend MBS    ANTITHROMBOTIC THERAPY: Aspirin WA    PULMONARY: CXR on 9/26 Right lower lobe nodules, largest of which measures 5 mm, pulm following- plan for repeat CT chest in 6 months, probable COPD, wheezing    CARDIOVASCULAR: TTE showed - EF: 63%, Grossly normal left ventricular internal dimensions and wall thicknesses. Grossly hyperdynamic left ventricular systolic function. Reversal of the E-A  waves of the mitral inflow pattern is consistent with diastolic LV dysfunction. Grossly normal right ventricular size and function.  Cardiac monitoring without any documented events.                            SBP goal: <140/90    GASTROINTESTINAL:  dysphagia screen - failed, speech and swallow failed, plan for MBS later  today      Diet: NPO    RENAL: BUN/Cr without acute change, good urine output      Na Goal: Greater than 135     Strong: yes    HEMATOLOGY: H/H without acute change, Platelets 158, no signs or symptoms of active bleeding.      DVT ppx: Heparin s.c [] LMWH [x]     ID: afebrile, no leukocytosis, no signs or symptoms of infection.      OTHER: All questions and concerns addressed.     DISPOSITION: Acute TBI once stable and workup is complete.    CORE MEASURES:        Admission NIHSS: 12     TPA: [] YES [x] NO      LDL/HDL: 17/36     Depression Screen: p     Statin Therapy: yes     Dysphagia Screen: [] PASS [x] FAIL     Smoking [] YES [x] NO      Afib [] YES [x] NO     Stroke Education [] YES [] NO - p

## 2018-09-27 NOTE — SWALLOW VFSS/MBS ASSESSMENT ADULT - ORAL PREPARATORY PHASE
Anterior loss/Poor bolus formation Poor bolus formation/Anterior loss Poor bolus formation Insufficient mastication/Anterior loss/Poor bolus formation

## 2018-09-27 NOTE — SWALLOW VFSS/MBS ASSESSMENT ADULT - DIAGNOSTIC IMPRESSIONS
Patient presents with ni-pharyngeal dysphagia with impaired oral management (most pronounced for solids and thin liquids), delay in trigger of the swallow reflex, impaired propulsion with post swallow oral and pharyngeal residue, and impaired a/w protection with silent aspiration of thin liquids and laryngeal penetration of solids.   Note: pt very impulsive and needs full assist for safety. Patient presents with ni-pharyngeal dysphagia with impaired oral management (most pronounced for solids and thin liquids), delay in trigger of the swallow reflex, impaired propulsion with post swallow oral and pharyngeal residue, and impaired a/w protection with silent aspiration of thin liquids and laryngeal penetration of solids. Note: pt very impulsive and needs full assist for safety. A small anterior esophageal filling defect was evident which may be suggestive of a web. Consider GI consult for assessment.

## 2018-09-27 NOTE — PROGRESS NOTE ADULT - SUBJECTIVE AND OBJECTIVE BOX
THE PATIENT WAS SEEN AND EXAMINED BY ME WITH THE HOUSESTAFF AND STROKE TEAM DURING MORNING ROUNDS.   HPI:62 year-old  male with past medical history of HTN and ETOH abuse. On 9/23/18 he presented to [facility] with AMS and intoxication. Upon further evaluation by the ED he wasn't moving L arm. On telestroke eval he was found with  left facial droop, left upper extremity plegia and lower extremity weakness, significant dysarthria and left hemibody hypoesthesia. Head CT showed area of hypodensity at right fronto parietal lobe and acute/ subacute hypodensity at the inferior temporal/parietal lobe middle cerebral artery territory. CT angiography demonstrated right middle cerebral artery proximal M1 segment occlusion. Patient not candidate for IV-tPA given unknown last known well as well as appearance of subacute infarct. He was transferred to [facility] for evaluation for endovascular therapy. CT perfusion showed a small core infarct with large area of penumbra. He was deemed candidate for endovascular therapy with mechanical thrombectomy with TICI 2b recanalization.     SUBJECTIVE: No events overnight.  No new neurologic complaints.      acetaminophen   Tablet .. 650 milliGRAM(s) Oral every 6 hours PRN  acetaminophen  IVPB .. 1000 milliGRAM(s) IV Intermittent once PRN  ALBUTerol/ipratropium for Nebulization 3 milliLiter(s) Nebulizer every 6 hours  aspirin Suppository 300 milliGRAM(s) Rectal daily  atorvastatin 80 milliGRAM(s) Oral at bedtime  buDESOnide  80 MICROgram(s)/formoterol 4.5 MICROgram(s) Inhaler 2 Puff(s) Inhalation two times a day  enoxaparin Injectable 30 milliGRAM(s) SubCutaneous every 12 hours  fluticasone propionate 50 MICROgram(s)/spray Nasal Spray 1 Spray(s) Both Nostrils two times a day  influenza   Vaccine 0.5 milliLiter(s) IntraMuscular once  nicotine -   7 mG/24Hr(s) Patch 1 patch Transdermal daily  sodium chloride 2 Gram(s) Oral every 6 hours  sodium chloride 0.9% with potassium chloride 20 mEq/L 1000 milliLiter(s) IV Continuous <Continuous>  thiamine Injectable 100 milliGRAM(s) IV Push daily      PHYSICAL EXAM:   Vital Signs Last 24 Hrs  T(C): 36.6 (27 Sep 2018 07:00), Max: 36.7 (26 Sep 2018 19:00)  T(F): 97.9 (27 Sep 2018 07:00), Max: 98.1 (26 Sep 2018 19:00)  HR: 71 (27 Sep 2018 17:46) (62 - 81)  BP: 130/73 (27 Sep 2018 17:00) (128/60 - 175/84)  BP(mean): 96 (27 Sep 2018 17:00) (86 - 121)  RR: 28 (27 Sep 2018 17:00) (17 - 30)  SpO2: 98% (27 Sep 2018 17:46) (92% - 99%)    General: No acute distress  HEENT: EOM intact, visual fields full, partial left gaze palsy, incomplete saccade to right   Abdomen: Soft, nontender, nondistended   Extremities: No edema    NEUROLOGICAL EXAM:  Mental status: Awake, alert, oriented to hospital, year, month, no neglect - aware of deficit.   Cranial Nerves: Moderate left facial palsy, no nystagmus, no dysarthria, tongue midline  Motor exam: Normal tone, RUE 5/5,  RLE 5/5, L deltoid 3-/5 and hard to test L wrist extensor as splint in place, LLE 4+/5, small drift  Sensation: Intact to light touch   Coordination/ Gait: No dysmetria, gait deferred.     LABS:                        11.5   7.7   )-----------( 160      ( 27 Sep 2018 00:28 )             35.5    09-27    136  |  103  |  11  ----------------------------<  91  4.0   |  25  |  0.74    Ca    8.7      27 Sep 2018 00:28  Phos  3.3     09-27  Mg     2.0     09-27    TPro  6.3  /  Alb  3.4  /  TBili  0.6  /  DBili  0.2  /  AST  23  /  ALT  17  /  AlkPhos  61  09-26    Hemoglobin A1C, Whole Blood: 6.6 % (09-23 @ 11:05)  Hemoglobin A1C, Whole Blood: 6.5 % (09-07 @ 18:01)      IMAGING: Reviewed by me.   CT Head No Cont (09.24.18):  Previously noted acute right MCA infarct has demonstrated expected evolutionary changes with areas of hemorrhagic transformation again seen.    CT Head No Cont (09.23.18):  Redemonstration of evolving right MCA territory infarct, faint areas of increasing attenuation may reflect contrast staining, tiny foci of petechial hemorrhage not excluded.    CT perfusion w/ Maps w/ IV Cont (09.23.18):   Large right-sided penumbra  with a small infarct in the large mismatch volume.    Noncontrast head CT (09.23.18):   Large area acute right MCA territory infarct. Posterior temporal lobe infarct appears slightly more subacute with questionable petechial hemorrhage.    CTA neck (09.23.18):   No major vessel occlusion or hemodynamically significant stenosis. No evidence of dissection.    CTA head (09.23.18):  Occluded proximal M1 segment right middle cerebral artery.   Remainder of the intracranial circulation is patent.

## 2018-09-27 NOTE — SWALLOW VFSS/MBS ASSESSMENT ADULT - ORAL PHASE
Delayed oral transit time/Residue in oral cavity/Incomplete tongue to palate contact/Reduced anterior - posterior transport Uncontrolled bolus / spillover in ni-pharynx/Reduced anterior - posterior transport/Residue in oral cavity/Incomplete tongue to palate contact/Delayed oral transit time Reduced anterior - posterior transport/Delayed oral transit time/Uncontrolled bolus / spillover in ni-pharynx/Residue in oral cavity/Incomplete tongue to palate contact Uncontrolled bolus / spillover in ni-pharynx/Delayed oral transit time/Reduced anterior - posterior transport/Residue in oral cavity/Incomplete tongue to palate contact/Uncontrolled bolus / spillover in hypopharynx Reduced anterior - posterior transport/Residue in oral cavity/Incomplete tongue to palate contact/Uncontrolled bolus / spillover in ni-pharynx Delayed oral transit time/Residue in oral cavity/Reduced anterior - posterior transport/Incomplete tongue to palate contact

## 2018-09-28 LAB
ANION GAP SERPL CALC-SCNC: 10 MMOL/L — SIGNIFICANT CHANGE UP (ref 5–17)
BUN SERPL-MCNC: 13 MG/DL — SIGNIFICANT CHANGE UP (ref 7–23)
CALCIUM SERPL-MCNC: 8.9 MG/DL — SIGNIFICANT CHANGE UP (ref 8.4–10.5)
CHLORIDE SERPL-SCNC: 106 MMOL/L — SIGNIFICANT CHANGE UP (ref 96–108)
CO2 SERPL-SCNC: 22 MMOL/L — SIGNIFICANT CHANGE UP (ref 22–31)
CREAT SERPL-MCNC: 0.75 MG/DL — SIGNIFICANT CHANGE UP (ref 0.5–1.3)
GLUCOSE SERPL-MCNC: 112 MG/DL — HIGH (ref 70–99)
HCT VFR BLD CALC: 36.8 % — LOW (ref 39–50)
HGB BLD-MCNC: 11.8 G/DL — LOW (ref 13–17)
MAGNESIUM SERPL-MCNC: 1.9 MG/DL — SIGNIFICANT CHANGE UP (ref 1.6–2.6)
MCHC RBC-ENTMCNC: 22.4 PG — LOW (ref 27–34)
MCHC RBC-ENTMCNC: 32.2 GM/DL — SIGNIFICANT CHANGE UP (ref 32–36)
MCV RBC AUTO: 69.7 FL — LOW (ref 80–100)
PHOSPHATE SERPL-MCNC: 3.3 MG/DL — SIGNIFICANT CHANGE UP (ref 2.5–4.5)
PLATELET # BLD AUTO: 165 K/UL — SIGNIFICANT CHANGE UP (ref 150–400)
POTASSIUM SERPL-MCNC: 3.9 MMOL/L — SIGNIFICANT CHANGE UP (ref 3.5–5.3)
POTASSIUM SERPL-SCNC: 3.9 MMOL/L — SIGNIFICANT CHANGE UP (ref 3.5–5.3)
RBC # BLD: 5.28 M/UL — SIGNIFICANT CHANGE UP (ref 4.2–5.8)
RBC # FLD: 14.3 % — SIGNIFICANT CHANGE UP (ref 10.3–14.5)
SODIUM SERPL-SCNC: 138 MMOL/L — SIGNIFICANT CHANGE UP (ref 135–145)
WBC # BLD: 6.8 K/UL — SIGNIFICANT CHANGE UP (ref 3.8–10.5)
WBC # FLD AUTO: 6.8 K/UL — SIGNIFICANT CHANGE UP (ref 3.8–10.5)

## 2018-09-28 PROCEDURE — 70553 MRI BRAIN STEM W/O & W/DYE: CPT | Mod: 26

## 2018-09-28 PROCEDURE — 99233 SBSQ HOSP IP/OBS HIGH 50: CPT

## 2018-09-28 PROCEDURE — 70549 MR ANGIOGRAPH NECK W/O&W/DYE: CPT | Mod: 26

## 2018-09-28 PROCEDURE — 70544 MR ANGIOGRAPHY HEAD W/O DYE: CPT | Mod: 26,59

## 2018-09-28 RX ADMIN — ENOXAPARIN SODIUM 30 MILLIGRAM(S): 100 INJECTION SUBCUTANEOUS at 18:10

## 2018-09-28 RX ADMIN — Medication 1 PATCH: at 16:21

## 2018-09-28 RX ADMIN — DEXTROSE MONOHYDRATE, SODIUM CHLORIDE, AND POTASSIUM CHLORIDE 75 MILLILITER(S): 50; .745; 4.5 INJECTION, SOLUTION INTRAVENOUS at 21:11

## 2018-09-28 RX ADMIN — SODIUM CHLORIDE 2 GRAM(S): 9 INJECTION INTRAMUSCULAR; INTRAVENOUS; SUBCUTANEOUS at 01:13

## 2018-09-28 RX ADMIN — Medication 100 MILLIGRAM(S): at 11:36

## 2018-09-28 RX ADMIN — BUDESONIDE AND FORMOTEROL FUMARATE DIHYDRATE 2 PUFF(S): 160; 4.5 AEROSOL RESPIRATORY (INHALATION) at 17:16

## 2018-09-28 RX ADMIN — SODIUM CHLORIDE 2 GRAM(S): 9 INJECTION INTRAMUSCULAR; INTRAVENOUS; SUBCUTANEOUS at 11:36

## 2018-09-28 RX ADMIN — ATORVASTATIN CALCIUM 80 MILLIGRAM(S): 80 TABLET, FILM COATED ORAL at 21:11

## 2018-09-28 RX ADMIN — Medication 1 PATCH: at 11:35

## 2018-09-28 RX ADMIN — SODIUM CHLORIDE 2 GRAM(S): 9 INJECTION INTRAMUSCULAR; INTRAVENOUS; SUBCUTANEOUS at 05:12

## 2018-09-28 RX ADMIN — BUDESONIDE AND FORMOTEROL FUMARATE DIHYDRATE 2 PUFF(S): 160; 4.5 AEROSOL RESPIRATORY (INHALATION) at 06:09

## 2018-09-28 RX ADMIN — SODIUM CHLORIDE 2 GRAM(S): 9 INJECTION INTRAMUSCULAR; INTRAVENOUS; SUBCUTANEOUS at 18:10

## 2018-09-28 RX ADMIN — Medication 81 MILLIGRAM(S): at 11:36

## 2018-09-28 RX ADMIN — Medication 3 MILLILITER(S): at 17:16

## 2018-09-28 RX ADMIN — Medication 3 MILLILITER(S): at 06:09

## 2018-09-28 RX ADMIN — ENOXAPARIN SODIUM 30 MILLIGRAM(S): 100 INJECTION SUBCUTANEOUS at 05:13

## 2018-09-28 RX ADMIN — Medication 1 SPRAY(S): at 18:11

## 2018-09-28 RX ADMIN — Medication 1 SPRAY(S): at 05:14

## 2018-09-28 RX ADMIN — DEXTROSE MONOHYDRATE, SODIUM CHLORIDE, AND POTASSIUM CHLORIDE 75 MILLILITER(S): 50; .745; 4.5 INJECTION, SOLUTION INTRAVENOUS at 18:17

## 2018-09-28 RX ADMIN — Medication 3 MILLILITER(S): at 11:50

## 2018-09-28 NOTE — PROGRESS NOTE ADULT - SUBJECTIVE AND OBJECTIVE BOX
Follow-up Pulm Progress Note    No new respiratory events overnight.  Denies SOB/CP.     Medications:  MEDICATIONS  (STANDING):  ALBUTerol/ipratropium for Nebulization 3 milliLiter(s) Nebulizer every 6 hours  aspirin  chewable 81 milliGRAM(s) Oral daily  atorvastatin 80 milliGRAM(s) Oral at bedtime  buDESOnide  80 MICROgram(s)/formoterol 4.5 MICROgram(s) Inhaler 2 Puff(s) Inhalation two times a day  enoxaparin Injectable 30 milliGRAM(s) SubCutaneous every 12 hours  fluticasone propionate 50 MICROgram(s)/spray Nasal Spray 1 Spray(s) Both Nostrils two times a day  influenza   Vaccine 0.5 milliLiter(s) IntraMuscular once  nicotine -   7 mG/24Hr(s) Patch 1 patch Transdermal daily  sodium chloride 2 Gram(s) Oral every 6 hours  sodium chloride 0.9% with potassium chloride 20 mEq/L 1000 milliLiter(s) (75 mL/Hr) IV Continuous <Continuous>  thiamine Injectable 100 milliGRAM(s) IV Push daily    MEDICATIONS  (PRN):  acetaminophen   Tablet .. 650 milliGRAM(s) Oral every 6 hours PRN Temp greater or equal to 38C (100.4F), Mild Pain (1 - 3)  acetaminophen  IVPB .. 1000 milliGRAM(s) IV Intermittent once PRN Mild Pain (1 - 3), Moderate Pain (4 - 6), Severe Pain (7 - 10)    Vital Signs Last 24 Hrs  T(C): 36.8 (28 Sep 2018 08:00), Max: 36.8 (28 Sep 2018 08:00)  T(F): 98.2 (28 Sep 2018 08:00), Max: 98.2 (28 Sep 2018 08:00)  HR: 66 (28 Sep 2018 09:00) (59 - 94)  BP: 142/70 (28 Sep 2018 09:00) (128/58 - 177/77)  BP(mean): 100 (28 Sep 2018 09:00) (84 - 125)  RR: 26 (28 Sep 2018 09:00) (15 - 31)  SpO2: 95% (28 Sep 2018 09:00) (88% - 99%)    09-27 @ 07:01  -  09-28 @ 07:00  --------------------------------------------------------  IN: 1650 mL / OUT: 1125 mL / NET: 525 mL    LABS:                        11.8   6.8   )-----------( 165      ( 28 Sep 2018 02:34 )             36.8     09-28    138  |  106  |  13  ----------------------------<  112<H>  3.9   |  22  |  0.75    Ca    8.9      28 Sep 2018 02:34  Phos  3.3     09-28  Mg     1.9     09-28    CAPILLARY BLOOD GLUCOSE    CULTURES: (if applicable)    Physical Examination:  PULM: Decreased BS at bases  CVS: S1, S2 heard    RADIOLOGY REVIEWED  CXR:     CT chest:    TTE:

## 2018-09-28 NOTE — PROGRESS NOTE ADULT - ASSESSMENT
Acute CVA  HTN    permissive hypertension as per neurology   AYO is recommended   ILR   fu with stroke service   on asa     History of tobacco  on nicotine patch

## 2018-09-28 NOTE — PROGRESS NOTE ADULT - ASSESSMENT
61 y/o M with PMH of HTN, HLD, renal cancer s/p partial R nephrectomy (10/2017 at [facility], patient denies having received chemo/XRT), current smoker (45 pack yrs), depression, anxiety presented to [facility] 9/23 with AMS and found to have R MCA CVA s/p embolectomy. Extubated 9/24.

## 2018-09-28 NOTE — PROGRESS NOTE ADULT - SUBJECTIVE AND OBJECTIVE BOX
Subjective: Patient seen and examined. No new events except as noted.     SUBJECTIVE/ROS:  feels ok       MEDICATIONS:  MEDICATIONS  (STANDING):  ALBUTerol/ipratropium for Nebulization 3 milliLiter(s) Nebulizer every 6 hours  aspirin  chewable 81 milliGRAM(s) Oral daily  atorvastatin 80 milliGRAM(s) Oral at bedtime  buDESOnide  80 MICROgram(s)/formoterol 4.5 MICROgram(s) Inhaler 2 Puff(s) Inhalation two times a day  enoxaparin Injectable 30 milliGRAM(s) SubCutaneous every 12 hours  fluticasone propionate 50 MICROgram(s)/spray Nasal Spray 1 Spray(s) Both Nostrils two times a day  influenza   Vaccine 0.5 milliLiter(s) IntraMuscular once  nicotine -   7 mG/24Hr(s) Patch 1 patch Transdermal daily  sodium chloride 2 Gram(s) Oral every 6 hours  sodium chloride 0.9% with potassium chloride 20 mEq/L 1000 milliLiter(s) (75 mL/Hr) IV Continuous <Continuous>  thiamine Injectable 100 milliGRAM(s) IV Push daily      PHYSICAL EXAM:  T(C): 36.8 (09-28-18 @ 08:00), Max: 36.8 (09-28-18 @ 08:00)  HR: 66 (09-28-18 @ 09:00) (59 - 94)  BP: 142/70 (09-28-18 @ 09:00) (128/58 - 177/77)  RR: 26 (09-28-18 @ 09:00) (15 - 31)  SpO2: 95% (09-28-18 @ 09:00) (88% - 99%)  Wt(kg): --  I&O's Summary    27 Sep 2018 07:01  -  28 Sep 2018 07:00  --------------------------------------------------------  IN: 1650 mL / OUT: 1125 mL / NET: 525 mL    28 Sep 2018 07:01  -  28 Sep 2018 09:35  --------------------------------------------------------  IN: 150 mL / OUT: 300 mL / NET: -150 mL        JVP: Normal  Neck: supple  Lung: clear   CV: S1 S2 , Murmur:  Abd: soft  Ext: No edema  neuro: Awake / alert  Psych: flat affect  Skin: normal        LABS/DATA:    CARDIAC MARKERS:                                11.8   6.8   )-----------( 165      ( 28 Sep 2018 02:34 )             36.8     09-28    138  |  106  |  13  ----------------------------<  112<H>  3.9   |  22  |  0.75    Ca    8.9      28 Sep 2018 02:34  Phos  3.3     09-28  Mg     1.9     09-28      proBNP:   Lipid Profile:   HgA1c:   TSH:     TELE:  EKG:

## 2018-09-28 NOTE — PROGRESS NOTE ADULT - ASSESSMENT
62 year-old  male with past medical history of HTN and ETOH abuse. On 9/23/18 he presented to [facility] with AMS and intoxication. Upon further evaluation by the ED he wasn't moving L arm. On telestroke eval he was found with  left facial droop, left upper extremity monoplegia and lower extremity weakness, significant dysarthria and left hemibody hypoesthesia. Head CT showed area of hypodensity at right fronto parietal lobe and acute/ subacute hypodensity at the inferior temporal/parietal lobe middle cerebral artery territory. CT angiography demonstrated right middle cerebral artery proximal M1 segment occlusion. Patient not candidate for IV-tPA given unknown last known well as well as appearance of subacute infarct. He was transferred to [facility] for evaluation for endovascular therapy. CT perfusion showed a small core infarct with large area of penumbra. He was deemed candidate for endovascular therapy with mechanical thrombectomy with TICI 2b recanalization.     Impression: Cerebral embolism with cerebral infarction - R MCA distribution infarct with M1 occlusion.  Etiology unknown, cardioembolism needs to be ruled out.     NEURO: Neurologically with improvement, remains with left hemiparesis.  Continue close monitoring for neurologic deterioration, BP goal <140/90, no need for statin as admission LDL is 17. Physical therapy/OT - recommends acute TBI. Speech eval/treatment - recommend MBS    ANTITHROMBOTIC THERAPY: Aspirin AL    PULMONARY: CXR on 9/26 Right lower lobe nodules, largest of which measures 5 mm, pulm following- plan for repeat CT chest in 6 months, probable COPD, wheezing    CARDIOVASCULAR: TTE showed - EF: 63%, Grossly normal left ventricular internal dimensions and wall thicknesses. Grossly hyperdynamic left ventricular systolic function. Reversal of the E-A  waves of the mitral inflow pattern is consistent with diastolic LV dysfunction. Grossly normal right ventricular size and function.  Cardiac monitoring without any documented events.                            SBP goal: <140/90    GASTROINTESTINAL:  dysphagia screen - failed, speech and swallow failed, MBS done on 9/27, diet recommended, tolerating diet      Diet: Dysphagia I with nectar thickened liquids.    RENAL: BUN/Cr without acute change, good urine output      Na Goal: Greater than 135     Strong: yes    HEMATOLOGY: H/H without acute change, Platelets 165, no signs or symptoms of active bleeding.      DVT ppx: Heparin s.c [] LMWH [x]     ID: afebrile, no leukocytosis, no signs or symptoms of infection.      OTHER: Wife at bedside, patient and wife reports that he has been depressed and wants to see psychiatry while in hospital, consult placed. We also discussed with patient and wife that he needs a sleep study as an outpatient as we suspect he has SHILO. All questions and concerns addressed.     DISPOSITION: Acute TBI once stable and workup is complete.    CORE MEASURES:        Admission NIHSS: 12     TPA: [] YES [x] NO      LDL/HDL: 17/36     Depression Screen: p     Statin Therapy: yes     Dysphagia Screen: [] PASS [x] FAIL     Smoking [] YES [x] NO      Afib [] YES [x] NO     Stroke Education [] YES [] NO - p 62 year-old  male with past medical history of HTN and ETOH abuse. On 9/23/18 he presented to [facility] with AMS and intoxication. Upon further evaluation by the ED he wasn't moving L arm. On telestroke eval he was found with  left facial droop, left upper extremity monoplegia and lower extremity weakness, significant dysarthria and left hemibody hypoesthesia. Head CT showed area of hypodensity at right fronto parietal lobe and acute/ subacute hypodensity at the inferior temporal/parietal lobe middle cerebral artery territory. CT angiography demonstrated right middle cerebral artery proximal M1 segment occlusion. Patient not candidate for IV-tPA given unknown last known well as well as appearance of subacute infarct. He was transferred to [facility] for evaluation for endovascular therapy. CT perfusion showed a small core infarct with large area of penumbra. He was deemed candidate for endovascular therapy with mechanical thrombectomy with TICI 2b recanalization.     Impression: Cerebral embolism with cerebral infarction - R MCA distribution infarct with M1 occlusion.  Etiology unknown, cardioembolism needs to be ruled out.     NEURO: Neurologically with improvement, remains with left hemiparesis.  Continue close monitoring for neurologic deterioration, BP goal <140/90, no need for statin as admission LDL is 17. Physical therapy/OT - recommends acute TBI. Speech eval/treatment - recommend MBS    ANTITHROMBOTIC THERAPY: Aspirin OR    PULMONARY: CXR on 9/26 Right lower lobe nodules, largest of which measures 5 mm, pulm following- plan for repeat CT chest in 6 months, probable COPD, wheezing    CARDIOVASCULAR: TTE showed - EF: 63%, Grossly normal left ventricular internal dimensions and wall thicknesses. Grossly hyperdynamic left ventricular systolic function. Reversal of the E-A  waves of the mitral inflow pattern is consistent with diastolic LV dysfunction. Grossly normal right ventricular size and function.  Cardiac monitoring without any documented events.   Plan for AYO and possible ILR.                         SBP goal: <140/90    GASTROINTESTINAL:  dysphagia screen - failed, speech and swallow failed, MBS done on 9/27, diet recommended, tolerating diet      Diet: Dysphagia I with nectar thickened liquids.    RENAL: BUN/Cr without acute change, good urine output      Na Goal: Greater than 135     Strong: yes    HEMATOLOGY: H/H without acute change, Platelets 165, no signs or symptoms of active bleeding.      DVT ppx: Heparin s.c [] LMWH [x]     ID: afebrile, no leukocytosis, no signs or symptoms of infection.      OTHER: Wife at bedside, patient and wife reports that he has been depressed and wants to see psychiatry while in hospital, consult placed. We also discussed with patient and wife that he needs a sleep study as an outpatient as we suspect he has SHILO. All questions and concerns addressed.     DISPOSITION: Acute TBI once stable and workup is complete.    CORE MEASURES:        Admission NIHSS: 12     TPA: [] YES [x] NO      LDL/HDL: 17/36     Depression Screen: p     Statin Therapy: yes     Dysphagia Screen: [] PASS [x] FAIL     Smoking [] YES [x] NO      Afib [] YES [x] NO     Stroke Education [] YES [] NO - p

## 2018-09-28 NOTE — PROGRESS NOTE ADULT - SUBJECTIVE AND OBJECTIVE BOX
THE PATIENT WAS SEEN AND EXAMINED BY ME WITH THE HOUSESTAFF AND STROKE TEAM DURING MORNING ROUNDS.   HPI:62 year-old  male with past medical history of HTN and ETOH abuse. On 9/23/18 he presented to [facility] with AMS and intoxication. Upon further evaluation by the ED he wasn't moving L arm. On telestroke eval he was found with  left facial droop, left upper extremity plegia and lower extremity weakness, significant dysarthria and left hemibody hypoesthesia. Head CT showed area of hypodensity at right fronto parietal lobe and acute/ subacute hypodensity at the inferior temporal/parietal lobe middle cerebral artery territory. CT angiography demonstrated right middle cerebral artery proximal M1 segment occlusion. Patient not candidate for IV-tPA given unknown last known well as well as appearance of subacute infarct. He was transferred to [facility] for evaluation for endovascular therapy. CT perfusion showed a small core infarct with large area of penumbra. He was deemed candidate for endovascular therapy with mechanical thrombectomy with TICI 2b recanalization.   THE PATIENT WAS SEEN AND EXAMINED BY ME WITH THE HOUSESTAFF AND STROKE TEAM DURING MORNING ROUNDS.   HPI:  61 y/o male with past medical history of HTN presented initially to [facility] ED for AMS with suspicion of EtOH intoxication. Patient's mental status did not improve and CT/CTA head was done demonstrating Large area acute Right MCA infarct with occluded proximal M1 segment R MCA. Patient subsequently transferred to [facility] for possible Endovascular intervention. LKN is unknown but suspected to be 11pm (9/22/2018). tPA deferred due to unclear LKN time. (23 Sep 2018 05:05)      SUBJECTIVE: No events overnight.  No new neurologic complaints.      acetaminophen   Tablet .. 650 milliGRAM(s) Oral every 6 hours PRN  acetaminophen  IVPB .. 1000 milliGRAM(s) IV Intermittent once PRN  ALBUTerol/ipratropium for Nebulization 3 milliLiter(s) Nebulizer every 6 hours  aspirin  chewable 81 milliGRAM(s) Oral daily  atorvastatin 80 milliGRAM(s) Oral at bedtime  buDESOnide  80 MICROgram(s)/formoterol 4.5 MICROgram(s) Inhaler 2 Puff(s) Inhalation two times a day  enoxaparin Injectable 30 milliGRAM(s) SubCutaneous every 12 hours  fluticasone propionate 50 MICROgram(s)/spray Nasal Spray 1 Spray(s) Both Nostrils two times a day  influenza   Vaccine 0.5 milliLiter(s) IntraMuscular once  nicotine -   7 mG/24Hr(s) Patch 1 patch Transdermal daily  sodium chloride 2 Gram(s) Oral every 6 hours  sodium chloride 0.9% with potassium chloride 20 mEq/L 1000 milliLiter(s) IV Continuous <Continuous>  thiamine Injectable 100 milliGRAM(s) IV Push daily      PHYSICAL EXAM:   Vital Signs Last 24 Hrs  T(C): 37.1 (28 Sep 2018 12:00), Max: 37.1 (28 Sep 2018 12:00)  T(F): 98.7 (28 Sep 2018 12:00), Max: 98.7 (28 Sep 2018 12:00)  HR: 78 (28 Sep 2018 15:00) (59 - 94)  BP: 159/72 (28 Sep 2018 15:00) (128/58 - 177/77)  BP(mean): 100 (28 Sep 2018 15:00) (84 - 125)  RR: 26 (28 Sep 2018 15:00) (15 - 31)  SpO2: 90% (28 Sep 2018 15:00) (88% - 98%)    General: No acute distress  HEENT: EOM intact, visual fields full, partial left gaze palsy, incomplete saccade to right   Abdomen: Soft, nontender, nondistended   Extremities: No edema    NEUROLOGICAL EXAM:  Mental status: Awake, alert, oriented to hospital, year, month, no neglect - aware of deficit.   Cranial Nerves: Moderate left facial palsy, no nystagmus, no dysarthria, tongue midline  Motor exam: Normal tone, RUE 5/5,  RLE 5/5, L deltoid 3-/5 and hard to test L wrist extensor as splint in place, LLE 4+/5, small drift  Sensation: Intact to light touch   Coordination/ Gait: No dysmetria, gait deferred.       LABS:                        11.8   6.8   )-----------( 165      ( 28 Sep 2018 02:34 )             36.8    09-28    138  |  106  |  13  ----------------------------<  112<H>  3.9   |  22  |  0.75    Ca    8.9      28 Sep 2018 02:34  Phos  3.3     09-28  Mg     1.9     09-28      Hemoglobin A1C, Whole Blood: 6.6 % (09-23 @ 11:05)  Hemoglobin A1C, Whole Blood: 6.5 % (09-07 @ 18:01)      IMAGING: Reviewed by me.   CT Head No Cont (09.24.18):  Previously noted acute right MCA infarct has demonstrated expected evolutionary changes with areas of hemorrhagic transformation again seen.    CT Head No Cont (09.23.18):  Redemonstration of evolving right MCA territory infarct, faint areas of increasing attenuation may reflect contrast staining, tiny foci of petechial hemorrhage not excluded.    CT perfusion w/ Maps w/ IV Cont (09.23.18):   Large right-sided penumbra  with a small infarct in the large mismatch volume.    Noncontrast head CT (09.23.18):   Large area acute right MCA territory infarct. Posterior temporal lobe infarct appears slightly more subacute with questionable petechial hemorrhage.    CTA neck (09.23.18):   No major vessel occlusion or hemodynamically significant stenosis. No evidence of dissection.    CTA head (09.23.18):  Occluded proximal M1 segment right middle cerebral artery.   Remainder of the intracranial circulation is patent.

## 2018-09-28 NOTE — DIETITIAN INITIAL EVALUATION ADULT. - ENERGY NEEDS
Ht: 72"  Wt: 268  BMI: 36.4 kg/m2   IBW: 178 (+/-10%)     150% IBW  Edema: 1+ generalized       Skin: no pressure injuries documented

## 2018-09-28 NOTE — DIETITIAN INITIAL EVALUATION ADULT. - OTHER INFO
Pt seen for:  MICU Length Of Stay  Adm dx: CVA S/P embolectomy    GI issues: denies N/V  Last BM:  9/26   Food allergies: NKFA    Vit/supplement PTA: multivitamin, vitamin D

## 2018-09-29 LAB
ANION GAP SERPL CALC-SCNC: 11 MMOL/L — SIGNIFICANT CHANGE UP (ref 5–17)
BUN SERPL-MCNC: 12 MG/DL — SIGNIFICANT CHANGE UP (ref 7–23)
CALCIUM SERPL-MCNC: 9 MG/DL — SIGNIFICANT CHANGE UP (ref 8.4–10.5)
CHLORIDE SERPL-SCNC: 105 MMOL/L — SIGNIFICANT CHANGE UP (ref 96–108)
CO2 SERPL-SCNC: 22 MMOL/L — SIGNIFICANT CHANGE UP (ref 22–31)
CREAT SERPL-MCNC: 0.79 MG/DL — SIGNIFICANT CHANGE UP (ref 0.5–1.3)
GLUCOSE SERPL-MCNC: 105 MG/DL — HIGH (ref 70–99)
HCT VFR BLD CALC: 35.9 % — LOW (ref 39–50)
HGB BLD-MCNC: 11.6 G/DL — LOW (ref 13–17)
MAGNESIUM SERPL-MCNC: 2 MG/DL — SIGNIFICANT CHANGE UP (ref 1.6–2.6)
MCHC RBC-ENTMCNC: 22.5 PG — LOW (ref 27–34)
MCHC RBC-ENTMCNC: 32.3 GM/DL — SIGNIFICANT CHANGE UP (ref 32–36)
MCV RBC AUTO: 69.7 FL — LOW (ref 80–100)
PHOSPHATE SERPL-MCNC: 3.5 MG/DL — SIGNIFICANT CHANGE UP (ref 2.5–4.5)
PLATELET # BLD AUTO: 184 K/UL — SIGNIFICANT CHANGE UP (ref 150–400)
POTASSIUM SERPL-MCNC: 4 MMOL/L — SIGNIFICANT CHANGE UP (ref 3.5–5.3)
POTASSIUM SERPL-SCNC: 4 MMOL/L — SIGNIFICANT CHANGE UP (ref 3.5–5.3)
RBC # BLD: 5.15 M/UL — SIGNIFICANT CHANGE UP (ref 4.2–5.8)
RBC # FLD: 14 % — SIGNIFICANT CHANGE UP (ref 10.3–14.5)
SODIUM SERPL-SCNC: 138 MMOL/L — SIGNIFICANT CHANGE UP (ref 135–145)
WBC # BLD: 7 K/UL — SIGNIFICANT CHANGE UP (ref 3.8–10.5)
WBC # FLD AUTO: 7 K/UL — SIGNIFICANT CHANGE UP (ref 3.8–10.5)

## 2018-09-29 PROCEDURE — 99233 SBSQ HOSP IP/OBS HIGH 50: CPT

## 2018-09-29 RX ORDER — DEXTROSE MONOHYDRATE, SODIUM CHLORIDE, AND POTASSIUM CHLORIDE 50; .745; 4.5 G/1000ML; G/1000ML; G/1000ML
1000 INJECTION, SOLUTION INTRAVENOUS
Qty: 0 | Refills: 0 | Status: DISCONTINUED | OUTPATIENT
Start: 2018-09-29 | End: 2018-09-30

## 2018-09-29 RX ORDER — FLUOXETINE HCL 10 MG
20 CAPSULE ORAL DAILY
Qty: 0 | Refills: 0 | Status: DISCONTINUED | OUTPATIENT
Start: 2018-09-29 | End: 2018-10-09

## 2018-09-29 RX ADMIN — ENOXAPARIN SODIUM 30 MILLIGRAM(S): 100 INJECTION SUBCUTANEOUS at 18:03

## 2018-09-29 RX ADMIN — ENOXAPARIN SODIUM 30 MILLIGRAM(S): 100 INJECTION SUBCUTANEOUS at 06:23

## 2018-09-29 RX ADMIN — Medication 1 PATCH: at 12:40

## 2018-09-29 RX ADMIN — SODIUM CHLORIDE 2 GRAM(S): 9 INJECTION INTRAMUSCULAR; INTRAVENOUS; SUBCUTANEOUS at 18:04

## 2018-09-29 RX ADMIN — BUDESONIDE AND FORMOTEROL FUMARATE DIHYDRATE 2 PUFF(S): 160; 4.5 AEROSOL RESPIRATORY (INHALATION) at 18:17

## 2018-09-29 RX ADMIN — SODIUM CHLORIDE 2 GRAM(S): 9 INJECTION INTRAMUSCULAR; INTRAVENOUS; SUBCUTANEOUS at 23:49

## 2018-09-29 RX ADMIN — Medication 3 MILLILITER(S): at 18:11

## 2018-09-29 RX ADMIN — Medication 81 MILLIGRAM(S): at 12:42

## 2018-09-29 RX ADMIN — Medication 3 MILLILITER(S): at 11:46

## 2018-09-29 RX ADMIN — SODIUM CHLORIDE 2 GRAM(S): 9 INJECTION INTRAMUSCULAR; INTRAVENOUS; SUBCUTANEOUS at 12:42

## 2018-09-29 RX ADMIN — DEXTROSE MONOHYDRATE, SODIUM CHLORIDE, AND POTASSIUM CHLORIDE 30 MILLILITER(S): 50; .745; 4.5 INJECTION, SOLUTION INTRAVENOUS at 23:49

## 2018-09-29 RX ADMIN — Medication 3 MILLILITER(S): at 23:49

## 2018-09-29 RX ADMIN — Medication 1 SPRAY(S): at 06:24

## 2018-09-29 RX ADMIN — BUDESONIDE AND FORMOTEROL FUMARATE DIHYDRATE 2 PUFF(S): 160; 4.5 AEROSOL RESPIRATORY (INHALATION) at 05:15

## 2018-09-29 RX ADMIN — SODIUM CHLORIDE 2 GRAM(S): 9 INJECTION INTRAMUSCULAR; INTRAVENOUS; SUBCUTANEOUS at 06:23

## 2018-09-29 RX ADMIN — Medication 1 PATCH: at 12:41

## 2018-09-29 RX ADMIN — SODIUM CHLORIDE 2 GRAM(S): 9 INJECTION INTRAMUSCULAR; INTRAVENOUS; SUBCUTANEOUS at 00:01

## 2018-09-29 RX ADMIN — ATORVASTATIN CALCIUM 80 MILLIGRAM(S): 80 TABLET, FILM COATED ORAL at 21:03

## 2018-09-29 RX ADMIN — Medication 100 MILLIGRAM(S): at 12:42

## 2018-09-29 RX ADMIN — Medication 1 SPRAY(S): at 18:04

## 2018-09-29 RX ADMIN — Medication 3 MILLILITER(S): at 05:14

## 2018-09-29 NOTE — PROGRESS NOTE ADULT - ASSESSMENT
Acute CVA  HTN    permissive hypertension as per neurology   AYO is recommended   ILR   fu with stroke service     History of tobacco  on nicotine patch

## 2018-09-29 NOTE — PROGRESS NOTE ADULT - ASSESSMENT
62 year-old  male with past medical history of HTN and ETOH abuse. On 9/23/18 [facility]. Upon further evaluation by the ED he wasn't moving L arm. On telestroke eval he was found with  left facial droop, left upper extremity monoplegia and lower extremity weakness, significant dysarthria and left hemibody hypoesthesia. Head CT showed area of hypodensity at right fronto parietal lobe and acute/ subacute hypodensity at the inferior temporal/parietal lobe middle cerebral artery territory. CT angiography demonstrated right middle cerebral artery proximal M1 segment occlusion. Patient not candidate for IV-tPA given unknown last known well as well as appearance of subacute infarct. He was transferred to [facility] for evaluation for endovascular therapy. CT perfusion showed a small core infarct with large area of penumbra. He was deemed candidate for endovascular therapy with mechanical thrombectomy with TICI 2b recanalization.     Impression: Cerebral embolism with cerebral infarction - R MCA distribution infarct with M1 occlusion.  Etiology unknown, cardioembolism needs to be ruled out.     NEURO: Neurologically with improvement, remains with left hemiparesis.  Continue close monitoring for neurologic deterioration, BP goal <140/90, no need for statin as admission LDL is 17. Physical therapy/OT - recommends acute TBI. Speech eval/treatment - recommend MBS    ANTITHROMBOTIC THERAPY: Aspirin WI    PULMONARY: CXR on 9/26 Right lower lobe nodules, largest of which measures 5 mm, pulm following- plan for repeat CT chest in 6 months, probable COPD, wheezing    CARDIOVASCULAR: TTE showed - EF: 63%, Grossly normal left ventricular internal dimensions and wall thicknesses. Grossly hyperdynamic left ventricular systolic function. Reversal of the E-A  waves of the mitral inflow pattern is consistent with diastolic LV dysfunction. Grossly normal right ventricular size and function.  Cardiac monitoring without any documented events.   Plan for AYO Sunday Night and ILR to follow                        SBP goal: <140/90    GASTROINTESTINAL:  dysphagia screen - failed, speech and swallow failed, MBS done on 9/27, diet recommended, tolerating diet      Diet: Dysphagia I with nectar thickened liquids.    RENAL: BUN/Cr without acute change, good urine output      Na Goal: Greater than 135     Strong: yes    HEMATOLOGY: H/H without acute change, Platelets 165, no signs or symptoms of active bleeding.      DVT ppx: Heparin s.c [] LMWH [x]     ID: afebrile, no leukocytosis, no signs or symptoms of infection.      OTHER: Psychiatry Consult called/pending for depression confirmed by patients wife. We also discussed with patient and wife that he needs a sleep study as an outpatient as we suspect he has SHILO. All questions and concerns addressed.     DISPOSITION: Acute TBI once stable and workup is complete.    CORE MEASURES:        Admission NIHSS: 12     TPA: [] YES [x] NO      LDL/HDL: 17/36     Depression Screen: p     Statin Therapy: yes     Dysphagia Screen: [] PASS [x] FAIL     Smoking [] YES [x] NO      Afib [] YES [x] NO     Stroke Education [] YES [] NO - p 62 year-old  male with past medical history of HTN and ETOH abuse. On 9/23/18 he presented to [facility] with AMS and intoxication. Upon further evaluation by the ED he wasn't moving L arm. On telestroke eval he was found with  left facial droop, left upper extremity monoplegia and lower extremity weakness, significant dysarthria and left hemibody hypoesthesia. Head CT showed area of hypodensity at right fronto parietal lobe and acute/ subacute hypodensity at the inferior temporal/parietal lobe middle cerebral artery territory. CT angiography demonstrated right middle cerebral artery proximal M1 segment occlusion. Patient not candidate for IV-tPA given unknown last known well as well as appearance of subacute infarct. He was transferred to [facility] for evaluation for endovascular therapy. CT perfusion showed a small core infarct with large area of penumbra. He was deemed candidate for endovascular therapy with mechanical thrombectomy with TICI 2b recanalization. MRI brain subsequently showedright MCA distribution infarct with hemorrhagic transformation (HI 2). TTE did not show any obvious structural cardiac source of embolism.      Impression:   Cerebral embolism with cerebral infarction. R MCA distribution stroke with mild hemorrhagic concentration (HI 2) - likely etiology being cryptogenic stroke, probably related to embolism from a proximal source like cardiac/paradoxical source of embolism.    NEURO: Neurologically with improvement, remains with left hemiparesis. Continue close monitoring for neurologic deterioration, BP goal gradual normotension in the setting of recent successful revascularization, continue with home statin medication if applicable considering likely non-at that time with etiology of his stroke and age, repeat fasting lipid profile to better characterize LDL results, Physical therapy/OT - recommends acute TBI. Speech eval/treatment - recommend [facility]    ANTITHROMBOTIC THERAPY: aspirin for secondary stroke prevention    PULMONARY: CXR on 9/26 Right lower lobe nodules, largest of which measures 5 mm, pulm following- plan for repeat CT chest in 6 months, probable COPD, wheezing    CARDIOVASCULAR: TTE showed - EF: 63%, Grossly normal left ventricular internal dimensions and wall thicknesses. Grossly hyperdynamic left ventricular systolic function. Reversal of the E-A  waves of the mitral inflow pattern is consistent with diastolic LV dysfunction. Grossly normal right ventricular size and function.  Cardiac monitoring without any documented events. Plan for AYO to rule out any structural cardiac source of embolism and consider prolonged cardiac monitoring with ICM to screen for occult cardiac arrhythmias and atrial fibrillation during the cardiac source of embolism based on AYO results     SBP goal: <140/90 mmHg    GASTROINTESTINAL:  dysphagia screen - failed, speech and swallow failed, MBS done on 9/27, diet recommended, tolerating diet      Diet: Dysphagia I with nectar thickened liquids.    RENAL: BUN/Cr without acute change, good urine output      Na Goal: Greater than 135     Strong: yes    HEMATOLOGY: H/H without acute change, Platelets 165, no signs or symptoms of active bleeding.      DVT ppx: Heparin s.c [] LMWH [x]     ID: afebrile, no leukocytosis, no signs or symptoms of infection.      OTHER: Psychiatry Consult called/pending for depression confirmed by patients wife. We also discussed with patient and wife that he needs a sleep study as an outpatient as we suspect he has SHILO. All questions and concerns addressed.     DISPOSITION: Acute TBI once stable and workup is complete.    CORE MEASURES:        Admission NIHSS: 12     TPA: [] YES [x] NO      LDL/HDL: 17/36     Depression Screen: p     Statin Therapy: yes     Dysphagia Screen: [] PASS [x] FAIL     Smoking [] YES [x] NO      Afib [] YES [x] NO     Stroke Education [] YES [] NO - p

## 2018-09-29 NOTE — PROGRESS NOTE ADULT - SUBJECTIVE AND OBJECTIVE BOX
THE PATIENT WAS SEEN AND EXAMINED BY ME WITH THE HOUSESTAFF AND STROKE TEAM DURING MORNING ROUNDS.   HPI:62 year-old  male with past medical history of HTN and ETOH abuse. On 9/23/18 he presented to [facility] with AMS and intoxication. Upon further evaluation by the ED he wasn't moving L arm. On telestroke eval he was found with  left facial droop, left upper extremity plegia and lower extremity weakness, significant dysarthria and left hemibody hypoesthesia. Head CT showed area of hypodensity at right fronto parietal lobe and acute/ subacute hypodensity at the inferior temporal/parietal lobe middle cerebral artery territory. CT angiography demonstrated right middle cerebral artery proximal M1 segment occlusion. Patient not candidate for IV-tPA given unknown last known well as well as appearance of subacute infarct. He was transferred to [facility] for evaluation for endovascular therapy. CT perfusion showed a small core infarct with large area of penumbra. He was deemed candidate for endovascular therapy with mechanical thrombectomy with TICI 2b recanalization.   THE PATIENT WAS SEEN AND EXAMINED BY ME WITH THE HOUSESTAFF AND STROKE TEAM DURING MORNING ROUNDS.   HPI:  63 y/o male with past medical history of HTN presented initially to [facility] ED for AMS with suspicion of EtOH intoxication. Patient's mental status did not improve and CT/CTA head was done demonstrating Large area acute Right MCA infarct with occluded proximal M1 segment R MCA. Patient subsequently transferred to [facility] for possible Endovascular intervention. LKN is unknown but suspected to be 11pm (9/22/2018). tPA deferred due to unclear LKN time. (23 Sep 2018 05:05)      SUBJECTIVE: No events overnight.  No new neurologic complaints.      acetaminophen   Tablet .. 650 milliGRAM(s) Oral every 6 hours PRN  acetaminophen  IVPB .. 1000 milliGRAM(s) IV Intermittent once PRN  ALBUTerol/ipratropium for Nebulization 3 milliLiter(s) Nebulizer every 6 hours  aspirin  chewable 81 milliGRAM(s) Oral daily  atorvastatin 80 milliGRAM(s) Oral at bedtime  buDESOnide  80 MICROgram(s)/formoterol 4.5 MICROgram(s) Inhaler 2 Puff(s) Inhalation two times a day  enoxaparin Injectable 30 milliGRAM(s) SubCutaneous every 12 hours  fluticasone propionate 50 MICROgram(s)/spray Nasal Spray 1 Spray(s) Both Nostrils two times a day  influenza   Vaccine 0.5 milliLiter(s) IntraMuscular once  nicotine -   7 mG/24Hr(s) Patch 1 patch Transdermal daily  sodium chloride 2 Gram(s) Oral every 6 hours  sodium chloride 0.9% with potassium chloride 20 mEq/L 1000 milliLiter(s) IV Continuous <Continuous>  thiamine Injectable 100 milliGRAM(s) IV Push daily      PHYSICAL EXAM:   Vital Signs Last 24 Hrs  T(C): 37 (29 Sep 2018 16:00), Max: 37.2 (29 Sep 2018 00:00)  T(F): 98.6 (29 Sep 2018 16:00), Max: 98.9 (29 Sep 2018 00:00)  HR: 66 (29 Sep 2018 16:00) (62 - 101)  BP: 137/79 (29 Sep 2018 16:00) (137/79 - 175/83)  BP(mean): 101 (29 Sep 2018 16:00) (96 - 119)  RR: 22 (29 Sep 2018 16:00) (13 - 38)  SpO2: 94% (29 Sep 2018 16:00) (88% - 99%)    General: No acute distress  HEENT: EOM intact, visual fields full, partial left gaze palsy, incomplete saccade to right   Abdomen: Soft, nontender, nondistended   Extremities: No edema    NEUROLOGICAL EXAM:  Mental status: Awake, alert, oriented to hospital, year, month, no neglect - aware of deficit.   Cranial Nerves: Moderate left facial palsy, no nystagmus, no dysarthria, tongue midline  Motor exam: Normal tone, RUE 5/5,  RLE 5/5, L deltoid 3-/5 and hard to test L wrist extensor as splint in place, LLE 4+/5, small drift  Sensation: Intact to light touch   Coordination/ Gait: No dysmetria, gait deferred.       LABS:                                   11.6   7.0   )-----------( 184      ( 29 Sep 2018 02:08 )             35.9     09-29    138  |  105  |  12  ----------------------------<  105<H>  4.0   |  22  |  0.79    Ca    9.0      29 Sep 2018 02:08  Phos  3.5     09-29  Mg     2.0     09-29      CAPILLARY BLOOD GLUCOSE            I&O's Summary    28 Sep 2018 07:01  -  29 Sep 2018 07:00  --------------------------------------------------------  IN: 1545 mL / OUT: 1825 mL / NET: -280 mL    29 Sep 2018 07:01  -  29 Sep 2018 16:31  --------------------------------------------------------  IN: 400 mL / OUT: 655 mL / NET: -255 mL      IMAGING: Reviewed by me.   CT Head No Cont (09.24.18):  Previously noted acute right MCA infarct has demonstrated expected evolutionary changes with areas of hemorrhagic transformation again seen.    CT Head No Cont (09.23.18):  Redemonstration of evolving right MCA territory infarct, faint areas of increasing attenuation may reflect contrast staining, tiny foci of petechial hemorrhage not excluded.    CT perfusion w/ Maps w/ IV Cont (09.23.18):   Large right-sided penumbra  with a small infarct in the large mismatch volume.    Noncontrast head CT (09.23.18):   Large area acute right MCA territory infarct. Posterior temporal lobe infarct appears slightly more subacute with questionable petechial hemorrhage.    CTA neck (09.23.18):   No major vessel occlusion or hemodynamically significant stenosis. No evidence of dissection.    CTA head (09.23.18):  Occluded proximal M1 segment right middle cerebral artery.   Remainder of the intracranial circulation is patent. THE PATIENT WAS SEEN AND EXAMINED BY ME WITH THE HOUSESTAFF AND STROKE TEAM DURING MORNING ROUNDS.     HPI:  63 y/o male with past medical history of HTN presented initially to [facility] ED for AMS with suspicion of EtOH intoxication. Patient's mental status did not improve and CT/CTA head was done demonstrating Large area acute Right MCA infarct with occluded proximal M1 segment R MCA. Patient subsequently transferred to [facility] for possible Endovascular intervention. LKN is unknown but suspected to be 11pm (9/22/2018). tPA deferred due to unclear LKN time. (23 Sep 2018 05:05)    SUBJECTIVE: No events overnight.  No new neurologic complaints.      ROS: All negative except documented above.    acetaminophen   Tablet .. 650 milliGRAM(s) Oral every 6 hours PRN  acetaminophen  IVPB .. 1000 milliGRAM(s) IV Intermittent once PRN  ALBUTerol/ipratropium for Nebulization 3 milliLiter(s) Nebulizer every 6 hours  aspirin  chewable 81 milliGRAM(s) Oral daily  atorvastatin 80 milliGRAM(s) Oral at bedtime  buDESOnide  80 MICROgram(s)/formoterol 4.5 MICROgram(s) Inhaler 2 Puff(s) Inhalation two times a day  enoxaparin Injectable 30 milliGRAM(s) SubCutaneous every 12 hours  fluticasone propionate 50 MICROgram(s)/spray Nasal Spray 1 Spray(s) Both Nostrils two times a day  influenza   Vaccine 0.5 milliLiter(s) IntraMuscular once  nicotine -   7 mG/24Hr(s) Patch 1 patch Transdermal daily  sodium chloride 2 Gram(s) Oral every 6 hours  sodium chloride 0.9% with potassium chloride 20 mEq/L 1000 milliLiter(s) IV Continuous <Continuous>  thiamine Injectable 100 milliGRAM(s) IV Push daily      PHYSICAL EXAM:   Vital Signs Last 24 Hrs  T(C): 37 (29 Sep 2018 16:00), Max: 37.2 (29 Sep 2018 00:00)  T(F): 98.6 (29 Sep 2018 16:00), Max: 98.9 (29 Sep 2018 00:00)  HR: 66 (29 Sep 2018 16:00) (62 - 101)  BP: 137/79 (29 Sep 2018 16:00) (137/79 - 175/83)  BP(mean): 101 (29 Sep 2018 16:00) (96 - 119)  RR: 22 (29 Sep 2018 16:00) (13 - 38)  SpO2: 94% (29 Sep 2018 16:00) (88% - 99%)    General: No acute distress  HEENT: EOM intact, visual fields full, partial left gaze palsy, incomplete saccade to right   Abdomen: Soft, nontender, nondistended   Extremities: No edema    NEUROLOGICAL EXAM:  Mental status: Awake, alert, oriented to hospital, year, month, no neglect - aware of deficit.   Cranial Nerves: Moderate left facial palsy, no nystagmus, no dysarthria, tongue midline  Motor exam: Normal tone, RUE 5/5,  RLE 5/5, L deltoid 3-/5 and hard to test L wrist extensor as splint in place, LLE 4+/5, small drift  Sensation: Intact to light touch   Coordination/ Gait: No dysmetria, gait deferred.       LABS:                                   11.6   7.0   )-----------( 184      ( 29 Sep 2018 02:08 )             35.9     09-29    138  |  105  |  12  ----------------------------<  105<H>  4.0   |  22  |  0.79    Ca    9.0      29 Sep 2018 02:08  Phos  3.5     09-29  Mg     2.0     09-29      CAPILLARY BLOOD GLUCOSE            I&O's Summary    28 Sep 2018 07:01  -  29 Sep 2018 07:00  --------------------------------------------------------  IN: 1545 mL / OUT: 1825 mL / NET: -280 mL    29 Sep 2018 07:01  -  29 Sep 2018 16:31  --------------------------------------------------------  IN: 400 mL / OUT: 655 mL / NET: -255 mL      IMAGING: Reviewed by me.   CT Head No Cont (09.24.18):  Previously noted acute right MCA infarct has demonstrated expected evolutionary changes with areas of hemorrhagic transformation again seen.    CT Head No Cont (09.23.18):  Redemonstration of evolving right MCA territory infarct, faint areas of increasing attenuation may reflect contrast staining, tiny foci of petechial hemorrhage not excluded.    CT perfusion w/ Maps w/ IV Cont (09.23.18):   Large right-sided penumbra  with a small infarct in the large mismatch volume.    Noncontrast head CT (09.23.18):   Large area acute right MCA territory infarct. Posterior temporal lobe infarct appears slightly more subacute with questionable petechial hemorrhage.    CTA neck (09.23.18):   No major vessel occlusion or hemodynamically significant stenosis. No evidence of dissection.    CTA head (09.23.18):  Occluded proximal M1 segment right middle cerebral artery.   Remainder of the intracranial circulation is patent.

## 2018-09-29 NOTE — PROGRESS NOTE ADULT - SUBJECTIVE AND OBJECTIVE BOX
Subjective: Patient seen and examined. No new events except as noted.     SUBJECTIVE/ROS:  awake  no comp    MEDICATIONS:  MEDICATIONS  (STANDING):  ALBUTerol/ipratropium for Nebulization 3 milliLiter(s) Nebulizer every 6 hours  aspirin  chewable 81 milliGRAM(s) Oral daily  atorvastatin 80 milliGRAM(s) Oral at bedtime  buDESOnide  80 MICROgram(s)/formoterol 4.5 MICROgram(s) Inhaler 2 Puff(s) Inhalation two times a day  enoxaparin Injectable 30 milliGRAM(s) SubCutaneous every 12 hours  fluticasone propionate 50 MICROgram(s)/spray Nasal Spray 1 Spray(s) Both Nostrils two times a day  influenza   Vaccine 0.5 milliLiter(s) IntraMuscular once  nicotine -   7 mG/24Hr(s) Patch 1 patch Transdermal daily  sodium chloride 2 Gram(s) Oral every 6 hours  sodium chloride 0.9% with potassium chloride 20 mEq/L 1000 milliLiter(s) (30 mL/Hr) IV Continuous <Continuous>  thiamine Injectable 100 milliGRAM(s) IV Push daily      PHYSICAL EXAM:  T(C): 36.9 (09-29-18 @ 12:00), Max: 37.2 (09-29-18 @ 00:00)  HR: 65 (09-29-18 @ 12:00) (62 - 101)  BP: 138/73 (09-29-18 @ 12:00) (137/67 - 175/83)  RR: 24 (09-29-18 @ 12:00) (13 - 38)  SpO2: 97% (09-29-18 @ 12:00) (88% - 99%)  Wt(kg): --  I&O's Summary    28 Sep 2018 07:01  -  29 Sep 2018 07:00  --------------------------------------------------------  IN: 1545 mL / OUT: 1825 mL / NET: -280 mL    29 Sep 2018 07:01  -  29 Sep 2018 14:53  --------------------------------------------------------  IN: 340 mL / OUT: 655 mL / NET: -315 mL        JVP: Normal  Neck: supple  Lung: clear   CV: S1 S2 , Murmur:  Abd: soft  Ext: No edema  neuro: Awake / alert  Psych: flat affect  Skin: normal      LABS/DATA:    CARDIAC MARKERS:                                11.6   7.0   )-----------( 184      ( 29 Sep 2018 02:08 )             35.9     09-29    138  |  105  |  12  ----------------------------<  105<H>  4.0   |  22  |  0.79    Ca    9.0      29 Sep 2018 02:08  Phos  3.5     09-29  Mg     2.0     09-29      proBNP:   Lipid Profile:   HgA1c:   TSH:     TELE:  EKG:

## 2018-09-30 PROCEDURE — 99233 SBSQ HOSP IP/OBS HIGH 50: CPT

## 2018-09-30 RX ORDER — KETOROLAC TROMETHAMINE 30 MG/ML
15 SYRINGE (ML) INJECTION EVERY 4 HOURS
Qty: 0 | Refills: 0 | Status: DISCONTINUED | OUTPATIENT
Start: 2018-09-30 | End: 2018-10-03

## 2018-09-30 RX ADMIN — Medication 1 SPRAY(S): at 17:30

## 2018-09-30 RX ADMIN — ATORVASTATIN CALCIUM 80 MILLIGRAM(S): 80 TABLET, FILM COATED ORAL at 22:18

## 2018-09-30 RX ADMIN — BUDESONIDE AND FORMOTEROL FUMARATE DIHYDRATE 2 PUFF(S): 160; 4.5 AEROSOL RESPIRATORY (INHALATION) at 17:32

## 2018-09-30 RX ADMIN — SODIUM CHLORIDE 2 GRAM(S): 9 INJECTION INTRAMUSCULAR; INTRAVENOUS; SUBCUTANEOUS at 23:41

## 2018-09-30 RX ADMIN — Medication 3 MILLILITER(S): at 12:22

## 2018-09-30 RX ADMIN — SODIUM CHLORIDE 2 GRAM(S): 9 INJECTION INTRAMUSCULAR; INTRAVENOUS; SUBCUTANEOUS at 12:23

## 2018-09-30 RX ADMIN — Medication 1 PATCH: at 12:23

## 2018-09-30 RX ADMIN — BUDESONIDE AND FORMOTEROL FUMARATE DIHYDRATE 2 PUFF(S): 160; 4.5 AEROSOL RESPIRATORY (INHALATION) at 06:24

## 2018-09-30 RX ADMIN — Medication 100 MILLIGRAM(S): at 12:23

## 2018-09-30 RX ADMIN — Medication 15 MILLIGRAM(S): at 23:45

## 2018-09-30 RX ADMIN — Medication 3 MILLILITER(S): at 23:41

## 2018-09-30 RX ADMIN — Medication 1 SPRAY(S): at 06:04

## 2018-09-30 RX ADMIN — Medication 3 MILLILITER(S): at 17:33

## 2018-09-30 RX ADMIN — Medication 81 MILLIGRAM(S): at 12:23

## 2018-09-30 RX ADMIN — SODIUM CHLORIDE 2 GRAM(S): 9 INJECTION INTRAMUSCULAR; INTRAVENOUS; SUBCUTANEOUS at 06:03

## 2018-09-30 RX ADMIN — SODIUM CHLORIDE 2 GRAM(S): 9 INJECTION INTRAMUSCULAR; INTRAVENOUS; SUBCUTANEOUS at 17:30

## 2018-09-30 RX ADMIN — ENOXAPARIN SODIUM 30 MILLIGRAM(S): 100 INJECTION SUBCUTANEOUS at 17:30

## 2018-09-30 RX ADMIN — Medication 20 MILLIGRAM(S): at 12:22

## 2018-09-30 RX ADMIN — ENOXAPARIN SODIUM 30 MILLIGRAM(S): 100 INJECTION SUBCUTANEOUS at 06:03

## 2018-09-30 RX ADMIN — Medication 3 MILLILITER(S): at 06:03

## 2018-09-30 NOTE — PROGRESS NOTE ADULT - ASSESSMENT
62 year-old  male with past medical history of HTN and ETOH abuse. On 9/23/18 he presented to [facility] with AMS and intoxication. Upon further evaluation by the ED he wasn't moving L arm. On telestroke eval he was found with  left facial droop, left upper extremity monoplegia and lower extremity weakness, significant dysarthria and left hemibody hypoesthesia. Head CT showed area of hypodensity at right fronto parietal lobe and acute/ subacute hypodensity at the inferior temporal/parietal lobe middle cerebral artery territory. CT angiography demonstrated right middle cerebral artery proximal M1 segment occlusion. Patient not candidate for IV-tPA given unknown last known well as well as appearance of subacute infarct. He was transferred to [facility] for evaluation for endovascular therapy. CT perfusion showed a small core infarct with large area of penumbra. He was deemed candidate for endovascular therapy with mechanical thrombectomy with TICI 2b recanalization. MRI brain subsequently showedright MCA distribution infarct with hemorrhagic transformation (HI 2). TTE did not show any obvious structural cardiac source of embolism.      Impression:   Cerebral embolism with cerebral infarction. R MCA distribution stroke with mild hemorrhagic concentration (HI 2) - likely etiology being cryptogenic stroke, probably related to embolism from a proximal source like cardiac/paradoxical source of embolism.    NEURO: Neurologically with improvement, remains with left hemiparesis. Continue close monitoring for neurologic deterioration, BP goal gradual normotension in the setting of recent successful revascularization, continue with home statin medication if applicable considering likely non-at that time with etiology of his stroke and age, repeat fasting lipid profile to better characterize LDL results, Physical therapy/OT - recommends acute TBI. Speech eval/treatment - recommend [facility] [place]    ANTITHROMBOTIC THERAPY: aspirin for secondary stroke prevention    PULMONARY: CXR on 9/26 Right lower lobe nodules, largest of which measures 5 mm, pulm following- plan for repeat CT chest in 6 months, probable COPD, wheezing    CARDIOVASCULAR: TTE showed - EF: 63%, Grossly normal left ventricular internal dimensions and wall thicknesses. Grossly hyperdynamic left ventricular systolic function. Reversal of the E-A  waves of the mitral inflow pattern is consistent with diastolic LV dysfunction. Grossly normal right ventricular size and function.  Cardiac monitoring without any documented events. Plan for AYO tomorrow to rule out any structural cardiac source of embolism and consider prolonged cardiac monitoring with ICM to screen for occult cardiac arrhythmias and atrial fibrillation during the cardiac source of embolism based on AYO results     SBP goal: <140/90 mmHg    GASTROINTESTINAL:  dysphagia screen - failed, speech and swallow failed, MBS done on 9/27, diet recommended, tolerating diet      Diet: Dysphagia I with nectar thickened liquids.    RENAL: BUN/Cr without acute change, good urine output      Na Goal: Greater than 135     Strong: yes    HEMATOLOGY: H/H without acute change, Platelets 165, no signs or symptoms of active bleeding.      DVT ppx: Heparin s.c [] LMWH [x]     ID: afebrile, no leukocytosis, no signs or symptoms of infection.      OTHER: Psychiatry Consult pending for depression confirmed by patients wife. We also discussed with patient and wife that he needs a sleep study as an outpatient as we suspect he has SHILO. All questions and concerns addressed. Patient reports chronic Low Back Pain and takes Gabapentin for this reason and chronic Neuropathy. His wife plans to bring in his home dose so he can restart this medication.     DISPOSITION: Acute TBI once stable and workup is complete.    CORE MEASURES:        Admission NIHSS: 12     TPA: [] YES [x] NO      LDL/HDL: 17/36     Depression Screen: p     Statin Therapy: yes     Dysphagia Screen: [] PASS [x] FAIL     Smoking [] YES [x] NO      Afib [] YES [x] NO     Stroke Education [] YES [] NO - p 62 year-old  male with past medical history of HTN and ETOH abuse. On 9/23/18 he presented to [facility] with AMS and intoxication. Upon further evaluation by the ED he wasn't moving L arm. On telestroke eval he was found with  left facial droop, left upper extremity monoplegia and lower extremity weakness, significant dysarthria and left hemibody hypoesthesia. Head CT showed area of hypodensity at right fronto parietal lobe and acute/ subacute hypodensity at the inferior temporal/parietal lobe middle cerebral artery territory. CT angiography demonstrated right middle cerebral artery proximal M1 segment occlusion. Patient not candidate for IV-tPA given unknown last known well as well as appearance of subacute infarct. He was transferred to [facility] for evaluation for endovascular therapy. CT perfusion showed a small core infarct with large area of penumbra. He was deemed candidate for endovascular therapy with mechanical thrombectomy with TICI 2b recanalization. MRI brain subsequently showedright MCA distribution infarct with hemorrhagic transformation (HI 2). TTE did not show any obvious structural cardiac source of embolism.      Impression:   Cerebral embolism with cerebral infarction. R MCA distribution stroke with mild hemorrhagic concentration (HI 2) - likely etiology being cryptogenic stroke, probably related to embolism from a proximal source like cardiac/paradoxical source of embolism.    NEURO: Neurologically with improvement, remains with left hemiparesis. Continue close monitoring for neurologic deterioration, BP goal gradual normotension in the setting of recent successful revascularization, continue with home statin medication if applicable considering likely non-at that time with etiology of his stroke and age, repeat fasting lipid profile to better characterize LDL results, Physical therapy/OT - recommends acute TBI. Speech eval/treatment - recommend [facility] [place]    ANTITHROMBOTIC THERAPY: aspirin for secondary stroke prevention    PULMONARY: CXR on 9/26 Right lower lobe nodules, largest of which measures 5 mm, pulm following- plan for repeat CT chest in 6 months, probable COPD, wheezing    CARDIOVASCULAR: TTE showed - LVEF: 63%, Grossly normal left ventricular internal dimensions and wall thicknesses. Grossly hyperdynamic left ventricular systolic function. Reversal of the E-A  waves of the mitral inflow pattern is consistent with diastolic LV dysfunction. Grossly normal right ventricular size and function.  Cardiac monitoring without any documented events. Plan for AYO tomorrow to rule out any structural cardiac source of embolism and consider prolonged cardiac monitoring with ICM to screen for occult cardiac arrhythmias and atrial fibrillation during the cardiac source of embolism based on AYO results     SBP goal: <140/90 mmHg    GASTROINTESTINAL:  dysphagia screen - failed, speech and swallow failed, MBS done on 9/27, diet recommended, tolerating diet      Diet: Dysphagia I with nectar thickened liquids.    RENAL: BUN/Cr without acute change, good urine output      Na Goal: Greater than 135     Strong: yes    HEMATOLOGY: H/H without acute change, Platelets 165, no signs or symptoms of active bleeding.      DVT ppx: Heparin s.c [] LMWH [x]     ID: afebrile, no leukocytosis, no signs or symptoms of infection.      OTHER: Psychiatry Consult pending for depression confirmed by patients wife. We also discussed with patient and wife that he needs a sleep study as an outpatient as we suspect he has SHILO. All questions and concerns addressed. Patient reports chronic Low Back Pain and takes Gabapentin for this reason and chronic Neuropathy. His wife plans to bring in his home dose so he can restart this medication.     DISPOSITION: Acute TBI once stable and workup is complete.    CORE MEASURES:        Admission NIHSS: 12     TPA: [] YES [x] NO      LDL/HDL: 17/36     Depression Screen: p     Statin Therapy: yes     Dysphagia Screen: [] PASS [x] FAIL     Smoking [] YES [x] NO      Afib [] YES [x] NO     Stroke Education [] YES [] NO - p

## 2018-09-30 NOTE — PROGRESS NOTE ADULT - ASSESSMENT
Acute CVA  HTN    BP control once deemed safe as per neurology   AYO is recommended   ILR   fu with stroke service     History of tobacco  on nicotine patch

## 2018-09-30 NOTE — PROGRESS NOTE ADULT - SUBJECTIVE AND OBJECTIVE BOX
THE PATIENT WAS SEEN AND EXAMINED BY ME WITH THE HOUSESTAFF AND STROKE TEAM DURING MORNING ROUNDS.     HPI:  63 y/o male with past medical history of HTN presented initially to [facility] ED for AMS with suspicion of EtOH intoxication. Patient's mental status did not improve and CT/CTA head was done demonstrating Large area acute Right MCA infarct with occluded proximal M1 segment R MCA. Patient subsequently transferred to [facility] for possible Endovascular intervention. LKN is unknown but suspected to be 11pm (9/22/2018). tPA deferred due to unclear LKN time. (23 Sep 2018 05:05)    SUBJECTIVE: No events overnight.  No new neurologic complaints.      ROS: All negative except documented above.    acetaminophen   Tablet .. 650 milliGRAM(s) Oral every 6 hours PRN  acetaminophen  IVPB .. 1000 milliGRAM(s) IV Intermittent once PRN  ALBUTerol/ipratropium for Nebulization 3 milliLiter(s) Nebulizer every 6 hours  aspirin  chewable 81 milliGRAM(s) Oral daily  atorvastatin 80 milliGRAM(s) Oral at bedtime  buDESOnide  80 MICROgram(s)/formoterol 4.5 MICROgram(s) Inhaler 2 Puff(s) Inhalation two times a day  enoxaparin Injectable 30 milliGRAM(s) SubCutaneous every 12 hours  fluticasone propionate 50 MICROgram(s)/spray Nasal Spray 1 Spray(s) Both Nostrils two times a day  influenza   Vaccine 0.5 milliLiter(s) IntraMuscular once  nicotine -   7 mG/24Hr(s) Patch 1 patch Transdermal daily  sodium chloride 2 Gram(s) Oral every 6 hours  sodium chloride 0.9% with potassium chloride 20 mEq/L 1000 milliLiter(s) IV Continuous <Continuous>  thiamine Injectable 100 milliGRAM(s) IV Push daily      PHYSICAL EXAM:   Vital Signs Last 24 Hrs  T(C): 36.1 (30 Sep 2018 08:00), Max: 37 (29 Sep 2018 16:00)  T(F): 97 (30 Sep 2018 08:00), Max: 98.6 (29 Sep 2018 16:00)  HR: 74 (30 Sep 2018 08:00) (66 - 78)  BP: 151/75 (30 Sep 2018 08:00) (137/79 - 158/83)  BP(mean): 101 (29 Sep 2018 20:00) (101 - 101)  RR: 189 (30 Sep 2018 08:00) (20 - 189)  SpO2: 97% (30 Sep 2018 08:00) (93% - 97%)    General: No acute distress  HEENT: EOM intact, visual fields full, partial left gaze palsy, incomplete saccade to right   Abdomen: Soft, nontender, nondistended   Extremities: No edema    NEUROLOGICAL EXAM:  Mental status: Awake, alert, oriented to hospital, year, month, no neglect - aware of deficit.   Cranial Nerves: Moderate left facial palsy, no nystagmus, no dysarthria, tongue midline  Motor exam: Normal tone, RUE 5/5,  RLE 5/5, L deltoid 3-/5 , Left upper strength distally 2-/5,  LLE 4+/5, small drift  Sensation: Intact to light touch   Coordination/ Gait: No dysmetria, gait deferred.       LABS:                                   11.6   7.0   )-----------( 184      ( 29 Sep 2018 02:08 )             35.9     09-29    138  |  105  |  12  ----------------------------<  105<H>  4.0   |  22  |  0.79    Ca    9.0      29 Sep 2018 02:08  Phos  3.5     09-29  Mg     2.0     09-29      CAPILLARY BLOOD GLUCOSE            I&O's Summary    29 Sep 2018 07:01  -  30 Sep 2018 07:00  --------------------------------------------------------  IN: 1170 mL / OUT: 2055 mL / NET: -885 mL    30 Sep 2018 07:01  -  30 Sep 2018 15:58  --------------------------------------------------------  IN: 360 mL / OUT: 0 mL / NET: 360 mL          IMAGING: Reviewed by me.   CT Head No Cont (09.24.18):  Previously noted acute right MCA infarct has demonstrated expected evolutionary changes with areas of hemorrhagic transformation again seen.    CT Head No Cont (09.23.18):  Redemonstration of evolving right MCA territory infarct, faint areas of increasing attenuation may reflect contrast staining, tiny foci of petechial hemorrhage not excluded.    CT perfusion w/ Maps w/ IV Cont (09.23.18):   Large right-sided penumbra  with a small infarct in the large mismatch volume.    Noncontrast head CT (09.23.18):   Large area acute right MCA territory infarct. Posterior temporal lobe infarct appears slightly more subacute with questionable petechial hemorrhage.    CTA neck (09.23.18):   No major vessel occlusion or hemodynamically significant stenosis. No evidence of dissection.    CTA head (09.23.18):  Occluded proximal M1 segment right middle cerebral artery.   Remainder of the intracranial circulation is patent.

## 2018-09-30 NOTE — PROGRESS NOTE ADULT - SUBJECTIVE AND OBJECTIVE BOX
Subjective: Patient seen and examined. No new events except as noted.     SUBJECTIVE/ROS:        MEDICATIONS:  MEDICATIONS  (STANDING):  ALBUTerol/ipratropium for Nebulization 3 milliLiter(s) Nebulizer every 6 hours  aspirin  chewable 81 milliGRAM(s) Oral daily  atorvastatin 80 milliGRAM(s) Oral at bedtime  buDESOnide  80 MICROgram(s)/formoterol 4.5 MICROgram(s) Inhaler 2 Puff(s) Inhalation two times a day  enoxaparin Injectable 30 milliGRAM(s) SubCutaneous every 12 hours  FLUoxetine 20 milliGRAM(s) Oral daily  fluticasone propionate 50 MICROgram(s)/spray Nasal Spray 1 Spray(s) Both Nostrils two times a day  influenza   Vaccine 0.5 milliLiter(s) IntraMuscular once  nicotine -   7 mG/24Hr(s) Patch 1 patch Transdermal daily  sodium chloride 2 Gram(s) Oral every 6 hours  sodium chloride 0.9% with potassium chloride 20 mEq/L 1000 milliLiter(s) (30 mL/Hr) IV Continuous <Continuous>  thiamine Injectable 100 milliGRAM(s) IV Push daily      PHYSICAL EXAM:  T(C): 36.1 (09-30-18 @ 08:00), Max: 37 (09-29-18 @ 16:00)  HR: 74 (09-30-18 @ 08:00) (64 - 78)  BP: 151/75 (09-30-18 @ 08:00) (137/79 - 158/83)  RR: 189 (09-30-18 @ 08:00) (20 - 189)  SpO2: 97% (09-30-18 @ 08:00) (93% - 97%)  Wt(kg): --  I&O's Summary    29 Sep 2018 07:01  -  30 Sep 2018 07:00  --------------------------------------------------------  IN: 1170 mL / OUT: 2055 mL / NET: -885 mL        JVP: Normal  Neck: supple  Lung: clear   CV: S1 S2 , Murmur:  Abd: soft  Ext: No edema  neuro: Awake / alert  Psych: flat affect  Skin: normal        LABS/DATA:    CARDIAC MARKERS:                                11.6   7.0   )-----------( 184      ( 29 Sep 2018 02:08 )             35.9     09-29    138  |  105  |  12  ----------------------------<  105<H>  4.0   |  22  |  0.79    Ca    9.0      29 Sep 2018 02:08  Phos  3.5     09-29  Mg     2.0     09-29      proBNP:   Lipid Profile:   HgA1c:   TSH:     TELE:  EKG:

## 2018-10-01 DIAGNOSIS — R13.10 DYSPHAGIA, UNSPECIFIED: ICD-10-CM

## 2018-10-01 DIAGNOSIS — F32.9 MAJOR DEPRESSIVE DISORDER, SINGLE EPISODE, UNSPECIFIED: ICD-10-CM

## 2018-10-01 PROBLEM — Z00.00 ENCOUNTER FOR PREVENTIVE HEALTH EXAMINATION: Status: ACTIVE | Noted: 2018-10-01

## 2018-10-01 PROCEDURE — 99222 1ST HOSP IP/OBS MODERATE 55: CPT

## 2018-10-01 PROCEDURE — 99233 SBSQ HOSP IP/OBS HIGH 50: CPT

## 2018-10-01 RX ADMIN — Medication 1 PATCH: at 13:16

## 2018-10-01 RX ADMIN — Medication 15 MILLIGRAM(S): at 09:27

## 2018-10-01 RX ADMIN — Medication 20 MILLIGRAM(S): at 13:43

## 2018-10-01 RX ADMIN — Medication 3 MILLILITER(S): at 06:10

## 2018-10-01 RX ADMIN — Medication 100 MILLIGRAM(S): at 13:16

## 2018-10-01 RX ADMIN — Medication 15 MILLIGRAM(S): at 21:30

## 2018-10-01 RX ADMIN — Medication 15 MILLIGRAM(S): at 08:27

## 2018-10-01 RX ADMIN — SODIUM CHLORIDE 2 GRAM(S): 9 INJECTION INTRAMUSCULAR; INTRAVENOUS; SUBCUTANEOUS at 06:10

## 2018-10-01 RX ADMIN — BUDESONIDE AND FORMOTEROL FUMARATE DIHYDRATE 2 PUFF(S): 160; 4.5 AEROSOL RESPIRATORY (INHALATION) at 06:10

## 2018-10-01 RX ADMIN — ATORVASTATIN CALCIUM 80 MILLIGRAM(S): 80 TABLET, FILM COATED ORAL at 21:23

## 2018-10-01 RX ADMIN — SODIUM CHLORIDE 2 GRAM(S): 9 INJECTION INTRAMUSCULAR; INTRAVENOUS; SUBCUTANEOUS at 13:43

## 2018-10-01 RX ADMIN — Medication 81 MILLIGRAM(S): at 13:43

## 2018-10-01 RX ADMIN — BUDESONIDE AND FORMOTEROL FUMARATE DIHYDRATE 2 PUFF(S): 160; 4.5 AEROSOL RESPIRATORY (INHALATION) at 17:05

## 2018-10-01 RX ADMIN — Medication 1 SPRAY(S): at 17:05

## 2018-10-01 RX ADMIN — Medication 3 MILLILITER(S): at 22:59

## 2018-10-01 RX ADMIN — Medication 3 MILLILITER(S): at 13:15

## 2018-10-01 RX ADMIN — SODIUM CHLORIDE 2 GRAM(S): 9 INJECTION INTRAMUSCULAR; INTRAVENOUS; SUBCUTANEOUS at 22:59

## 2018-10-01 RX ADMIN — ENOXAPARIN SODIUM 30 MILLIGRAM(S): 100 INJECTION SUBCUTANEOUS at 06:10

## 2018-10-01 RX ADMIN — ENOXAPARIN SODIUM 30 MILLIGRAM(S): 100 INJECTION SUBCUTANEOUS at 17:04

## 2018-10-01 RX ADMIN — Medication 1 SPRAY(S): at 06:10

## 2018-10-01 RX ADMIN — Medication 3 MILLILITER(S): at 17:04

## 2018-10-01 RX ADMIN — SODIUM CHLORIDE 2 GRAM(S): 9 INJECTION INTRAMUSCULAR; INTRAVENOUS; SUBCUTANEOUS at 17:05

## 2018-10-01 RX ADMIN — Medication 15 MILLIGRAM(S): at 22:00

## 2018-10-01 RX ADMIN — Medication 15 MILLIGRAM(S): at 00:30

## 2018-10-01 NOTE — BEHAVIORAL HEALTH ASSESSMENT NOTE - NSBHSUICPROTECTFACT_PSY_A_CORE
Responsibility to family and others/Identifies reasons for living/Future oriented/Supportive social network or family/Fear of death or dying due to pain/suffering

## 2018-10-01 NOTE — BEHAVIORAL HEALTH ASSESSMENT NOTE - HPI (INCLUDE ILLNESS QUALITY, SEVERITY, DURATION, TIMING, CONTEXT, MODIFYING FACTORS, ASSOCIATED SIGNS AND SYMPTOMS)
61 y/o  male, domiciled with wife, hx depression, was on zoloft prior to admission, was in tx with psychiatrist Dr. Diaz few weeks ago, no prior psych admissions, with past medical history of HTN presented initially to [facility] ED for AMS with suspicion of EtOH intoxication. Patient's mental status did not improve and CT/CTA head was done demonstrating Large area acute Right MCA infarct with occluded proximal M1 segment R MCA. Patient subsequently transferred to [facility] for possible Endovascular intervention, seen for depression. Pt states he has been feeling depressed since he stopped working 2 yrs ago, suffered back pain, worsening spinal stenosis. Pt feels worse since he has the stroke, used to be very independent the last few years, mild anhedonia, decrease sleep, fair appetite. Pt denies si/hi. pt also reports long hx anxiety, panic attacks, no recent panic attacks here in hospital. pt denies psychosis, mare. pt was drinking 6 pack beer every night to help him sleep, no other drug use.

## 2018-10-01 NOTE — BEHAVIORAL HEALTH ASSESSMENT NOTE - SUMMARY
61 y/o  male, domiciled with wife, hx depression, was on zoloft prior to admission, was in tx with psychiatrist Dr. Diaz few weeks ago, no prior psych admissions, with past medical history of HTN presented initially to [facility] ED for AMS with suspicion of EtOH intoxication. Patient's mental status did not improve and CT/CTA head was done demonstrating Large area acute Right MCA infarct with occluded proximal M1 segment R MCA. Patient subsequently transferred to [facility] for possible Endovascular intervention, seen for depression. Pt reported multiple depressive sx, no si/hi, anxiety, on prozac currently, c/o dec sleep.

## 2018-10-01 NOTE — CONSULT NOTE ADULT - ASSESSMENT
61 y/o M with PMhx of HTN [facility] with suspicion of EtOH intoxication found to have RMCA infarct. GI consulted for EGD prior to AYO.    1)Concern for esophageal web on MBS 61 y/o M with PMhx of HTN presented initially to [facility] ED for AMS with suspicion of EtOH intoxication found to have RMCA infarct. GI consulted for EGD prior to AYO.    1)Concern for esophageal web on MBS  2) RMCA infarct    -please obtain esophagram for evaluation of esophageal web  - diet as per S&S  - rest of plan as per primary team

## 2018-10-01 NOTE — CONSULT NOTE ADULT - SUBJECTIVE AND OBJECTIVE BOX
Chief Complaint:  Patient is a 62y old  Male who presents with a chief complaint of R MCA Infarct now s/p Endovascular (01 Oct 2018 08:26)      HPI:  63 y/o M with PMhx of HTN presented initially to [facility] ED for AMS with suspicion of EtOH intoxication found to have RMCA infarct. GI consulted for EGD prior to AYO.    At OSH, CT/CTA head was done demonstrating R MCA infarct with occluded proximal M1 segment R MCA. Patient subsequently transferred to [facility] for possible Endovascular intervention. TPA was not given because LKN was unknown.    During admission, S&S eval was done. MBS was recommended (results below). Patient is planned for AYO to rule out cardiac anatomic abnormality. Concern for esophageal web on MBS. GI consulted for EGD prior to MBS.      Allergies:  No Known Allergies      Home Medications:    Hospital Medications:  acetaminophen   Tablet .. 650 milliGRAM(s) Oral every 6 hours PRN  acetaminophen  IVPB .. 1000 milliGRAM(s) IV Intermittent once PRN  ALBUTerol/ipratropium for Nebulization 3 milliLiter(s) Nebulizer every 6 hours  aspirin  chewable 81 milliGRAM(s) Oral daily  atorvastatin 80 milliGRAM(s) Oral at bedtime  buDESOnide  80 MICROgram(s)/formoterol 4.5 MICROgram(s) Inhaler 2 Puff(s) Inhalation two times a day  enoxaparin Injectable 30 milliGRAM(s) SubCutaneous every 12 hours  FLUoxetine 20 milliGRAM(s) Oral daily  fluticasone propionate 50 MICROgram(s)/spray Nasal Spray 1 Spray(s) Both Nostrils two times a day  influenza   Vaccine 0.5 milliLiter(s) IntraMuscular once  ketorolac   Injectable 15 milliGRAM(s) IV Push every 4 hours PRN  nicotine -   7 mG/24Hr(s) Patch 1 patch Transdermal daily  sodium chloride 2 Gram(s) Oral every 6 hours  thiamine Injectable 100 milliGRAM(s) IV Push daily      PMHX/PSHX:  HTN (hypertension)  TIA (transient ischemic attack)      Family history:      Social History:     ROS:   as above      PHYSICAL EXAM:     GENERAL:  NAD  HEENT:  sclera anicteric  CHEST:  CTA bl  HEART:  S1 S2  ABDOMEN:  Soft, non-tender, non-distended, normoactive bowel sounds,  no masses ,  EXTREMITIES:  no cyanosis,clubbing or edema  SKIN:  No rash/erythema/ecchymoses/petechiae/wounds/abscess/warm/dry  NEURO:  Alert, oriented    Vital Signs:  Vital Signs Last 24 Hrs  T(C): 36.5 (01 Oct 2018 08:35), Max: 37.2 (30 Sep 2018 20:15)  T(F): 97.7 (01 Oct 2018 08:35), Max: 98.9 (30 Sep 2018 20:15)  HR: 67 (01 Oct 2018 08:35) (67 - 78)  BP: 116/79 (01 Oct 2018 08:35) (116/72 - 155/88)  BP(mean): --  RR: 20 (01 Oct 2018 08:35) (18 - 20)  SpO2: 95% (01 Oct 2018 08:35) (93% - 96%)  Daily     Daily     LABS:                    Imaging:  MBS  FINDINGS:    There is silent aspiration with thin liquids. There is penetration with   solids.    IMPRESSION:       There is silent aspiration with thin liquids. There is penetration with   solids.    For further information and recommendations, please refer to the speech   pathologist final report which is available for review in the electronic   medical record. Chief Complaint:  Patient is a 62y old  Male who presents with a chief complaint of R MCA Infarct now s/p Endovascular (01 Oct 2018 08:26)      HPI:  61 y/o M with PMhx of HTN presented initially to [facility] ED for AMS with suspicion of EtOH intoxication found to have RMCA infarct. GI consulted for EGD prior to AYO.    At OSH, CT/CTA head was done demonstrating R MCA infarct with occluded proximal M1 segment R MCA. Patient subsequently transferred to [facility] for possible Endovascular intervention. TPA was not given because LKN was unknown.    During admission, S&S eval was done. MBS was recommended (results below). Patient is planned for AYO to rule out cardiac anatomic abnormality. Concern for esophageal web on MBS. GI consulted for EGD prior to MBS.    Patient denies any feeling of dysphagia or odynophagia. He never had issues with swallowing in the past. He had an EGD done a couple years ago which he reports was normal.      Allergies:  No Known Allergies      Home Medications:    Hospital Medications:  acetaminophen   Tablet .. 650 milliGRAM(s) Oral every 6 hours PRN  acetaminophen  IVPB .. 1000 milliGRAM(s) IV Intermittent once PRN  ALBUTerol/ipratropium for Nebulization 3 milliLiter(s) Nebulizer every 6 hours  aspirin  chewable 81 milliGRAM(s) Oral daily  atorvastatin 80 milliGRAM(s) Oral at bedtime  buDESOnide  80 MICROgram(s)/formoterol 4.5 MICROgram(s) Inhaler 2 Puff(s) Inhalation two times a day  enoxaparin Injectable 30 milliGRAM(s) SubCutaneous every 12 hours  FLUoxetine 20 milliGRAM(s) Oral daily  fluticasone propionate 50 MICROgram(s)/spray Nasal Spray 1 Spray(s) Both Nostrils two times a day  influenza   Vaccine 0.5 milliLiter(s) IntraMuscular once  ketorolac   Injectable 15 milliGRAM(s) IV Push every 4 hours PRN  nicotine -   7 mG/24Hr(s) Patch 1 patch Transdermal daily  sodium chloride 2 Gram(s) Oral every 6 hours  thiamine Injectable 100 milliGRAM(s) IV Push daily      PMHX/PSHX:  HTN (hypertension)  TIA (transient ischemic attack)      Family history:      Social History:     ROS:   as above      PHYSICAL EXAM:     GENERAL:  NAD  HEENT:  sclera anicteric  CHEST:  CTA bl  HEART:  S1 S2  ABDOMEN:  Soft, non-tender, non-distended, normoactive bowel sounds,  no masses ,  EXTREMITIES:  no cyanosis,clubbing or edema  SKIN:  No rash/erythema/ecchymoses/petechiae/wounds/abscess/warm/dry  NEURO:  Alert, oriented    Vital Signs:  Vital Signs Last 24 Hrs  T(C): 36.5 (01 Oct 2018 08:35), Max: 37.2 (30 Sep 2018 20:15)  T(F): 97.7 (01 Oct 2018 08:35), Max: 98.9 (30 Sep 2018 20:15)  HR: 67 (01 Oct 2018 08:35) (67 - 78)  BP: 116/79 (01 Oct 2018 08:35) (116/72 - 155/88)  BP(mean): --  RR: 20 (01 Oct 2018 08:35) (18 - 20)  SpO2: 95% (01 Oct 2018 08:35) (93% - 96%)  Daily     Daily     LABS:                    Imaging:  MBS  FINDINGS:    There is silent aspiration with thin liquids. There is penetration with   solids.    IMPRESSION:       There is silent aspiration with thin liquids. There is penetration with   solids.    For further information and recommendations, please refer to the speech   pathologist final report which is available for review in the electronic   medical record.

## 2018-10-01 NOTE — BEHAVIORAL HEALTH ASSESSMENT NOTE - RISK ASSESSMENT
pt overall low risk for suicide attempt, no si/hi, no hx attempts, future oriented, risk factors include recent medical issue

## 2018-10-01 NOTE — PROGRESS NOTE ADULT - ASSESSMENT
63 y/o M with PMH of HTN, HLD, renal cancer s/p partial R nephrectomy (10/[place] [facility] [place] denies having received chemo/XRT), current smoker (45 pack yrs), depression, anxiety presented to [facility] 9/23 with AMS and found to have R MCA CVA s/p embolectomy. Extubated 9/24.

## 2018-10-01 NOTE — BEHAVIORAL HEALTH ASSESSMENT NOTE - THOUGHT CONTENT
Preoccupations

## 2018-10-01 NOTE — BEHAVIORAL HEALTH ASSESSMENT NOTE - NSBHCHARTREVIEWIMAGING_PSY_A_CORE FT
EXAM:  CT CHEST                            PROCEDURE DATE:  09/26/2018            INTERPRETATION:  CT CHEST WITHOUT CONTRAST    INDICATION: Wheezing, rule out pneumonia    TECHNIQUE: Unenhanced helical images were obtained of the chest. Coronal   and sagittal images were reconstructed. Maximum intensity projection   images were generated.     COMPARISON: None.    FINDINGS:     Tubes/Lines: None.    Lungs And Airways: Small triangular density (series 4 image 252) is   present, which may represent atelectasis.    The nodules measure:   *  A right lower lobe nodule measures 4 mm (series 4 image 213).  *  A right lower lobe nodule measures 5 mm (series 3 image 100)    The remainder of the lungs are clear. The airways are unremarkable.      Pleura:No pleural effusion.  No pneumothorax.    Mediastinum: There are no enlarged chest lymph nodes. The visualized   portion of the thyroid gland is unremarkable. The esophagus is   unremarkable.     Heart and Vasculature: The heart is normal in size.There is no   pericardial effusion. Coronary artery disease with calcified plaque

## 2018-10-01 NOTE — PROGRESS NOTE ADULT - PROBLEM SELECTOR PLAN 7
Dysphagia 1 diet with nectar thick liquids per MBS  -XRay esophogram pending 2nd concern for esophageal web

## 2018-10-01 NOTE — PROGRESS NOTE ADULT - SUBJECTIVE AND OBJECTIVE BOX
Follow-up Pulm Progress Note    No new respiratory events overnight.  Denies SOB/CP.   94% on RA    Medications:  MEDICATIONS  (STANDING):  ALBUTerol/ipratropium for Nebulization 3 milliLiter(s) Nebulizer every 6 hours  aspirin  chewable 81 milliGRAM(s) Oral daily  atorvastatin 80 milliGRAM(s) Oral at bedtime  buDESOnide  80 MICROgram(s)/formoterol 4.5 MICROgram(s) Inhaler 2 Puff(s) Inhalation two times a day  enoxaparin Injectable 30 milliGRAM(s) SubCutaneous every 12 hours  FLUoxetine 20 milliGRAM(s) Oral daily  fluticasone propionate 50 MICROgram(s)/spray Nasal Spray 1 Spray(s) Both Nostrils two times a day  influenza   Vaccine 0.5 milliLiter(s) IntraMuscular once  nicotine -   7 mG/24Hr(s) Patch 1 patch Transdermal daily  sodium chloride 2 Gram(s) Oral every 6 hours  thiamine Injectable 100 milliGRAM(s) IV Push daily    MEDICATIONS  (PRN):  acetaminophen   Tablet .. 650 milliGRAM(s) Oral every 6 hours PRN Temp greater or equal to 38C (100.4F), Mild Pain (1 - 3)  acetaminophen  IVPB .. 1000 milliGRAM(s) IV Intermittent once PRN Mild Pain (1 - 3), Moderate Pain (4 - 6), Severe Pain (7 - 10)  ketorolac   Injectable 15 milliGRAM(s) IV Push every 4 hours PRN Moderate Pain (4 - 6)          Vital Signs Last 24 Hrs  T(C): 36.8 (01 Oct 2018 11:27), Max: 37.2 (30 Sep 2018 20:15)  T(F): 98.3 (01 Oct 2018 11:27), Max: 98.9 (30 Sep 2018 20:15)  HR: 62 (01 Oct 2018 11:27) (62 - 78)  BP: 147/83 (01 Oct 2018 11:27) (116/72 - 155/88)  BP(mean): --  RR: 18 (01 Oct 2018 11:27) (18 - 20)  SpO2: 95% (01 Oct 2018 11:27) (93% - 96%) on RA          09-30 @ 07:01  -  10-01 @ 07:00  --------------------------------------------------------  IN: 840 mL / OUT: 240 mL / NET: 600 mL          Physical Examination:  PULM: Clear to auscultation bilaterally, no significant sputum production  CVS: S1, S2 heard    RADIOLOGY REVIEWED  CT chest: < from: CT Chest No Cont (09.26.18 @ 12:01) >  Lungs And Airways: Small triangular density (series 4 image 252) is   present, which may represent atelectasis.    The nodules measure:   *  A right lower lobe nodule measures 4 mm (series 4 image 213).  *  A right lower lobe nodule measures 5 mm (series 3 image 100)    The remainder of the lungs are clear. The airways are unremarkable.      Pleura:No pleural effusion.  No pneumothorax.    Mediastinum: There are no enlarged chest lymph nodes. The visualized   portion of the thyroid gland is unremarkable. The esophagus is   unremarkable.     Heart and Vasculature: The heart is normal in size.There is no   pericardial effusion. Coronary artery disease with calcified plaque   involving the left anterior descending and right coronary arteries.  The   main pulmonary artery is normal in caliber. Left-sided aortic arch and   left-sided descending thoracic aorta. There is no aortic aneurysm.     Upper Abdomen: The upper abdomen is unremarkable.    Bones And Soft Tissues: The bones are unremarkable.  The soft tissues are   unremarkable.      IMPRESSION:     1.  Right lower lobe nodules, largest of which measures 5 mm.    < end of copied text >

## 2018-10-01 NOTE — BEHAVIORAL HEALTH ASSESSMENT NOTE - NSBHCHARTREVIEWVS_PSY_A_CORE FT
Vital Signs Last 24 Hrs  T(C): 36.8 (01 Oct 2018 11:27), Max: 37.2 (30 Sep 2018 20:15)  T(F): 98.3 (01 Oct 2018 11:27), Max: 98.9 (30 Sep 2018 20:15)  HR: 62 (01 Oct 2018 11:27) (62 - 78)  BP: 147/83 (01 Oct 2018 11:27) (116/72 - 155/88)  BP(mean): --  RR: 18 (01 Oct 2018 11:27) (18 - 20)  SpO2: 95% (01 Oct 2018 11:27) (93% - 96%)

## 2018-10-01 NOTE — PROGRESS NOTE ADULT - ASSESSMENT
62 year-old  male with past medical history of HTN and ETOH abuse. On 9/23/18 he presented to [facility] with AMS and intoxication. Upon further evaluation by the ED he wasn't moving L arm. On telestroke eval he was found with  left facial droop, left upper extremity monoplegia and lower extremity weakness, significant dysarthria and left hemibody hypoesthesia. Head CT showed area of hypodensity at right fronto parietal lobe and acute/ subacute hypodensity at the inferior temporal/parietal lobe middle cerebral artery territory. CT angiography demonstrated right middle cerebral artery proximal M1 segment occlusion. Patient not candidate for IV-tPA given unknown last known well as well as appearance of subacute infarct. He was transferred to [facility] for evaluation for endovascular therapy. CT perfusion showed a small core infarct with large area of penumbra. He was deemed candidate for endovascular therapy with mechanical thrombectomy with TICI 2b recanalization. MRI brain subsequently showedright MCA distribution infarct with hemorrhagic transformation (HI 2). TTE did not show any obvious structural cardiac source of embolism.      Impression:   Cerebral embolism with cerebral infarction. R MCA distribution stroke with mild hemorrhagic concentration (HI 2) - likely etiology being cryptogenic stroke, probably related to embolism from a proximal source like cardiac/paradoxical source of embolism.    NEURO: Neurologically overall with improvement,  Continue close monitoring for neurologic deterioration, BP goal gradual normotension in the setting of recent successful revascularization, continue with home statin medication if applicable considering likely non-at that time with etiology of his stroke and age, repeat fasting lipid profile to better characterize LDL results, Physical therapy/OT - acute TBI.      ANTITHROMBOTIC THERAPY: aspirin     PULMONARY: CXR on 9/26 Right lower lobe nodules, largest of which measures 5 mm, pulm following- plan for repeat CT chest in 6 months, probable COPD, wheezing, continue Duoneb, symbicort     CARDIOVASCULAR: TTE showed - LVEF: 63%, Grossly normal left ventricular internal dimensions and wall thicknesses. Grossly hyperdynamic left ventricular systolic function. Reversal of the E-A  waves of the mitral inflow pattern is consistent with diastolic LV dysfunction. Grossly normal right ventricular size and function.  Cardiac monitoring without any documented events. Plan for AYO if cleared by GI given concern for esophageal web to rule out any structural cardiac source of embolism and consider prolonged cardiac monitoring with ICM to screen for occult cardiac arrhythmias and atrial fibrillation during the cardiac source of embolism.      SBP goal: <140/90 mmHg    GASTROINTESTINAL:  dysphagia screen - failed, speech and swallow failed, MBS done on 9/27, diet recommended, tolerating diet, GI consult appreciated : esophagram recommended if able to tolerate      Diet: Dysphagia I with nectar thickened liquids.    RENAL: BUN/Cr without acute change, good urine output      Na Goal: Greater than 135     Strong: yes    HEMATOLOGY: H/H without acute change, Platelets 184, no signs or symptoms of active bleeding. History of renal CA- consider CT abdomen/pelvis      DVT ppx: Heparin s.c [] LMWH [x]     ID: afebrile, no leukocytosis, no signs or symptoms of infection.      OTHER: Psychiatry Consult: continue Prozac, melatonin for insomnia, no need for CIWA, monitor for si/sx of withdrawal,  also discussed with patient and wife that he needs a sleep study as an outpatient as we suspect he has SHILO. All questions and concerns addressed. Patient reported chronic Low Back Pain and takes Gabapentin for this reason and chronic Neuropathy. His wife plans to bring in his home dose so he can restart this medication.     DISPOSITION: Acute TBI once stable and workup is complete.    CORE MEASURES:        Admission NIHSS: 12     TPA: [] YES [x] NO      LDL/HDL: 17/36     Depression Screen: p     Statin Therapy: yes     Dysphagia Screen: [] PASS [x] FAIL     Smoking [] YES [x] NO      Afib [] YES [x] NO     Stroke Education [] YES [] NO - ASSESSMENT:  62 year-old  male with past medical history of HTN and ETOH abuse. On 9/23/18 he presented to [facility] with AMS and intoxication. Upon further evaluation by the ED he wasn't moving L arm. On telestroke eval he was found with  left facial droop, left upper extremity monoplegia and lower extremity weakness, significant dysarthria and left hemibody hypoesthesia. Head CT showed area of hypodensity at right fronto parietal lobe and acute/ subacute hypodensity at the inferior temporal/parietal lobe middle cerebral artery territory. CT angiography demonstrated right middle cerebral artery proximal M1 segment occlusion. Patient not candidate for IV-tPA given unknown last known well as well as appearance of subacute infarct. He was transferred to [facility] for evaluation for endovascular therapy. CT perfusion showed a small core infarct with large area of penumbra. He was deemed candidate for endovascular therapy with mechanical thrombectomy with TICI 2b recanalization. MRI brain subsequently showed right MCA distribution infarct with hemorrhagic transformation (HI 2). TTE did not show any obvious structural cardiac source of embolism.      Impression:   Cerebral embolism with cerebral infarction. R MCA distribution stroke with mild hemorrhagic concentration (HI 2) - likely etiology being cryptogenic stroke, probably related to embolism from a proximal source like cardiac/paradoxical source of embolism.    NEURO: Neurologically overall with improvement, Continue close monitoring for neurologic deterioration, BP goal gradual normotension in the setting of recent successful revascularization, continue with home statin medication if applicable considering likely non-atheroembolic etiology of his stroke and age, repeat fasting lipid profile to better characterize LDL results, Physical therapy/OT - acute TBI.      ANTITHROMBOTIC THERAPY: Aspirin for secondary stroke prevention     PULMONARY: CXR on 9/26 Right lower lobe nodules, largest of which measures 5 mm, pulm following- plan for repeat CT chest in 6 months, probable COPD, wheezing, continue Duoneb, symbicort     CARDIOVASCULAR: TTE showed - LVEF: 63%, Grossly normal left ventricular internal dimensions and wall thicknesses. Grossly hyperdynamic left ventricular systolic function. Reversal of the E-A  waves of the mitral inflow pattern is consistent with diastolic LV dysfunction. Grossly normal right ventricular size and function.  Cardiac monitoring without any documented events. Plan for AYO if cleared by GI given concern for esophageal web to rule out any structural cardiac source of embolism and consider prolonged cardiac monitoring with ICM to screen for occult cardiac arrhythmias and atrial fibrillation during the cardiac source of embolism.      SBP goal: <140/90 mmHg    GASTROINTESTINAL:  dysphagia screen - failed, speech and swallow failed, MBS done on 9/27, diet recommended, tolerating diet, GI consult appreciated : esophagram recommended if able to tolerate      Diet: Dysphagia I with nectar thickened liquids.    RENAL: BUN/Cr without acute change, good urine output      Na Goal: Greater than 135     Strong: yes    HEMATOLOGY: H/H without acute change, Platelets 184, no signs or symptoms of active bleeding. History of renal CA- consider CT abdomen/pelvis      DVT ppx: Heparin s.c [] LMWH [x]     ID: afebrile, no leukocytosis, no signs or symptoms of infection.      OTHER: Psychiatry Consult: continue fluoxetine for improved motor recovery, melatonin for insomnia, no need for CIWA, monitor for si/sx of withdrawal,  also discussed with patient and wife that he needs a sleep study as an outpatient as we suspect he has SHILO. All questions and concerns addressed. Patient reported chronic Low Back Pain and takes Gabapentin for this reason and chronic Neuropathy. His wife plans to bring in his home dose so he can restart this medication.     DISPOSITION: Acute TBI once stable and workup is complete.    CORE MEASURES:        Admission NIHSS: 12     TPA: [] YES [x] NO      LDL/HDL: 17/36     Depression Screen: p     Statin Therapy: yes     Dysphagia Screen: [] PASS [x] FAIL     Smoking [] YES [x] NO      Afib [] YES [x] NO     Stroke Education [] YES [] NO -

## 2018-10-01 NOTE — PROGRESS NOTE ADULT - SUBJECTIVE AND OBJECTIVE BOX
Subjective: Patient seen and examined. No new events except as noted.     SUBJECTIVE/ROS:  No chest pain, dyspnea, palpitation, or dizziness.       MEDICATIONS:  MEDICATIONS  (STANDING):  ALBUTerol/ipratropium for Nebulization 3 milliLiter(s) Nebulizer every 6 hours  aspirin  chewable 81 milliGRAM(s) Oral daily  atorvastatin 80 milliGRAM(s) Oral at bedtime  buDESOnide  80 MICROgram(s)/formoterol 4.5 MICROgram(s) Inhaler 2 Puff(s) Inhalation two times a day  enoxaparin Injectable 30 milliGRAM(s) SubCutaneous every 12 hours  FLUoxetine 20 milliGRAM(s) Oral daily  fluticasone propionate 50 MICROgram(s)/spray Nasal Spray 1 Spray(s) Both Nostrils two times a day  influenza   Vaccine 0.5 milliLiter(s) IntraMuscular once  nicotine -   7 mG/24Hr(s) Patch 1 patch Transdermal daily  sodium chloride 2 Gram(s) Oral every 6 hours  thiamine Injectable 100 milliGRAM(s) IV Push daily      PHYSICAL EXAM:  T(C): 36.6 (10-01-18 @ 04:57), Max: 37.2 (09-30-18 @ 20:15)  HR: 67 (10-01-18 @ 04:57) (67 - 78)  BP: 155/88 (10-01-18 @ 04:57) (116/72 - 155/88)  RR: 18 (10-01-18 @ 04:57) (18 - 20)  SpO2: 93% (10-01-18 @ 04:57) (93% - 96%)  Wt(kg): --  I&O's Summary    30 Sep 2018 07:01  -  01 Oct 2018 07:00  --------------------------------------------------------  IN: 840 mL / OUT: 240 mL / NET: 600 mL      JVP: Normal  Neck: supple  Lung: clear   CV: S1 S2 , Murmur:  Abd: soft  Ext: No edema  neuro: Awake / alert  Psych: flat affect  Skin: normal      LABS/DATA:    CARDIAC MARKERS:                  proBNP:   Lipid Profile:   HgA1c:   TSH:     TELE:  EKG:

## 2018-10-01 NOTE — BEHAVIORAL HEALTH ASSESSMENT NOTE - NSBHCONSULTMEDS_PSY_A_CORE FT
cont prozac 20mg daily, can give melatonin 5mg po qhs prn insomnia. no need for ciwa at this time, no acute withdrawal symptoms.

## 2018-10-01 NOTE — PROGRESS NOTE ADULT - SUBJECTIVE AND OBJECTIVE BOX
THE PATIENT WAS SEEN AND EXAMINED BY ME WITH THE HOUSESTAFF AND STROKE TEAM DURING MORNING ROUNDS.   HPI:  61 y/o male with past medical history of HTN presented initially to [facility] ED for AMS with suspicion of EtOH intoxication. Patient's mental status did not improve and CT/CTA head was done demonstrating Large area acute Right MCA infarct with occluded proximal M1 segment R MCA. Patient subsequently transferred to [facility] for possible Endovascular intervention. LKN is unknown but suspected to be 11pm (9/22/2018). tPA deferred due to unclear LKN time.      SUBJECTIVE: No events overnight.  No new neurologic complaints.      acetaminophen   Tablet .. 650 milliGRAM(s) Oral every 6 hours PRN  acetaminophen  IVPB .. 1000 milliGRAM(s) IV Intermittent once PRN  ALBUTerol/ipratropium for Nebulization 3 milliLiter(s) Nebulizer every 6 hours  aspirin  chewable 81 milliGRAM(s) Oral daily  atorvastatin 80 milliGRAM(s) Oral at bedtime  buDESOnide  80 MICROgram(s)/formoterol 4.5 MICROgram(s) Inhaler 2 Puff(s) Inhalation two times a day  enoxaparin Injectable 30 milliGRAM(s) SubCutaneous every 12 hours  FLUoxetine 20 milliGRAM(s) Oral daily  fluticasone propionate 50 MICROgram(s)/spray Nasal Spray 1 Spray(s) Both Nostrils two times a day  influenza   Vaccine 0.5 milliLiter(s) IntraMuscular once  ketorolac   Injectable 15 milliGRAM(s) IV Push every 4 hours PRN  nicotine -   7 mG/24Hr(s) Patch 1 patch Transdermal daily  sodium chloride 2 Gram(s) Oral every 6 hours  thiamine Injectable 100 milliGRAM(s) IV Push daily      PHYSICAL EXAM:   Vital Signs Last 24 Hrs  T(C): 36.6 (01 Oct 2018 16:30), Max: 37.2 (30 Sep 2018 20:15)  T(F): 97.8 (01 Oct 2018 16:30), Max: 98.9 (30 Sep 2018 20:15)  HR: 70 (01 Oct 2018 16:30) (62 - 78)  BP: 142/79 (01 Oct 2018 16:30) (116/72 - 155/88)  BP(mean): --  RR: 18 (01 Oct 2018 16:30) (18 - 20)  SpO2: 96% (01 Oct 2018 16:30) (93% - 96%)    General: No acute distress  HEENT: EOM intact, visual fields full, partial left gaze palsy, incomplete saccade to right   Abdomen: Soft, nontender, nondistended   Extremities: No edema    NEUROLOGICAL EXAM:  Mental status: Awake, alert, oriented to hospital, year, month, no neglect - aware of deficit.   Cranial Nerves: Moderate left facial palsy, no nystagmus, no dysarthria, tongue midline  Motor exam: Normal tone, RUE 5/5,  RLE 5/5, L deltoid 3-/5 , Left upper strength distally 2-/5,  LLE 4+/5, slight drift  Sensation: Intact to light touch   Coordination/ Gait: No dysmetria, gait deferred.     LABS:         Hemoglobin A1C, Whole Blood: 6.6 % (09-23 @ 11:05)  Hemoglobin A1C, Whole Blood: 6.5 % (09-07 @ 18:01)      IMAGING: Reviewed by me.   CT Head No Cont (09.24.18):  Previously noted acute right MCA infarct has demonstrated expected evolutionary changes with areas of hemorrhagic transformation again seen.    CT Head No Cont (09.23.18):  Redemonstration of evolving right MCA territory infarct, faint areas of increasing attenuation may reflect contrast staining, tiny foci of petechial hemorrhage not excluded.    CT perfusion w/ Maps w/ IV Cont (09.23.18):   Large right-sided penumbra  with a small infarct in the large mismatch volume.    Noncontrast head CT (09.23.18):   Large area acute right MCA territory infarct. Posterior temporal lobe infarct appears slightly more subacute with questionable petechial hemorrhage.    CTA neck (09.23.18):   No major vessel occlusion or hemodynamically significant stenosis. No evidence of dissection.    CTA head (09.23.18):  Occluded proximal M1 segment right middle cerebral artery.   Remainder of the intracranial circulation is patent.    MR Head w/wo IV Cont (09.28.18 )   Subacute right MCA infarct with associated areas of   hemorrhagic transformation suspected.    Unremarkable MRA of the neck and [place]. THE PATIENT WAS SEEN AND EXAMINED BY ME WITH THE HOUSESTAFF AND STROKE TEAM DURING MORNING ROUNDS.     HPI:  61 y/o male with past medical history of HTN presented initially to [facility] ED for AMS with suspicion of EtOH intoxication. Patient's mental status did not improve and CT/CTA head was done demonstrating Large area acute Right MCA infarct with occluded proximal M1 segment R MCA. Patient subsequently transferred to [facility] for possible Endovascular intervention. LKN is unknown but suspected to be 11pm (9/22/2018). tPA deferred due to unclear LKN time.      SUBJECTIVE: No events overnight.  No new neurologic complaints.      ROS: All negative except documented above.    acetaminophen   Tablet .. 650 milliGRAM(s) Oral every 6 hours PRN  acetaminophen  IVPB .. 1000 milliGRAM(s) IV Intermittent once PRN  ALBUTerol/ipratropium for Nebulization 3 milliLiter(s) Nebulizer every 6 hours  aspirin  chewable 81 milliGRAM(s) Oral daily  atorvastatin 80 milliGRAM(s) Oral at bedtime  buDESOnide  80 MICROgram(s)/formoterol 4.5 MICROgram(s) Inhaler 2 Puff(s) Inhalation two times a day  enoxaparin Injectable 30 milliGRAM(s) SubCutaneous every 12 hours  FLUoxetine 20 milliGRAM(s) Oral daily  fluticasone propionate 50 MICROgram(s)/spray Nasal Spray 1 Spray(s) Both Nostrils two times a day  influenza   Vaccine 0.5 milliLiter(s) IntraMuscular once  ketorolac   Injectable 15 milliGRAM(s) IV Push every 4 hours PRN  nicotine -   7 mG/24Hr(s) Patch 1 patch Transdermal daily  sodium chloride 2 Gram(s) Oral every 6 hours  thiamine Injectable 100 milliGRAM(s) IV Push daily      PHYSICAL EXAM:   Vital Signs Last 24 Hrs  T(C): 36.6 (01 Oct 2018 16:30), Max: 37.2 (30 Sep 2018 20:15)  T(F): 97.8 (01 Oct 2018 16:30), Max: 98.9 (30 Sep 2018 20:15)  HR: 70 (01 Oct 2018 16:30) (62 - 78)  BP: 142/79 (01 Oct 2018 16:30) (116/72 - 155/88)  BP(mean): --  RR: 18 (01 Oct 2018 16:30) (18 - 20)  SpO2: 96% (01 Oct 2018 16:30) (93% - 96%)    General: No acute distress  HEENT: EOM intact, visual fields full, partial left gaze palsy, incomplete saccade to right   Abdomen: Soft, nontender, nondistended   Extremities: No edema    NEUROLOGICAL EXAM:  Mental status: Awake, alert, oriented to hospital, year, month, no neglect - aware of deficit.   Cranial Nerves: Moderate left facial palsy, no nystagmus, no dysarthria, tongue midline  Motor exam: Normal tone, RUE 5/5,  RLE 5/5, L deltoid 3-/5 , Left upper strength distally 2-/5,  LLE 4+/5, slight drift  Sensation: Intact to light touch   Coordination/ Gait: No dysmetria, gait deferred.     LABS:         Hemoglobin A1C, Whole Blood: 6.6 % (09-23 @ 11:05)  Hemoglobin A1C, Whole Blood: 6.5 % (09-07 @ 18:01)      IMAGING: Reviewed by me.   CT Head No Cont (09.24.18):  Previously noted acute right MCA infarct has demonstrated expected evolutionary changes with areas of hemorrhagic transformation again seen.    CT Head No Cont (09.23.18):  Redemonstration of evolving right MCA territory infarct, faint areas of increasing attenuation may reflect contrast staining, tiny foci of petechial hemorrhage not excluded.    CT perfusion w/ Maps w/ IV Cont (09.23.18):   Large right-sided penumbra  with a small infarct in the large mismatch volume.    Noncontrast head CT (09.23.18):   Large area acute right MCA territory infarct. Posterior temporal lobe infarct appears slightly more subacute with questionable petechial hemorrhage.    CTA neck (09.23.18):   No major vessel occlusion or hemodynamically significant stenosis. No evidence of dissection.    CTA head (09.23.18):  Occluded proximal M1 segment right middle cerebral artery.   Remainder of the intracranial circulation is patent.    MR Head w/wo IV Cont (09.28.18 )   Subacute right MCA infarct with associated areas of   hemorrhagic transformation suspected.    Unremarkable MRA of the neck and [place].

## 2018-10-01 NOTE — BEHAVIORAL HEALTH ASSESSMENT NOTE - NSBHCHARTREVIEWINVESTIGATE_PSY_A_CORE FT
Ventricular Rate 71 BPM    Atrial Rate 71 BPM    P-R Interval 182 ms    QRS Duration 84 ms    Q-T Interval 410 ms    QTC Calculation(Bezet) 445 ms    P Axis 68 degrees    R Axis -61 degrees    T Axis 52 degrees    Diagnosis Line NORMAL SINUS RHYTHM  LEFT AXIS DEVIATION  INFERIOR INFARCT , AGE UNDETERMINED  ABNORMAL ECG    Confirmed by MD ILIANA, DEMARCUS ([id number]) on 9/27/2018 9:25:03 AM

## 2018-10-01 NOTE — PROGRESS NOTE ADULT - ASSESSMENT
Acute CVA  HTN    BP control once deemed safe as per neurology   Plan for AYO if neg then ILR   fu with stroke service     History of tobacco  on nicotine patch

## 2018-10-02 LAB
ANION GAP SERPL CALC-SCNC: 4 MMOL/L — LOW (ref 5–17)
BUN SERPL-MCNC: 21 MG/DL — SIGNIFICANT CHANGE UP (ref 7–23)
CALCIUM SERPL-MCNC: 9.7 MG/DL — SIGNIFICANT CHANGE UP (ref 8.4–10.5)
CHLORIDE SERPL-SCNC: 106 MMOL/L — SIGNIFICANT CHANGE UP (ref 96–108)
CO2 SERPL-SCNC: 31 MMOL/L — SIGNIFICANT CHANGE UP (ref 22–31)
CREAT SERPL-MCNC: 1.05 MG/DL — SIGNIFICANT CHANGE UP (ref 0.5–1.3)
GLUCOSE SERPL-MCNC: 115 MG/DL — HIGH (ref 70–99)
HCT VFR BLD CALC: 39 % — SIGNIFICANT CHANGE UP (ref 39–50)
HGB BLD-MCNC: 12.3 G/DL — LOW (ref 13–17)
LDLC SERPL DIRECT ASSAY-MCNC: 53 MG/DL — SIGNIFICANT CHANGE UP
MCHC RBC-ENTMCNC: 21.9 PG — LOW (ref 27–34)
MCHC RBC-ENTMCNC: 31.5 GM/DL — LOW (ref 32–36)
MCV RBC AUTO: 69.4 FL — LOW (ref 80–100)
PLATELET # BLD AUTO: 212 K/UL — SIGNIFICANT CHANGE UP (ref 150–400)
POTASSIUM SERPL-MCNC: 4.4 MMOL/L — SIGNIFICANT CHANGE UP (ref 3.5–5.3)
POTASSIUM SERPL-SCNC: 4.4 MMOL/L — SIGNIFICANT CHANGE UP (ref 3.5–5.3)
RBC # BLD: 5.62 M/UL — SIGNIFICANT CHANGE UP (ref 4.2–5.8)
RBC # FLD: 14.6 % — HIGH (ref 10.3–14.5)
SODIUM SERPL-SCNC: 141 MMOL/L — SIGNIFICANT CHANGE UP (ref 135–145)
WBC # BLD: 8.47 K/UL — SIGNIFICANT CHANGE UP (ref 3.8–10.5)
WBC # FLD AUTO: 8.47 K/UL — SIGNIFICANT CHANGE UP (ref 3.8–10.5)

## 2018-10-02 PROCEDURE — 71260 CT THORAX DX C+: CPT | Mod: 26

## 2018-10-02 PROCEDURE — 99233 SBSQ HOSP IP/OBS HIGH 50: CPT

## 2018-10-02 PROCEDURE — 74177 CT ABD & PELVIS W/CONTRAST: CPT | Mod: 26

## 2018-10-02 RX ORDER — ENOXAPARIN SODIUM 100 MG/ML
40 INJECTION SUBCUTANEOUS DAILY
Qty: 0 | Refills: 0 | Status: DISCONTINUED | OUTPATIENT
Start: 2018-10-02 | End: 2018-10-09

## 2018-10-02 RX ADMIN — Medication 3 MILLILITER(S): at 05:25

## 2018-10-02 RX ADMIN — Medication 1 PATCH: at 12:45

## 2018-10-02 RX ADMIN — SODIUM CHLORIDE 2 GRAM(S): 9 INJECTION INTRAMUSCULAR; INTRAVENOUS; SUBCUTANEOUS at 12:33

## 2018-10-02 RX ADMIN — Medication 15 MILLIGRAM(S): at 20:21

## 2018-10-02 RX ADMIN — BUDESONIDE AND FORMOTEROL FUMARATE DIHYDRATE 2 PUFF(S): 160; 4.5 AEROSOL RESPIRATORY (INHALATION) at 05:25

## 2018-10-02 RX ADMIN — Medication 3 MILLILITER(S): at 12:32

## 2018-10-02 RX ADMIN — ATORVASTATIN CALCIUM 80 MILLIGRAM(S): 80 TABLET, FILM COATED ORAL at 21:57

## 2018-10-02 RX ADMIN — Medication 15 MILLIGRAM(S): at 20:51

## 2018-10-02 RX ADMIN — Medication 1 SPRAY(S): at 05:25

## 2018-10-02 RX ADMIN — Medication 1 SPRAY(S): at 19:00

## 2018-10-02 RX ADMIN — Medication 81 MILLIGRAM(S): at 12:32

## 2018-10-02 RX ADMIN — BUDESONIDE AND FORMOTEROL FUMARATE DIHYDRATE 2 PUFF(S): 160; 4.5 AEROSOL RESPIRATORY (INHALATION) at 17:29

## 2018-10-02 RX ADMIN — Medication 1 PATCH: at 12:32

## 2018-10-02 RX ADMIN — Medication 20 MILLIGRAM(S): at 17:29

## 2018-10-02 RX ADMIN — SODIUM CHLORIDE 2 GRAM(S): 9 INJECTION INTRAMUSCULAR; INTRAVENOUS; SUBCUTANEOUS at 21:57

## 2018-10-02 RX ADMIN — ENOXAPARIN SODIUM 30 MILLIGRAM(S): 100 INJECTION SUBCUTANEOUS at 05:25

## 2018-10-02 RX ADMIN — Medication 100 MILLIGRAM(S): at 12:33

## 2018-10-02 RX ADMIN — SODIUM CHLORIDE 2 GRAM(S): 9 INJECTION INTRAMUSCULAR; INTRAVENOUS; SUBCUTANEOUS at 05:25

## 2018-10-02 RX ADMIN — Medication 3 MILLILITER(S): at 17:29

## 2018-10-02 RX ADMIN — SODIUM CHLORIDE 2 GRAM(S): 9 INJECTION INTRAMUSCULAR; INTRAVENOUS; SUBCUTANEOUS at 17:29

## 2018-10-02 NOTE — PROGRESS NOTE ADULT - SUBJECTIVE AND OBJECTIVE BOX
THE PATIENT WAS SEEN AND EXAMINED BY ME WITH THE HOUSESTAFF AND STROKE TEAM DURING MORNING ROUNDS.   HPI:  63 y/o male with past medical history of HTN presented initially to [facility] ED for AMS with suspicion of EtOH intoxication. Patient's mental status did not improve and CT/CTA head was done demonstrating Large area acute Right MCA infarct with occluded proximal M1 segment R MCA. Patient subsequently transferred to [facility] for possible Endovascular intervention. LKN is unknown but suspected to be 11pm (9/22/2018). tPA deferred due to unclear LKN time.      SUBJECTIVE: No events overnight.  No new neurologic complaints.      acetaminophen   Tablet .. 650 milliGRAM(s) Oral every 6 hours PRN  acetaminophen  IVPB .. 1000 milliGRAM(s) IV Intermittent once PRN  ALBUTerol/ipratropium for Nebulization 3 milliLiter(s) Nebulizer every 6 hours  aspirin  chewable 81 milliGRAM(s) Oral daily  atorvastatin 80 milliGRAM(s) Oral at bedtime  buDESOnide  80 MICROgram(s)/formoterol 4.5 MICROgram(s) Inhaler 2 Puff(s) Inhalation two times a day  enoxaparin Injectable 30 milliGRAM(s) SubCutaneous every 12 hours  FLUoxetine 20 milliGRAM(s) Oral daily  fluticasone propionate 50 MICROgram(s)/spray Nasal Spray 1 Spray(s) Both Nostrils two times a day  influenza   Vaccine 0.5 milliLiter(s) IntraMuscular once  ketorolac   Injectable 15 milliGRAM(s) IV Push every 4 hours PRN  nicotine -   7 mG/24Hr(s) Patch 1 patch Transdermal daily  sodium chloride 2 Gram(s) Oral every 6 hours  thiamine Injectable 100 milliGRAM(s) IV Push daily      PHYSICAL EXAM:   Vital Signs Last 24 Hrs  T(C): 37.1 (02 Oct 2018 12:55), Max: 37.2 (02 Oct 2018 04:59)  T(F): 98.7 (02 Oct 2018 12:55), Max: 98.9 (02 Oct 2018 04:59)  HR: 74 (02 Oct 2018 12:55) (65 - 81)  BP: 130/80 (02 Oct 2018 12:55) (125/77 - 150/87)  BP(mean): --  RR: 18 (02 Oct 2018 12:55) (18 - 18)  SpO2: 97% (02 Oct 2018 12:55) (96% - 97%)      General: No acute distress  HEENT: EOM intact, visual fields full, partial left gaze palsy, incomplete saccade to right   Abdomen: Soft, nontender, nondistended   Extremities: No edema    NEUROLOGICAL EXAM:  Mental status: Awake, alert, oriented to hospital, year, month, no neglect - aware of deficit.   Cranial Nerves: Moderate left facial palsy, no nystagmus, no dysarthria, tongue midline  Motor exam: Normal tone, RUE 5/5,  RLE 5/5, L deltoid 3-/5 , Left upper strength distally 2-/5,  LLE 4+/5, slight drift  Sensation: Intact to light touch   Coordination/ Gait: No dysmetria, gait deferred.     LABS:                        12.3   8.47  )-----------( 212      ( 02 Oct 2018 07:48 )             39.0    10-02    141  |  106  |  21  ----------------------------<  115<H>  4.4   |  31  |  1.05    Ca    9.7      02 Oct 2018 06:21      Hemoglobin A1C, Whole Blood: 6.6 % (09-23 @ 11:05)  Hemoglobin A1C, Whole Blood: 6.5 % (09-07 @ 18:01)  LDL Cholesterol, Direct: 53 mg/dL (10-02 @ 10:56)      IMAGING: Reviewed by me.     CT Head No Cont (09.24.18):  Previously noted acute right MCA infarct has demonstrated expected evolutionary changes with areas of hemorrhagic transformation again seen.    CT Head No Cont (09.23.18):  Redemonstration of evolving right MCA territory infarct, faint areas of increasing attenuation may reflect contrast staining, tiny foci of petechial hemorrhage not excluded.    CT perfusion w/ Maps w/ IV Cont (09.23.18):   Large right-sided penumbra  with a small infarct in the large mismatch volume.    Noncontrast head CT (09.23.18):   Large area acute right MCA territory infarct. Posterior temporal lobe infarct appears slightly more subacute with questionable petechial hemorrhage.    CTA neck (09.23.18):   No major vessel occlusion or hemodynamically significant stenosis. No evidence of dissection.    CTA head (09.23.18):  Occluded proximal M1 segment right middle cerebral artery.   Remainder of the intracranial circulation is patent.    MR Head w/wo IV Cont (09.28.18 )   Subacute right MCA infarct with associated areas of   hemorrhagic transformation suspected.    Unremarkable MRA of the neck and [place]. THE PATIENT WAS SEEN AND EXAMINED BY ME WITH THE HOUSESTAFF AND STROKE TEAM DURING MORNING ROUNDS.     HPI:  61 y/o male with past medical history of HTN presented initially to [facility] ED for AMS with suspicion of EtOH intoxication. Patient's mental status did not improve and CT/CTA head was done demonstrating Large area acute Right MCA infarct with occluded proximal M1 segment R MCA. Patient subsequently transferred to [facility] for possible Endovascular intervention. LKN is unknown but suspected to be 11pm (9/22/2018). tPA deferred due to unclear LKN time.      SUBJECTIVE: No events overnight.  No new neurologic complaints.      ROS: All negative except documented above.    acetaminophen   Tablet .. 650 milliGRAM(s) Oral every 6 hours PRN  acetaminophen  IVPB .. 1000 milliGRAM(s) IV Intermittent once PRN  ALBUTerol/ipratropium for Nebulization 3 milliLiter(s) Nebulizer every 6 hours  aspirin  chewable 81 milliGRAM(s) Oral daily  atorvastatin 80 milliGRAM(s) Oral at bedtime  buDESOnide  80 MICROgram(s)/formoterol 4.5 MICROgram(s) Inhaler 2 Puff(s) Inhalation two times a day  enoxaparin Injectable 30 milliGRAM(s) SubCutaneous every 12 hours  FLUoxetine 20 milliGRAM(s) Oral daily  fluticasone propionate 50 MICROgram(s)/spray Nasal Spray 1 Spray(s) Both Nostrils two times a day  influenza   Vaccine 0.5 milliLiter(s) IntraMuscular once  ketorolac   Injectable 15 milliGRAM(s) IV Push every 4 hours PRN  nicotine -   7 mG/24Hr(s) Patch 1 patch Transdermal daily  sodium chloride 2 Gram(s) Oral every 6 hours  thiamine Injectable 100 milliGRAM(s) IV Push daily      PHYSICAL EXAM:   Vital Signs Last 24 Hrs  T(C): 37.1 (02 Oct 2018 12:55), Max: 37.2 (02 Oct 2018 04:59)  T(F): 98.7 (02 Oct 2018 12:55), Max: 98.9 (02 Oct 2018 04:59)  HR: 74 (02 Oct 2018 12:55) (65 - 81)  BP: 130/80 (02 Oct 2018 12:55) (125/77 - 150/87)  BP(mean): --  RR: 18 (02 Oct 2018 12:55) (18 - 18)  SpO2: 97% (02 Oct 2018 12:55) (96% - 97%)      General: No acute distress  HEENT: EOM intact, visual fields full, partial left gaze palsy, incomplete saccade to right   Abdomen: Soft, nontender, nondistended   Extremities: No edema    NEUROLOGICAL EXAM:  Mental status: Awake, alert, oriented to hospital, year, month, no neglect - aware of deficit.   Cranial Nerves: Moderate left facial palsy, no nystagmus, no dysarthria, tongue midline  Motor exam: Normal tone, RUE 5/5,  RLE 5/5, L deltoid 3-/5 , Left upper strength distally 2-/5,  LLE 4+/5, slight drift  Sensation: Intact to light touch   Coordination/ Gait: No dysmetria, gait deferred.     LABS:                        12.3   8.47  )-----------( 212      ( 02 Oct 2018 07:48 )             39.0    10-02    141  |  106  |  21  ----------------------------<  115<H>  4.4   |  31  |  1.05    Ca    9.7      02 Oct 2018 06:21      Hemoglobin A1C, Whole Blood: 6.6 % (09-23 @ 11:05)  Hemoglobin A1C, Whole Blood: 6.5 % (09-07 @ 18:01)  LDL Cholesterol, Direct: 53 mg/dL (10-02 @ 10:56)      IMAGING: Reviewed by me.     CT Head No Cont (09.24.18):  Previously noted acute right MCA infarct has demonstrated expected evolutionary changes with areas of hemorrhagic transformation again seen.    CT Head No Cont (09.23.18):  Redemonstration of evolving right MCA territory infarct, faint areas of increasing attenuation may reflect contrast staining, tiny foci of petechial hemorrhage not excluded.    CT perfusion w/ Maps w/ IV Cont (09.23.18):   Large right-sided penumbra  with a small infarct in the large mismatch volume.    Noncontrast head CT (09.23.18):   Large area acute right MCA territory infarct. Posterior temporal lobe infarct appears slightly more subacute with questionable petechial hemorrhage.    CTA neck (09.23.18):   No major vessel occlusion or hemodynamically significant stenosis. No evidence of dissection.    CTA head (09.23.18):  Occluded proximal M1 segment right middle cerebral artery.   Remainder of the intracranial circulation is patent.    MR Head w/wo IV Cont (09.28.18 )   Subacute right MCA infarct with associated areas of   hemorrhagic transformation suspected.    Unremarkable MRA of the neck and [place].

## 2018-10-02 NOTE — PROGRESS NOTE ADULT - SUBJECTIVE AND OBJECTIVE BOX
Follow-up Pulm Progress Note    No new respiratory events overnight.  Denies SOB/CP.   92% on RA    Medications:  MEDICATIONS  (STANDING):  ALBUTerol/ipratropium for Nebulization 3 milliLiter(s) Nebulizer every 6 hours  aspirin  chewable 81 milliGRAM(s) Oral daily  atorvastatin 80 milliGRAM(s) Oral at bedtime  buDESOnide  80 MICROgram(s)/formoterol 4.5 MICROgram(s) Inhaler 2 Puff(s) Inhalation two times a day  enoxaparin Injectable 30 milliGRAM(s) SubCutaneous every 12 hours  FLUoxetine 20 milliGRAM(s) Oral daily  fluticasone propionate 50 MICROgram(s)/spray Nasal Spray 1 Spray(s) Both Nostrils two times a day  influenza   Vaccine 0.5 milliLiter(s) IntraMuscular once  nicotine -   7 mG/24Hr(s) Patch 1 patch Transdermal daily  sodium chloride 2 Gram(s) Oral every 6 hours  thiamine Injectable 100 milliGRAM(s) IV Push daily    MEDICATIONS  (PRN):  acetaminophen   Tablet .. 650 milliGRAM(s) Oral every 6 hours PRN Temp greater or equal to 38C (100.4F), Mild Pain (1 - 3)  acetaminophen  IVPB .. 1000 milliGRAM(s) IV Intermittent once PRN Mild Pain (1 - 3), Moderate Pain (4 - 6), Severe Pain (7 - 10)  ketorolac   Injectable 15 milliGRAM(s) IV Push every 4 hours PRN Moderate Pain (4 - 6)          Vital Signs Last 24 Hrs  T(C): 37.1 (02 Oct 2018 12:55), Max: 37.2 (02 Oct 2018 04:59)  T(F): 98.7 (02 Oct 2018 12:55), Max: 98.9 (02 Oct 2018 04:59)  HR: 74 (02 Oct 2018 12:55) (65 - 81)  BP: 130/80 (02 Oct 2018 12:55) (125/77 - 150/87)  BP(mean): --  RR: 18 (02 Oct 2018 12:55) (18 - 18)  SpO2: 97% (02 Oct 2018 12:55) (96% - 97%) on RA          10-01 @ 07:01  -  10-02 @ 07:00  --------------------------------------------------------  IN: 240 mL / OUT: 550 mL / NET: -310 mL          LABS:                        12.3   8.47  )-----------( 212      ( 02 Oct 2018 07:48 )             39.0     10-02    141  |  106  |  21  ----------------------------<  115<H>  4.4   |  31  |  1.05    Ca    9.7      02 Oct 2018 06:21            Physical Examination:  PULM: Clear to auscultation bilaterally, no significant sputum production  CVS: S1, S2 heard    RADIOLOGY REVIEWED

## 2018-10-02 NOTE — PROGRESS NOTE ADULT - ASSESSMENT
63 y/o M with PMH of HTN, HLD, renal cancer s/p partial R nephrectomy (10/[place] [facility] [place] patient denies having received chemo/XRT), current smoker (45 pack yrs), depression, anxiety presented to [facility] 9/23 with AMS and found to have R MCA CVA s/p embolectomy. Extubated 9/24.

## 2018-10-02 NOTE — CONSULT NOTE ADULT - ASSESSMENT
62 Y.O. WM admitted with an acute Right MCA CVA with noted dysphagia s/p S&S eval with MBS which revealed a filling defect in the anterior esophagus. Patient was planned for AYO to rule out cardiac anatomic abnormality. Concern for esophageal web on MBS. GI consulted for EGD prior to AYO.  Pt. also noted with mild anemia, but significant Microcytic and Hypochromic anemia.  R/O upper Esophageal lesion (pt. at risk for malignancy due to both long smoking and drinking)  Will schedule pt. for an EGD.  Check Fe, TIBC, Ferritin, B12, Folate  Check stool Guaiac X 3  Need recent colonoscopy report  NPO after MN   IVF  Pt. needs Nutritionist to assess adequate caloric need  I had a prolonged conversation with pt. and wife re. likely diagnosis and plan who verbalizes clear understanding.

## 2018-10-02 NOTE — PROGRESS NOTE ADULT - ASSESSMENT
ASSESSMENT:  62 year-old  male with past medical history of HTN and ETOH abuse. On 9/23/18 he presented to [facility] with AMS and intoxication. Upon further evaluation by the ED he wasn't moving L arm. On telestroke eval he was found with  left facial droop, left upper extremity monoplegia and lower extremity weakness, significant dysarthria and left hemibody hypoesthesia. Head CT showed area of hypodensity at right fronto parietal lobe and acute/ subacute hypodensity at the inferior temporal/parietal lobe middle cerebral artery territory. CT angiography demonstrated right middle cerebral artery proximal M1 segment occlusion. Patient not candidate for IV-tPA given unknown last known well as well as appearance of subacute infarct. He was transferred to [facility] for evaluation for endovascular therapy. CT perfusion showed a small core infarct with large area of penumbra. He was deemed candidate for endovascular therapy with mechanical thrombectomy with TICI 2b recanalization. MRI brain subsequently showed right MCA distribution infarct with hemorrhagic transformation (HI 2). TTE did not show any obvious structural cardiac source of embolism.      Impression:   Cerebral embolism with cerebral infarction. R MCA distribution stroke with mild hemorrhagic concentration (HI 2) - likely etiology being cryptogenic stroke, probably related to embolism from a proximal source like cardiac/paradoxical source of embolism.    NEURO: Neurologically overall with improvement, Continue close monitoring for neurologic deterioration, BP goal gradual normotension in the setting of recent successful revascularization, continue with home statin medication if applicable considering likely non-atheroembolic etiology of his stroke and age, repeat fasting lipid profile to better characterize LDL results, Physical therapy/OT - acute TBI.      ANTITHROMBOTIC THERAPY: Aspirin for secondary stroke prevention     PULMONARY: CXR on 9/26 Right lower lobe nodules, largest of which measures 5 mm, pulm following- plan for repeat CT chest in 6 months, probable COPD, wheezing, continue Duoneb, symbicort     CARDIOVASCULAR: TTE showed - LVEF: 63%, Grossly normal left ventricular internal dimensions and wall thicknesses. Grossly hyperdynamic left ventricular systolic function. Reversal of the E-A  waves of the mitral inflow pattern is consistent with diastolic LV dysfunction. Grossly normal right ventricular size and function.  Cardiac monitoring without any documented events. Plan for AYO if cleared by GI given concern for esophageal web to rule out any structural cardiac source of embolism and consider prolonged cardiac monitoring with ICM to screen for occult cardiac arrhythmias and atrial fibrillation during the cardiac source of embolism.      SBP goal: <140/90 mmHg    GASTROINTESTINAL:  dysphagia screen - failed, speech and swallow failed, MBS done on 9/27, diet recommended, tolerating diet, GI consult appreciated : possible EGD.     Diet: Dysphagia I with nectar thickened liquids.    RENAL: BUN/Cr without acute change, good urine output      Na Goal: Greater than 135     Strong: yes    HEMATOLOGY: H/H without acute change, Platelets 212, no signs or symptoms of active bleeding. History of renal CA- consider CT abdomen/pelvis      DVT ppx: Heparin s.c [] LMWH [x]     ID: afebrile, no leukocytosis, no signs or symptoms of infection.      OTHER: Psychiatry Consult: continue fluoxetine for improved motor recovery, melatonin for insomnia, no need for CIWA, monitor for si/sx of withdrawal,  also discussed with patient and wife that he needs a sleep study as an outpatient as we suspect he has SHILO. All questions and concerns addressed. Patient reported chronic Low Back Pain and takes Gabapentin for this reason and chronic Neuropathy. His wife plans to bring in his home dose so he can restart this medication.     DISPOSITION: Acute TBI once stable and workup is complete.    CORE MEASURES:        Admission NIHSS: 12     TPA: [] YES [x] NO      LDL/HDL: 17/36     Depression Screen: p     Statin Therapy: yes     Dysphagia Screen: [] PASS [x] FAIL     Smoking [] YES [x] NO      Afib [] YES [x] NO     Stroke Education [] YES [] NO - ASSESSMENT:  62 year-old  male with past medical history of HTN and ETOH abuse. On 9/23/18 he presented to [facility] with AMS and intoxication. Upon further evaluation by the ED he wasn't moving L arm. On telestroke eval he was found with  left facial droop, left upper extremity monoplegia and lower extremity weakness, significant dysarthria and left hemibody hypoesthesia. Head CT showed area of hypodensity at right fronto parietal lobe and acute/ subacute hypodensity at the inferior temporal/parietal lobe middle cerebral artery territory. CT angiography demonstrated right middle cerebral artery proximal M1 segment occlusion. Patient not candidate for IV-tPA given unknown last known well as well as appearance of subacute infarct. He was transferred to [facility] for evaluation for endovascular therapy. CT perfusion showed a small core infarct with large area of penumbra. He was deemed candidate for endovascular therapy with mechanical thrombectomy with TICI 2b recanalization. MRI brain subsequently showed right MCA distribution infarct with hemorrhagic transformation (HI 2). TTE did not show any obvious structural cardiac source of embolism.      Impression:   Cerebral embolism with cerebral infarction. R MCA distribution stroke with mild hemorrhagic concentration (HI 2) - likely etiology being cryptogenic stroke, probably related to embolism from a proximal source like cardiac/paradoxical source of embolism.    NEURO: Neurologically overall with improvement, Continue close monitoring for neurologic deterioration, BP goal gradual normotension in the setting of recent successful revascularization, continue with home statin medication if applicable considering likely non-atheroembolic etiology of his stroke and age, repeat fasting lipid profile to better characterize LDL results, Physical therapy/OT - acute TBI.      ANTITHROMBOTIC THERAPY: Aspirin for secondary stroke prevention     PULMONARY: CXR on 9/26 Right lower lobe nodules, largest of which measures 5 mm, pulm following- plan for repeat CT chest in 6 months, probable COPD, wheezing, continue Duoneb, symbicort     CARDIOVASCULAR: TTE showed - LVEF: 63%, Grossly normal left ventricular internal dimensions and wall thicknesses. Grossly hyperdynamic left ventricular systolic function. Reversal of the E-A  waves of the mitral inflow pattern is consistent with diastolic LV dysfunction. Grossly normal right ventricular size and function.  Cardiac monitoring without any documented events. Plan for AYO if cleared by GI given concern for esophageal web to rule out any structural cardiac source of embolism and consider prolonged cardiac monitoring with ICM to screen for occult cardiac arrhythmias and atrial fibrillation during the cardiac source of embolism.      SBP goal: <140/90 mmHg    GASTROINTESTINAL:  dysphagia screen - failed, speech and swallow failed, MBS done on 9/27, diet recommended, tolerating diet, GI consult appreciated : possible EGD tomorrow AM      Diet: Dysphagia I with nectar thickened liquids.    RENAL: BUN/Cr without acute change, good urine output      Na Goal: Greater than 135     Strong: yes    HEMATOLOGY: H/H without acute change, Platelets 212, no signs or symptoms of active bleeding. History of renal CA- consider CT abdomen/pelvis      DVT ppx: Heparin s.c [] LMWH [x]     ID: afebrile, no leukocytosis, no signs or symptoms of infection.      OTHER: Psychiatry Consult: continue fluoxetine for improved motor recovery, melatonin for insomnia, no need for CIWA, monitor for si/sx of withdrawal,  also discussed with patient and wife that he needs a sleep study as an outpatient as we suspect he has SHILO. All questions and concerns addressed. Patient reported chronic Low Back Pain and takes Gabapentin for this reason and chronic Neuropathy. His wife plans to bring in his home dose so he can restart this medication.     DISPOSITION: Acute TBI once stable and workup is complete.    CORE MEASURES:        Admission NIHSS: 12     TPA: [] YES [x] NO      LDL/HDL: 17/36     Depression Screen: p     Statin Therapy: yes     Dysphagia Screen: [] PASS [x] FAIL     Smoking [] YES [x] NO      Afib [] YES [x] NO     Stroke Education [] YES [] NO -

## 2018-10-02 NOTE — CHART NOTE - NSCHARTNOTEFT_GEN_A_CORE
Primary team called private GI Dr. Alvares for further management of dysphagia.  As per primary team, plan for possible EGD by Dr. Alvares tomorrow.  Please call back with additional questions or concerns  We will sign off

## 2018-10-02 NOTE — CONSULT NOTE ADULT - SUBJECTIVE AND OBJECTIVE BOX
Chief Complaint:  Patient is a 62y old  Male who presents with a chief complaint of R MCA Infarct now s/p Endovascular (03 Oct 2018 08:30)      HPI:  63 y/o M with PMH of HTN, Excessive alcohol abuse, Severe spinal stenosis, COPD, HLD, renal cancer s/p partial R nephrectomy (2016 at [facility] [place], patient denies   having received chemo/XRT), current smoker (45 pack yrs), depression, anxiety presented to [facility] 9/23 with AMS found to have R M1 CVA s/p embolectomy. Extubated 9/24. Pt. now with dysphagia s/p S&S eval with MBS which revealed a filling defect in the anterior esophagus. Patient was planned for AYO to rule out cardiac anatomic abnormality. Concern for esophageal web on MBS. GI consulted for EGD prior to AYO.  Patient denies any feeling of dysphagia or odynophagia. He never had issues with swallowing in the past. He had an EGD done a couple years ago which he reports was normal. He reports a good appetite with no reporeted wt. loss.  He reports normal BM with no BRBPR. He states he had a colonoscopy about a year ago, with hx. of polyps.    Allergies:  No Known Allergies      Home Medications:    Hospital Medications:  acetaminophen   Tablet .. 650 milliGRAM(s) Oral every 6 hours PRN  acetaminophen  IVPB .. 1000 milliGRAM(s) IV Intermittent once PRN  ALBUTerol/ipratropium for Nebulization 3 milliLiter(s) Nebulizer every 6 hours  aspirin  chewable 81 milliGRAM(s) Oral daily  atorvastatin 80 milliGRAM(s) Oral at bedtime  buDESOnide  80 MICROgram(s)/formoterol 4.5 MICROgram(s) Inhaler 2 Puff(s) Inhalation two times a day  enoxaparin Injectable 40 milliGRAM(s) SubCutaneous daily  FLUoxetine 20 milliGRAM(s) Oral daily  fluticasone propionate 50 MICROgram(s)/spray Nasal Spray 1 Spray(s) Both Nostrils two times a day  influenza   Vaccine 0.5 milliLiter(s) IntraMuscular once  nicotine -   7 mG/24Hr(s) Patch 1 patch Transdermal daily  pantoprazole    Tablet 40 milliGRAM(s) Oral before breakfast  sodium chloride 2 Gram(s) Oral every 6 hours  thiamine Injectable 100 milliGRAM(s) IV Push daily      PMHX/PSHX:  HTN (hypertension)  TIA (transient ischemic attack)      Family history:      Social History: Tobacco: [ +] yes  [ ] no  EtOH: [+] yes  [ ] no 6 beer / day  Illicit drugs: denies  Lives with wife    ROS:     General:  No wt loss, fevers, chills, night sweats, fatigue,   Eyes:  Good vision, no reported pain  ENT:  No sore throat, pain, runny nose, dysphagia  CV:  No pain, palpitations, hypo/hypertension  Resp:  No dyspnea, cough, tachypnea, wheezing  GI:  No pain, No nausea, No vomiting, No diarrhea, No constipation, No weight loss, No fever, No pruritis, No rectal bleeding, No tarry stools, No dysphagia,  :  No pain, bleeding, incontinence, nocturia  Muscle:  No pain, weakness  Neuro:  No weakness, tingling, memory problems  Psych:  No fatigue, insomnia, mood problems, depression  Endocrine:  No polyuria, polydipsia, cold/heat intolerance  Heme:  No petechiae, ecchymosis, easy bruisability  Skin:  No rash, tattoos, scars, edema      PHYSICAL EXAM:   Vital Signs:  Vital Signs Last 24 Hrs  T(C): 37 (03 Oct 2018 05:02), Max: 37.1 (02 Oct 2018 12:55)  T(F): 98.6 (03 Oct 2018 05:02), Max: 98.7 (02 Oct 2018 12:55)  HR: 75 (03 Oct 2018 05:02) (70 - 75)  BP: 124/63 (03 Oct 2018 05:02) (117/70 - 137/90)  BP(mean): --  RR: 18 (03 Oct 2018 05:02) (18 - 18)  SpO2: 97% (03 Oct 2018 05:02) (94% - 97%)  Daily     Daily     GENERAL:  Appears stated age, well-groomed, well-nourished, no distress  HEENT:  NC/AT,  conjunctivae clear and pink, no thyromegaly, nodules, adenopathy, no JVD, sclera -anicteric  CHEST:  Full & symmetric excursion, no increased effort, breath sounds clear  HEART:  Regular rhythm, S1, S2, no murmur/rub/S3/S4, no abdominal bruit, no edema  ABDOMEN:  Soft, non-tender, non-distended, normoactive bowel sounds,  no masses ,no hepato-splenomegaly, no signs of chronic liver disease  EXTEREMITIES:  no cyanosis,clubbing or edema  SKIN:  No rash/erythema/ecchymoses/petechiae/wounds/abscess/warm/dry  NEURO:  Alert, oriented, no asterixis, no tremor, no encephalopathy  RECTAL: Brown stool, Heme -    LABS:                        12.3   7.5   )-----------( 216      ( 03 Oct 2018 00:22 )             37.5     10-03    141  |  104  |  18  ----------------------------<  159<H>  3.8   |  25  |  1.09    Ca    9.3      03 Oct 2018 00:14                Imaging:

## 2018-10-02 NOTE — PROGRESS NOTE ADULT - SUBJECTIVE AND OBJECTIVE BOX
Subjective: Patient seen and examined. No new events except as noted.     SUBJECTIVE/ROS:  No chest pain, dyspnea, palpitation, or dizziness.       MEDICATIONS:  MEDICATIONS  (STANDING):  ALBUTerol/ipratropium for Nebulization 3 milliLiter(s) Nebulizer every 6 hours  aspirin  chewable 81 milliGRAM(s) Oral daily  atorvastatin 80 milliGRAM(s) Oral at bedtime  buDESOnide  80 MICROgram(s)/formoterol 4.5 MICROgram(s) Inhaler 2 Puff(s) Inhalation two times a day  enoxaparin Injectable 30 milliGRAM(s) SubCutaneous every 12 hours  FLUoxetine 20 milliGRAM(s) Oral daily  fluticasone propionate 50 MICROgram(s)/spray Nasal Spray 1 Spray(s) Both Nostrils two times a day  influenza   Vaccine 0.5 milliLiter(s) IntraMuscular once  nicotine -   7 mG/24Hr(s) Patch 1 patch Transdermal daily  sodium chloride 2 Gram(s) Oral every 6 hours  thiamine Injectable 100 milliGRAM(s) IV Push daily      PHYSICAL EXAM:  T(C): 37.1 (10-02-18 @ 08:52), Max: 37.2 (10-02-18 @ 04:59)  HR: 74 (10-02-18 @ 08:52) (62 - 81)  BP: 136/74 (10-02-18 @ 08:52) (125/77 - 150/87)  RR: 18 (10-02-18 @ 08:52) (18 - 18)  SpO2: 97% (10-02-18 @ 08:52) (95% - 97%)  Wt(kg): --  I&O's Summary    01 Oct 2018 07:01  -  02 Oct 2018 07:00  --------------------------------------------------------  IN: 240 mL / OUT: 550 mL / NET: -310 mL          Appearance: Normal	  HEENT:   Normal oral mucosa, PERRL, EOMI	  Cardiovascular: Normal S1 S2,    Murmur:   Neck: JVP normal  Respiratory: Lungs clear to auscultation  Gastrointestinal:  Soft, Non-tender, + BS	  Skin: normal   Neuro: No gross deficits.   Psychiatry:  Mood & affect appropriate  Ext: No edema      LABS/DATA:    CARDIAC MARKERS:                                12.3   8.47  )-----------( 212      ( 02 Oct 2018 07:48 )             39.0     10-02    141  |  106  |  21  ----------------------------<  115<H>  4.4   |  31  |  1.05    Ca    9.7      02 Oct 2018 06:21      proBNP:   Lipid Profile:   HgA1c:   TSH:     TELE:  EKG:

## 2018-10-03 ENCOUNTER — RESULT REVIEW (OUTPATIENT)
Age: 63
End: 2018-10-03

## 2018-10-03 LAB
ANION GAP SERPL CALC-SCNC: 12 MMOL/L — SIGNIFICANT CHANGE UP (ref 5–17)
BUN SERPL-MCNC: 18 MG/DL — SIGNIFICANT CHANGE UP (ref 7–23)
CALCIUM SERPL-MCNC: 9.3 MG/DL — SIGNIFICANT CHANGE UP (ref 8.4–10.5)
CHLORIDE SERPL-SCNC: 104 MMOL/L — SIGNIFICANT CHANGE UP (ref 96–108)
CO2 SERPL-SCNC: 25 MMOL/L — SIGNIFICANT CHANGE UP (ref 22–31)
CREAT SERPL-MCNC: 1.09 MG/DL — SIGNIFICANT CHANGE UP (ref 0.5–1.3)
FERRITIN SERPL-MCNC: 342 NG/ML — SIGNIFICANT CHANGE UP (ref 30–400)
FOLATE SERPL-MCNC: 12.3 NG/ML — SIGNIFICANT CHANGE UP
GLUCOSE SERPL-MCNC: 159 MG/DL — HIGH (ref 70–99)
HCT VFR BLD CALC: 37.5 % — LOW (ref 39–50)
HGB BLD-MCNC: 12.3 G/DL — LOW (ref 13–17)
IRON SATN MFR SERPL: 19 % — SIGNIFICANT CHANGE UP (ref 16–55)
IRON SATN MFR SERPL: 48 UG/DL — SIGNIFICANT CHANGE UP (ref 45–165)
MCHC RBC-ENTMCNC: 22.8 PG — LOW (ref 27–34)
MCHC RBC-ENTMCNC: 33 GM/DL — SIGNIFICANT CHANGE UP (ref 32–36)
MCV RBC AUTO: 69.2 FL — LOW (ref 80–100)
OB PNL STL: NEGATIVE — SIGNIFICANT CHANGE UP
PLATELET # BLD AUTO: 216 K/UL — SIGNIFICANT CHANGE UP (ref 150–400)
POTASSIUM SERPL-MCNC: 3.8 MMOL/L — SIGNIFICANT CHANGE UP (ref 3.5–5.3)
POTASSIUM SERPL-SCNC: 3.8 MMOL/L — SIGNIFICANT CHANGE UP (ref 3.5–5.3)
RBC # BLD: 5.41 M/UL — SIGNIFICANT CHANGE UP (ref 4.2–5.8)
RBC # FLD: 14.1 % — SIGNIFICANT CHANGE UP (ref 10.3–14.5)
SODIUM SERPL-SCNC: 141 MMOL/L — SIGNIFICANT CHANGE UP (ref 135–145)
TIBC SERPL-MCNC: 251 UG/DL — SIGNIFICANT CHANGE UP (ref 220–430)
UIBC SERPL-MCNC: 203 UG/DL — SIGNIFICANT CHANGE UP (ref 110–370)
VIT B12 SERPL-MCNC: 593 PG/ML — SIGNIFICANT CHANGE UP (ref 232–1245)
WBC # BLD: 7.5 K/UL — SIGNIFICANT CHANGE UP (ref 3.8–10.5)
WBC # FLD AUTO: 7.5 K/UL — SIGNIFICANT CHANGE UP (ref 3.8–10.5)

## 2018-10-03 PROCEDURE — 93325 DOPPLER ECHO COLOR FLOW MAPG: CPT | Mod: 26

## 2018-10-03 PROCEDURE — 88305 TISSUE EXAM BY PATHOLOGIST: CPT | Mod: 26

## 2018-10-03 PROCEDURE — 93312 ECHO TRANSESOPHAGEAL: CPT | Mod: 26

## 2018-10-03 PROCEDURE — 88312 SPECIAL STAINS GROUP 1: CPT | Mod: 26

## 2018-10-03 PROCEDURE — 93320 DOPPLER ECHO COMPLETE: CPT | Mod: 26

## 2018-10-03 PROCEDURE — 99233 SBSQ HOSP IP/OBS HIGH 50: CPT

## 2018-10-03 PROCEDURE — 99232 SBSQ HOSP IP/OBS MODERATE 35: CPT

## 2018-10-03 RX ORDER — SODIUM CHLORIDE 9 MG/ML
1000 INJECTION, SOLUTION INTRAVENOUS
Qty: 0 | Refills: 0 | Status: DISCONTINUED | OUTPATIENT
Start: 2018-10-03 | End: 2018-10-04

## 2018-10-03 RX ORDER — KETOROLAC TROMETHAMINE 30 MG/ML
15 SYRINGE (ML) INJECTION ONCE
Qty: 0 | Refills: 0 | Status: DISCONTINUED | OUTPATIENT
Start: 2018-10-03 | End: 2018-10-03

## 2018-10-03 RX ORDER — PANTOPRAZOLE SODIUM 20 MG/1
40 TABLET, DELAYED RELEASE ORAL
Qty: 0 | Refills: 0 | Status: DISCONTINUED | OUTPATIENT
Start: 2018-10-03 | End: 2018-10-09

## 2018-10-03 RX ADMIN — Medication 3 MILLILITER(S): at 23:32

## 2018-10-03 RX ADMIN — Medication 15 MILLIGRAM(S): at 22:43

## 2018-10-03 RX ADMIN — BUDESONIDE AND FORMOTEROL FUMARATE DIHYDRATE 2 PUFF(S): 160; 4.5 AEROSOL RESPIRATORY (INHALATION) at 05:30

## 2018-10-03 RX ADMIN — SODIUM CHLORIDE 2 GRAM(S): 9 INJECTION INTRAMUSCULAR; INTRAVENOUS; SUBCUTANEOUS at 17:28

## 2018-10-03 RX ADMIN — Medication 15 MILLIGRAM(S): at 22:13

## 2018-10-03 RX ADMIN — Medication 1 SPRAY(S): at 17:35

## 2018-10-03 RX ADMIN — Medication 1 PATCH: at 12:50

## 2018-10-03 RX ADMIN — BUDESONIDE AND FORMOTEROL FUMARATE DIHYDRATE 2 PUFF(S): 160; 4.5 AEROSOL RESPIRATORY (INHALATION) at 17:35

## 2018-10-03 RX ADMIN — Medication 15 MILLIGRAM(S): at 00:23

## 2018-10-03 RX ADMIN — Medication 3 MILLILITER(S): at 12:50

## 2018-10-03 RX ADMIN — Medication 81 MILLIGRAM(S): at 17:28

## 2018-10-03 RX ADMIN — Medication 20 MILLIGRAM(S): at 17:28

## 2018-10-03 RX ADMIN — PANTOPRAZOLE SODIUM 40 MILLIGRAM(S): 20 TABLET, DELAYED RELEASE ORAL at 17:31

## 2018-10-03 RX ADMIN — Medication 100 MILLIGRAM(S): at 17:36

## 2018-10-03 RX ADMIN — Medication 1 PATCH: at 12:52

## 2018-10-03 RX ADMIN — SODIUM CHLORIDE 2 GRAM(S): 9 INJECTION INTRAMUSCULAR; INTRAVENOUS; SUBCUTANEOUS at 05:32

## 2018-10-03 RX ADMIN — Medication 3 MILLILITER(S): at 05:32

## 2018-10-03 RX ADMIN — Medication 15 MILLIGRAM(S): at 00:53

## 2018-10-03 RX ADMIN — Medication 3 MILLILITER(S): at 00:23

## 2018-10-03 RX ADMIN — Medication 1 SPRAY(S): at 05:32

## 2018-10-03 RX ADMIN — ENOXAPARIN SODIUM 40 MILLIGRAM(S): 100 INJECTION SUBCUTANEOUS at 21:39

## 2018-10-03 RX ADMIN — Medication 3 MILLILITER(S): at 17:28

## 2018-10-03 NOTE — PROGRESS NOTE ADULT - SUBJECTIVE AND OBJECTIVE BOX
Chief Complaint:    pt. s/p EGD, please see full report.    Interval Events:     Allergies:  No Known Allergies      Home Medications:    Hospital Medications:  acetaminophen   Tablet .. 650 milliGRAM(s) Oral every 6 hours PRN  acetaminophen  IVPB .. 1000 milliGRAM(s) IV Intermittent once PRN  ALBUTerol/ipratropium for Nebulization 3 milliLiter(s) Nebulizer every 6 hours  aspirin  chewable 81 milliGRAM(s) Oral daily  atorvastatin 80 milliGRAM(s) Oral at bedtime  buDESOnide  80 MICROgram(s)/formoterol 4.5 MICROgram(s) Inhaler 2 Puff(s) Inhalation two times a day  enoxaparin Injectable 40 milliGRAM(s) SubCutaneous daily  FLUoxetine 20 milliGRAM(s) Oral daily  fluticasone propionate 50 MICROgram(s)/spray Nasal Spray 1 Spray(s) Both Nostrils two times a day  influenza   Vaccine 0.5 milliLiter(s) IntraMuscular once  nicotine -   7 mG/24Hr(s) Patch 1 patch Transdermal daily  pantoprazole    Tablet 40 milliGRAM(s) Oral before breakfast  sodium chloride 2 Gram(s) Oral every 6 hours  thiamine Injectable 100 milliGRAM(s) IV Push daily    ROS  GI: [- ] Nausea, [- ] vomiting, [ -]abdominal pain    PHYSICAL EXAM:   Vital Signs:  Vital Signs Last 24 Hrs  T(C): 37 (03 Oct 2018 05:02), Max: 37.1 (02 Oct 2018 12:55)  T(F): 98.6 (03 Oct 2018 05:02), Max: 98.7 (02 Oct 2018 12:55)  HR: 75 (03 Oct 2018 05:02) (70 - 75)  BP: 124/63 (03 Oct 2018 05:02) (117/70 - 137/90)  BP(mean): --  RR: 18 (03 Oct 2018 05:02) (18 - 18)  SpO2: 97% (03 Oct 2018 05:02) (94% - 97%)  Daily     Daily     GENERAL:  Appears stated age, well-groomed, well-nourished, no distress  HEENT:  NC/AT,  conjunctivae clear and pink, no thyromegaly, nodules, adenopathy, no JVD, sclera -anicteric  CHEST:  Full & symmetric excursion, no increased effort, breath sounds clear  HEART:  Regular rhythm, S1, S2, no murmur/rub/S3/S4, no abdominal bruit, no edema  ABDOMEN:  Soft, non-tender, non-distended, normoactive bowel sounds,  no masses ,no hepato-splenomegaly, no signs of chronic liver disease  EXTEREMITIES:  no cyanosis,clubbing or edema  SKIN:  No rash/erythema/ecchymoses/petechiae/wounds/abscess/warm/dry  NEURO:  Alert, oriented, no asterixis, no tremor, no encephalopathy    LABS:                        12.3   7.5   )-----------( 216      ( 03 Oct 2018 00:22 )             37.5     10-03    141  |  104  |  18  ----------------------------<  159<H>  3.8   |  25  |  1.09    Ca    9.3      03 Oct 2018 00:14                Imaging:

## 2018-10-03 NOTE — PROGRESS NOTE ADULT - SUBJECTIVE AND OBJECTIVE BOX
THE PATIENT WAS SEEN AND EXAMINED BY ME WITH THE HOUSESTAFF AND STROKE TEAM DURING MORNING ROUNDS.   HPI:  61 y/o male with past medical history of HTN [facility] with suspicion of EtOH intoxication. Patient's mental status did not improve and CT/CTA head was done demonstrating Large area acute Right MCA infarct with occluded proximal M1 segment R MCA. Patient subsequently transferred to [facility] for possible Endovascular intervention. LKN is unknown but suspected to be 11pm (9/22/2018). tPA deferred due to unclear LKN time.      SUBJECTIVE: No events overnight.  No new neurologic complaints.      acetaminophen   Tablet .. 650 milliGRAM(s) Oral every 6 hours PRN  acetaminophen  IVPB .. 1000 milliGRAM(s) IV Intermittent once PRN  ALBUTerol/ipratropium for Nebulization 3 milliLiter(s) Nebulizer every 6 hours  aspirin  chewable 81 milliGRAM(s) Oral daily  atorvastatin 80 milliGRAM(s) Oral at bedtime  buDESOnide  80 MICROgram(s)/formoterol 4.5 MICROgram(s) Inhaler 2 Puff(s) Inhalation two times a day  enoxaparin Injectable 40 milliGRAM(s) SubCutaneous daily  FLUoxetine 20 milliGRAM(s) Oral daily  fluticasone propionate 50 MICROgram(s)/spray Nasal Spray 1 Spray(s) Both Nostrils two times a day  influenza   Vaccine 0.5 milliLiter(s) IntraMuscular once  ketorolac   Injectable 15 milliGRAM(s) IV Push every 4 hours PRN  nicotine -   7 mG/24Hr(s) Patch 1 patch Transdermal daily  sodium chloride 2 Gram(s) Oral every 6 hours  thiamine Injectable 100 milliGRAM(s) IV Push daily    PHYSICAL EXAM:   Vital Signs Last 24 Hrs  T(C): 37 (03 Oct 2018 05:02), Max: 37.1 (02 Oct 2018 08:52)  T(F): 98.6 (03 Oct 2018 05:02), Max: 98.8 (02 Oct 2018 08:52)  HR: 75 (03 Oct 2018 05:02) (70 - 75)  BP: 124/63 (03 Oct 2018 05:02) (117/70 - 137/90)  RR: 18 (03 Oct 2018 05:02) (18 - 18)  SpO2: 97% (03 Oct 2018 05:02) (94% - 97%)    General: No acute distress  HEENT: EOM intact, visual fields full, partial left gaze palsy, incomplete saccade to right   Abdomen: Soft, nontender, nondistended   Extremities: No edema    NEUROLOGICAL EXAM:  Mental status: Awake, alert, oriented to hospital, year, month, no neglect - aware of deficit.   Cranial Nerves: Moderate left facial palsy, no nystagmus, no dysarthria, tongue midline  Motor exam: Normal tone, RUE 5/5,  RLE 5/5, L deltoid 3-/5 , Left upper strength distally 2-/5,  LLE 4+/5, slight drift  Sensation: Intact to light touch   Coordination/ Gait: No dysmetria, gait deferred.     LABS:                        12.3   7.5   )-----------( 216      ( 03 Oct 2018 00:22 )             37.5    10-03    141  |  104  |  18  ----------------------------<  159<H>  3.8   |  25  |  1.09    Ca    9.3      03 Oct 2018 00:14      Hemoglobin A1C, Whole Blood: 6.6 % (09-23 @ 11:05)  Hemoglobin A1C, Whole Blood: 6.5 % (09-07 @ 18:01)  LDL Cholesterol, Direct: 53 mg/dL (10-02 @ 10:56)      IMAGING: Reviewed by me.   CT Head No Cont (09.24.18):  Previously noted acute right MCA infarct has demonstrated expected evolutionary changes with areas of hemorrhagic transformation again seen.    CT Head No Cont (09.23.18):  Redemonstration of evolving right MCA territory infarct, faint areas of increasing attenuation may reflect contrast staining, tiny foci of petechial hemorrhage not excluded.    CT perfusion w/ Maps w/ IV Cont (09.23.18):   Large right-sided penumbra  with a small infarct in the large mismatch volume.    Noncontrast head CT (09.23.18):   Large area acute right MCA territory infarct. Posterior temporal lobe infarct appears slightly more subacute with questionable petechial hemorrhage.    CTA neck (09.23.18):   No major vessel occlusion or hemodynamically significant stenosis. No evidence of dissection.    CTA head (09.23.18):  Occluded proximal M1 segment right middle cerebral artery.   Remainder of the intracranial circulation is patent.    MR Head w/wo IV Cont (09.28.18 )   Subacute right MCA infarct with associated areas of   hemorrhagic transformation suspected.    Unremarkable MRA of the neck and [place]. THE PATIENT WAS SEEN AND EXAMINED BY ME WITH THE HOUSESTAFF AND STROKE TEAM DURING MORNING ROUNDS.     HPI:  61 y/o male with past medical history of HTN presented initially to [facility] ED for AMS with suspicion of EtOH intoxication. Patient's mental status did not improve and CT/CTA head was done demonstrating Large area acute Right MCA infarct with occluded proximal M1 segment R MCA. Patient subsequently transferred to [facility] for possible Endovascular intervention. LKN is unknown but suspected to be 11pm (9/22/2018). tPA deferred due to unclear LKN time.      SUBJECTIVE: No events overnight.  No new neurologic complaints.      ROS: All negative except documented above.    acetaminophen   Tablet .. 650 milliGRAM(s) Oral every 6 hours PRN  acetaminophen  IVPB .. 1000 milliGRAM(s) IV Intermittent once PRN  ALBUTerol/ipratropium for Nebulization 3 milliLiter(s) Nebulizer every 6 hours  aspirin  chewable 81 milliGRAM(s) Oral daily  atorvastatin 80 milliGRAM(s) Oral at bedtime  buDESOnide  80 MICROgram(s)/formoterol 4.5 MICROgram(s) Inhaler 2 Puff(s) Inhalation two times a day  enoxaparin Injectable 40 milliGRAM(s) SubCutaneous daily  FLUoxetine 20 milliGRAM(s) Oral daily  fluticasone propionate 50 MICROgram(s)/spray Nasal Spray 1 Spray(s) Both Nostrils two times a day  influenza   Vaccine 0.5 milliLiter(s) IntraMuscular once  ketorolac   Injectable 15 milliGRAM(s) IV Push every 4 hours PRN  nicotine -   7 mG/24Hr(s) Patch 1 patch Transdermal daily  sodium chloride 2 Gram(s) Oral every 6 hours  thiamine Injectable 100 milliGRAM(s) IV Push daily    PHYSICAL EXAM:   Vital Signs Last 24 Hrs  T(C): 37 (03 Oct 2018 05:02), Max: 37.1 (02 Oct 2018 08:52)  T(F): 98.6 (03 Oct 2018 05:02), Max: 98.8 (02 Oct 2018 08:52)  HR: 75 (03 Oct 2018 05:02) (70 - 75)  BP: 124/63 (03 Oct 2018 05:02) (117/70 - 137/90)  RR: 18 (03 Oct 2018 05:02) (18 - 18)  SpO2: 97% (03 Oct 2018 05:02) (94% - 97%)    General: No acute distress  HEENT: EOM intact, visual fields full, partial left gaze palsy, incomplete saccade to right   Abdomen: Soft, nontender, nondistended   Extremities: No edema    NEUROLOGICAL EXAM:  Mental status: Awake, alert, oriented to hospital, year, month, no neglect - aware of deficit.   Cranial Nerves: Moderate left facial palsy, no nystagmus, no dysarthria, tongue midline  Motor exam: Normal tone, RUE 5/5,  RLE 5/5, L deltoid 3-/5, Left upper strength distally 2-/5,  LLE 4+/5 with slight drift  Sensation: Intact to light touch   Coordination/ Gait: No dysmetria, gait deferred.     LABS:                        12.3   7.5   )-----------( 216      ( 03 Oct 2018 00:22 )             37.5    10-03    141  |  104  |  18  ----------------------------<  159<H>  3.8   |  25  |  1.09    Ca    9.3      03 Oct 2018 00:14      Hemoglobin A1C, Whole Blood: 6.6 % (09-23 @ 11:05)  Hemoglobin A1C, Whole Blood: 6.5 % (09-07 @ 18:01)  LDL Cholesterol, Direct: 53 mg/dL (10-02 @ 10:56)      IMAGING: Reviewed by me.   CT Head No Cont (09.24.18):  Previously noted acute right MCA infarct has demonstrated expected evolutionary changes with areas of hemorrhagic transformation again seen.    CT Head No Cont (09.23.18):  Redemonstration of evolving right MCA territory infarct, faint areas of increasing attenuation may reflect contrast staining, tiny foci of petechial hemorrhage not excluded.    CT perfusion w/ Maps w/ IV Cont (09.23.18):   Large right-sided penumbra  with a small infarct in the large mismatch volume.    Noncontrast head CT (09.23.18):   Large area acute right MCA territory infarct. Posterior temporal lobe infarct appears slightly more subacute with questionable petechial hemorrhage.    CTA neck (09.23.18):   No major vessel occlusion or hemodynamically significant stenosis. No evidence of dissection.    CTA head (09.23.18):  Occluded proximal M1 segment right middle cerebral artery.   Remainder of the intracranial circulation is patent.    MR Head w/wo IV Cont (09.28.18 )   Subacute right MCA infarct with associated areas of   hemorrhagic transformation suspected.    Unremarkable MRA of the neck and [place].

## 2018-10-03 NOTE — PROGRESS NOTE ADULT - SUBJECTIVE AND OBJECTIVE BOX
Subjective: Patient seen and examined. No new events except as noted.     SUBJECTIVE/ROS:  feels ok       MEDICATIONS:  MEDICATIONS  (STANDING):  ALBUTerol/ipratropium for Nebulization 3 milliLiter(s) Nebulizer every 6 hours  aspirin  chewable 81 milliGRAM(s) Oral daily  atorvastatin 80 milliGRAM(s) Oral at bedtime  buDESOnide  80 MICROgram(s)/formoterol 4.5 MICROgram(s) Inhaler 2 Puff(s) Inhalation two times a day  enoxaparin Injectable 40 milliGRAM(s) SubCutaneous daily  FLUoxetine 20 milliGRAM(s) Oral daily  fluticasone propionate 50 MICROgram(s)/spray Nasal Spray 1 Spray(s) Both Nostrils two times a day  influenza   Vaccine 0.5 milliLiter(s) IntraMuscular once  nicotine -   7 mG/24Hr(s) Patch 1 patch Transdermal daily  sodium chloride 2 Gram(s) Oral every 6 hours  thiamine Injectable 100 milliGRAM(s) IV Push daily      PHYSICAL EXAM:  T(C): 37 (10-03-18 @ 05:02), Max: 37.1 (10-02-18 @ 08:52)  HR: 75 (10-03-18 @ 05:02) (70 - 75)  BP: 124/63 (10-03-18 @ 05:02) (117/70 - 137/90)  RR: 18 (10-03-18 @ 05:02) (18 - 18)  SpO2: 97% (10-03-18 @ 05:02) (94% - 97%)  Wt(kg): --  I&O's Summary    02 Oct 2018 07:01  -  03 Oct 2018 07:00  --------------------------------------------------------  IN: 240 mL / OUT: 1350 mL / NET: -1110 mL          Appearance: Normal	  HEENT:   no gross abnormality   Cardiovascular: Normal S1 S2,    Murmur:   Neck: JVP normal  Respiratory: Lungs clear   Gastrointestinal:  Soft, Non-tender  Skin: normal   Neuro: No gross deficits.   Psychiatry:  Mood & affect flat  Ext: No edema      LABS/DATA:    CARDIAC MARKERS:                                12.3   7.5   )-----------( 216      ( 03 Oct 2018 00:22 )             37.5     10-03    141  |  104  |  18  ----------------------------<  159<H>  3.8   |  25  |  1.09    Ca    9.3      03 Oct 2018 00:14      proBNP:   Lipid Profile:   HgA1c:   TSH:     TELE:  EKG:

## 2018-10-03 NOTE — PROGRESS NOTE ADULT - SUBJECTIVE AND OBJECTIVE BOX
Cc: Weakness, difficulty walking    HPI:  Patient tolerating therapies- min A transfers  S/P EGD  No pain.    MEDICATIONS  (STANDING):  ALBUTerol/ipratropium for Nebulization 3 milliLiter(s) Nebulizer every 6 hours  aspirin  chewable 81 milliGRAM(s) Oral daily  atorvastatin 80 milliGRAM(s) Oral at bedtime  buDESOnide  80 MICROgram(s)/formoterol 4.5 MICROgram(s) Inhaler 2 Puff(s) Inhalation two times a day  enoxaparin Injectable 40 milliGRAM(s) SubCutaneous daily  FLUoxetine 20 milliGRAM(s) Oral daily  fluticasone propionate 50 MICROgram(s)/spray Nasal Spray 1 Spray(s) Both Nostrils two times a day  influenza   Vaccine 0.5 milliLiter(s) IntraMuscular once  lactated ringers. 1000 milliLiter(s) (60 mL/Hr) IV Continuous <Continuous>  nicotine -   7 mG/24Hr(s) Patch 1 patch Transdermal daily  pantoprazole    Tablet 40 milliGRAM(s) Oral before breakfast  sodium chloride 2 Gram(s) Oral every 6 hours  thiamine Injectable 100 milliGRAM(s) IV Push daily    MEDICATIONS  (PRN):  acetaminophen   Tablet .. 650 milliGRAM(s) Oral every 6 hours PRN Temp greater or equal to 38C (100.4F), Mild Pain (1 - 3)  acetaminophen  IVPB .. 1000 milliGRAM(s) IV Intermittent once PRN Mild Pain (1 - 3), Moderate Pain (4 - 6), Severe Pain (7 - 10)    Vital Signs Last 24 Hrs  T(C): 36.5 (03 Oct 2018 12:09), Max: 37 (03 Oct 2018 05:02)  T(F): 97.7 (03 Oct 2018 12:09), Max: 98.6 (03 Oct 2018 05:02)  HR: 63 (03 Oct 2018 12:25) (63 - 75)  BP: 159/92 (03 Oct 2018 12:25) (117/70 - 159/92)  BP(mean): --  RR: 19 (03 Oct 2018 12:09) (18 - 19)  SpO2: 96% (03 Oct 2018 12:09) (94% - 97%)    PHYSICAL EXAM  Constitutional - NAD, Comfortable  HEENT - NCAT, EOMI  Extremities - No C/C/E, No calf tenderness   Neurologic Exam -                    Cognitive - Awake, Alert, AAO to self, place, date, year, situation     Slight dysarthria but intelligible     Motor - LUE 3+/5 sh abd and EF, 0/5 FF, LLE 4+/5; right side 5/5  Psychiatric - Mood stable, Affect WNL                          12.3   7.5   )-----------( 216      ( 03 Oct 2018 00:22 )             37.5     10-03    141  |  104  |  18  ----------------------------<  159<H>  3.8   |  25  |  1.09    Ca    9.3      03 Oct 2018 00:14          Impression:   63 yo with CVA with left sided weakness and gait dysfunction.    Plan:  PT- ROM, Bed Mob, Transfers, Amb w AD and bracing as needed  OT- ADLs, bracing  SLP- Dysphagia eval and treat  Prec- Falls, Cardiac  DVT Prophylaxis- Lovenox  Skin- Turn q2 h  Dispo- Acute Rehab- can tolerate 3h/d PT/OT/SLP and requires daily physician visits

## 2018-10-03 NOTE — PROGRESS NOTE ADULT - ASSESSMENT
Acute CVA  HTN    BP control once deemed safe as per neurology   EGD today to rule out esophageal web   Plan for AYO if neg then ILR   fu with stroke service     History of tobacco  on nicotine patch

## 2018-10-03 NOTE — PROGRESS NOTE ADULT - ASSESSMENT
62 year-old  male with past medical history of HTN and ETOH abuse. On 9/23/18 he presented to [facility] with AMS and intoxication. Upon further evaluation by the ED he wasn't moving L arm. On telestroke eval he was found with  left facial droop, left upper extremity monoplegia and lower extremity weakness, significant dysarthria and left hemibody hypoesthesia. Head CT showed area of hypodensity at right fronto parietal lobe and acute/ subacute hypodensity at the inferior temporal/parietal lobe middle cerebral artery territory. CT angiography demonstrated right middle cerebral artery proximal M1 segment occlusion. Patient not candidate for IV-tPA given unknown last known well as well as appearance of subacute infarct. He was transferred to [facility] for evaluation for endovascular therapy. CT perfusion showed a small core infarct with large area of penumbra. He was deemed candidate for endovascular therapy with mechanical thrombectomy with TICI 2b recanalization. MRI brain subsequently showed right MCA distribution infarct with hemorrhagic transformation (HI 2). TTE did not show any obvious structural cardiac source of embolism.      Impression:   Cerebral embolism with cerebral infarction. R MCA distribution stroke with mild hemorrhagic concentration (HI 2) - likely etiology being cryptogenic stroke, probably related to embolism from a proximal source like cardiac/paradoxical source of embolism.    NEURO: Neurologically overall with improvement, Continue close monitoring for neurologic deterioration, BP goal gradual normotension in the setting of recent successful revascularization, continue with home statin medication if applicable considering likely non-atheroembolic etiology of his stroke and age, repeat fasting lipid profile to better characterize LDL results, Physical therapy/OT - acute TBI.      ANTITHROMBOTIC THERAPY: Aspirin for secondary stroke prevention     PULMONARY: CXR on 9/26 Right lower lobe nodules, largest of which measures 5 mm, pulm following- plan for repeat CT chest in 6 months, probable COPD, wheezing, continue Duoneb, symbicort     CARDIOVASCULAR: TTE showed - LVEF: 63%, Grossly normal left ventricular internal dimensions and wall thicknesses. Grossly hyperdynamic left ventricular systolic function. Reversal of the E-A  waves of the mitral inflow pattern is consistent with diastolic LV dysfunction. Grossly normal right ventricular size and function.  Cardiac monitoring without any documented events. Plan for AYO if cleared by GI given concern for esophageal web to rule out any structural cardiac source of embolism and consider prolonged cardiac monitoring with ICM to screen for occult cardiac arrhythmias and atrial fibrillation during the cardiac source of embolism.      SBP goal: <140/90 mmHg    GASTROINTESTINAL:  dysphagia screen - failed, speech and swallow failed, MBS done on 9/27, diet recommended, tolerating diet, GI consult appreciated : possible EGD today     Diet: Dysphagia I with nectar thickened liquids, but now NPO since midnight for possible EGD vs AYO    RENAL: BUN/Cr without acute change, good urine output      Na Goal: Greater than 135     Strong: yes    HEMATOLOGY: H/H without acute change, Platelets 212, no signs or symptoms of active bleeding. History of renal CA- consider CT abdomen/pelvis      DVT ppx: Heparin s.c [] LMWH [x]     ID: afebrile, no leukocytosis, no signs or symptoms of infection.      OTHER: Psychiatry Consult: continue fluoxetine for improved motor recovery, melatonin for insomnia, no need for CIWA, monitor for si/sx of withdrawal,  also discussed with patient and wife that he needs a sleep study as an outpatient as we suspect he has SHILO. All questions and concerns addressed. Patient reported chronic Low Back Pain and takes Gabapentin for this reason and chronic Neuropathy. His wife plans to bring in his home dose so he can restart this medication.     DISPOSITION: Acute TBI once stable and workup is complete.    CORE MEASURES:        Admission NIHSS: 12     TPA: [] YES [x] NO      LDL/HDL: 17/36     Depression Screen: p     Statin Therapy: yes     Dysphagia Screen: [] PASS [x] FAIL     Smoking [] YES [x] NO      Afib [] YES [x] NO     Stroke Education [] YES [] NO - 62 year-old  male with past medical history of HTN and ETOH abuse. On 9/23/18 he presented to [facility] with AMS and intoxication. Upon further evaluation by the ED he wasn't moving L arm. On telestroke eval he was found with  left facial droop, left upper extremity monoplegia and lower extremity weakness, significant dysarthria and left hemibody hypoesthesia. Head CT showed area of hypodensity at right fronto parietal lobe and acute/ subacute hypodensity at the inferior temporal/parietal lobe middle cerebral artery territory. CT angiography demonstrated right middle cerebral artery proximal M1 segment occlusion. Patient not candidate for IV-tPA given unknown last known well as well as appearance of subacute infarct. He was transferred to [facility] for evaluation for endovascular therapy. CT perfusion showed a small core infarct with large area of penumbra. He was deemed candidate for endovascular therapy with mechanical thrombectomy with TICI 2b recanalization. MRI brain subsequently showed right MCA distribution infarct with hemorrhagic transformation (HI 2). TTE did not show any obvious structural cardiac source of embolism.      Impression:   Cerebral embolism with cerebral infarction. R MCA distribution stroke with mild hemorrhagic concentration (HI 2) - likely etiology being cryptogenic stroke, probably related to embolism from a proximal source like cardiac/paradoxical source of embolism.    NEURO: Neurologically overall with improvement, Continue close monitoring for neurologic deterioration, BP goal gradual normotension in the setting of recent successful revascularization, continue with home statin medication if applicable considering likely non-atheroembolic etiology of his stroke and age, repeat fasting lipid profile to better characterize LDL results, Physical therapy/OT - acute TBI.      ANTITHROMBOTIC THERAPY: Aspirin for secondary stroke prevention     PULMONARY: CXR on 9/26 Right lower lobe nodules, largest of which measures 5 mm, pulm following- plan for repeat CT chest in 6 months, probable COPD, wheezing, continue Duoneb, symbicort     CARDIOVASCULAR: TTE showed - LVEF: 63%, Grossly normal left ventricular internal dimensions and wall thicknesses. Grossly hyperdynamic left ventricular systolic function. Reversal of the E-A  waves of the mitral inflow pattern is consistent with diastolic LV dysfunction. Grossly normal right ventricular size and function.  Cardiac monitoring without any documented events. Plan for AYO as cleared by GI to rule out any structural cardiac source of embolism and consider prolonged cardiac monitoring with ICM to screen for occult cardiac arrhythmias and atrial fibrillation during the cardiac source of embolism.      SBP goal: <140/90 mmHg    GASTROINTESTINAL:  dysphagia screen - failed, speech and swallow failed, MBS done on 9/27, diet recommended, tolerating diet, GI consult appreciated : EGD done today: Normal esophagus. Biopsied, Small hiatus hernia, Chronic gastritis. Biopsied.Non-bleeding gastric ulcers with no stigmata of bleeding. Non-bleeding gastric ulcers with no stigmata of bleeding. Biopsied. - Multiple non-bleeding duodenal ulcers with no stigmata of bleeding.  Recommendation: Use Protonix (pantoprazole) 40 mg PO daily daily and cleared for AYO as per GI.     Diet: Dysphagia I with nectar thickened liquids, but now NPO since midnight for YAO    RENAL: BUN/Cr without acute change, good urine output      Na Goal: Greater than 135     Strong: yes    HEMATOLOGY: H/H without acute change, Platelets 212, no signs or symptoms of active bleeding. History of renal CA- consider CT abdomen/pelvis      DVT ppx: Heparin s.c [] LMWH [x]     ID: afebrile, no leukocytosis, no signs or symptoms of infection.      OTHER: Psychiatry Consult: continue fluoxetine for improved motor recovery, melatonin for insomnia, no need for CIWA, monitor for si/sx of withdrawal,  also discussed with patient and wife that he needs a sleep study as an outpatient as we suspect he has SHILO. All questions and concerns addressed. Patient reported chronic Low Back Pain and takes Gabapentin for this reason and chronic Neuropathy. His wife plans to bring in his home dose so he can restart this medication.     DISPOSITION: Acute TBI once stable and workup is complete.    CORE MEASURES:        Admission NIHSS: 12     TPA: [] YES [x] NO      LDL/HDL: 17/36     Depression Screen: p     Statin Therapy: yes     Dysphagia Screen: [] PASS [x] FAIL     Smoking [] YES [x] NO      Afib [] YES [x] NO     Stroke Education [] YES [] NO - 62 year-old  male with past medical history of HTN and ETOH abuse. On 9/23/18 he presented to [facility] with AMS and intoxication. Upon further evaluation by the ED he wasn't moving L arm. On telestroke eval he was found with  left facial droop, left upper extremity monoplegia and lower extremity weakness, significant dysarthria and left hemibody hypoesthesia. Head CT showed area of hypodensity at right fronto parietal lobe and acute/ subacute hypodensity at the inferior temporal/parietal lobe middle cerebral artery territory. CT angiography demonstrated right middle cerebral artery proximal M1 segment occlusion. Patient not candidate for IV-tPA given unknown last known well as well as appearance of subacute infarct. He was transferred to [facility] for evaluation for endovascular therapy. CT perfusion showed a small core infarct with large area of penumbra. He was deemed candidate for endovascular therapy with mechanical thrombectomy with TICI 2b recanalization. MRI brain subsequently showed right MCA distribution infarct with hemorrhagic transformation (HI 2). TTE did not show any obvious structural cardiac source of embolism. AYO did not show any obvious structural cardiac source of embolism nor shoulder any evidence of a PFO.      Impression:   Cerebral embolism with cerebral infarction. R MCA distribution stroke with mild hemorrhagic concentration (HI 2) - likely etiology being cryptogenic stroke, probably related to embolism from a proximal source like cardiac source of embolism.    NEURO: Neurologically overall with improvement, Continue close monitoring for neurologic deterioration, BP goal gradual normotension in the setting of recent successful revascularization, continue with home statin medication if applicable considering likely non-atheroembolic etiology of his stroke and age, repeat fasting lipid profile to better characterize LDL results, Physical therapy/OT - acute TBI.      ANTITHROMBOTIC THERAPY: Aspirin for secondary stroke prevention     PULMONARY: CXR on 9/26 Right lower lobe nodules, largest of which measures 5 mm, pulm following- plan for repeat CT chest in 6 months, probable COPD, wheezing, continue Duoneb, symbicort     CARDIOVASCULAR: TTE showed - LVEF: 63%, Grossly normal left ventricular internal dimensions and wall thicknesses. Grossly hyperdynamic left ventricular systolic function. Reversal of the E-A  waves of the mitral inflow pattern is consistent with diastolic LV dysfunction. Grossly normal right ventricular size and function.  Cardiac monitoring without any documented events. Plan for AYO as cleared by GI to rule out any structural cardiac source of embolism and consider prolonged cardiac monitoring with ICM to screen for occult cardiac arrhythmias and atrial fibrillation during the cardiac source of embolism.      SBP goal: <140/90 mmHg    GASTROINTESTINAL:  dysphagia screen - failed, speech and swallow failed, MBS done on 9/27, diet recommended, tolerating diet, GI consult appreciated : EGD done today: Normal esophagus. Biopsied, Small hiatus hernia, Chronic gastritis. Biopsied.Non-bleeding gastric ulcers with no stigmata of bleeding. Non-bleeding gastric ulcers with no stigmata of bleeding. Biopsied. - Multiple non-bleeding duodenal ulcers with no stigmata of bleeding.  Recommendation: Use Protonix (pantoprazole) 40 mg PO daily daily and cleared for AYO as per GI.     Diet: Dysphagia I with nectar thickened liquids, but now NPO since midnight for AYO    RENAL: BUN/Cr without acute change, good urine output      Na Goal: Greater than 135     Strong: yes    HEMATOLOGY: H/H without acute change, Platelets 212, no signs or symptoms of active bleeding. History of renal CA- consider CT abdomen/pelvis      DVT ppx: Heparin s.c [] LMWH [x]     ID: afebrile, no leukocytosis, no signs or symptoms of infection.      OTHER: Psychiatry Consult: continue fluoxetine for improved motor recovery, melatonin for insomnia, no need for CIWA, monitor for si/sx of withdrawal,  also discussed with patient and wife that he needs a sleep study as an outpatient as we suspect he has SHILO. All questions and concerns addressed. Patient reported chronic Low Back Pain and takes Gabapentin for this reason and chronic Neuropathy. His wife plans to bring in his home dose so he can restart this medication.     DISPOSITION: Acute TBI once stable and workup is complete.    CORE MEASURES:        Admission NIHSS: 12     TPA: [] YES [x] NO      LDL/HDL: 17/36     Depression Screen: p     Statin Therapy: yes     Dysphagia Screen: [] PASS [x] FAIL     Smoking [] YES [x] NO      Afib [] YES [x] NO     Stroke Education [] YES [] NO -

## 2018-10-03 NOTE — PROGRESS NOTE ADULT - ASSESSMENT
62 Y.O. WM admitted with an acute Right MCA CVA with noted dysphagia s/p S&S eval with MBS which revealed a filling defect in the anterior esophagus. Patient was planned for AYO to rule out cardiac anatomic abnormality. Concern for esophageal web on MBS. GI consulted for EGD prior to AYO.  Pt. also noted with mild anemia, but significant Microcytic and Hypochromic anemia.    Pt. S/P EGD:  Upper Endoscopy (10.03.18 @ 07:13) >                       - Normal esophagus. Biopsied.                       - Small hiatus hernia.                       - Chronic gastritis. Biopsied.                       - Non-bleeding gastric ulcers with no stigmata of bleeding.                       - Non-bleeding gastric ulcers with no stigmata of bleeding. Biopsied.                       - Multiple non-bleeding duodenal ulcers with no stigmata of bleeding.  Recommendation:                             - Use Protonix (pantoprazole) 40 mg PO daily daily.                       - Await pathology results.    Continue  stool Guaiac X 3 total  Need recent colonoscopy report  Continue NPO for AYO   IVF  Pt. needs Nutritionist to assess adequate caloric need.  Thank you.

## 2018-10-04 LAB
CEA SERPL-MCNC: 1.9 NG/ML — SIGNIFICANT CHANGE UP (ref 0–3.8)
SURGICAL PATHOLOGY STUDY: SIGNIFICANT CHANGE UP

## 2018-10-04 PROCEDURE — 74181 MRI ABDOMEN W/O CONTRAST: CPT | Mod: 26

## 2018-10-04 PROCEDURE — 74230 X-RAY XM SWLNG FUNCJ C+: CPT | Mod: 26

## 2018-10-04 PROCEDURE — 99233 SBSQ HOSP IP/OBS HIGH 50: CPT

## 2018-10-04 PROCEDURE — 99232 SBSQ HOSP IP/OBS MODERATE 35: CPT

## 2018-10-04 RX ORDER — KETOROLAC TROMETHAMINE 30 MG/ML
15 SYRINGE (ML) INJECTION EVERY 4 HOURS
Qty: 0 | Refills: 0 | Status: DISCONTINUED | OUTPATIENT
Start: 2018-10-04 | End: 2018-10-09

## 2018-10-04 RX ORDER — ACETAMINOPHEN 500 MG
1000 TABLET ORAL ONCE
Qty: 0 | Refills: 0 | Status: DISCONTINUED | OUTPATIENT
Start: 2018-10-04 | End: 2018-10-09

## 2018-10-04 RX ADMIN — ATORVASTATIN CALCIUM 80 MILLIGRAM(S): 80 TABLET, FILM COATED ORAL at 21:19

## 2018-10-04 RX ADMIN — Medication 3 MILLILITER(S): at 06:00

## 2018-10-04 RX ADMIN — Medication 1 SPRAY(S): at 06:00

## 2018-10-04 RX ADMIN — ENOXAPARIN SODIUM 40 MILLIGRAM(S): 100 INJECTION SUBCUTANEOUS at 13:39

## 2018-10-04 RX ADMIN — Medication 81 MILLIGRAM(S): at 13:39

## 2018-10-04 RX ADMIN — Medication 1 PATCH: at 13:39

## 2018-10-04 RX ADMIN — Medication 100 MILLIGRAM(S): at 13:39

## 2018-10-04 RX ADMIN — Medication 20 MILLIGRAM(S): at 13:39

## 2018-10-04 RX ADMIN — Medication 15 MILLIGRAM(S): at 23:32

## 2018-10-04 RX ADMIN — Medication 1 SPRAY(S): at 17:34

## 2018-10-04 RX ADMIN — Medication 3 MILLILITER(S): at 23:12

## 2018-10-04 RX ADMIN — SODIUM CHLORIDE 60 MILLILITER(S): 9 INJECTION, SOLUTION INTRAVENOUS at 06:09

## 2018-10-04 RX ADMIN — Medication 1 PATCH: at 12:00

## 2018-10-04 RX ADMIN — BUDESONIDE AND FORMOTEROL FUMARATE DIHYDRATE 2 PUFF(S): 160; 4.5 AEROSOL RESPIRATORY (INHALATION) at 06:00

## 2018-10-04 RX ADMIN — Medication 3 MILLILITER(S): at 13:39

## 2018-10-04 RX ADMIN — BUDESONIDE AND FORMOTEROL FUMARATE DIHYDRATE 2 PUFF(S): 160; 4.5 AEROSOL RESPIRATORY (INHALATION) at 17:34

## 2018-10-04 NOTE — SWALLOW VFSS/MBS ASSESSMENT ADULT - SLP GENERAL OBSERVATIONS
Pt arrived in radiology secure in TIBURCIO chair. Pt on room air. A&O x3. Cooperative for exam. + Baseline cough. Following directions for evaluation purposes
Pt alert and able to follow commands for exam; however, appears to be impulsive with reduced awareness

## 2018-10-04 NOTE — PROGRESS NOTE ADULT - ASSESSMENT
62 year-old  male with past medical history of HTN and ETOH abuse. On 9/23/18 he presented to [facility] with AMS and intoxication. Upon further evaluation by the ED he wasn't moving L arm. On telestroke eval he was found with  left facial droop, left upper extremity monoplegia and lower extremity weakness, significant dysarthria and left hemibody hypoesthesia. Head CT showed area of hypodensity at right fronto parietal lobe and acute/ subacute hypodensity at the inferior temporal/parietal lobe middle cerebral artery territory. CT angiography demonstrated right middle cerebral artery proximal M1 segment occlusion. Patient not candidate for IV-tPA given unknown last known well as well as appearance of subacute infarct. He was transferred to [facility] for evaluation for endovascular therapy. CT perfusion showed a small core infarct with large area of penumbra. He was deemed candidate for endovascular therapy with mechanical thrombectomy with TICI 2b recanalization. MRI brain subsequently showed right MCA distribution infarct with hemorrhagic transformation (HI 2). TTE did not show any obvious structural cardiac source of embolism. AYO did not show any obvious structural cardiac source of embolism nor shoulder any evidence of a PFO.      Impression:   Cerebral embolism with cerebral infarction. R MCA distribution stroke with mild hemorrhagic concentration (HI 2) - likely etiology being cryptogenic stroke, probably related to embolism from a proximal source like cardiac source of embolism.    NEURO: Neurologically overall with improvement, Continue close monitoring for neurologic deterioration, BP goal gradual normotension in the setting of recent successful revascularization, continue with home statin medication if applicable considering likely non-atheroembolic etiology of his stroke and age, repeat fasting lipid profile to better characterize LDL results, Physical therapy/OT - acute TBI.      ANTITHROMBOTIC THERAPY: Aspirin for secondary stroke prevention     PULMONARY: CXR on 9/26 Right lower lobe nodules, largest of which measures 5 mm, pulm following- plan for repeat CT chest in 6 months, probable COPD, wheezing, continue Duoneb, symbicort     CARDIOVASCULAR: TTE showed - LVEF: 63%, Grossly normal left ventricular internal dimensions and wall thicknesses. Grossly hyperdynamic left ventricular systolic function. Reversal of the E-A  waves of the mitral inflow pattern is consistent with diastolic LV dysfunction. Grossly normal right ventricular size and function.  Cardiac monitoring without any documented events. AYO: negative for thrombus or PFO. PLan for  prolonged cardiac monitoring with ICM to screen for occult cardiac arrhythmias and atrial fibrillation during the cardiac source of embolism.      SBP goal: <140/90 mmHg    GASTROINTESTINAL:  dysphagia screen - failed, speech and swallow failed, MBS done on 9/27, diet recommended, tolerating diet, GI consult appreciated : EGD done today: Normal esophagus. Biopsied, Small hiatus hernia, Chronic gastritis. Biopsied.Non-bleeding gastric ulcers with no stigmata of bleeding. Non-bleeding gastric ulcers with no stigmata of bleeding. Biopsied. - Multiple non-bleeding duodenal ulcers with no stigmata of bleeding.  Recommendation: Use Protonix (pantoprazole) 40 mg PO daily daily and cleared for AYO as per GI.     Diet: NPO, MBS today    RENAL: BUN/Cr without acute change, good urine output      Na Goal: Greater than 135     Strong: yes    HEMATOLOGY: H/H without acute change, Platelets 212, no signs or symptoms of active bleeding. History of renal CA- consider CT abdomen/pelvis      DVT ppx: Heparin s.c [] LMWH [x]     ID: afebrile, no leukocytosis, no signs or symptoms of infection.      OTHER: Psychiatry Consult: continue fluoxetine for improved motor recovery, melatonin for insomnia, no need for CIWA, monitor for si/sx of withdrawal,  also discussed with patient and wife that he needs a sleep study as an outpatient as we suspect he has SHILO. All questions and concerns addressed. Patient reported chronic Low Back Pain and takes Gabapentin for this reason and chronic Neuropathy. His wife plans to bring in his home dose so he can restart this medication.     DISPOSITION: Acute TBI once stable and workup is complete.    CORE MEASURES:        Admission NIHSS: 12     TPA: [] YES [x] NO      LDL/HDL: 17/36     Depression Screen: p     Statin Therapy: yes     Dysphagia Screen: [] PASS [x] FAIL     Smoking [] YES [x] NO      Afib [] YES [x] NO     Stroke Education [] YES [] NO - 62 year-old  male with past medical history of HTN and ETOH abuse. On 9/23/18 he presented to [facility] with AMS and intoxication. Upon further evaluation by the ED he wasn't moving L arm. On telestroke eval he was found with  left facial droop, left upper extremity monoplegia and lower extremity weakness, significant dysarthria and left hemibody hypoesthesia. Head CT showed area of hypodensity at right fronto parietal lobe and acute/ subacute hypodensity at the inferior temporal/parietal lobe middle cerebral artery territory. CT angiography demonstrated right middle cerebral artery proximal M1 segment occlusion. Patient not candidate for IV-tPA given unknown last known well as well as appearance of subacute infarct. He was transferred to [facility] for evaluation for endovascular therapy. CT perfusion showed a small core infarct with large area of penumbra. He was deemed candidate for endovascular therapy with mechanical thrombectomy with TICI 2b recanalization. MRI brain subsequently showed right MCA distribution infarct with hemorrhagic transformation (HI 2). TTE did not show any obvious structural cardiac source of embolism. AYO did not show any obvious structural cardiac source of embolism nor shoulder any evidence of a PFO.      Impression:   Cerebral embolism with cerebral infarction. R MCA distribution stroke with mild hemorrhagic concentration (HI 2) - likely etiology being cryptogenic stroke, probably related to embolism from a proximal source like cardiac source of embolism.    NEURO: Neurologically overall with improvement, Continue close monitoring for neurologic deterioration, BP goal gradual normotension in the setting of recent successful revascularization, continue with home statin medication if applicable considering likely non-atheroembolic etiology of his stroke and age, repeat fasting lipid profile to better characterize LDL results, Physical therapy/OT - acute TBI.      ANTITHROMBOTIC THERAPY: Aspirin for secondary stroke prevention     PULMONARY: CXR on 9/26 Right lower lobe nodules, largest of which measures 5 mm, pulm following- plan for repeat CT chest in 6 months, probable COPD, wheezing, continue Duoneb, symbicort     CARDIOVASCULAR: TTE showed - LVEF: 63%, Grossly normal left ventricular internal dimensions and wall thicknesses. Grossly hyperdynamic left ventricular systolic function. Reversal of the E-A  waves of the mitral inflow pattern is consistent with diastolic LV dysfunction. Grossly normal right ventricular size and function.  Cardiac monitoring without any documented events. AYO: negative for thrombus or PFO. PLan for  prolonged cardiac monitoring with ICM to screen for occult cardiac arrhythmias and atrial fibrillation during the cardiac source of embolism.      SBP goal: <140/90 mmHg    GASTROINTESTINAL:  dysphagia screen - failed, speech and swallow failed, MBS done on 9/27, diet recommended, tolerating diet, GI consult appreciated : EGD done today: Normal esophagus. Biopsied, Small hiatus hernia, Chronic gastritis. Biopsied.Non-bleeding gastric ulcers with no stigmata of bleeding. Non-bleeding gastric ulcers with no stigmata of bleeding. Biopsied. - Multiple non-bleeding duodenal ulcers with no stigmata of bleeding.  Recommendation: Use Protonix (pantoprazole) 40 mg PO daily daily and cleared for AYO as per GI. Overnight patient noted to be coughing and possible aspiration was made NPO, MBS done today, diet recommended     Diet: dysphagia II nectar consistency    RENAL: BUN/Cr without acute change, good urine output      Na Goal: Greater than 135     Strong: no    HEMATOLOGY: H/H without acute change, Platelets 212, no signs or symptoms of active bleeding. History of renal CA-, CT A/P showed pancreatic tail lesion, MRI of abdomen and pelvis with Howard (MRCP protocol) ordered to delineate the pancreatic tail lesion     DVT ppx: Heparin s.c [] LMWH [x]     ID: afebrile, no leukocytosis, no signs or symptoms of infection.      OTHER: Psychiatry Consult: continue fluoxetine for improved motor recovery, melatonin for insomnia, no need for CIWA, monitor for si/sx of withdrawal,  also discussed with patient and wife that he needs a sleep study as an outpatient as we suspect he has SHILO. All questions and concerns addressed. Patient reported chronic Low Back Pain and takes Gabapentin for this reason and chronic Neuropathy. His wife plans to bring in his home dose so he can restart this medication.     DISPOSITION: Acute TBI once stable and workup is complete.    CORE MEASURES:        Admission NIHSS: 12     TPA: [] YES [x] NO      LDL/HDL: 17/36     Depression Screen: p     Statin Therapy: yes     Dysphagia Screen: [] PASS [x] FAIL     Smoking [] YES [x] NO      Afib [] YES [x] NO     Stroke Education [] YES [] NO - 62 year-old  male with past medical history of HTN and ETOH abuse. On 9/23/18 he presented to [facility] with AMS and intoxication. Upon further evaluation by the ED he wasn't moving L arm. On telestroke eval he was found with  left facial droop, left upper extremity monoplegia and lower extremity weakness, significant dysarthria and left hemibody hypoesthesia. Head CT showed area of hypodensity at right fronto parietal lobe and acute/ subacute hypodensity at the inferior temporal/parietal lobe middle cerebral artery territory. CT angiography demonstrated right middle cerebral artery proximal M1 segment occlusion. Patient not candidate for IV-tPA given unknown last known well as well as appearance of subacute infarct. He was transferred to [facility] for evaluation for endovascular therapy. CT perfusion showed a small core infarct with large area of penumbra. He was deemed candidate for endovascular therapy with mechanical thrombectomy with TICI 2b recanalization. MRI brain subsequently showed right MCA distribution infarct with hemorrhagic transformation (HI 2). TTE did not show any obvious structural cardiac source of embolism. AYO did not show any obvious structural cardiac source of embolism nor showed any evidence of a PFO.      Impression:   Cerebral embolism with cerebral infarction. R MCA distribution stroke with mild hemorrhagic concentration (HI 2) - likely etiology being cryptogenic stroke, probably related to embolism from a proximal source like cardiac source of embolism.    NEURO: Neurologically overall with improvement, Continue close monitoring for neurologic deterioration, BP goal gradual normotension in the setting of recent successful revascularization, continue with home statin medication if applicable considering likely non-atheroembolic etiology of his stroke and age, repeat fasting lipid profile to better characterize LDL results, Physical therapy/OT - acute TBI.      ANTITHROMBOTIC THERAPY: Aspirin for secondary stroke prevention     PULMONARY: CXR on 9/26 Right lower lobe nodules, largest of which measures 5 mm, pulm following- plan for repeat CT chest in 6 months, probable COPD, wheezing, continue Duoneb, symbicort     CARDIOVASCULAR: TTE showed - LVEF: 63%, Grossly normal left ventricular internal dimensions and wall thicknesses. Grossly hyperdynamic left ventricular systolic function. Reversal of the E-A  waves of the mitral inflow pattern is consistent with diastolic LV dysfunction. Grossly normal right ventricular size and function.  Cardiac monitoring without any documented events. AYO: negative for thrombus or PFO. PLan for  prolonged cardiac monitoring with ICM to screen for occult cardiac arrhythmias and atrial fibrillation during the cardiac source of embolism.      SBP goal: <140/90 mmHg    GASTROINTESTINAL:  dysphagia screen - failed, speech and swallow failed, MBS done on 9/27, diet recommended, tolerating diet, GI consult appreciated : EGD done today: Normal esophagus. Biopsied, Small hiatus hernia, Chronic gastritis. Biopsied.Non-bleeding gastric ulcers with no stigmata of bleeding. Non-bleeding gastric ulcers with no stigmata of bleeding. Biopsied. - Multiple non-bleeding duodenal ulcers with no stigmata of bleeding. Recommendation: Use Protonix (pantoprazole) 40 mg PO daily daily and cleared for AYO as per GI. Overnight patient noted to be coughing and possible aspiration was made NPO, MBS done today, diet recommended     Diet: dysphagia II nectar consistency    RENAL: BUN/Cr without acute change, good urine output      Na Goal: Greater than 135     Strong: no    HEMATOLOGY: H/H without acute change, Platelets 212, no signs or symptoms of active bleeding. History of renal CA-, CT A/P showed pancreatic tail lesion, MRI of abdomen with Howard (MRCP protocol) ordered to delineate the pancreatic tail lesion     DVT ppx: Heparin s.c [] LMWH [x]     ID: afebrile, no leukocytosis, no signs or symptoms of infection.      OTHER: Psychiatry Consult: continue fluoxetine for improved motor recovery, melatonin for insomnia, no need for CIWA, monitor for si/sx of withdrawal,  also discussed with patient and wife that he needs a sleep study as an outpatient as we suspect he has SHILO. All questions and concerns addressed. Patient reported chronic Low Back Pain and takes Gabapentin for this reason and chronic Neuropathy. His wife plans to bring in his home dose so he can restart this medication.     DISPOSITION: Acute TBI once stable and workup is complete.    CORE MEASURES:        Admission NIHSS: 12     TPA: [] YES [x] NO      LDL/HDL: 17/36     Depression Screen: p     Statin Therapy: yes     Dysphagia Screen: [] PASS [x] FAIL     Smoking [] YES [x] NO      Afib [] YES [x] NO     Stroke Education [] YES [] NO -

## 2018-10-04 NOTE — PROGRESS NOTE ADULT - SUBJECTIVE AND OBJECTIVE BOX
Follow-up Pulm Progress Note    No new respiratory events overnight.  Denies SOB/CP.   96% on RA    Medications:  MEDICATIONS  (STANDING):  ALBUTerol/ipratropium for Nebulization 3 milliLiter(s) Nebulizer every 6 hours  aspirin  chewable 81 milliGRAM(s) Oral daily  atorvastatin 80 milliGRAM(s) Oral at bedtime  buDESOnide  80 MICROgram(s)/formoterol 4.5 MICROgram(s) Inhaler 2 Puff(s) Inhalation two times a day  enoxaparin Injectable 40 milliGRAM(s) SubCutaneous daily  FLUoxetine 20 milliGRAM(s) Oral daily  fluticasone propionate 50 MICROgram(s)/spray Nasal Spray 1 Spray(s) Both Nostrils two times a day  influenza   Vaccine 0.5 milliLiter(s) IntraMuscular once  nicotine -   7 mG/24Hr(s) Patch 1 patch Transdermal daily  pantoprazole    Tablet 40 milliGRAM(s) Oral before breakfast  thiamine Injectable 100 milliGRAM(s) IV Push daily    MEDICATIONS  (PRN):  acetaminophen   Tablet .. 650 milliGRAM(s) Oral every 6 hours PRN Temp greater or equal to 38C (100.4F), Mild Pain (1 - 3)  acetaminophen  IVPB .. 1000 milliGRAM(s) IV Intermittent once PRN Mild Pain (1 - 3), Moderate Pain (4 - 6), Severe Pain (7 - 10)          Vital Signs Last 24 Hrs  T(C): 36.4 (04 Oct 2018 05:21), Max: 36.8 (03 Oct 2018 17:34)  T(F): 97.5 (04 Oct 2018 05:21), Max: 98.2 (03 Oct 2018 17:34)  HR: 75 (04 Oct 2018 05:21) (73 - 83)  BP: 124/83 (04 Oct 2018 05:21) (118/70 - 130/82)  BP(mean): --  RR: 18 (04 Oct 2018 05:21) (18 - 20)  SpO2: 96% (04 Oct 2018 05:21) (96% - 98%)          10-03 @ 07:01  -  10-04 @ 07:00  --------------------------------------------------------  IN: 240 mL / OUT: 400 mL / NET: -160 mL          LABS:                        12.3   7.5   )-----------( 216      ( 03 Oct 2018 00:22 )             37.5     10-03    141  |  104  |  18  ----------------------------<  159<H>  3.8   |  25  |  1.09    Ca    9.3      03 Oct 2018 00:14      Physical Examination:  PULM: Clear to auscultation bilaterally, no significant sputum production  CVS: S1, S2 heard    RADIOLOGY REVIEWED  CT chest: < from: CT Chest w/ IV Cont (10.02.18 @ 21:10) >  CHEST:     LUNGS AND LARGE AIRWAYS: Patent central airways. Dependent atelectasis in   lower lobe lobes bilaterally with small areas of mucoid impaction in the   lower lobes and right middle lobe. Redemonstration of right lower lobe   nodules measuring up to 5 mm as seen on chest CT 9/26/2018.  PLEURA: No pleural effusion.  VESSELS: Normal caliber aorta and main pulmonary artery. Aortic   calcifications.  HEART: Heart size is normal. No pericardial effusion. Coronary artery   calcifications.  MEDIASTINUM AND THU: No lymphadenopathy.  CHEST WALL AND LOWER NECK: Within normal limits.    < end of copied text >

## 2018-10-04 NOTE — PROGRESS NOTE ADULT - ASSESSMENT
61 y/o M with PMH of HTN, HLD, renal cancer s/p partial R nephrectomy (10/2017 at [facility], patient denies having received chemo/XRT), current smoker (45 pack yrs), depression, anxiety presented to [facility] 9/23 with AMS and found to have R MCA CVA s/p embolectomy. Extubated 9/24.

## 2018-10-04 NOTE — SWALLOW VFSS/MBS ASSESSMENT ADULT - THE ABOVE FINDINGS WERE DISCUSSED WITH
MD Darrell Sanders (Neuro #[id number]). MD Darrell Sanders (Neuro #[id number]), Dr. Alvares, ELDER Bowser, Neuro NP Raina, patient, family

## 2018-10-04 NOTE — SWALLOW VFSS/MBS ASSESSMENT ADULT - ORAL PHASE
within functional limits Incomplete tongue to palate contact/mild-moderate spillover to the valleculae/Uncontrolled bolus / spillover in ni-pharynx Uncontrolled bolus / spillover in hypopharynx/Uncontrolled bolus / spillover in ni-pharynx/mild spillover of material to the valleculae, trace amount to the AE folds/Incomplete tongue to palate contact Delayed oral transit time Uncontrolled bolus / spillover in ni-pharynx/trace-mild spillover to the pyriform sinus/Incomplete tongue to palate contact/Uncontrolled bolus / spillover in hypopharynx

## 2018-10-04 NOTE — PROGRESS NOTE ADULT - SUBJECTIVE AND OBJECTIVE BOX
THE PATIENT WAS SEEN AND EXAMINED BY ME WITH THE HOUSESTAFF AND STROKE TEAM DURING MORNING ROUNDS.   HPI:  63 y/o male with past medical history of HTN presented initially to [facility] ED for AMS with suspicion of EtOH intoxication. Patient's mental status did not improve and CT/CTA head was done demonstrating Large area acute Right MCA infarct with occluded proximal M1 segment R MCA. Patient subsequently transferred to [facility] for possible Endovascular intervention. LKN is unknown but suspected to be 11pm (9/22/2018). tPA deferred due to unclear LKN time.     SUBJECTIVE: No events overnight.  No new neurologic complaints.      acetaminophen   Tablet .. 650 milliGRAM(s) Oral every 6 hours PRN  acetaminophen  IVPB .. 1000 milliGRAM(s) IV Intermittent once PRN  ALBUTerol/ipratropium for Nebulization 3 milliLiter(s) Nebulizer every 6 hours  aspirin  chewable 81 milliGRAM(s) Oral daily  atorvastatin 80 milliGRAM(s) Oral at bedtime  buDESOnide  80 MICROgram(s)/formoterol 4.5 MICROgram(s) Inhaler 2 Puff(s) Inhalation two times a day  enoxaparin Injectable 40 milliGRAM(s) SubCutaneous daily  FLUoxetine 20 milliGRAM(s) Oral daily  fluticasone propionate 50 MICROgram(s)/spray Nasal Spray 1 Spray(s) Both Nostrils two times a day  influenza   Vaccine 0.5 milliLiter(s) IntraMuscular once  lactated ringers. 1000 milliLiter(s) IV Continuous <Continuous>  nicotine -   7 mG/24Hr(s) Patch 1 patch Transdermal daily  pantoprazole    Tablet 40 milliGRAM(s) Oral before breakfast  thiamine Injectable 100 milliGRAM(s) IV Push daily      PHYSICAL EXAM:   Vital Signs Last 24 Hrs  T(C): 36.4 (04 Oct 2018 05:21), Max: 36.8 (03 Oct 2018 17:34)  T(F): 97.5 (04 Oct 2018 05:21), Max: 98.2 (03 Oct 2018 17:34)  HR: 75 (04 Oct 2018 05:21) (63 - 83)  BP: 124/83 (04 Oct 2018 05:21) (118/70 - 159/92)  RR: 18 (04 Oct 2018 05:21) (18 - 20)  SpO2: 96% (04 Oct 2018 05:21) (96% - 98%)    General: No acute distress  HEENT: EOM intact, visual fields full  Abdomen: Soft, nontender, nondistended   Extremities: No edema    NEUROLOGICAL EXAM:  Mental status: Awake, alert, oriented x3, no aphasia, no neglect, normal memory   Cranial Nerves: No facial asymmetry, no nystagmus, no dysarthria,  tongue midline  Motor exam: Normal tone, no drift, 5/5 RUE, 5/5 RLE, 5/5 LUE, 5/5 LLE, normal fine finger movements.  Sensation: Intact to light touch   Coordination/ Gait: No dysmetria, SKYLAR intact and symmetric bilaterally    LABS:                        12.3   7.5   )-----------( 216      ( 03 Oct 2018 00:22 )             37.5    10-03    141  |  104  |  18  ----------------------------<  159<H>  3.8   |  25  |  1.09    Ca    9.3      03 Oct 2018 00:14      Hemoglobin A1C, Whole Blood: 6.6 % (09-23 @ 11:05)  Hemoglobin A1C, Whole Blood: 6.5 % (09-07 @ 18:01)  LDL Cholesterol, Direct: 53 mg/dL (10-02 @ 10:56)      IMAGING: Reviewed by me.   CT Head No Cont (09.24.18):  Previously noted acute right MCA infarct has demonstrated expected evolutionary changes with areas of hemorrhagic transformation again seen.    CT Head No Cont (09.23.18):  Redemonstration of evolving right MCA territory infarct, faint areas of increasing attenuation may reflect contrast staining, tiny foci of petechial hemorrhage not excluded.    CT perfusion w/ Maps w/ IV Cont (09.23.18):   Large right-sided penumbra  with a small infarct in the large mismatch volume.    Noncontrast head CT (09.23.18):   Large area acute right MCA territory infarct. Posterior temporal lobe infarct appears slightly more subacute with questionable petechial hemorrhage.    CTA neck (09.23.18):   No major vessel occlusion or hemodynamically significant stenosis. No evidence of dissection.    CTA head (09.23.18):  Occluded proximal M1 segment right middle cerebral artery.   Remainder of the intracranial circulation is patent.    MR Head w/wo IV Cont (09.28.18 )   Subacute right MCA infarct with associated areas of   hemorrhagic transformation suspected.    Unremarkable MRA of the neck and [place]. THE PATIENT WAS SEEN AND EXAMINED BY ME WITH THE HOUSESTAFF AND STROKE TEAM DURING MORNING ROUNDS.     HPI:  61 y/o male with past medical history of HTN presented initially to [facility] ED for AMS with suspicion of EtOH intoxication. Patient's mental status did not improve and CT/CTA head was done demonstrating Large area acute Right MCA infarct with occluded proximal M1 segment R MCA. Patient subsequently transferred to [facility] for possible Endovascular intervention. LKN is unknown but suspected to be 11pm (9/22/2018). tPA deferred due to unclear LKN time.     SUBJECTIVE: No events overnight.  No new neurologic complaints.      ROS: All negative except documented above.    acetaminophen   Tablet .. 650 milliGRAM(s) Oral every 6 hours PRN  acetaminophen  IVPB .. 1000 milliGRAM(s) IV Intermittent once PRN  ALBUTerol/ipratropium for Nebulization 3 milliLiter(s) Nebulizer every 6 hours  aspirin  chewable 81 milliGRAM(s) Oral daily  atorvastatin 80 milliGRAM(s) Oral at bedtime  buDESOnide  80 MICROgram(s)/formoterol 4.5 MICROgram(s) Inhaler 2 Puff(s) Inhalation two times a day  enoxaparin Injectable 40 milliGRAM(s) SubCutaneous daily  FLUoxetine 20 milliGRAM(s) Oral daily  fluticasone propionate 50 MICROgram(s)/spray Nasal Spray 1 Spray(s) Both Nostrils two times a day  influenza   Vaccine 0.5 milliLiter(s) IntraMuscular once  lactated ringers. 1000 milliLiter(s) IV Continuous <Continuous>  nicotine -   7 mG/24Hr(s) Patch 1 patch Transdermal daily  pantoprazole    Tablet 40 milliGRAM(s) Oral before breakfast  thiamine Injectable 100 milliGRAM(s) IV Push daily      PHYSICAL EXAM:   Vital Signs Last 24 Hrs  T(C): 36.4 (04 Oct 2018 05:21), Max: 36.8 (03 Oct 2018 17:34)  T(F): 97.5 (04 Oct 2018 05:21), Max: 98.2 (03 Oct 2018 17:34)  HR: 75 (04 Oct 2018 05:21) (63 - 83)  BP: 124/83 (04 Oct 2018 05:21) (118/70 - 159/92)  RR: 18 (04 Oct 2018 05:21) (18 - 20)  SpO2: 96% (04 Oct 2018 05:21) (96% - 98%)    General: No acute distress  HEENT: EOM intact, visual fields full, partial left gaze palsy, incomplete saccade to right   Abdomen: Soft, nontender, nondistended   Extremities: No edema    NEUROLOGICAL EXAM:  Mental status: Awake, alert, oriented to hospital, year, month, no neglect - aware of deficit.   Cranial Nerves: Moderate left facial palsy, no nystagmus, no dysarthria, tongue midline  Motor exam: Normal tone, RUE 5/5,  RLE 5/5, L deltoid 3-/5, Left upper strength distally 2-/5,  LLE 4+/5 with slight drift  Sensation: Intact to light touch   Coordination/ Gait: No dysmetria, gait deferred.     LABS:                        12.3   7.5   )-----------( 216      ( 03 Oct 2018 00:22 )             37.5    10-03    141  |  104  |  18  ----------------------------<  159<H>  3.8   |  25  |  1.09    Ca    9.3      03 Oct 2018 00:14      Hemoglobin A1C, Whole Blood: 6.6 % (09-23 @ 11:05)  Hemoglobin A1C, Whole Blood: 6.5 % (09-07 @ 18:01)  LDL Cholesterol, Direct: 53 mg/dL (10-02 @ 10:56)      IMAGING: Reviewed by me.   CT Head No Cont (09.24.18):  Previously noted acute right MCA infarct has demonstrated expected evolutionary changes with areas of hemorrhagic transformation again seen.    CT Head No Cont (09.23.18):  Redemonstration of evolving right MCA territory infarct, faint areas of increasing attenuation may reflect contrast staining, tiny foci of petechial hemorrhage not excluded.    CT perfusion w/ Maps w/ IV Cont (09.23.18):   Large right-sided penumbra  with a small infarct in the large mismatch volume.    Noncontrast head CT (09.23.18):   Large area acute right MCA territory infarct. Posterior temporal lobe infarct appears slightly more subacute with questionable petechial hemorrhage.    CTA neck (09.23.18):   No major vessel occlusion or hemodynamically significant stenosis. No evidence of dissection.    CTA head (09.23.18):  Occluded proximal M1 segment right middle cerebral artery.   Remainder of the intracranial circulation is patent.    MR Head w/wo IV Cont (09.28.18 )   Subacute right MCA infarct with associated areas of   hemorrhagic transformation suspected.    Unremarkable MRA of the neck and [place].

## 2018-10-04 NOTE — SWALLOW VFSS/MBS ASSESSMENT ADULT - ROSENBEK'S PENETRATION ASPIRATION SCALE
(2) contrast enters airway, remains above the vocal cords, no residue remains (penetration) (1) no aspiration, contrast does not enter airway (3) contrast remains above the vocal cords, visible residue remains (penetration)

## 2018-10-04 NOTE — PROGRESS NOTE ADULT - ASSESSMENT
62 Y.O. WM admitted with an acute Right MCA CVA with noted dysphagia s/p S&S eval with MBS which revealed a filling defect in the anterior esophagus. Patient was planned for AYO to rule out cardiac anatomic abnormality. Concern for esophageal web on MBS. GI consulted for EGD prior to AYO.  Pt. also noted with mild anemia, but with significant Microcytosis and Hypochromia. AYO with no evidence of thrombus.  pt. also with  pancreatic tail lesion.    Pt. S/P EGD:  Upper Endoscopy (10.03.18 @ 07:13) >                       - Normal esophagus. Biopsied.                       - Small hiatus hernia.                       - Chronic gastritis. Biopsied.                       - Non-bleeding gastric ulcers with no stigmata of bleeding.                       - Non-bleeding gastric ulcers with no stigmata of bleeding. Biopsied.                       - Multiple non-bleeding duodenal ulcers with no stigmata of bleeding.  Recommendation:                             - Use Protonix (pantoprazole) 40 mg PO daily daily.                       - Await pathology results.    Continue  stool Guaiac X 3 total  Awaiting recent colonoscopy report from wife.  Continue dysphagia 1 diet.  Pt. needs MRI of Abd. and Pelvis with Howard (MRCP protocol) to delineate the pancreatic tail lesion.  F/U with EGD biopsies.  Continue Protonix 40 mg PO QD 1/2 hr. AC.  Check CEA,     I had a prolonged conversation with pt. and wife re. likely diagnosis and plan who verbalizes clear understanding,  Thank you.

## 2018-10-04 NOTE — SWALLOW VFSS/MBS ASSESSMENT ADULT - NS SWALLOW VFSS REC ASPIR MON
change of breathing pattern/upper respiratory infection/pneumonia/throat clearing/,mmm/cough/gurgly voice/fever fever/Monitor for s/s aspiration/laryngeal penetration. If noted:  D/C p.o. intake, provide non-oral nutrition/hydration/meds, and contact this service @ [phone]/cough/gurgly voice/change of breathing pattern/pneumonia/throat clearing/upper respiratory infection

## 2018-10-04 NOTE — SWALLOW VFSS/MBS ASSESSMENT ADULT - DIAGNOSTIC IMPRESSIONS
Pt presents with a mild-moderate oral and mild pharyngeal dysphagia characterized by reduced bolus formation/control on nectar thick and thin liquids, delayed oral transit time, reduced mastication of soft solid food, post swallow reside in the pharynx most prominent int eh oropharynx, delayed pharyngeal swallows and laryngeal penetration on mechanical soft food and thin liquid. No aspiration evident on exam. Pt needs supervision with PO intake to reduce impulsivity when eating/drinking. + Cough throughout exam in the absence of laryngeal penetration/aspiration. Pt presents with a mild-moderate oral and mild pharyngeal dysphagia characterized by reduced bolus formation/control on nectar thick and thin liquids, delayed oral transit time, reduced mastication of soft solid food, post swallow reside in the pharynx most prominent int eh oropharynx, delayed pharyngeal swallows and laryngeal penetration on mechanical soft food and thin liquid. No aspiration evident on exam. Pt needs supervision with PO intake to reduce impulsivity when eating/drinking. + Cough throughout exam in the absence of laryngeal penetration/aspiration.  Disorders: reduced lingual strength/ROM/Rate of motion, reduced BOT to posterior pharyngeal wall contact, delay in trigger of the swallow reflex, reduced hyo-laryngeal excursion, reduced laryngeal closure,  reduced supraglottic sensation

## 2018-10-04 NOTE — PROGRESS NOTE ADULT - ASSESSMENT
Acute CVA  HTN    BP stable  AYO is neg for thrombus   please obtain EP eval for ILR     History of tobacco  on nicotine patch

## 2018-10-04 NOTE — PROGRESS NOTE ADULT - SUBJECTIVE AND OBJECTIVE BOX
Cc: Weakness, difficulty walking    HPI:  Patient tolerating therapies- CG transfers, min A gait  No pain.    MEDICATIONS  (STANDING):  ALBUTerol/ipratropium for Nebulization 3 milliLiter(s) Nebulizer every 6 hours  aspirin  chewable 81 milliGRAM(s) Oral daily  atorvastatin 80 milliGRAM(s) Oral at bedtime  buDESOnide  80 MICROgram(s)/formoterol 4.5 MICROgram(s) Inhaler 2 Puff(s) Inhalation two times a day  enoxaparin Injectable 40 milliGRAM(s) SubCutaneous daily  FLUoxetine 20 milliGRAM(s) Oral daily  fluticasone propionate 50 MICROgram(s)/spray Nasal Spray 1 Spray(s) Both Nostrils two times a day  influenza   Vaccine 0.5 milliLiter(s) IntraMuscular once  nicotine -   7 mG/24Hr(s) Patch 1 patch Transdermal daily  pantoprazole    Tablet 40 milliGRAM(s) Oral before breakfast  thiamine Injectable 100 milliGRAM(s) IV Push daily    MEDICATIONS  (PRN):  acetaminophen   Tablet .. 650 milliGRAM(s) Oral every 6 hours PRN Temp greater or equal to 38C (100.4F), Mild Pain (1 - 3)  acetaminophen  IVPB .. 1000 milliGRAM(s) IV Intermittent once PRN Mild Pain (1 - 3), Moderate Pain (4 - 6), Severe Pain (7 - 10)    Vital Signs Last 24 Hrs  T(C): 36.4 (04 Oct 2018 05:21), Max: 36.8 (03 Oct 2018 17:34)  T(F): 97.5 (04 Oct 2018 05:21), Max: 98.2 (03 Oct 2018 17:34)  HR: 75 (04 Oct 2018 05:21) (63 - 83)  BP: 124/83 (04 Oct 2018 05:21) (118/70 - 159/92)  BP(mean): --  RR: 18 (04 Oct 2018 05:21) (18 - 20)  SpO2: 96% (04 Oct 2018 05:21) (96% - 98%)    PHYSICAL EXAM  Constitutional - NAD, Comfortable  HEENT - NCAT, EOMI  Extremities - No C/C/E, No calf tenderness   Neurologic Exam -                    Cognitive - Awake, Alert, AAO to self, place, date, year, situation     Slight dysarthria but intelligible     Motor - L side 4/5, R side 5/5  Psychiatric - Mood stable, Affect WNL                          12.3   7.5   )-----------( 216      ( 03 Oct 2018 00:22 )             37.5     10-03    141  |  104  |  18  ----------------------------<  159<H>  3.8   |  25  |  1.09    Ca    9.3      03 Oct 2018 00:14                 Impression:   63 yo with CVA with left sided weakness and gait dysfunction.    Plan:  PT- ROM, Bed Mob, Transfers, Amb w AD   OT- ADLs  SLP- Dysphagia eval and treat  Prec- Falls, Cardiac  DVT Prophylaxis- Lovenox  Skin- Turn q2 h  Dispo- Acute Rehab- can tolerate 3h/d PT/OT/SLP and requires daily physician visits

## 2018-10-04 NOTE — SWALLOW VFSS/MBS ASSESSMENT ADULT - RECOMMENDED FEEDING/EATING TECHNIQUES
crush medication (when feasible)/no straws/position upright (90 degrees)/maintain upright posture during/after eating for 30 mins/oral hygiene

## 2018-10-04 NOTE — PROGRESS NOTE ADULT - SUBJECTIVE AND OBJECTIVE BOX
Chief Complaint:    Pt. feels OK, tolerating dysphagia I diet.    Interval Events:     Allergies:  No Known Allergies      Home Medications:    Hospital Medications:  acetaminophen   Tablet .. 650 milliGRAM(s) Oral every 6 hours PRN  acetaminophen  IVPB .. 1000 milliGRAM(s) IV Intermittent once PRN  ALBUTerol/ipratropium for Nebulization 3 milliLiter(s) Nebulizer every 6 hours  aspirin  chewable 81 milliGRAM(s) Oral daily  atorvastatin 80 milliGRAM(s) Oral at bedtime  buDESOnide  80 MICROgram(s)/formoterol 4.5 MICROgram(s) Inhaler 2 Puff(s) Inhalation two times a day  enoxaparin Injectable 40 milliGRAM(s) SubCutaneous daily  FLUoxetine 20 milliGRAM(s) Oral daily  fluticasone propionate 50 MICROgram(s)/spray Nasal Spray 1 Spray(s) Both Nostrils two times a day  influenza   Vaccine 0.5 milliLiter(s) IntraMuscular once  lactated ringers. 1000 milliLiter(s) IV Continuous <Continuous>  nicotine -   7 mG/24Hr(s) Patch 1 patch Transdermal daily  pantoprazole    Tablet 40 milliGRAM(s) Oral before breakfast  thiamine Injectable 100 milliGRAM(s) IV Push daily    ROS  GI: [- ] Nausea, [- ] vomiting, [ -]abdominal pain    PHYSICAL EXAM:   Vital Signs:  Vital Signs Last 24 Hrs  T(C): 36.4 (04 Oct 2018 05:21), Max: 36.8 (03 Oct 2018 17:34)  T(F): 97.5 (04 Oct 2018 05:21), Max: 98.2 (03 Oct 2018 17:34)  HR: 75 (04 Oct 2018 05:21) (63 - 83)  BP: 124/83 (04 Oct 2018 05:21) (118/70 - 159/92)  BP(mean): --  RR: 18 (04 Oct 2018 05:21) (18 - 20)  SpO2: 96% (04 Oct 2018 05:21) (96% - 98%)  Daily     Daily     GENERAL:  Appears stated age, well-groomed, well-nourished, no distress  HEENT:  NC/AT,  conjunctivae clear and pink, no thyromegaly, nodules, adenopathy, no JVD, sclera -anicteric  CHEST:  Full & symmetric excursion, no increased effort, breath sounds clear  HEART:  Regular rhythm, S1, S2, no murmur/rub/S3/S4, no abdominal bruit, no edema  ABDOMEN:  Soft, non-tender, non-distended, normoactive bowel sounds,  no masses ,no hepato-splenomegaly, no signs of chronic liver disease  EXTEREMITIES:  no cyanosis,clubbing or edema  SKIN:  No rash/erythema/ecchymoses/petechiae/wounds/abscess/warm/dry  NEURO:  Alert, oriented, no asterixis, no tremor, no encephalopathy    LABS:                        12.3   7.5   )-----------( 216      ( 03 Oct 2018 00:22 )             37.5     10-03    141  |  104  |  18  ----------------------------<  159<H>  3.8   |  25  |  1.09    Ca    9.3      03 Oct 2018 00:14                Imaging: CT Abdomen and Pelvis w/ IV Cont (10.02.18 @ 21:10) >  reviewed:  A 3.7 x 3.3 cm thick-walled cystic mass in the tail of the pancreas.    Ill-defined heterogeneity of the right lower pole with high density   material may be postsurgical.     Contrast-enhanced MRI is recommended for further characterization of the   pancreas and right kidney.

## 2018-10-04 NOTE — SWALLOW VFSS/MBS ASSESSMENT ADULT - SLP PERTINENT HISTORY OF CURRENT PROBLEM
63 y/o male with past medical history of HTN presented initially to [facility] ED for AMS with suspicion of EtOH intoxication. Patient's mental status did not improve and CT/CTA head was done demonstrating Large area acute Right MCA infarct with occluded proximal M1 segment R MCA. Patient subsequently transferred to [facility] for possible Endovascular intervention. LKN is unknown but suspected to be 11pm (9/22/2018). tPA deferred due to unclear LKN time. NIHSS 12. Pre-MRS 0. Patient is a candidate endovascular procedure. CT Perfusion demonstrates Large right-sided penumbra  with a small infarct in the large mismatch .

## 2018-10-04 NOTE — SWALLOW VFSS/MBS ASSESSMENT ADULT - RECOMMENDED CONSISTENCY
Dysphagia 2 and nectar thick liquid. SUPERVISION WITH ALL PO INTAKE. NO STRAWS. SMALL BITES OF FOOD AND SMALL SIPS OF LIQUID. NO STRAWS.

## 2018-10-04 NOTE — SWALLOW VFSS/MBS ASSESSMENT ADULT - MODE OF PRESENTATION
spoon/fed by clinician cup/fed by clinician/spoon/self fed spoon/straw/self fed/cup/fed by clinician spoon/self fed self fed

## 2018-10-04 NOTE — PROGRESS NOTE ADULT - SUBJECTIVE AND OBJECTIVE BOX
Subjective: Patient seen and examined. No new events except as noted.     SUBJECTIVE/ROS:  No chest pain, dyspnea, palpitation, or dizziness.       MEDICATIONS:  MEDICATIONS  (STANDING):  ALBUTerol/ipratropium for Nebulization 3 milliLiter(s) Nebulizer every 6 hours  aspirin  chewable 81 milliGRAM(s) Oral daily  atorvastatin 80 milliGRAM(s) Oral at bedtime  buDESOnide  80 MICROgram(s)/formoterol 4.5 MICROgram(s) Inhaler 2 Puff(s) Inhalation two times a day  enoxaparin Injectable 40 milliGRAM(s) SubCutaneous daily  FLUoxetine 20 milliGRAM(s) Oral daily  fluticasone propionate 50 MICROgram(s)/spray Nasal Spray 1 Spray(s) Both Nostrils two times a day  influenza   Vaccine 0.5 milliLiter(s) IntraMuscular once  lactated ringers. 1000 milliLiter(s) (60 mL/Hr) IV Continuous <Continuous>  nicotine -   7 mG/24Hr(s) Patch 1 patch Transdermal daily  pantoprazole    Tablet 40 milliGRAM(s) Oral before breakfast  thiamine Injectable 100 milliGRAM(s) IV Push daily      PHYSICAL EXAM:  T(C): 36.4 (10-04-18 @ 05:21), Max: 36.8 (10-03-18 @ 17:34)  HR: 75 (10-04-18 @ 05:21) (63 - 83)  BP: 124/83 (10-04-18 @ 05:21) (118/70 - 159/92)  RR: 18 (10-04-18 @ 05:21) (18 - 20)  SpO2: 96% (10-04-18 @ 05:21) (96% - 98%)  Wt(kg): --  I&O's Summary    03 Oct 2018 07:01  -  04 Oct 2018 07:00  --------------------------------------------------------  IN: 240 mL / OUT: 400 mL / NET: -160 mL          Appearance: Normal	  HEENT:   no gross abnormality   Cardiovascular: Normal S1 S2,    Murmur:   Neck: JVP normal  Respiratory: Lungs clear   Gastrointestinal:  Soft, Non-tender  Skin: normal   Neuro: No gross deficits.   Psychiatry:  Mood & affect flat  Ext: No edema      LABS/DATA:    CARDIAC MARKERS:                                12.3   7.5   )-----------( 216      ( 03 Oct 2018 00:22 )             37.5     10-03    141  |  104  |  18  ----------------------------<  159<H>  3.8   |  25  |  1.09    Ca    9.3      03 Oct 2018 00:14      proBNP:   Lipid Profile:   HgA1c:   TSH:     TELE:  EKG:

## 2018-10-05 LAB — CANCER AG19-9 SERPL-ACNC: 24.6 U/ML — SIGNIFICANT CHANGE UP

## 2018-10-05 PROCEDURE — 99233 SBSQ HOSP IP/OBS HIGH 50: CPT

## 2018-10-05 PROCEDURE — 99254 IP/OBS CNSLTJ NEW/EST MOD 60: CPT | Mod: GC

## 2018-10-05 RX ORDER — TIOTROPIUM BROMIDE 18 UG/1
1 CAPSULE ORAL; RESPIRATORY (INHALATION) DAILY
Qty: 0 | Refills: 0 | Status: DISCONTINUED | OUTPATIENT
Start: 2018-10-05 | End: 2018-10-09

## 2018-10-05 RX ORDER — ALBUTEROL 90 UG/1
2 AEROSOL, METERED ORAL EVERY 6 HOURS
Qty: 0 | Refills: 0 | Status: DISCONTINUED | OUTPATIENT
Start: 2018-10-05 | End: 2018-10-09

## 2018-10-05 RX ADMIN — Medication 1 SPRAY(S): at 17:45

## 2018-10-05 RX ADMIN — Medication 100 MILLIGRAM(S): at 13:27

## 2018-10-05 RX ADMIN — Medication 15 MILLIGRAM(S): at 13:29

## 2018-10-05 RX ADMIN — ATORVASTATIN CALCIUM 80 MILLIGRAM(S): 80 TABLET, FILM COATED ORAL at 21:52

## 2018-10-05 RX ADMIN — PANTOPRAZOLE SODIUM 40 MILLIGRAM(S): 20 TABLET, DELAYED RELEASE ORAL at 05:26

## 2018-10-05 RX ADMIN — Medication 1 SPRAY(S): at 05:27

## 2018-10-05 RX ADMIN — BUDESONIDE AND FORMOTEROL FUMARATE DIHYDRATE 2 PUFF(S): 160; 4.5 AEROSOL RESPIRATORY (INHALATION) at 05:26

## 2018-10-05 RX ADMIN — Medication 1 PATCH: at 13:25

## 2018-10-05 RX ADMIN — Medication 20 MILLIGRAM(S): at 13:25

## 2018-10-05 RX ADMIN — Medication 15 MILLIGRAM(S): at 00:05

## 2018-10-05 RX ADMIN — TIOTROPIUM BROMIDE 1 CAPSULE(S): 18 CAPSULE ORAL; RESPIRATORY (INHALATION) at 15:01

## 2018-10-05 RX ADMIN — Medication 81 MILLIGRAM(S): at 13:26

## 2018-10-05 RX ADMIN — BUDESONIDE AND FORMOTEROL FUMARATE DIHYDRATE 2 PUFF(S): 160; 4.5 AEROSOL RESPIRATORY (INHALATION) at 17:45

## 2018-10-05 RX ADMIN — ENOXAPARIN SODIUM 40 MILLIGRAM(S): 100 INJECTION SUBCUTANEOUS at 13:25

## 2018-10-05 RX ADMIN — Medication 15 MILLIGRAM(S): at 14:20

## 2018-10-05 RX ADMIN — Medication 3 MILLILITER(S): at 05:26

## 2018-10-05 RX ADMIN — Medication 1 PATCH: at 13:35

## 2018-10-05 NOTE — PROGRESS NOTE ADULT - SUBJECTIVE AND OBJECTIVE BOX
THE PATIENT WAS SEEN AND EXAMINED BY ME WITH THE HOUSESTAFF AND STROKE TEAM DURING MORNING ROUNDS.     HPI:  61 y/o male with past medical history of HTN [facility] with suspicion of EtOH intoxication. Patient's mental status did not improve and CT/CTA head was done demonstrating Large area acute Right MCA infarct with occluded proximal M1 segment R MCA. Patient subsequently transferred to [facility] for possible Endovascular intervention. LKN is unknown but suspected to be 11pm (9/22/2018). tPA deferred due to unclear LKN time.   THE PATIENT WAS SEEN AND EXAMINED BY ME WITH THE HOUSESTAFF AND STROKE TEAM DURING MORNING ROUNDS.   HPI:  61 y/o male with past medical history of HTN [facility] with suspicion of EtOH intoxication. Patient's mental status did not improve and CT/CTA head was done demonstrating Large area acute Right MCA infarct with occluded proximal M1 segment R MCA. Patient subsequently transferred to [facility] for possible Endovascular intervention. LKN is unknown but suspected to be 11pm (9/22/2018). tPA deferred due to unclear LKN time. (23 Sep 2018 05:05)      SUBJECTIVE: No events overnight.  No new neurologic complaints.      acetaminophen   Tablet .. 650 milliGRAM(s) Oral every 6 hours PRN  acetaminophen  IVPB .. 1000 milliGRAM(s) IV Intermittent once PRN  acetaminophen  IVPB .. 1000 milliGRAM(s) IV Intermittent once  ALBUTerol/ipratropium for Nebulization 3 milliLiter(s) Nebulizer every 6 hours  aspirin  chewable 81 milliGRAM(s) Oral daily  atorvastatin 80 milliGRAM(s) Oral at bedtime  buDESOnide  80 MICROgram(s)/formoterol 4.5 MICROgram(s) Inhaler 2 Puff(s) Inhalation two times a day  enoxaparin Injectable 40 milliGRAM(s) SubCutaneous daily  FLUoxetine 20 milliGRAM(s) Oral daily  fluticasone propionate 50 MICROgram(s)/spray Nasal Spray 1 Spray(s) Both Nostrils two times a day  influenza   Vaccine 0.5 milliLiter(s) IntraMuscular once  ketorolac   Injectable 15 milliGRAM(s) IV Push every 4 hours PRN  nicotine -   7 mG/24Hr(s) Patch 1 patch Transdermal daily  pantoprazole    Tablet 40 milliGRAM(s) Oral before breakfast  thiamine Injectable 100 milliGRAM(s) IV Push daily      PHYSICAL EXAM:   Vital Signs Last 24 Hrs  T(C): 37.1 (05 Oct 2018 08:55), Max: 37.1 (04 Oct 2018 20:20)  T(F): 98.8 (05 Oct 2018 08:55), Max: 98.8 (04 Oct 2018 20:20)  HR: 74 (05 Oct 2018 08:55) (67 - 75)  BP: 142/75 (05 Oct 2018 08:55) (115/73 - 155/89)  RR: 18 (05 Oct 2018 08:55) (18 - 20)  SpO2: 96% (05 Oct 2018 08:55) (96% - 97%)    General: No acute distress  HEENT: EOM intact, visual fields full, partial left gaze palsy, incomplete saccade to right   Abdomen: Soft, nontender, nondistended   Extremities: No edema    NEUROLOGICAL EXAM:  Mental status: Awake, alert, oriented to hospital, year, month, no neglect - aware of deficit.   Cranial Nerves: Moderate left facial palsy, no nystagmus, no dysarthria, tongue midline  Motor exam: Normal tone, RUE 5/5,  RLE 5/5, L deltoid 3-/5, Left upper strength distally 2-/5,  LLE 4+/5 with slight drift  Sensation: Intact to light touch   Coordination/ Gait: No dysmetria, gait deferred.     LABS:         Hemoglobin A1C, Whole Blood: 6.6 % (09-23 @ 11:05)  Hemoglobin A1C, Whole Blood: 6.5 % (09-07 @ 18:01)  LDL Cholesterol, Direct: 53 mg/dL (10-02 @ 10:56)      IMAGING: Reviewed by me.     CT Head No Cont (09.24.18):  Previously noted acute right MCA infarct has demonstrated expected evolutionary changes with areas of hemorrhagic transformation again seen.    CT Head No Cont (09.23.18):  Redemonstration of evolving right MCA territory infarct, faint areas of increasing attenuation may reflect contrast staining, tiny foci of petechial hemorrhage not excluded.    CT perfusion w/ Maps w/ IV Cont (09.23.18):   Large right-sided penumbra  with a small infarct in the large mismatch volume.    Noncontrast head CT (09.23.18):   Large area acute right MCA territory infarct. Posterior temporal lobe infarct appears slightly more subacute with questionable petechial hemorrhage.    CTA neck (09.23.18):   No major vessel occlusion or hemodynamically significant stenosis. No evidence of dissection.    CTA head (09.23.18):  Occluded proximal M1 segment right middle cerebral artery.   Remainder of the intracranial circulation is patent.    MR Head w/wo IV Cont (09.28.18 )   Subacute right MCA infarct with associated areas of   hemorrhagic transformation suspected.    Unremarkable MRA of the neck and [place]. THE PATIENT WAS SEEN AND EXAMINED BY ME WITH THE HOUSESTAFF AND STROKE TEAM DURING MORNING ROUNDS.     HPI:  61 y/o male with past medical history of HTN presented initially to [facility] ED for AMS with suspicion of EtOH intoxication. Patient's mental status did not improve and CT/CTA head was done demonstrating Large area acute Right MCA infarct with occluded proximal M1 segment R MCA. Patient subsequently transferred to [facility] for possible Endovascular intervention. LKN is unknown but suspected to be 11pm (9/22/2018). tPA deferred due to unclear LKN time.     SUBJECTIVE: No events overnight.  No new neurologic complaints.      acetaminophen   Tablet .. 650 milliGRAM(s) Oral every 6 hours PRN  acetaminophen  IVPB .. 1000 milliGRAM(s) IV Intermittent once PRN  acetaminophen  IVPB .. 1000 milliGRAM(s) IV Intermittent once  ALBUTerol/ipratropium for Nebulization 3 milliLiter(s) Nebulizer every 6 hours  aspirin  chewable 81 milliGRAM(s) Oral daily  atorvastatin 80 milliGRAM(s) Oral at bedtime  buDESOnide  80 MICROgram(s)/formoterol 4.5 MICROgram(s) Inhaler 2 Puff(s) Inhalation two times a day  enoxaparin Injectable 40 milliGRAM(s) SubCutaneous daily  FLUoxetine 20 milliGRAM(s) Oral daily  fluticasone propionate 50 MICROgram(s)/spray Nasal Spray 1 Spray(s) Both Nostrils two times a day  influenza   Vaccine 0.5 milliLiter(s) IntraMuscular once  ketorolac   Injectable 15 milliGRAM(s) IV Push every 4 hours PRN  nicotine -   7 mG/24Hr(s) Patch 1 patch Transdermal daily  pantoprazole    Tablet 40 milliGRAM(s) Oral before breakfast  thiamine Injectable 100 milliGRAM(s) IV Push daily      PHYSICAL EXAM:   Vital Signs Last 24 Hrs  T(C): 37.1 (05 Oct 2018 08:55), Max: 37.1 (04 Oct 2018 20:20)  T(F): 98.8 (05 Oct 2018 08:55), Max: 98.8 (04 Oct 2018 20:20)  HR: 74 (05 Oct 2018 08:55) (67 - 75)  BP: 142/75 (05 Oct 2018 08:55) (115/73 - 155/89)  RR: 18 (05 Oct 2018 08:55) (18 - 20)  SpO2: 96% (05 Oct 2018 08:55) (96% - 97%)    General: No acute distress  HEENT: EOM intact, visual fields full, partial left gaze palsy, incomplete saccade to right   Abdomen: Soft, nontender, nondistended   Extremities: No edema    NEUROLOGICAL EXAM:  Mental status: Awake, alert, oriented to hospital, year, month, no neglect - aware of deficit.   Cranial Nerves: Moderate left facial palsy, no nystagmus, no dysarthria, tongue midline  Motor exam: Normal tone, RUE 5/5,  RLE 5/5, L deltoid 3-/5, Left upper strength distally 2-/5,  LLE 4+/5 with slight drift  Sensation: Intact to light touch   Coordination/ Gait: No dysmetria, gait deferred.     LABS:         Hemoglobin A1C, Whole Blood: 6.6 % (09-23 @ 11:05)  Hemoglobin A1C, Whole Blood: 6.5 % (09-07 @ 18:01)  LDL Cholesterol, Direct: 53 mg/dL (10-02 @ 10:56)      IMAGING: Reviewed by me.     CT Head No Cont (09.24.18):  Previously noted acute right MCA infarct has demonstrated expected evolutionary changes with areas of hemorrhagic transformation again seen.    CT Head No Cont (09.23.18):  Redemonstration of evolving right MCA territory infarct, faint areas of increasing attenuation may reflect contrast staining, tiny foci of petechial hemorrhage not excluded.    CT perfusion w/ Maps w/ IV Cont (09.23.18):   Large right-sided penumbra  with a small infarct in the large mismatch volume.    Noncontrast head CT (09.23.18):   Large area acute right MCA territory infarct. Posterior temporal lobe infarct appears slightly more subacute with questionable petechial hemorrhage.    CTA neck (09.23.18):   No major vessel occlusion or hemodynamically significant stenosis. No evidence of dissection.    CTA head (09.23.18):  Occluded proximal M1 segment right middle cerebral artery.   Remainder of the intracranial circulation is patent.    MR Head w/wo IV Cont (09.28.18 )   Subacute right MCA infarct with associated areas of   hemorrhagic transformation suspected.    Unremarkable MRA of the neck and [place]. THE PATIENT WAS SEEN AND EXAMINED BY ME WITH THE HOUSESTAFF AND STROKE TEAM DURING MORNING ROUNDS.     HPI:  61 y/o male with past medical history of HTN presented initially to [facility] ED for AMS with suspicion of EtOH intoxication. Patient's mental status did not improve and CT/CTA head was done demonstrating Large area acute Right MCA infarct with occluded proximal M1 segment R MCA. Patient subsequently transferred to [facility] for possible Endovascular intervention. LKN is unknown but suspected to be 11pm (9/22/2018). tPA deferred due to unclear LKN time.     SUBJECTIVE: No events overnight.  No new neurologic complaints.      ROS: All negative except documented above.    acetaminophen   Tablet .. 650 milliGRAM(s) Oral every 6 hours PRN  acetaminophen  IVPB .. 1000 milliGRAM(s) IV Intermittent once PRN  acetaminophen  IVPB .. 1000 milliGRAM(s) IV Intermittent once  ALBUTerol/ipratropium for Nebulization 3 milliLiter(s) Nebulizer every 6 hours  aspirin  chewable 81 milliGRAM(s) Oral daily  atorvastatin 80 milliGRAM(s) Oral at bedtime  buDESOnide  80 MICROgram(s)/formoterol 4.5 MICROgram(s) Inhaler 2 Puff(s) Inhalation two times a day  enoxaparin Injectable 40 milliGRAM(s) SubCutaneous daily  FLUoxetine 20 milliGRAM(s) Oral daily  fluticasone propionate 50 MICROgram(s)/spray Nasal Spray 1 Spray(s) Both Nostrils two times a day  influenza   Vaccine 0.5 milliLiter(s) IntraMuscular once  ketorolac   Injectable 15 milliGRAM(s) IV Push every 4 hours PRN  nicotine -   7 mG/24Hr(s) Patch 1 patch Transdermal daily  pantoprazole    Tablet 40 milliGRAM(s) Oral before breakfast  thiamine Injectable 100 milliGRAM(s) IV Push daily      PHYSICAL EXAM:   Vital Signs Last 24 Hrs  T(C): 37.1 (05 Oct 2018 08:55), Max: 37.1 (04 Oct 2018 20:20)  T(F): 98.8 (05 Oct 2018 08:55), Max: 98.8 (04 Oct 2018 20:20)  HR: 74 (05 Oct 2018 08:55) (67 - 75)  BP: 142/75 (05 Oct 2018 08:55) (115/73 - 155/89)  RR: 18 (05 Oct 2018 08:55) (18 - 20)  SpO2: 96% (05 Oct 2018 08:55) (96% - 97%)    General: No acute distress  HEENT: EOM intact, visual fields full, partial left gaze palsy, incomplete saccade to right   Abdomen: Soft, nontender, nondistended   Extremities: No edema    NEUROLOGICAL EXAM:  Mental status: Awake, alert, oriented to hospital, year, month, no neglect - aware of deficit.   Cranial Nerves: Moderate left facial palsy, no nystagmus, no dysarthria, tongue midline  Motor exam: Normal tone, RUE 5/5,  RLE 5/5, L deltoid 3-/5, Left upper strength distally 2-/5,  LLE 4+/5 with slight drift  Sensation: Intact to light touch   Coordination/ Gait: No dysmetria, gait deferred.     LABS:         Hemoglobin A1C, Whole Blood: 6.6 % (09-23 @ 11:05)  Hemoglobin A1C, Whole Blood: 6.5 % (09-07 @ 18:01)  LDL Cholesterol, Direct: 53 mg/dL (10-02 @ 10:56)      IMAGING: Reviewed by me.     CT Head No Cont (09.24.18):  Previously noted acute right MCA infarct has demonstrated expected evolutionary changes with areas of hemorrhagic transformation again seen.    CT Head No Cont (09.23.18):  Redemonstration of evolving right MCA territory infarct, faint areas of increasing attenuation may reflect contrast staining, tiny foci of petechial hemorrhage not excluded.    CT perfusion w/ Maps w/ IV Cont (09.23.18):   Large right-sided penumbra  with a small infarct in the large mismatch volume.    Noncontrast head CT (09.23.18):   Large area acute right MCA territory infarct. Posterior temporal lobe infarct appears slightly more subacute with questionable petechial hemorrhage.    CTA neck (09.23.18):   No major vessel occlusion or hemodynamically significant stenosis. No evidence of dissection.    CTA head (09.23.18):  Occluded proximal M1 segment right middle cerebral artery.   Remainder of the intracranial circulation is patent.    MR Head w/wo IV Cont (09.28.18 )   Subacute right MCA infarct with associated areas of   hemorrhagic transformation suspected.    Unremarkable MRA of the neck and [place].

## 2018-10-05 NOTE — CONSULT NOTE ADULT - SUBJECTIVE AND OBJECTIVE BOX
Chief Complaint:  Patient is a 62y old  Male who presents with a chief complaint of R MCA Infarct now s/p Endovascular (05 Oct 2018 14:17)      HPI:    Allergies:  No Known Allergies      Home Medications:    Hospital Medications:  acetaminophen   Tablet .. 650 milliGRAM(s) Oral every 6 hours PRN  acetaminophen  IVPB .. 1000 milliGRAM(s) IV Intermittent once PRN  acetaminophen  IVPB .. 1000 milliGRAM(s) IV Intermittent once  ALBUTerol    90 MICROgram(s) HFA Inhaler 2 Puff(s) Inhalation every 6 hours PRN  aspirin  chewable 81 milliGRAM(s) Oral daily  atorvastatin 80 milliGRAM(s) Oral at bedtime  buDESOnide  80 MICROgram(s)/formoterol 4.5 MICROgram(s) Inhaler 2 Puff(s) Inhalation two times a day  enoxaparin Injectable 40 milliGRAM(s) SubCutaneous daily  FLUoxetine 20 milliGRAM(s) Oral daily  fluticasone propionate 50 MICROgram(s)/spray Nasal Spray 1 Spray(s) Both Nostrils two times a day  influenza   Vaccine 0.5 milliLiter(s) IntraMuscular once  ketorolac   Injectable 15 milliGRAM(s) IV Push every 4 hours PRN  nicotine -   7 mG/24Hr(s) Patch 1 patch Transdermal daily  pantoprazole    Tablet 40 milliGRAM(s) Oral before breakfast  thiamine Injectable 100 milliGRAM(s) IV Push daily  tiotropium 18 MICROgram(s) Capsule 1 Capsule(s) Inhalation daily      PMHX/PSHX:  HTN (hypertension)  TIA (transient ischemic attack)      Family history:      Social History:     ROS:     General:  No wt loss, fevers, chills, night sweats, fatigue,   Eyes:  Good vision, no reported pain  ENT:  No sore throat, pain, runny nose, dysphagia  CV:  No pain, palpitations, hypo/hypertension  Resp:  No dyspnea, cough, tachypnea, wheezing  GI:  See HPI  :  No pain, bleeding, incontinence, nocturia  Muscle:  No pain, weakness  Neuro:  No weakness, tingling, memory problems  Psych:  No fatigue, insomnia, mood problems, depression  Endocrine:  No polyuria, polydipsia, cold/heat intolerance  Heme:  No petechiae, ecchymosis, easy bruisability  Skin:  No rash, edema      PHYSICAL EXAM:     GENERAL:  Appears stated age, well-groomed, well-nourished, no distress  HEENT:  NC/AT,  conjunctivae clear and pink,  no JVD  CHEST:  Full & symmetric excursion, no increased effort, breath sounds clear  HEART:  Regular rhythm, S1, S2, no murmur/rub/S3/S4, no abdominal bruit, no edema  ABDOMEN:  Soft, non-tender, non-distended, normoactive bowel sounds,  no masses , no hepatosplenomegaly  EXTREMITIES:  no cyanosis,clubbing or edema  SKIN:  No rash/erythema/ecchymoses/petechiae/wounds/abscess/warm/dry  NEURO:  Alert, oriented    Vital Signs:  Vital Signs Last 24 Hrs  T(C): 37.1 (05 Oct 2018 13:48), Max: 37.1 (04 Oct 2018 20:20)  T(F): 98.8 (05 Oct 2018 13:48), Max: 98.8 (04 Oct 2018 20:20)  HR: 75 (05 Oct 2018 13:48) (67 - 75)  BP: 144/73 (05 Oct 2018 13:48) (115/73 - 155/89)  BP(mean): --  RR: 18 (05 Oct 2018 13:48) (18 - 20)  SpO2: 96% (05 Oct 2018 13:48) (96% - 97%)  Daily     Daily     LABS:                    Imaging: Chief Complaint:  Patient is a 62y old  Male who presents with a chief complaint of R MCA Infarct now s/p Endovascular (05 Oct 2018 14:17)      HPI:  62 year old man hx of R nephrectomy for RCC in 2017, possible COPD, HTN, ethanol misuse who presented on 9/23 with stroke symptoms and underwent R MCA embolectomy. The patient had a CT abdomen on 10/2 as part of a workup for occult malignancy and was found to have a 3.7 X 3.3 cystic pancreas tail mass which was also described on MR. The patient denies having a history of pancreas cyst or ever having an episode of pancreatitis as far as he knowns. His ethanol consumption consists of ...  During his hospital course he had an EGD to rule out esophageal web (seen on MBS). Small nonbleeding gastric and duodenal ulcers were seen and biopsies were negative for H. pylori.  Patient is s/p normal TTE and now s/p ILR placement.  He is on Aspirin 81mg and lovenox prophylactic dose.  Allergies:  No Known Allergies      Home Medications:    Hospital Medications:  acetaminophen   Tablet .. 650 milliGRAM(s) Oral every 6 hours PRN  acetaminophen  IVPB .. 1000 milliGRAM(s) IV Intermittent once PRN  acetaminophen  IVPB .. 1000 milliGRAM(s) IV Intermittent once  ALBUTerol    90 MICROgram(s) HFA Inhaler 2 Puff(s) Inhalation every 6 hours PRN  aspirin  chewable 81 milliGRAM(s) Oral daily  atorvastatin 80 milliGRAM(s) Oral at bedtime  buDESOnide  80 MICROgram(s)/formoterol 4.5 MICROgram(s) Inhaler 2 Puff(s) Inhalation two times a day  enoxaparin Injectable 40 milliGRAM(s) SubCutaneous daily  FLUoxetine 20 milliGRAM(s) Oral daily  fluticasone propionate 50 MICROgram(s)/spray Nasal Spray 1 Spray(s) Both Nostrils two times a day  influenza   Vaccine 0.5 milliLiter(s) IntraMuscular once  ketorolac   Injectable 15 milliGRAM(s) IV Push every 4 hours PRN  nicotine -   7 mG/24Hr(s) Patch 1 patch Transdermal daily  pantoprazole    Tablet 40 milliGRAM(s) Oral before breakfast  thiamine Injectable 100 milliGRAM(s) IV Push daily  tiotropium 18 MICROgram(s) Capsule 1 Capsule(s) Inhalation daily      PMHX/PSHX:  HTN (hypertension)  TIA (transient ischemic attack)      Family history:    no family history of pancreas neoplasm  Social History:   45 PY smoker  ROS:     General:  No wt loss, fevers, chills, night sweats, fatigue,   Eyes:  Good vision, no reported pain  ENT:  No sore throat, pain, runny nose, dysphagia  CV:  No pain, palpitations, hypo/hypertension  Resp:  No dyspnea, cough, tachypnea, wheezing  GI:  See HPI  :  No pain, bleeding, incontinence, nocturia  Muscle:  No pain, weakness  Neuro:  No weakness, tingling, memory problems  Psych:  No fatigue, insomnia, mood problems, depression  Endocrine:  No polyuria, polydipsia, cold/heat intolerance  Heme:  No petechiae, ecchymosis, easy bruisability  Skin:  No rash, edema      PHYSICAL EXAM:     GENERAL:  Appears stated age, well-groomed, well-nourished, no distress  HEENT:  NC/AT,  conjunctivae clear and pink,  no JVD  CHEST:  Full & symmetric excursion, no increased effort, breath sounds clear  HEART:  Regular rhythm, S1, S2, no murmur/rub/S3/S4, no abdominal bruit, no edema  ABDOMEN:  Soft, non-tender, non-distended, normoactive bowel sounds,  no masses , no hepatosplenomegaly  EXTREMITIES:  no cyanosis,clubbing or edema  SKIN:  No rash/erythema/ecchymoses/petechiae/wounds/abscess/warm/dry  NEURO:  L facial droop    Vital Signs:  Vital Signs Last 24 Hrs  T(C): 37.1 (05 Oct 2018 13:48), Max: 37.1 (04 Oct 2018 20:20)  T(F): 98.8 (05 Oct 2018 13:48), Max: 98.8 (04 Oct 2018 20:20)  HR: 75 (05 Oct 2018 13:48) (67 - 75)  BP: 144/73 (05 Oct 2018 13:48) (115/73 - 155/89)  BP(mean): --  RR: 18 (05 Oct 2018 13:48) (18 - 20)  SpO2: 96% (05 Oct 2018 13:48) (96% - 97%)  Daily     Daily     LABS:                    Imaging: Chief Complaint:  Patient is a 62y old  Male who presents with a chief complaint of R MCA Infarct now s/p Endovascular (05 Oct 2018 14:17)      HPI:  62 year old man hx of R nephrectomy for RCC in 2017, possible COPD, HTN, ethanol misuse who presented on 9/23 with stroke symptoms and underwent R MCA embolectomy. The patient had a CT abdomen on 10/2 as part of a workup for occult malignancy and was found to have a 3.7 X 3.3 cystic pancreas tail mass which was also described on MR. The patient denies having a history of pancreas cyst or ever having an episode of pancreatitis as far as he knowns. His ethanol consumption consists of a six pack of beer per day but he has never had pancreatitis.  During his hospital course he had an EGD to rule out esophageal web (seen on MBS). Small nonbleeding gastric and duodenal ulcers were seen and biopsies were negative for H. pylori.  Patient is s/p normal TTE and now s/p ILR placement.  He is on Aspirin 81mg and lovenox prophylactic dose.  Allergies:  No Known Allergies      Home Medications:    Hospital Medications:  acetaminophen   Tablet .. 650 milliGRAM(s) Oral every 6 hours PRN  acetaminophen  IVPB .. 1000 milliGRAM(s) IV Intermittent once PRN  acetaminophen  IVPB .. 1000 milliGRAM(s) IV Intermittent once  ALBUTerol    90 MICROgram(s) HFA Inhaler 2 Puff(s) Inhalation every 6 hours PRN  aspirin  chewable 81 milliGRAM(s) Oral daily  atorvastatin 80 milliGRAM(s) Oral at bedtime  buDESOnide  80 MICROgram(s)/formoterol 4.5 MICROgram(s) Inhaler 2 Puff(s) Inhalation two times a day  enoxaparin Injectable 40 milliGRAM(s) SubCutaneous daily  FLUoxetine 20 milliGRAM(s) Oral daily  fluticasone propionate 50 MICROgram(s)/spray Nasal Spray 1 Spray(s) Both Nostrils two times a day  influenza   Vaccine 0.5 milliLiter(s) IntraMuscular once  ketorolac   Injectable 15 milliGRAM(s) IV Push every 4 hours PRN  nicotine -   7 mG/24Hr(s) Patch 1 patch Transdermal daily  pantoprazole    Tablet 40 milliGRAM(s) Oral before breakfast  thiamine Injectable 100 milliGRAM(s) IV Push daily  tiotropium 18 MICROgram(s) Capsule 1 Capsule(s) Inhalation daily      PMHX/PSHX:  HTN (hypertension)  TIA (transient ischemic attack)      Family history:    no family history of pancreas neoplasm  Social History:   47 PY smoker  ROS:     General:  No wt loss, fevers, chills, night sweats, fatigue,   Eyes:  Good vision, no reported pain  ENT:  No sore throat, pain, runny nose, dysphagia  CV:  No pain, palpitations, hypo/hypertension  Resp:  No dyspnea, cough, tachypnea, wheezing  GI:  See HPI  :  No pain, bleeding, incontinence, nocturia  Muscle:  No pain, weakness  Neuro:  No weakness, tingling, memory problems  Psych:  No fatigue, insomnia, mood problems, depression  Endocrine:  No polyuria, polydipsia, cold/heat intolerance  Heme:  No petechiae, ecchymosis, easy bruisability  Skin:  No rash, edema      PHYSICAL EXAM:     GENERAL:  Appears stated age, well-groomed, well-nourished, no distress  HEENT:  NC/AT,  conjunctivae clear and pink,  no JVD  CHEST:  Full & symmetric excursion, no increased effort, breath sounds clear  HEART:  Regular rhythm, S1, S2, no murmur/rub/S3/S4, no abdominal bruit, no edema  ABDOMEN:  Soft, non-tender, non-distended, normoactive bowel sounds,  no masses , no hepatosplenomegaly  EXTREMITIES:  no cyanosis,clubbing or edema  SKIN:  No rash/erythema/ecchymoses/petechiae/wounds/abscess/warm/dry  NEURO:  L facial droop    Vital Signs:  Vital Signs Last 24 Hrs  T(C): 37.1 (05 Oct 2018 13:48), Max: 37.1 (04 Oct 2018 20:20)  T(F): 98.8 (05 Oct 2018 13:48), Max: 98.8 (04 Oct 2018 20:20)  HR: 75 (05 Oct 2018 13:48) (67 - 75)  BP: 144/73 (05 Oct 2018 13:48) (115/73 - 155/89)  BP(mean): --  RR: 18 (05 Oct 2018 13:48) (18 - 20)  SpO2: 96% (05 Oct 2018 13:48) (96% - 97%)  Daily     Daily     LABS:                    Imaging:

## 2018-10-05 NOTE — PROGRESS NOTE ADULT - SUBJECTIVE AND OBJECTIVE BOX
Chief Complaint:    pt. feels ok, s/p repeat MBS    Interval Events:     Allergies:  No Known Allergies      Home Medications:    Hospital Medications:  acetaminophen   Tablet .. 650 milliGRAM(s) Oral every 6 hours PRN  acetaminophen  IVPB .. 1000 milliGRAM(s) IV Intermittent once PRN  acetaminophen  IVPB .. 1000 milliGRAM(s) IV Intermittent once  ALBUTerol/ipratropium for Nebulization 3 milliLiter(s) Nebulizer every 6 hours  aspirin  chewable 81 milliGRAM(s) Oral daily  atorvastatin 80 milliGRAM(s) Oral at bedtime  buDESOnide  80 MICROgram(s)/formoterol 4.5 MICROgram(s) Inhaler 2 Puff(s) Inhalation two times a day  enoxaparin Injectable 40 milliGRAM(s) SubCutaneous daily  FLUoxetine 20 milliGRAM(s) Oral daily  fluticasone propionate 50 MICROgram(s)/spray Nasal Spray 1 Spray(s) Both Nostrils two times a day  influenza   Vaccine 0.5 milliLiter(s) IntraMuscular once  ketorolac   Injectable 15 milliGRAM(s) IV Push every 4 hours PRN  nicotine -   7 mG/24Hr(s) Patch 1 patch Transdermal daily  pantoprazole    Tablet 40 milliGRAM(s) Oral before breakfast  thiamine Injectable 100 milliGRAM(s) IV Push daily    ROS  GI: [- ] Nausea, [- ] vomiting, [ -]abdominal pain    PHYSICAL EXAM:   Vital Signs:  Vital Signs Last 24 Hrs  T(C): 37.1 (05 Oct 2018 08:55), Max: 37.1 (04 Oct 2018 20:20)  T(F): 98.8 (05 Oct 2018 08:55), Max: 98.8 (04 Oct 2018 20:20)  HR: 74 (05 Oct 2018 08:55) (67 - 75)  BP: 142/75 (05 Oct 2018 08:55) (115/73 - 155/89)  BP(mean): --  RR: 18 (05 Oct 2018 08:55) (18 - 20)  SpO2: 96% (05 Oct 2018 08:55) (96% - 97%)  Daily     Daily     GENERAL:  Appears stated age, well-groomed, well-nourished, no distress  HEENT:  NC/AT,  conjunctivae clear and pink, no thyromegaly, nodules, adenopathy, no JVD, sclera -anicteric  CHEST:  Full & symmetric excursion, no increased effort, breath sounds clear  HEART:  Regular rhythm, S1, S2, no murmur/rub/S3/S4, no abdominal bruit, no edema  ABDOMEN:  Soft, non-tender, non-distended, normoactive bowel sounds,  no masses ,no hepato-splenomegaly, no signs of chronic liver disease  EXTEREMITIES:  no cyanosis,clubbing or edema  SKIN:  No rash/erythema/ecchymoses/petechiae/wounds/abscess/warm/dry  NEURO:  Alert, oriented, no asterixis, no tremor, no encephalopathy    LABS:    Reviewed                Imaging: Modified Barium Swallow (10.04.18 @ 12:25) >    Laryngeal penetration without aspiration.    For further information and recommendations, please refer to the speech   pathologist finalreport which is available for review in the electronic   medical record.    MRI Abd. reviewed     EGD Pathology Report:     Final Diagnosis  1. Stomach, antrum, biopsy  - Gastric corporo-antral mucosa with chronic inactive  gastritis and reactive  gastropathy  - Negative for Helicobacter microorganisms ([place]-Starry  stain)  - Negative for intestinal metaplasia    2. Esophagus, biopsy  - Squamous mucosa with no significant diagnostic alterations  - Negative for intraepithelial eosinophilia  - Negative for glandular mucosa

## 2018-10-05 NOTE — CONSULT NOTE ADULT - ATTENDING COMMENTS
63 yo M pmh HTN with new onset Right MCA infarct who is planned for AYO to workup stroke causes.  On S&S, patient found to have concern for esophageal web in cervical region of esophagus.  Only MBS completed, no further esophagram.  Patient with no dysphagia, though silent aspiration on S&S testing.  Will plan for full esophagram - if there is cervical web, depending on size, can try dilation prior to AYO.  Otherwise can proceed directly to AYO if normal esophagram.    Impression:  1) ? Cervical Web  2) New MCA infract  3) Silent aspiration    Plan:  1) Barium Esophagram  2) Pending results - will decide on EGD
As above.    Pt tentatively scheduled for EUS / needle biopsy of thick walled pancreas cystic lesion on Monday 10/8.
as above

## 2018-10-05 NOTE — CHART NOTE - NSCHARTNOTEFT_GEN_A_CORE
Patient seen for nutrition follow-up on [facility]     Chart reviewed, events noted. This is a 62 year old Male admitted with past medical history of HTN presented initially to [facility] ED for AMS with suspicion of EtOH intoxication. Patient's mental status did not improve and CT/CTA head was done demonstrating Large area acute Right MCA infarct with occluded proximal M1 segment R MCA. Pt noted with dysphagia s/p S&S eval with MBS which revealed a filling defect in the anterior esophagus. Patient was planned for AYO to rule out cardiac anatomic abnormality. Concern for esophageal web on MBS. GI consulted for EGD prior to AYO.    Source: Patient [x ]    Family [ ]     other [x ]: medical record    Diet : Dysphagia 2 + Nectar thick liquid per SLP recommendations on 10/4.     Pt previously on Dysphagia 1with Nectar thick liquids, advanced to Dysphagia 2 diet with nectar thick liquids s/p MBS yesterday with supervision, no straws and small bites per SLP recommendations.     Patient reports [ ] nausea  [ ] vomiting [ ] diarrhea [ ] constipation  [ ]chewing problems [ ] swallowing issues  [ ] other: Pt denies any acute GI distress. Last BM 10/27.      PO intake:  < 50% [ ] 50-75% [ ]   % [x ]  other : pt reports good PO intake continues, likely completing at least 75% of meals. Reports good tolerance to current diet. Declined offer to obtain additional food preferences.      Source for PO intake [ x] Patient [ ] family [ ] chart [ ] staff [ ] other    No new weight to assess     Pertinent Medications: MEDICATIONS  (STANDING):  acetaminophen  IVPB .. 1000 milliGRAM(s) IV Intermittent once  aspirin  chewable 81 milliGRAM(s) Oral daily  atorvastatin 80 milliGRAM(s) Oral at bedtime  buDESOnide  80 MICROgram(s)/formoterol 4.5 MICROgram(s) Inhaler 2 Puff(s) Inhalation two times a day  enoxaparin Injectable 40 milliGRAM(s) SubCutaneous daily  FLUoxetine 20 milliGRAM(s) Oral daily  fluticasone propionate 50 MICROgram(s)/spray Nasal Spray 1 Spray(s) Both Nostrils two times a day  influenza   Vaccine 0.5 milliLiter(s) IntraMuscular once  nicotine -   7 mG/24Hr(s) Patch 1 patch Transdermal daily  pantoprazole    Tablet 40 milliGRAM(s) Oral before breakfast  thiamine Injectable 100 milliGRAM(s) IV Push daily  tiotropium 18 MICROgram(s) Capsule 1 Capsule(s) Inhalation daily    MEDICATIONS  (PRN):  acetaminophen   Tablet .. 650 milliGRAM(s) Oral every 6 hours PRN Temp greater or equal to 38C (100.4F), Mild Pain (1 - 3)  acetaminophen  IVPB .. 1000 milliGRAM(s) IV Intermittent once PRN Mild Pain (1 - 3), Moderate Pain (4 - 6), Severe Pain (7 - 10)  ALBUTerol    90 MICROgram(s) HFA Inhaler 2 Puff(s) Inhalation every 6 hours PRN Shortness of Breath and/or Wheezing  ketorolac   Injectable 15 milliGRAM(s) IV Push every 4 hours PRN Moderate Pain (4 - 6)    Pertinent Labs:  10-03 Na141 mmol/L Glu 159 mg/dL<H> K+ 3.8 mmol/L Cr  1.09 mg/dL BUN 18 mg/dL 09-29 Phos 3.5 mg/dL 09-23 [id number] 6.6 %<H> 09-23 Chol 110 mg/dL LDL Unable to calculate LDL Cholesterol --- Interpretive Comment (for adults 18 and over)  Optimal LDL Level may vary based on clinical situation  Below 70                  Ideal for people at very high risk of heart  disease  Below 100                Ideal for people at risk of heart disease  100 - 129                   [place]  130 - 159                   Borderline high  160 - 189                   High  190 and Above          Very high HDL 27 mg/dL<L> Trig 402 mg/dL<H>      Skin: free of pressure injuries per nursing flow sheets   Edema: none noted per nursing flow sheets     Estimated Needs:   [x ] no change since previous assessment  [ ] recalculated:       Previous Nutrition Diagnosis:     [x ] Swallowing difficulties       Nutrition Diagnosis is [x ] ongoing  [ ] resolved [ ] not applicable        New Nutrition Diagnosis: [x ] not applicable      Interventions:   Recommend  1. Continue current diet as tolerance. Defer diet consistency to SLP.   2. Obtain/honor food preferences as able.   3. Consider additional bowel regimen given last BM noted on 9/27.      Monitoring and Evaluation:     [x ] PO intake [x ] Tolerance to diet prescription [x ] weights [x ] follow up per protocol    [ x] other: RD to remain available and follow-up as medically appropriate. Jane Still RD, CDN, Pager # [phone]

## 2018-10-05 NOTE — PROGRESS NOTE ADULT - ASSESSMENT
62 Y.O. WM admitted with an acute Right MCA CVA with noted dysphagia s/p S&S eval with MBS which revealed a filling defect in the anterior esophagus. Patient was planned for AYO to rule out cardiac anatomic abnormality. Concern for esophageal web on MBS. GI consulted for EGD prior to AYO.  Pt. also noted with mild anemia, but significant Microcytic and Hypochromic anemia.  Pt. now with a Pancreatic tail lesion.  Upper Endoscopy (10.03.18 @ 07:13) >                       - Normal esophagus. Biopsied.                       - Small hiatus hernia.                       - Chronic gastritis. Biopsied.                       - Non-bleeding gastric ulcers with no stigmata of bleeding.                       - Non-bleeding gastric ulcers with no stigmata of bleeding. Biopsied.                       - Multiple non-bleeding duodenal ulcers with no stigmata of bleeding.  Recommendation:                             - Continue Protonix (pantoprazole) 40 mg PO daily daily.                           Recent colonoscopy report reviewed: pt. with many colonic polyps (Tubular Adenomas).  Will advance diet to dysphagia 2 with supervision.  Pt. needs an EUS (Endoscopic Ultrasound) to biopsy the pancreatic tail lesion.  R/O Pancreatic Cancer.  Called Dr. Prince Barillas who will schedule pt. for for EUS on Mon.    I had a prolonged conversation with pt. and wife re. likely diagnosis and plan who verbalizes clear understanding,    Thank you.

## 2018-10-05 NOTE — CONSULT NOTE ADULT - ASSESSMENT
Impression:  1. Pancreas tail lesion: Ddx includes adenoCa, IPMN, pseudocyst. Bile ducts normal caliber. Liver chemistries.    2. Microcytic anemia, history of gastric ulcers and colon polyps on colonoscopy approx 1 y ago with several polyps (unknown if removed)    Recommendation:  -plan for EUS possible FNA on Mon  -please perform an INR/PTT as patient has not had one since admission    Kartik Elder M.D.   [facility]  Contact: [phone] Impression:  1. Pancreas tail lesion: Ddx includes adenoCa, IPMN, pseudocyst. Bile ducts normal caliber. Liver chemistries unremarkable.    2. Microcytic anemia, history of gastric ulcers and colon polyps on colonoscopy approx 1 y ago with several polyps (unknown if removed)    Recommendation:  -plan for EUS possible FNA on Mon  -please perform an INR/PTT as patient has not had one since admission    Kartik Elder M.D.   [facility]  Contact: [phone] Impression:  1. Pancreas tail lesion: Ddx includes adenoCa, IPMN, pseudocyst. Bile ducts normal caliber. Liver chemistries unremarkable.    2. Microcytic anemia, history of gastric ulcers and colon polyps on colonoscopy approx 1 y ago with several polyps (unknown if removed)    Recommendation:  -plan for EUS possible FNA on Mon  -please perform an INR/PTT as patient has not had one since admission  -as pulmonary is following, it would be helpful for them to comment on patient's risk level for undergoing anesthesia  Kartik Elder M.D.   [facility]  Contact: [phone] Impression:  1. Pancreas tail lesion: Ddx includes adenoCa, IPMN, pseudocyst. Bile ducts normal caliber. Liver chemistries unremarkable.    2. Microcytic anemia, history of gastric ulcers and colon polyps on colonoscopy approx 1 y ago with several polyps (unknown if removed)    3. COPD, abnormal CT of lungs (mucoid impaction). Patient appears asymptomatic at this time. saturating 94-98% on room air.  Recommendation:  -plan for EUS possible FNA on Mon  -please perform an INR/PTT as patient has not had one since admission  -as pulmonary is following, it would be helpful for them to comment on patient's risk level for undergoing anesthesia  Kartik Elder M.D.   [facility]  Contact: [phone]

## 2018-10-05 NOTE — PROGRESS NOTE ADULT - ASSESSMENT
63 y/o M with PMH of HTN, HLD, renal [place] s/p [place] [facility] [place]), current smoker (45 pack yrs), depression, anxiety presented to [facility] 9/23 with AMS and found to have R MCA CVA s/p embolectomy. Extubated 9/24.

## 2018-10-05 NOTE — PROGRESS NOTE ADULT - ASSESSMENT
62 year-old  male with past medical history of HTN and ETOH abuse. On 9/23/18 he presented to [facility] with AMS and intoxication. Upon further evaluation by the ED he wasn't moving L arm. On telestroke eval he was found with  left facial droop, left upper extremity monoplegia and lower extremity weakness, significant dysarthria and left hemibody hypoesthesia. Head CT showed area of hypodensity at right fronto parietal lobe and acute/ subacute hypodensity at the inferior temporal/parietal lobe middle cerebral artery territory. CT angiography demonstrated right middle cerebral artery proximal M1 segment occlusion. Patient not candidate for IV-tPA given unknown last known well as well as appearance of subacute infarct. He was transferred to [facility] for evaluation for endovascular therapy. CT perfusion showed a small core infarct with large area of penumbra. He was deemed candidate for endovascular therapy with mechanical thrombectomy with TICI 2b recanalization. MRI brain subsequently showed right MCA distribution infarct with hemorrhagic transformation (HI 2). TTE did not show any obvious structural cardiac source of embolism. AYO did not show any obvious structural cardiac source of embolism nor showed any evidence of a PFO.      Impression:   Cerebral embolism with cerebral infarction. R MCA distribution stroke with mild hemorrhagic concentration (HI 2) - likely etiology being cryptogenic stroke, probably related to embolism from a proximal source like cardiac source of embolism.    NEURO: Neurologically overall with improvement, Continue close monitoring for neurologic deterioration, BP goal gradual normotension in the setting of recent successful revascularization, continue with home statin medication if applicable considering likely non-atheroembolic etiology of his stroke and age, repeat fasting lipid profile to better characterize LDL results, Physical therapy/OT - acute TBI.      ANTITHROMBOTIC THERAPY: Aspirin for secondary stroke prevention     PULMONARY: CXR on 9/26 Right lower lobe nodules, largest of which measures 5 mm, pulm following- plan for repeat CT chest in 6 months, probable COPD, wheezing, continue Duoneb, symbicort     CARDIOVASCULAR: TTE showed - LVEF: 63%, Grossly normal left ventricular internal dimensions and wall thicknesses. Grossly hyperdynamic left ventricular systolic function. Reversal of the E-A  waves of the mitral inflow pattern is consistent with diastolic LV dysfunction. Grossly normal right ventricular size and function.  Cardiac monitoring without any documented events. AYO: negative for thrombus or PFO. PLan for  prolonged cardiac monitoring with ICM to screen for occult cardiac arrhythmias and atrial fibrillation during the cardiac source of embolism. EP consulted for ILR placement today.     SBP goal: <140/90 mmHg    GASTROINTESTINAL:  dysphagia screen - failed, speech and swallow failed, MBS done on 9/27, diet recommended, tolerating diet, GI consult appreciated : EGD done today: Normal esophagus. Biopsied, Small hiatus hernia, Chronic gastritis. Biopsied. Non-bleeding gastric ulcers with no stigmata of bleeding. Non-bleeding gastric ulcers with no stigmata of bleeding. Biopsied. - Multiple non-bleeding duodenal ulcers with no stigmata of bleeding. Recommendation: Use Protonix (pantoprazole) 40 mg PO daily daily and cleared for AYO as per GI. Overnight patient noted to be coughing and possible aspiration was made NPO, MBS done today, diet recommended. CT A/P showed pancreatic tail lesion, MRI of abdomen with Howard (MRCP protocol) ordered to delineate the pancreatic tail lesion ordered but patient was unable to tolerate the test.       Diet: dysphagia II nectar consistency    RENAL: BUN/Cr without acute change, good urine output      Na Goal: Greater than 135     Strong: no    HEMATOLOGY: H/H without acute change, Platelets 212, no signs or symptoms of active bleeding. History of renal CA-,     DVT ppx: Heparin s.c [] LMWH [x]     ID: afebrile, no leukocytosis, no signs or symptoms of infection.      OTHER: Psychiatry Consult: continue fluoxetine for improved motor recovery, melatonin for insomnia, no need for CIWA, monitor for si/sx of withdrawal,  also discussed with patient and wife that he needs a sleep study as an outpatient as we suspect he has SHILO. All questions and concerns addressed. Patient reported chronic Low Back Pain and takes Gabapentin for this reason and chronic Neuropathy. His wife plans to bring in his home dose so he can restart this medication.     DISPOSITION: Acute TBI once stable and workup is complete.    CORE MEASURES:        Admission NIHSS: 12     TPA: [] YES [x] NO      LDL/HDL: 17/36     Depression Screen: p     Statin Therapy: yes     Dysphagia Screen: [] PASS [x] FAIL     Smoking [] YES [x] NO      Afib [] YES [x] NO     Stroke Education [] YES [] NO - 62 year-old  male with past medical history of HTN and ETOH abuse. On 9/23/18 he presented to [facility] with AMS and intoxication. Upon further evaluation by the ED he wasn't moving L arm. On telestroke eval he was found with  left facial droop, left upper extremity monoplegia and lower extremity weakness, significant dysarthria and left hemibody hypoesthesia. Head CT showed area of hypodensity at right fronto parietal lobe and acute/ subacute hypodensity at the inferior temporal/parietal lobe middle cerebral artery territory. CT angiography demonstrated right middle cerebral artery proximal M1 segment occlusion. Patient not candidate for IV-tPA given unknown last known well as well as appearance of subacute infarct. He was transferred to [facility] for evaluation for endovascular therapy. CT perfusion showed a small core infarct with large area of penumbra. He was deemed candidate for endovascular therapy with mechanical thrombectomy with TICI 2b recanalization. MRI brain subsequently showed right MCA distribution infarct with hemorrhagic transformation (HI 2). TTE did not show any obvious structural cardiac source of embolism. AYO did not show any obvious structural cardiac source of embolism nor showed any evidence of a PFO.      Impression:   Cerebral embolism with cerebral infarction. R MCA distribution stroke with mild hemorrhagic concentration (HI 2) - likely etiology being cryptogenic stroke, probably related to embolism from a proximal source like cardiac source of embolism.    NEURO: Neurologically overall with improvement, Continue close monitoring for neurologic deterioration, BP goal gradual normotension in the setting of recent successful revascularization, continue with home statin medication if applicable considering likely non-atheroembolic etiology of his stroke and age, repeat fasting lipid profile to better characterize LDL results, Physical therapy/OT - acute TBI.      ANTITHROMBOTIC THERAPY: Aspirin for secondary stroke prevention     PULMONARY: CXR on 9/26 Right lower lobe nodules, largest of which measures 5 mm, pulm following- plan for repeat CT chest in 6 months, probable COPD, wheezing, continue Duoneb, symbicort     CARDIOVASCULAR: TTE showed - LVEF: 63%, Grossly normal left ventricular internal dimensions and wall thicknesses. Grossly hyperdynamic left ventricular systolic function. Reversal of the E-A  waves of the mitral inflow pattern is consistent with diastolic LV dysfunction. Grossly normal right ventricular size and function.  Cardiac monitoring without any documented events. AYO: negative for thrombus or PFO. PLan for  prolonged cardiac monitoring with ICM to screen for occult cardiac arrhythmias and atrial fibrillation during the cardiac source of embolism. EP consulted for ILR placement today.     SBP goal: <140/90 mmHg    GASTROINTESTINAL:  dysphagia screen - failed, speech and swallow failed, MBS done on 9/27, diet recommended, tolerating diet, GI consult appreciated : EGD done today: Normal esophagus. Biopsied, Small hiatus hernia, Chronic gastritis. Biopsied. Non-bleeding gastric ulcers with no stigmata of bleeding. Non-bleeding gastric ulcers with no stigmata of bleeding. Biopsied. - Multiple non-bleeding duodenal ulcers with no stigmata of bleeding. Recommendation: Use Protonix (pantoprazole) 40 mg PO daily daily and cleared for AYO as per GI. Overnight patient noted to be coughing and possible aspiration was made NPO, MBS done today, diet recommended. CT A/P showed pancreatic tail lesion, MRI of abdomen with Howard (MRCP protocol) ordered to delineate the pancreatic tail lesion ordered but patient was unable to tolerate the test. Plan for biopsy on Monday.       Diet: dysphagia II nectar consistency    RENAL: BUN/Cr without acute change, good urine output      Na Goal: Greater than 135     Strong: no    HEMATOLOGY: H/H without acute change, Platelets 212, no signs or symptoms of active bleeding. History of renal CA-,     DVT ppx: Heparin s.c [] LMWH [x]     ID: afebrile, no leukocytosis, no signs or symptoms of infection.      OTHER: Psychiatry Consult: continue fluoxetine for improved motor recovery, melatonin for insomnia, no need for CIWA, monitor for si/sx of withdrawal,  also discussed with patient and wife that he needs a sleep study as an outpatient as we suspect he has SHILO. All questions and concerns addressed. Patient reported chronic Low Back Pain and takes Gabapentin for this reason and chronic Neuropathy. His wife plans to bring in his home dose so he can restart this medication.     DISPOSITION: Acute TBI once stable and workup is complete.    CORE MEASURES:        Admission NIHSS: 12     TPA: [] YES [x] NO      LDL/HDL: 17/36     Depression Screen: p     Statin Therapy: yes     Dysphagia Screen: [] PASS [x] FAIL     Smoking [] YES [x] NO      Afib [] YES [x] NO     Stroke Education [] YES [] NO - 62 year-old  male with past medical history of HTN and ETOH abuse. On 9/23/18 he presented to [facility] with AMS and intoxication. Upon further evaluation by the ED he wasn't moving L arm. On telestroke eval he was found with  left facial droop, left upper extremity monoplegia and lower extremity weakness, significant dysarthria and left hemibody hypoesthesia. Head CT showed area of hypodensity at right fronto parietal lobe and acute/ subacute hypodensity at the inferior temporal/parietal lobe middle cerebral artery territory. CT angiography demonstrated right middle cerebral artery proximal M1 segment occlusion. Patient not candidate for IV-tPA given unknown last known well as well as appearance of subacute infarct. He was transferred to [facility] for evaluation for endovascular therapy. CT perfusion showed a small core infarct with large area of penumbra. He was deemed candidate for endovascular therapy with mechanical thrombectomy with TICI 2b recanalization. MRI brain subsequently showed right MCA distribution infarct with hemorrhagic transformation (HI 2). TTE did not show any obvious structural cardiac source of embolism. AYO did not show any obvious structural cardiac source of embolism nor showed any evidence of a PFO. 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      Impression:   Cerebral embolism with cerebral infarction. R MCA distribution stroke with mild hemorrhagic concentration (HI 2) - likely etiology being cryptogenic stroke, probably related to embolism from a proximal source like cardiac source of embolism.    NEURO: Neurologically overall with improvement, Continue close monitoring for neurologic deterioration, BP goal gradual normotension in the setting of recent successful revascularization, continue with home statin medication if applicable considering likely non-atheroembolic etiology of his stroke and age, repeat fasting lipid profile to better characterize LDL results, Physical therapy/OT - acute TBI.      ANTITHROMBOTIC THERAPY: Aspirin for secondary stroke prevention     PULMONARY: CXR on 9/26 Right lower lobe nodules, largest of which measures 5 mm, pulm following- plan for repeat CT chest in 6 months, probable COPD, wheezing, continue Duoneb, symbicort     CARDIOVASCULAR: TTE showed - LVEF: 63%, Grossly normal left ventricular internal dimensions and wall thicknesses. Grossly hyperdynamic left ventricular systolic function. Reversal of the E-A  waves of the mitral inflow pattern is consistent with diastolic LV dysfunction. Grossly normal right ventricular size and function.  Cardiac monitoring without any documented events. AYO: negative for thrombus or PFO. Plan for  prolonged cardiac monitoring with ICM to screen for occult cardiac arrhythmias and atrial fibrillation during the cardiac source of embolism. s/p ICM placement for prolonged cardiac monitoring to screen for occult cardiac arrhythmias like atrial fibrillation     SBP goal: gradual normotension    GASTROINTESTINAL:  dysphagia screen - failed, speech and swallow failed, MBS done on 9/27, diet recommended, tolerating diet, GI consult appreciated : EGD done today: Normal esophagus. Biopsied, Small hiatus hernia, Chronic gastritis. Biopsied. Non-bleeding gastric ulcers with no stigmata of bleeding. Non-bleeding gastric ulcers with no stigmata of bleeding. Biopsied. - Multiple non-bleeding duodenal ulcers with no stigmata of bleeding. Recommendation: Use Protonix (pantoprazole) 40 mg PO daily daily and cleared for AYO as per GI. Overnight patient noted to be coughing and possible aspiration was made NPO, MBS done today, diet recommended. CT A/P showed pancreatic tail lesion, MRI of abdomen with Howard (MRCP protocol) ordered to delineate the pancreatic tail lesion ordered but patient was unable to tolerate the test. Plan for biopsy on Monday.     Diet: dysphagia II nectar consistency    RENAL: BUN/Cr without acute change, good urine output      Na Goal: Greater than 135     Strong: no    HEMATOLOGY: H/H without acute change, Platelets 212, no signs or symptoms of active bleeding. History of renal CA-,     DVT ppx: Heparin s.c [] LMWH [x]     ID: afebrile, no leukocytosis, no signs or symptoms of infection.      OTHER: Psychiatry Consult: continue fluoxetine for improved motor recovery, melatonin for insomnia, no need for CIWA, monitor for si/sx of withdrawal,  also discussed with patient and wife that he needs a sleep study as an outpatient as we suspect he has SHILO. All questions and concerns addressed. Patient reported chronic Low Back Pain and takes Gabapentin for this reason and chronic Neuropathy. His wife plans to bring in his home dose so he can restart this medication.     DISPOSITION: Acute TBI once stable and workup is complete.    CORE MEASURES:        Admission NIHSS: 12     TPA: [] YES [x] NO      LDL/HDL: 17/36     Depression Screen: p     Statin Therapy: yes     Dysphagia Screen: [] PASS [x] FAIL     Smoking [] YES [x] NO      Afib [] YES [x] NO     Stroke Education [] YES [] NO -

## 2018-10-05 NOTE — PROGRESS NOTE ADULT - SUBJECTIVE AND OBJECTIVE BOX
Subjective: Patient seen and examined. No new events except as noted.     SUBJECTIVE/ROS:  No chest pain, dyspnea, palpitation, or dizziness.       MEDICATIONS:  MEDICATIONS  (STANDING):  acetaminophen  IVPB .. 1000 milliGRAM(s) IV Intermittent once  ALBUTerol/ipratropium for Nebulization 3 milliLiter(s) Nebulizer every 6 hours  aspirin  chewable 81 milliGRAM(s) Oral daily  atorvastatin 80 milliGRAM(s) Oral at bedtime  buDESOnide  80 MICROgram(s)/formoterol 4.5 MICROgram(s) Inhaler 2 Puff(s) Inhalation two times a day  enoxaparin Injectable 40 milliGRAM(s) SubCutaneous daily  FLUoxetine 20 milliGRAM(s) Oral daily  fluticasone propionate 50 MICROgram(s)/spray Nasal Spray 1 Spray(s) Both Nostrils two times a day  influenza   Vaccine 0.5 milliLiter(s) IntraMuscular once  nicotine -   7 mG/24Hr(s) Patch 1 patch Transdermal daily  pantoprazole    Tablet 40 milliGRAM(s) Oral before breakfast  thiamine Injectable 100 milliGRAM(s) IV Push daily      PHYSICAL EXAM:  T(C): 36.4 (10-05-18 @ 05:06), Max: 37.1 (10-04-18 @ 20:20)  HR: 67 (10-05-18 @ 05:06) (67 - 75)  BP: 115/73 (10-05-18 @ 05:06) (115/73 - 155/89)  RR: 18 (10-05-18 @ 05:06) (18 - 20)  SpO2: 96% (10-05-18 @ 05:06) (96% - 97%)  Wt(kg): --  I&O's Summary    04 Oct 2018 07:01  -  05 Oct 2018 07:00  --------------------------------------------------------  IN: 240 mL / OUT: 900 mL / NET: -660 mL          Appearance: Normal	  HEENT:   no gross abnormality   Cardiovascular: Normal S1 S2,    Murmur:   Neck: JVP normal  Respiratory: Lungs clear   Gastrointestinal:  Soft, Non-tender  Skin: normal   Neuro: No gross deficits.   Psychiatry:  Mood & affect flat  Ext: No edema      LABS/DATA:    CARDIAC MARKERS:                  proBNP:   Lipid Profile:   HgA1c:   TSH:     TELE:  EKG:

## 2018-10-05 NOTE — PROGRESS NOTE ADULT - SUBJECTIVE AND OBJECTIVE BOX
Follow-up Pulm Progress Note    No new respiratory events overnight.  Denies SOB/CP.   s/p ILR placement  96% on RA    Medications:  MEDICATIONS  (STANDING):  acetaminophen  IVPB .. 1000 milliGRAM(s) IV Intermittent once  aspirin  chewable 81 milliGRAM(s) Oral daily  atorvastatin 80 milliGRAM(s) Oral at bedtime  buDESOnide  80 MICROgram(s)/formoterol 4.5 MICROgram(s) Inhaler 2 Puff(s) Inhalation two times a day  enoxaparin Injectable 40 milliGRAM(s) SubCutaneous daily  FLUoxetine 20 milliGRAM(s) Oral daily  fluticasone propionate 50 MICROgram(s)/spray Nasal Spray 1 Spray(s) Both Nostrils two times a day  influenza   Vaccine 0.5 milliLiter(s) IntraMuscular once  nicotine -   7 mG/24Hr(s) Patch 1 patch Transdermal daily  pantoprazole    Tablet 40 milliGRAM(s) Oral before breakfast  thiamine Injectable 100 milliGRAM(s) IV Push daily  tiotropium 18 MICROgram(s) Capsule 1 Capsule(s) Inhalation daily    MEDICATIONS  (PRN):  acetaminophen   Tablet .. 650 milliGRAM(s) Oral every 6 hours PRN Temp greater or equal to 38C (100.4F), Mild Pain (1 - 3)  acetaminophen  IVPB .. 1000 milliGRAM(s) IV Intermittent once PRN Mild Pain (1 - 3), Moderate Pain (4 - 6), Severe Pain (7 - 10)  ALBUTerol    90 MICROgram(s) HFA Inhaler 2 Puff(s) Inhalation every 6 hours PRN Shortness of Breath and/or Wheezing  ketorolac   Injectable 15 milliGRAM(s) IV Push every 4 hours PRN Moderate Pain (4 - 6)          Vital Signs Last 24 Hrs  T(C): 37.1 (05 Oct 2018 08:55), Max: 37.1 (04 Oct 2018 20:20)  T(F): 98.8 (05 Oct 2018 08:55), Max: 98.8 (04 Oct 2018 20:20)  HR: 74 (05 Oct 2018 08:55) (67 - 75)  BP: 142/75 (05 Oct 2018 08:55) (115/73 - 155/89)  BP(mean): --  RR: 18 (05 Oct 2018 08:55) (18 - 20)  SpO2: 96% (05 Oct 2018 08:55) (96% - 97%) on RA          10-04 @ 07:01  -  10-05 @ 07:00  --------------------------------------------------------  IN: 240 mL / OUT: 900 mL / NET: -660 mL              Physical Examination:  PULM: Diminished BS bilaterally   CVS: S1, S2 heard    RADIOLOGY REVIEWED

## 2018-10-05 NOTE — CONSULT NOTE ADULT - SUBJECTIVE AND OBJECTIVE BOX
EP Nurse Practitioner Progress note: Resting comfortably in bed, denies CP, palpitations or SOB.       Physical Exam:  General: Alert and Oriented x 3   Cardio: S1, S2 Regular  Lungs: Clear to auscultation B/L   Abdomen: softly distended, non tender  Extremities: no edema    Tele: NSR. No AFib seen on telemetry.     ASSESSMENT/PLAN: 	    63 y/o male with past medical history of HTN presented initially to [facility] ED for AMS with suspicion of EtOH intoxication. Patient's mental status did not improve and CT/CTA head was done demonstrating Large area acute Right MCA infarct with occluded proximal M1 segment R MCA. Patient subsequently transferred to [facility] for Endovascular intervention. On telemetry no atrial arrythmia seen. AYO done without PFO.     -Plan for ILR in EP Lab today  -Patient and family in agreement   -Post ILR teaching reviewed with patient and spouse at bedside   -F/U in EP Clinic for wound and device check within 10 days  -If fever or swelling/redness/discharge from wound, call EP clinic at [phone].    #[phone]

## 2018-10-06 PROCEDURE — 99233 SBSQ HOSP IP/OBS HIGH 50: CPT

## 2018-10-06 RX ADMIN — Medication 81 MILLIGRAM(S): at 11:54

## 2018-10-06 RX ADMIN — Medication 1 PATCH: at 12:00

## 2018-10-06 RX ADMIN — Medication 1 SPRAY(S): at 05:07

## 2018-10-06 RX ADMIN — Medication 1 PATCH: at 11:54

## 2018-10-06 RX ADMIN — Medication 100 MILLIGRAM(S): at 11:55

## 2018-10-06 RX ADMIN — Medication 20 MILLIGRAM(S): at 11:54

## 2018-10-06 RX ADMIN — Medication 1 SPRAY(S): at 17:30

## 2018-10-06 RX ADMIN — TIOTROPIUM BROMIDE 1 CAPSULE(S): 18 CAPSULE ORAL; RESPIRATORY (INHALATION) at 11:55

## 2018-10-06 RX ADMIN — Medication 15 MILLIGRAM(S): at 21:30

## 2018-10-06 RX ADMIN — ATORVASTATIN CALCIUM 80 MILLIGRAM(S): 80 TABLET, FILM COATED ORAL at 21:10

## 2018-10-06 RX ADMIN — Medication 15 MILLIGRAM(S): at 01:00

## 2018-10-06 RX ADMIN — ENOXAPARIN SODIUM 40 MILLIGRAM(S): 100 INJECTION SUBCUTANEOUS at 11:55

## 2018-10-06 RX ADMIN — BUDESONIDE AND FORMOTEROL FUMARATE DIHYDRATE 2 PUFF(S): 160; 4.5 AEROSOL RESPIRATORY (INHALATION) at 17:30

## 2018-10-06 RX ADMIN — Medication 1000 MILLIGRAM(S): at 15:10

## 2018-10-06 RX ADMIN — PANTOPRAZOLE SODIUM 40 MILLIGRAM(S): 20 TABLET, DELAYED RELEASE ORAL at 05:07

## 2018-10-06 RX ADMIN — Medication 15 MILLIGRAM(S): at 12:10

## 2018-10-06 RX ADMIN — Medication 15 MILLIGRAM(S): at 21:13

## 2018-10-06 RX ADMIN — Medication 400 MILLIGRAM(S): at 14:40

## 2018-10-06 RX ADMIN — Medication 15 MILLIGRAM(S): at 00:20

## 2018-10-06 RX ADMIN — Medication 15 MILLIGRAM(S): at 12:30

## 2018-10-06 RX ADMIN — BUDESONIDE AND FORMOTEROL FUMARATE DIHYDRATE 2 PUFF(S): 160; 4.5 AEROSOL RESPIRATORY (INHALATION) at 05:07

## 2018-10-06 NOTE — PROGRESS NOTE ADULT - ASSESSMENT
Acute CVA  HTN    BP stable  AYO is neg for thrombus   s/p ILR     History of tobacco  on nicotine patch

## 2018-10-06 NOTE — PROGRESS NOTE ADULT - ASSESSMENT
62 year-old  male with past medical history of HTN and ETOH abuse. On 9/23/18 he presented to [facility] with AMS and intoxication. Upon further evaluation by the ED he wasn't moving L arm. On telestroke eval he was found with  left facial droop, left upper extremity monoplegia and lower extremity weakness, significant dysarthria and left hemibody hypoesthesia. Head CT showed area of hypodensity at right fronto parietal lobe and acute/ subacute hypodensity at the inferior temporal/parietal lobe middle cerebral artery territory. CT angiography demonstrated right middle cerebral artery proximal M1 segment occlusion. Patient not candidate for IV-tPA given unknown last known well as well as appearance of subacute infarct. He was transferred to [facility] for evaluation for endovascular therapy. CT perfusion showed a small core infarct with large area of penumbra. He was deemed candidate for endovascular therapy with mechanical thrombectomy with TICI 2b recanalization. MRI brain subsequently showed right MCA distribution infarct with hemorrhagic transformation (HI 2). TTE did not show any obvious structural cardiac source of embolism. AYO did not show any obvious structural cardiac source of embolism nor showed any evidence of a PFO. 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      Impression:   Cerebral embolism with cerebral infarction. R MCA distribution stroke with mild hemorrhagic concentration (HI 2) - likely etiology being cryptogenic stroke, probably related to embolism from a proximal source like cardiac source of embolism.    NEURO: Neurologically overall with improvement, Continue close monitoring for neurologic deterioration, BP goal gradual normotension in the setting of recent successful revascularization, continue with home statin medication if applicable considering likely non-atheroembolic etiology of his stroke and age, repeat fasting lipid profile to better characterize LDL results, Physical therapy/OT - acute TBI.      ANTITHROMBOTIC THERAPY: Aspirin for secondary stroke prevention     PULMONARY: CXR on 9/26 Right lower lobe nodules, largest of which measures 5 mm, pulm following- plan for repeat CT chest in 6 months, probable COPD, wheezing, continue Duoneb, symbicort     CARDIOVASCULAR: TTE showed - LVEF: 63%, Grossly normal left ventricular internal dimensions and wall thicknesses. Grossly hyperdynamic left ventricular systolic function. Reversal of the E-A  waves of the mitral inflow pattern is consistent with diastolic LV dysfunction. Grossly normal right ventricular size and function.  Cardiac monitoring without any documented events. AYO: negative for thrombus or PFO. Plan for  prolonged cardiac monitoring with ICM to screen for occult cardiac arrhythmias and atrial fibrillation during the cardiac source of embolism. s/p ICM placement for prolonged cardiac monitoring to screen for occult cardiac arrhythmias like atrial fibrillation     SBP goal: gradual normotension    GASTROINTESTINAL:  dysphagia screen - failed, s/p MBSon 9/27, with recommendation for Dysphagia 2 nectar consistency, tolerating diet, EGD: Chronic gastritis. Biopsied. Multiple non-bleeding duodenal ulcers with no stigmata of bleeding, will continue Protonix (pantoprazole) 40 mg PO daily. CT A/P showed pancreatic tail lesion, MRI of abdomen with Howard (MRCP protocol) ordered to delineate the pancreatic tail lesion ordered but patient was unable to tolerate the test. Plan for biopsy on Monday 10/08/18.     Diet: dysphagia II nectar consistency    RENAL: Good urine output      Na Goal: Greater than 135     Strong: no    HEMATOLOGY: H/H without acute change, Platelets 212, no signs or symptoms of active bleeding. History of renal CA-,     DVT ppx: Heparin s.c [] LMWH [x]     ID: afebrile, no signs or symptoms of infection.      OTHER: Psychiatry Consult: continue fluoxetine for improved motor recovery, melatonin for insomnia, no need for CIWA, monitor for si/sx of withdrawal,  also discussed with patient and wife that he needs a sleep study as an outpatient as we suspect he has SHILO. All questions and concerns addressed. Patient reported chronic Low Back Pain and takes Gabapentin for this reason and chronic Neuropathy. His wife plans to bring in his home dose so he can restart this medication.     DISPOSITION: Acute TBI once stable and workup is complete.    CORE MEASURES:        Admission NIHSS: 12     TPA: [] YES [x] NO      LDL/HDL: 17/36     Depression Screen: p     Statin Therapy: yes     Dysphagia Screen: [] PASS [x] FAIL     Smoking [] YES [x] NO      Afib [] YES [x] NO     Stroke Education [] YES [] NO - none

## 2018-10-06 NOTE — PROGRESS NOTE ADULT - SUBJECTIVE AND OBJECTIVE BOX
THE PATIENT WAS SEEN AND EXAMINED BY ME WITH THE HOUSESTAFF AND STROKE TEAM DURING MORNING ROUNDS.   HPI:  61 y/o male with past medical history of HTN presented initially to [facility] ED for AMS with suspicion of EtOH intoxication. Patient's mental status did not improve and CT/CTA head was done demonstrating Large area acute Right MCA infarct with occluded proximal M1 segment R MCA. Patient subsequently transferred to [facility] for possible Endovascular intervention. LKN is unknown but suspected to be 11pm (9/22/2018). tPA deferred due to unclear LKN time      SUBJECTIVE: No events overnight.  No new neurologic complaints.      acetaminophen   Tablet .. 650 milliGRAM(s) Oral every 6 hours PRN  acetaminophen  IVPB .. 1000 milliGRAM(s) IV Intermittent once  ALBUTerol    90 MICROgram(s) HFA Inhaler 2 Puff(s) Inhalation every 6 hours PRN  aspirin  chewable 81 milliGRAM(s) Oral daily  atorvastatin 80 milliGRAM(s) Oral at bedtime  buDESOnide  80 MICROgram(s)/formoterol 4.5 MICROgram(s) Inhaler 2 Puff(s) Inhalation two times a day  enoxaparin Injectable 40 milliGRAM(s) SubCutaneous daily  FLUoxetine 20 milliGRAM(s) Oral daily  fluticasone propionate 50 MICROgram(s)/spray Nasal Spray 1 Spray(s) Both Nostrils two times a day  influenza   Vaccine 0.5 milliLiter(s) IntraMuscular once  ketorolac   Injectable 15 milliGRAM(s) IV Push every 4 hours PRN  nicotine -   7 mG/24Hr(s) Patch 1 patch Transdermal daily  pantoprazole    Tablet 40 milliGRAM(s) Oral before breakfast  thiamine Injectable 100 milliGRAM(s) IV Push daily  tiotropium 18 MICROgram(s) Capsule 1 Capsule(s) Inhalation daily      PHYSICAL EXAM:   Vital Signs Last 24 Hrs  T(C): 36.6 (06 Oct 2018 16:00), Max: 36.8 (05 Oct 2018 20:00)  T(F): 97.9 (06 Oct 2018 16:00), Max: 98.3 (05 Oct 2018 20:00)  HR: 83 (06 Oct 2018 16:00) (64 - 83)  BP: 139/84 (06 Oct 2018 16:00) (136/88 - 167/93)  BP(mean): --  RR: 18 (06 Oct 2018 16:00) (18 - 18)  SpO2: 96% (06 Oct 2018 16:00) (94% - 96%)    General: No acute distress  HEENT: EOM intact, visual fields full, partial left gaze palsy, incomplete saccade to right   Abdomen: Soft, nontender, nondistended   Extremities: No edema    NEUROLOGICAL EXAM:  Mental status: Awake, alert, oriented to hospital, year, month, fluent speech, no neglect - aware of deficit, able to follow commands.   Cranial Nerves: Moderate left facial palsy, no nystagmus, no dysarthria, tongue midline  Motor exam: Normal tone, RUE 5/5,  RLE 5/5, LUE deltoid 3-/5, biceps 3/5, wrist extension 0/5,  LLE 4+/5 with slight drift  Sensation: Intact to light touch   Coordination/ Gait: No dysmetria, gait deferred.     LABS:    Hemoglobin A1C, Whole Blood: 6.6 % (09-23 @ 11:05)  Hemoglobin A1C, Whole Blood: 6.5 % (09-07 @ 18:01)  LDL Cholesterol, Direct: 53 mg/dL (10-02 @ 10:56)      IMAGING: Reviewed by me.     CT Head No Cont (09.24.18):  Previously noted acute right MCA infarct has demonstrated expected evolutionary changes with areas of hemorrhagic transformation again seen.    CT Head No Cont (09.23.18):  Redemonstration of evolving right MCA territory infarct, faint areas of increasing attenuation may reflect contrast staining, tiny foci of petechial hemorrhage not excluded.    CT perfusion w/ Maps w/ IV Cont (09.23.18):   Large right-sided penumbra  with a small infarct in the large mismatch volume.    Noncontrast head CT (09.23.18):   Large area acute right MCA territory infarct. Posterior temporal lobe infarct appears slightly more subacute with questionable petechial hemorrhage.    CTA neck (09.23.18):   No major vessel occlusion or hemodynamically significant stenosis. No evidence of dissection.    CTA head (09.23.18):  Occluded proximal M1 segment right middle cerebral artery.   Remainder of the intracranial circulation is patent.    MR Head w/wo IV Cont (09.28.18 )   Subacute right MCA infarct with associated areas of   hemorrhagic transformation suspected.    Unremarkable MRA of the neck and [place]. THE PATIENT WAS SEEN AND EXAMINED BY ME WITH THE HOUSESTAFF AND STROKE TEAM DURING MORNING ROUNDS.     HPI:  61 y/o male with past medical history of HTN presented initially to [facility] ED for AMS with suspicion of EtOH intoxication. Patient's mental status did not improve and CT/CTA head was done demonstrating Large area acute Right MCA infarct with occluded proximal M1 segment R MCA. Patient subsequently transferred to [facility] for possible Endovascular intervention. LKN is unknown but suspected to be 11pm (9/22/2018). tPA deferred due to unclear LKN time    SUBJECTIVE: No events overnight.  No new neurologic complaints.      ROS: All negative except documented above.    acetaminophen   Tablet .. 650 milliGRAM(s) Oral every 6 hours PRN  acetaminophen  IVPB .. 1000 milliGRAM(s) IV Intermittent once  ALBUTerol    90 MICROgram(s) HFA Inhaler 2 Puff(s) Inhalation every 6 hours PRN  aspirin  chewable 81 milliGRAM(s) Oral daily  atorvastatin 80 milliGRAM(s) Oral at bedtime  buDESOnide  80 MICROgram(s)/formoterol 4.5 MICROgram(s) Inhaler 2 Puff(s) Inhalation two times a day  enoxaparin Injectable 40 milliGRAM(s) SubCutaneous daily  FLUoxetine 20 milliGRAM(s) Oral daily  fluticasone propionate 50 MICROgram(s)/spray Nasal Spray 1 Spray(s) Both Nostrils two times a day  influenza   Vaccine 0.5 milliLiter(s) IntraMuscular once  ketorolac   Injectable 15 milliGRAM(s) IV Push every 4 hours PRN  nicotine -   7 mG/24Hr(s) Patch 1 patch Transdermal daily  pantoprazole    Tablet 40 milliGRAM(s) Oral before breakfast  thiamine Injectable 100 milliGRAM(s) IV Push daily  tiotropium 18 MICROgram(s) Capsule 1 Capsule(s) Inhalation daily      PHYSICAL EXAM:   Vital Signs Last 24 Hrs  T(C): 36.6 (06 Oct 2018 16:00), Max: 36.8 (05 Oct 2018 20:00)  T(F): 97.9 (06 Oct 2018 16:00), Max: 98.3 (05 Oct 2018 20:00)  HR: 83 (06 Oct 2018 16:00) (64 - 83)  BP: 139/84 (06 Oct 2018 16:00) (136/88 - 167/93)  BP(mean): --  RR: 18 (06 Oct 2018 16:00) (18 - 18)  SpO2: 96% (06 Oct 2018 16:00) (94% - 96%)    General: No acute distress  HEENT: EOM intact, visual fields full, partial left gaze palsy, incomplete saccade to right   Abdomen: Soft, nontender, nondistended   Extremities: No edema    NEUROLOGICAL EXAM:  Mental status: Awake, alert, oriented to hospital, year, month, fluent speech, no neglect - aware of deficit, able to follow commands.   Cranial Nerves: Moderate left facial palsy, no nystagmus, no dysarthria, tongue midline  Motor exam: Normal tone, RUE 5/5,  RLE 5/5, LUE deltoid 3-/5, biceps 3/5, wrist extension 0/5,  LLE 4+/5 with slight drift  Sensation: Intact to light touch   Coordination/ Gait: No dysmetria, gait deferred.     LABS:    Hemoglobin A1C, Whole Blood: 6.6 % (09-23 @ 11:05)  Hemoglobin A1C, Whole Blood: 6.5 % (09-07 @ 18:01)  LDL Cholesterol, Direct: 53 mg/dL (10-02 @ 10:56)      IMAGING: Reviewed by me.     CT Head No Cont (09.24.18):  Previously noted acute right MCA infarct has demonstrated expected evolutionary changes with areas of hemorrhagic transformation again seen.    CT Head No Cont (09.23.18):  Redemonstration of evolving right MCA territory infarct, faint areas of increasing attenuation may reflect contrast staining, tiny foci of petechial hemorrhage not excluded.    CT perfusion w/ Maps w/ IV Cont (09.23.18):   Large right-sided penumbra  with a small infarct in the large mismatch volume.    Noncontrast head CT (09.23.18):   Large area acute right MCA territory infarct. Posterior temporal lobe infarct appears slightly more subacute with questionable petechial hemorrhage.    CTA neck (09.23.18):   No major vessel occlusion or hemodynamically significant stenosis. No evidence of dissection.    CTA head (09.23.18):  Occluded proximal M1 segment right middle cerebral artery.   Remainder of the intracranial circulation is patent.    MR Head w/wo IV Cont (09.28.18 )   Subacute right MCA infarct with associated areas of   hemorrhagic transformation suspected.    Unremarkable MRA of the neck and [place].

## 2018-10-06 NOTE — PROGRESS NOTE ADULT - SUBJECTIVE AND OBJECTIVE BOX
Chief Complaint:   pt. feels ok, had a BM today, eating well    Interval Events:     Allergies:  No Known Allergies      Home Medications:    Hospital Medications:  acetaminophen   Tablet .. 650 milliGRAM(s) Oral every 6 hours PRN  acetaminophen  IVPB .. 1000 milliGRAM(s) IV Intermittent once  ALBUTerol    90 MICROgram(s) HFA Inhaler 2 Puff(s) Inhalation every 6 hours PRN  aspirin  chewable 81 milliGRAM(s) Oral daily  atorvastatin 80 milliGRAM(s) Oral at bedtime  buDESOnide  80 MICROgram(s)/formoterol 4.5 MICROgram(s) Inhaler 2 Puff(s) Inhalation two times a day  enoxaparin Injectable 40 milliGRAM(s) SubCutaneous daily  FLUoxetine 20 milliGRAM(s) Oral daily  fluticasone propionate 50 MICROgram(s)/spray Nasal Spray 1 Spray(s) Both Nostrils two times a day  influenza   Vaccine 0.5 milliLiter(s) IntraMuscular once  ketorolac   Injectable 15 milliGRAM(s) IV Push every 4 hours PRN  nicotine -   7 mG/24Hr(s) Patch 1 patch Transdermal daily  pantoprazole    Tablet 40 milliGRAM(s) Oral before breakfast  thiamine Injectable 100 milliGRAM(s) IV Push daily  tiotropium 18 MICROgram(s) Capsule 1 Capsule(s) Inhalation daily    ROS  GI: [- ] Nausea, [- ] vomiting, [ -]abdominal pain    PHYSICAL EXAM:   Vital Signs:  Vital Signs Last 24 Hrs  T(C): 36.6 (06 Oct 2018 16:00), Max: 36.6 (06 Oct 2018 05:22)  T(F): 97.9 (06 Oct 2018 16:00), Max: 97.9 (06 Oct 2018 05:22)  HR: 83 (06 Oct 2018 16:00) (67 - 83)  BP: 139/84 (06 Oct 2018 16:00) (136/88 - 167/93)  BP(mean): --  RR: 18 (06 Oct 2018 16:00) (18 - 18)  SpO2: 96% (06 Oct 2018 16:00) (94% - 96%)  Daily     Daily     GENERAL:  Appears stated age, well-groomed, well-nourished, no distress  HEENT:  NC/AT,  conjunctivae clear and pink, no thyromegaly, nodules, adenopathy, no JVD, sclera -anicteric  CHEST:  Full & symmetric excursion, no increased effort, breath sounds clear  HEART:  Regular rhythm, S1, S2, no murmur/rub/S3/S4, no abdominal bruit, no edema  ABDOMEN:  Soft, non-tender, non-distended, normoactive bowel sounds,  no masses ,no hepato-splenomegaly, no signs of chronic liver disease  EXTEREMITIES:  no cyanosis,clubbing or edema  SKIN:  No rash/erythema/ecchymoses/petechiae/wounds/abscess/warm/dry  NEURO:  Alert, oriented, no asterixis, no tremor, no encephalopathy    LABS:                    Imaging:

## 2018-10-06 NOTE — PROGRESS NOTE ADULT - SUBJECTIVE AND OBJECTIVE BOX
Subjective: Patient seen and examined. No new events except as noted.     SUBJECTIVE/ROS:        MEDICATIONS:  MEDICATIONS  (STANDING):  acetaminophen  IVPB .. 1000 milliGRAM(s) IV Intermittent once  aspirin  chewable 81 milliGRAM(s) Oral daily  atorvastatin 80 milliGRAM(s) Oral at bedtime  buDESOnide  80 MICROgram(s)/formoterol 4.5 MICROgram(s) Inhaler 2 Puff(s) Inhalation two times a day  enoxaparin Injectable 40 milliGRAM(s) SubCutaneous daily  FLUoxetine 20 milliGRAM(s) Oral daily  fluticasone propionate 50 MICROgram(s)/spray Nasal Spray 1 Spray(s) Both Nostrils two times a day  influenza   Vaccine 0.5 milliLiter(s) IntraMuscular once  nicotine -   7 mG/24Hr(s) Patch 1 patch Transdermal daily  pantoprazole    Tablet 40 milliGRAM(s) Oral before breakfast  thiamine Injectable 100 milliGRAM(s) IV Push daily  tiotropium 18 MICROgram(s) Capsule 1 Capsule(s) Inhalation daily      PHYSICAL EXAM:  T(C): 36.6 (10-06-18 @ 08:07), Max: 37.1 (10-05-18 @ 13:48)  HR: 68 (10-06-18 @ 08:07) (64 - 75)  BP: 148/89 (10-06-18 @ 08:07) (131/81 - 167/93)  RR: 18 (10-06-18 @ 08:07) (18 - 18)  SpO2: 96% (10-06-18 @ 08:07) (94% - 97%)  Wt(kg): --  I&O's Summary    05 Oct 2018 07:01  -  06 Oct 2018 07:00  --------------------------------------------------------  IN: 720 mL / OUT: 0 mL / NET: 720 mL          Appearance: Normal	  HEENT:   no gross abnormality   Cardiovascular: Normal S1 S2,    Murmur:   Neck: JVP normal  Respiratory: Lungs clear   Gastrointestinal:  Soft, Non-tender  Skin: normal   Neuro: No gross deficits.   Psychiatry:  Mood & affect flat  Ext: No edema      LABS/DATA:    CARDIAC MARKERS:                  proBNP:   Lipid Profile:   HgA1c:   TSH:     TELE:  EKG:

## 2018-10-06 NOTE — PROGRESS NOTE ADULT - ASSESSMENT
62 Y.O. WM admitted with an acute Right MCA CVA with noted dysphagia s/p S&S eval with MBS which revealed a filling defect in the anterior esophagus. Patient was planned for AYO to rule out cardiac anatomic abnormality. Concern for esophageal web on MBS. GI consulted for EGD prior to AYO.  Pt. also noted with mild anemia, but significant Microcytic and Hypochromic anemia.  Pt. now with a Pancreatic tail lesion.  Appreciate Advanced GI consultation for EUS.  Upper Endoscopy (10.03.18 @ 07:13) >                       - Normal esophagus. Biopsied.                       - Small hiatus hernia.                       - Chronic gastritis. Biopsied.                       - Non-bleeding gastric ulcers with no stigmata of bleeding.                       - Non-bleeding gastric ulcers with no stigmata of bleeding. Biopsied.                       - Multiple non-bleeding duodenal ulcers with no stigmata of bleeding.  Recommendation:                             - Continue Protonix (pantoprazole) 40 mg PO daily daily.                           Recent colonoscopy report reviewed: pt. with many colonic polyps (Tubular Adenomas).  Continue diet with dysphagia 2 with supervision.  Pt. scheduled for an EUS (Endoscopic Ultrasound) to biopsy the pancreatic tail lesion.  R/O Pancreatic Cancer.  Please obtain PT/INR and routine Lab in AM.    I had a prolonged conversation with pt. and wife re. likely diagnosis and plan who verbalizes clear understanding,

## 2018-10-07 LAB
ANION GAP SERPL CALC-SCNC: 11 MMOL/L — SIGNIFICANT CHANGE UP (ref 5–17)
APTT BLD: 27.7 SEC — SIGNIFICANT CHANGE UP (ref 27.5–37.4)
BUN SERPL-MCNC: 19 MG/DL — SIGNIFICANT CHANGE UP (ref 7–23)
CALCIUM SERPL-MCNC: 9.1 MG/DL — SIGNIFICANT CHANGE UP (ref 8.4–10.5)
CHLORIDE SERPL-SCNC: 102 MMOL/L — SIGNIFICANT CHANGE UP (ref 96–108)
CO2 SERPL-SCNC: 25 MMOL/L — SIGNIFICANT CHANGE UP (ref 22–31)
CREAT SERPL-MCNC: 0.91 MG/DL — SIGNIFICANT CHANGE UP (ref 0.5–1.3)
GLUCOSE SERPL-MCNC: 100 MG/DL — HIGH (ref 70–99)
HCT VFR BLD CALC: 36.9 % — LOW (ref 39–50)
HGB BLD-MCNC: 11.2 G/DL — LOW (ref 13–17)
INR BLD: 1.07 RATIO — SIGNIFICANT CHANGE UP (ref 0.88–1.16)
MCHC RBC-ENTMCNC: 21 PG — LOW (ref 27–34)
MCHC RBC-ENTMCNC: 30.4 GM/DL — LOW (ref 32–36)
MCV RBC AUTO: 69.1 FL — LOW (ref 80–100)
PLATELET # BLD AUTO: 200 K/UL — SIGNIFICANT CHANGE UP (ref 150–400)
POTASSIUM SERPL-MCNC: 3.7 MMOL/L — SIGNIFICANT CHANGE UP (ref 3.5–5.3)
POTASSIUM SERPL-SCNC: 3.7 MMOL/L — SIGNIFICANT CHANGE UP (ref 3.5–5.3)
PROTHROM AB SERPL-ACNC: 11.7 SEC — SIGNIFICANT CHANGE UP (ref 9.8–12.7)
RBC # BLD: 5.34 M/UL — SIGNIFICANT CHANGE UP (ref 4.2–5.8)
RBC # FLD: 14.5 % — SIGNIFICANT CHANGE UP (ref 10.3–14.5)
SODIUM SERPL-SCNC: 138 MMOL/L — SIGNIFICANT CHANGE UP (ref 135–145)
WBC # BLD: 7.5 K/UL — SIGNIFICANT CHANGE UP (ref 3.8–10.5)
WBC # FLD AUTO: 7.5 K/UL — SIGNIFICANT CHANGE UP (ref 3.8–10.5)

## 2018-10-07 PROCEDURE — 99233 SBSQ HOSP IP/OBS HIGH 50: CPT

## 2018-10-07 RX ADMIN — Medication 15 MILLIGRAM(S): at 21:35

## 2018-10-07 RX ADMIN — Medication 15 MILLIGRAM(S): at 22:05

## 2018-10-07 RX ADMIN — Medication 1 PATCH: at 12:26

## 2018-10-07 RX ADMIN — PANTOPRAZOLE SODIUM 40 MILLIGRAM(S): 20 TABLET, DELAYED RELEASE ORAL at 06:02

## 2018-10-07 RX ADMIN — Medication 1 SPRAY(S): at 17:27

## 2018-10-07 RX ADMIN — Medication 100 MILLIGRAM(S): at 12:24

## 2018-10-07 RX ADMIN — Medication 15 MILLIGRAM(S): at 06:27

## 2018-10-07 RX ADMIN — Medication 15 MILLIGRAM(S): at 06:02

## 2018-10-07 RX ADMIN — ENOXAPARIN SODIUM 40 MILLIGRAM(S): 100 INJECTION SUBCUTANEOUS at 12:25

## 2018-10-07 RX ADMIN — TIOTROPIUM BROMIDE 1 CAPSULE(S): 18 CAPSULE ORAL; RESPIRATORY (INHALATION) at 12:26

## 2018-10-07 RX ADMIN — Medication 1 PATCH: at 12:30

## 2018-10-07 RX ADMIN — ATORVASTATIN CALCIUM 80 MILLIGRAM(S): 80 TABLET, FILM COATED ORAL at 21:17

## 2018-10-07 RX ADMIN — BUDESONIDE AND FORMOTEROL FUMARATE DIHYDRATE 2 PUFF(S): 160; 4.5 AEROSOL RESPIRATORY (INHALATION) at 06:01

## 2018-10-07 RX ADMIN — Medication 81 MILLIGRAM(S): at 12:25

## 2018-10-07 RX ADMIN — Medication 15 MILLIGRAM(S): at 17:27

## 2018-10-07 RX ADMIN — Medication 20 MILLIGRAM(S): at 12:26

## 2018-10-07 RX ADMIN — BUDESONIDE AND FORMOTEROL FUMARATE DIHYDRATE 2 PUFF(S): 160; 4.5 AEROSOL RESPIRATORY (INHALATION) at 17:25

## 2018-10-07 RX ADMIN — Medication 1 SPRAY(S): at 06:01

## 2018-10-07 NOTE — PROGRESS NOTE ADULT - ASSESSMENT
62 year-old  male with past medical history of HTN and ETOH abuse. On 9/23/18 he presented to [facility] with AMS and intoxication. Upon further evaluation by the ED he wasn't moving L arm. On telestroke eval he was found with  left facial droop, left upper extremity monoplegia and lower extremity weakness, significant dysarthria and left hemibody hypoesthesia. Head CT showed area of hypodensity at right fronto parietal lobe and acute/ subacute hypodensity at the inferior temporal/parietal lobe middle cerebral artery territory. CT angiography demonstrated right middle cerebral artery proximal M1 segment occlusion. Patient not candidate for IV-tPA given unknown last known well as well as appearance of subacute infarct. He was transferred to [facility] for evaluation for endovascular therapy. CT perfusion showed a small core infarct with large area of penumbra. He was deemed candidate for endovascular therapy with mechanical thrombectomy with TICI 2b recanalization. MRI brain subsequently showed right MCA distribution infarct with hemorrhagic transformation (HI 2). TTE did not show any obvious structural cardiac source of embolism. AYO did not show any obvious structural cardiac source of embolism nor showed any evidence of a PFO. 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      Impression:   Cerebral embolism with cerebral infarction. R MCA distribution stroke with mild hemorrhagic concentration (HI 2) - likely etiology being cryptogenic stroke, probably related to embolism from a proximal source like cardiac source of embolism.    NEURO: Neurologically overall with improvement, Continue close monitoring for neurologic deterioration, BP goal gradual normotension in the setting of recent successful revascularization, continue with home statin medication if applicable considering likely non-atheroembolic etiology of his stroke and age, repeat fasting lipid profile to better characterize LDL results, Physical therapy/OT - acute TBI.      ANTITHROMBOTIC THERAPY: Aspirin for secondary stroke prevention     PULMONARY: CXR on 9/26 Right lower lobe nodules, largest of which measures 5 mm, pulm following- plan for repeat CT chest in 6 months, probable COPD, wheezing, continue Duoneb, symbicort     CARDIOVASCULAR: TTE showed - LVEF: 63%, Grossly normal left ventricular internal dimensions and wall thicknesses. Grossly hyperdynamic left ventricular systolic function. Reversal of the E-A  waves of the mitral inflow pattern is consistent with diastolic LV dysfunction. Grossly normal right ventricular size and function.  Cardiac monitoring without any documented events. AYO: negative for thrombus or PFO. Plan for  prolonged cardiac monitoring with ICM to screen for occult cardiac arrhythmias and atrial fibrillation during the cardiac source of embolism. s/p ICM placement for prolonged cardiac monitoring to screen for occult cardiac arrhythmias like atrial fibrillation     SBP goal: gradual normotension    GASTROINTESTINAL:  dysphagia screen - failed, s/p MBSon 9/27, with recommendation for Dysphagia 2 nectar consistency, tolerating diet, EGD: Chronic gastritis. Biopsied. Multiple non-bleeding duodenal ulcers with no stigmata of bleeding, will continue Protonix (pantoprazole) 40 mg PO daily. CT A/P showed pancreatic tail lesion, MRI of abdomen with Howard (MRCP protocol) ordered to delineate the pancreatic tail lesion ordered but patient was unable to tolerate the test. Plan for biopsy on Monday 10/08/18. NPO after midnight     Diet: dysphagia II nectar consistency    RENAL: BUN/Cr stable, Good urine output      Na Goal: Greater than 135     Strong: no    HEMATOLOGY: H/H 11.2/36.9, Platelets 200, no signs or symptoms of active bleeding. History of renal CA-,     DVT ppx: Heparin s.c [] LMWH [x]     ID: afebrile, no signs or symptoms of infection.      OTHER: Psychiatry Consult: continue fluoxetine for improved motor recovery, melatonin for insomnia, no need for CIWA, monitor for si/sx of withdrawal,  also discussed with patient and wife that he needs a sleep study as an outpatient as we suspect he has SHILO. All questions and concerns addressed. Patient reported chronic Low Back Pain and takes Gabapentin for this reason and chronic Neuropathy. His wife plans to bring in his home dose so he can restart this medication.     DISPOSITION: Acute TBI once stable and workup is complete.    CORE MEASURES:        Admission NIHSS: 12     TPA: [] YES [x] NO      LDL/HDL: 17/36     Depression Screen: p     Statin Therapy: yes     Dysphagia Screen: [] PASS [x] FAIL     Smoking [] YES [x] NO      Afib [] YES [x] NO     Stroke Education [] YES [] NO -

## 2018-10-07 NOTE — PROGRESS NOTE ADULT - NSHPATTENDINGPLANDISCUSS_GEN_ALL_CORE
Neurology PA on stroke service
neurology residents and NP
Neurology PA on stroke service
Neurology resident, NP and medical students on stroke service
Neurology resident, PA and medical students on stroke service
Neurology residents, PA and medical students on stroke service
neurology NP and stroke fellow
neurology residents and NP
Neurology residents, NP and medical students on stroke service
neurology residents and PAs
Neurology residents, NP and medical students on stroke service

## 2018-10-07 NOTE — PROGRESS NOTE ADULT - ASSESSMENT
62 Y.O. WM admitted with an acute Right MCA CVA with noted dysphagia s/p S&S eval with MBS which revealed a filling defect in the anterior esophagus. Patient was planned for AYO to rule out cardiac anatomic abnormality. Concern for esophageal web on MBS. GI consulted for EGD prior to AYO.  Pt. also noted with mild anemia, but significant Microcytic and Hypochromic anemia.  Pt. now with a Pancreatic tail lesion.  Appreciate Advanced GI consultation for EUS.  Upper Endoscopy (10.03.18 @ 07:13) >                       - Normal esophagus. Biopsied.                       - Small hiatus hernia.                       - Chronic gastritis. Biopsied.                       - Non-bleeding gastric ulcers with no stigmata of bleeding.                       - Non-bleeding gastric ulcers with no stigmata of bleeding. Biopsied.                       - Multiple non-bleeding duodenal ulcers with no stigmata of bleeding.  Recommendation:                             - Continue Protonix (pantoprazole) 40 mg PO daily daily.                           Recent colonoscopy report reviewed: pt. with many colonic polyps (Tubular Adenomas) s/p removal.  Continue diet with dysphagia 2 with supervision.  Pt. scheduled for an EUS (Endoscopic Ultrasound) to biopsy the pancreatic tail lesion in AM.  R/O Pancreatic Cancer.    I had a prolonged conversation with pt. and wife re. likely diagnosis and plan who verbalizes clear understanding,

## 2018-10-07 NOTE — CHART NOTE - NSCHARTNOTEFT_GEN_A_CORE
Patient planned for upper endoscopy and endoscopic ultrasound tomorrow   NPO after midnight    Pipo Gay MD  Gastroenterology Fellow  Pager number: [phone] / [phone]

## 2018-10-07 NOTE — CHART NOTE - NSCHARTNOTEFT_GEN_A_CORE
Approached by RNs on 4 [place] as pt reportedly choking as per family and also family requesting to give patient food items they brought in from home. Pt and family seen for reeducation of safe feeding guidelines and appropriate items for consumption on recommended/ordered diet. Pt/family educated that patient should be assisted with meals and that all soups need to be thickened to nectar thickened consistency (soup the consistency of thin fluid noted at bedside). Family with many questions regarding method of feeding and dietary consistencies. All questions and concerns addressed. Observed family feeding patient nectar thickened water via teaspoon with no overt s/s of laryngeal penetration or aspiration. Purposeful proactive rounding reinforced and 5 Ps addressed. Pt left in no distress.

## 2018-10-07 NOTE — PROGRESS NOTE ADULT - SUBJECTIVE AND OBJECTIVE BOX
Chief Complaint:    pt. anxious to go home, feels ok    Interval Events:     Allergies:  No Known Allergies      Home Medications:    Hospital Medications:  acetaminophen   Tablet .. 650 milliGRAM(s) Oral every 6 hours PRN  acetaminophen  IVPB .. 1000 milliGRAM(s) IV Intermittent once  ALBUTerol    90 MICROgram(s) HFA Inhaler 2 Puff(s) Inhalation every 6 hours PRN  aspirin  chewable 81 milliGRAM(s) Oral daily  atorvastatin 80 milliGRAM(s) Oral at bedtime  buDESOnide  80 MICROgram(s)/formoterol 4.5 MICROgram(s) Inhaler 2 Puff(s) Inhalation two times a day  enoxaparin Injectable 40 milliGRAM(s) SubCutaneous daily  FLUoxetine 20 milliGRAM(s) Oral daily  fluticasone propionate 50 MICROgram(s)/spray Nasal Spray 1 Spray(s) Both Nostrils two times a day  influenza   Vaccine 0.5 milliLiter(s) IntraMuscular once  ketorolac   Injectable 15 milliGRAM(s) IV Push every 4 hours PRN  nicotine -   7 mG/24Hr(s) Patch 1 patch Transdermal daily  pantoprazole    Tablet 40 milliGRAM(s) Oral before breakfast  thiamine Injectable 100 milliGRAM(s) IV Push daily  tiotropium 18 MICROgram(s) Capsule 1 Capsule(s) Inhalation daily    ROS  GI: [- ] Nausea, [- ] vomiting, [ -]abdominal pain    PHYSICAL EXAM:   Vital Signs:  Vital Signs Last 24 Hrs  T(C): 37.2 (08 Oct 2018 08:19), Max: 37.2 (08 Oct 2018 04:51)  T(F): 98.9 (08 Oct 2018 08:19), Max: 98.9 (08 Oct 2018 04:51)  HR: 74 (08 Oct 2018 08:19) (67 - 88)  BP: 122/74 (08 Oct 2018 08:19) (113/70 - 136/79)  BP(mean): --  RR: 18 (08 Oct 2018 08:19) (18 - 19)  SpO2: 97% (08 Oct 2018 08:19) (94% - 98%)  Daily     Daily     GENERAL:  Appears stated age, well-groomed, well-nourished, no distress  HEENT:  NC/AT,  conjunctivae clear and pink, no thyromegaly, nodules, adenopathy, no JVD, sclera -anicteric  CHEST:  Full & symmetric excursion, no increased effort, breath sounds clear  HEART:  Regular rhythm, S1, S2, no murmur/rub/S3/S4, no abdominal bruit, no edema  ABDOMEN:  Soft, non-tender, non-distended, normoactive bowel sounds,  no masses ,no hepato-splenomegaly, no signs of chronic liver disease  EXTEREMITIES:  no cyanosis,clubbing or edema  SKIN:  No rash/erythema/ecchymoses/petechiae/wounds/abscess/warm/dry  NEURO:  Alert, oriented, no asterixis, no tremor, no encephalopathy    LABS:                        11.2   7.50  )-----------( 200      ( 07 Oct 2018 08:06 )             36.9     10-08    138  |  103  |  20  ----------------------------<  101<H>  3.8   |  26  |  0.95    Ca    8.9      08 Oct 2018 06:26        PT/INR - ( 07 Oct 2018 07:17 )   PT: 11.7 sec;   INR: 1.07 ratio         PTT - ( 07 Oct 2018 07:17 )  PTT:27.7 sec        Imaging:

## 2018-10-07 NOTE — PROGRESS NOTE ADULT - SUBJECTIVE AND OBJECTIVE BOX
THE PATIENT WAS SEEN AND EXAMINED BY ME WITH THE HOUSESTAFF AND STROKE TEAM DURING MORNING ROUNDS.   HPI:  61 y/o male with past medical history of HTN presented initially to [facility] ED for AMS with suspicion of EtOH intoxication. Patient's mental status did not improve and CT/CTA head was done demonstrating Large area acute Right MCA infarct with occluded proximal M1 segment R MCA. Patient subsequently transferred to [facility] for possible Endovascular intervention. LKN is unknown but suspected to be 11pm (9/22/2018). tPA deferred due to unclear LKN time      SUBJECTIVE: No events overnight.  No new neurologic complaints.      acetaminophen   Tablet .. 650 milliGRAM(s) Oral every 6 hours PRN  acetaminophen  IVPB .. 1000 milliGRAM(s) IV Intermittent once  ALBUTerol    90 MICROgram(s) HFA Inhaler 2 Puff(s) Inhalation every 6 hours PRN  aspirin  chewable 81 milliGRAM(s) Oral daily  atorvastatin 80 milliGRAM(s) Oral at bedtime  buDESOnide  80 MICROgram(s)/formoterol 4.5 MICROgram(s) Inhaler 2 Puff(s) Inhalation two times a day  enoxaparin Injectable 40 milliGRAM(s) SubCutaneous daily  FLUoxetine 20 milliGRAM(s) Oral daily  fluticasone propionate 50 MICROgram(s)/spray Nasal Spray 1 Spray(s) Both Nostrils two times a day  influenza   Vaccine 0.5 milliLiter(s) IntraMuscular once  ketorolac   Injectable 15 milliGRAM(s) IV Push every 4 hours PRN  nicotine -   7 mG/24Hr(s) Patch 1 patch Transdermal daily  pantoprazole    Tablet 40 milliGRAM(s) Oral before breakfast  thiamine Injectable 100 milliGRAM(s) IV Push daily  tiotropium 18 MICROgram(s) Capsule 1 Capsule(s) Inhalation daily      PHYSICAL EXAM:   Vital Signs Last 24 Hrs  T(C): 36.8 (07 Oct 2018 15:37), Max: 37.2 (07 Oct 2018 04:36)  T(F): 98.2 (07 Oct 2018 15:37), Max: 98.9 (07 Oct 2018 04:36)  HR: 67 (07 Oct 2018 15:37) (67 - 90)  BP: 113/70 (07 Oct 2018 15:37) (113/70 - 159/95)  BP(mean): --  RR: 18 (07 Oct 2018 15:37) (18 - 20)  SpO2: 94% (07 Oct 2018 15:37) (94% - 98%)    General: No acute distress  HEENT: EOM intact, visual fields full, partial left gaze palsy, incomplete saccade to right   Abdomen: Soft, nontender, nondistended   Extremities: No edema    NEUROLOGICAL EXAM:  Mental status: Awake, alert, oriented to hospital, year, month, fluent speech, no neglect - aware of deficit, able to follow commands.   Cranial Nerves: Moderate left facial palsy, no nystagmus, no dysarthria, tongue midline  Motor exam: Normal tone, RUE 5/5,  RLE 5/5, LUE deltoid 3-/5, biceps 3/5, wrist extension 0/5,  LLE 4+/5 with slight drift  Sensation: Intact to light touch   Coordination/ Gait: No dysmetria, gait deferred.     LABS:                        11.2   7.50  )-----------( 200      ( 07 Oct 2018 08:06 )             36.9    10-07    138  |  102  |  19  ----------------------------<  100<H>  3.7   |  25  |  0.91    Ca    9.1      07 Oct 2018 06:47    PT/INR - ( 07 Oct 2018 07:17 )   PT: 11.7 sec;   INR: 1.07 ratio         PTT - ( 07 Oct 2018 07:17 )  PTT:27.7 sec  Hemoglobin A1C, Whole Blood: 6.6 % (09-23 @ 11:05)  Hemoglobin A1C, Whole Blood: 6.5 % (09-07 @ 18:01)  LDL Cholesterol, Direct: 53 mg/dL (10-02 @ 10:56)      IMAGING: Reviewed by me.     CT Head No Cont (09.24.18):  Previously noted acute right MCA infarct has demonstrated expected evolutionary changes with areas of hemorrhagic transformation again seen.    CT Head No Cont (09.23.18):  Redemonstration of evolving right MCA territory infarct, faint areas of increasing attenuation may reflect contrast staining, tiny foci of petechial hemorrhage not excluded.    CT perfusion w/ Maps w/ IV Cont (09.23.18):   Large right-sided penumbra  with a small infarct in the large mismatch volume.    Noncontrast head CT (09.23.18):   Large area acute right MCA territory infarct. Posterior temporal lobe infarct appears slightly more subacute with questionable petechial hemorrhage.    CTA neck (09.23.18):   No major vessel occlusion or hemodynamically significant stenosis. No evidence of dissection.    CTA head (09.23.18):  Occluded proximal M1 segment right middle cerebral artery.   Remainder of the intracranial circulation is patent.    MR Head w/wo IV Cont (09.28.18 )   Subacute right MCA infarct with associated areas of   hemorrhagic transformation suspected.    Unremarkable MRA of the neck and [place]. THE PATIENT WAS SEEN AND EXAMINED BY ME WITH THE HOUSESTAFF AND STROKE TEAM DURING MORNING ROUNDS.     HPI:  63 y/o male with past medical history of HTN presented initially to [facility] ED for AMS with suspicion of EtOH intoxication. Patient's mental status did not improve and CT/CTA head was done demonstrating Large area acute Right MCA infarct with occluded proximal M1 segment R MCA. Patient subsequently transferred to [facility] for possible Endovascular intervention. LKN is unknown but suspected to be 11pm (9/22/2018). tPA deferred due to unclear LKN time    SUBJECTIVE: No events overnight.  No new neurologic complaints.      ROS: All negative except documented above.    acetaminophen   Tablet .. 650 milliGRAM(s) Oral every 6 hours PRN  acetaminophen  IVPB .. 1000 milliGRAM(s) IV Intermittent once  ALBUTerol    90 MICROgram(s) HFA Inhaler 2 Puff(s) Inhalation every 6 hours PRN  aspirin  chewable 81 milliGRAM(s) Oral daily  atorvastatin 80 milliGRAM(s) Oral at bedtime  buDESOnide  80 MICROgram(s)/formoterol 4.5 MICROgram(s) Inhaler 2 Puff(s) Inhalation two times a day  enoxaparin Injectable 40 milliGRAM(s) SubCutaneous daily  FLUoxetine 20 milliGRAM(s) Oral daily  fluticasone propionate 50 MICROgram(s)/spray Nasal Spray 1 Spray(s) Both Nostrils two times a day  influenza   Vaccine 0.5 milliLiter(s) IntraMuscular once  ketorolac   Injectable 15 milliGRAM(s) IV Push every 4 hours PRN  nicotine -   7 mG/24Hr(s) Patch 1 patch Transdermal daily  pantoprazole    Tablet 40 milliGRAM(s) Oral before breakfast  thiamine Injectable 100 milliGRAM(s) IV Push daily  tiotropium 18 MICROgram(s) Capsule 1 Capsule(s) Inhalation daily      PHYSICAL EXAM:   Vital Signs Last 24 Hrs  T(C): 36.8 (07 Oct 2018 15:37), Max: 37.2 (07 Oct 2018 04:36)  T(F): 98.2 (07 Oct 2018 15:37), Max: 98.9 (07 Oct 2018 04:36)  HR: 67 (07 Oct 2018 15:37) (67 - 90)  BP: 113/70 (07 Oct 2018 15:37) (113/70 - 159/95)  BP(mean): --  RR: 18 (07 Oct 2018 15:37) (18 - 20)  SpO2: 94% (07 Oct 2018 15:37) (94% - 98%)    General: No acute distress  HEENT: EOM intact, visual fields full, partial left gaze palsy, incomplete saccade to right   Abdomen: Soft, nontender, nondistended   Extremities: No edema    NEUROLOGICAL EXAM:  Mental status: Awake, alert, oriented to hospital, year, month, fluent speech, no neglect - aware of deficit, able to follow commands.   Cranial Nerves: Moderate left facial palsy, no nystagmus, no dysarthria, tongue midline  Motor exam: Normal tone, RUE 5/5,  RLE 5/5, LUE deltoid 3-/5, biceps 3/5, wrist extension 0/5,  LLE 4+/5 with slight drift  Sensation: Intact to light touch   Coordination/ Gait: No dysmetria, gait deferred.     LABS:                        11.2   7.50  )-----------( 200      ( 07 Oct 2018 08:06 )             36.9    10-07    138  |  102  |  19  ----------------------------<  100<H>  3.7   |  25  |  0.91    Ca    9.1      07 Oct 2018 06:47    PT/INR - ( 07 Oct 2018 07:17 )   PT: 11.7 sec;   INR: 1.07 ratio         PTT - ( 07 Oct 2018 07:17 )  PTT:27.7 sec  Hemoglobin A1C, Whole Blood: 6.6 % (09-23 @ 11:05)  Hemoglobin A1C, Whole Blood: 6.5 % (09-07 @ 18:01)  LDL Cholesterol, Direct: 53 mg/dL (10-02 @ 10:56)      IMAGING: Reviewed by me.     CT Head No Cont (09.24.18):  Previously noted acute right MCA infarct has demonstrated expected evolutionary changes with areas of hemorrhagic transformation again seen.    CT Head No Cont (09.23.18):  Redemonstration of evolving right MCA territory infarct, faint areas of increasing attenuation may reflect contrast staining, tiny foci of petechial hemorrhage not excluded.    CT perfusion w/ Maps w/ IV Cont (09.23.18):   Large right-sided penumbra  with a small infarct in the large mismatch volume.    Noncontrast head CT (09.23.18):   Large area acute right MCA territory infarct. Posterior temporal lobe infarct appears slightly more subacute with questionable petechial hemorrhage.    CTA neck (09.23.18):   No major vessel occlusion or hemodynamically significant stenosis. No evidence of dissection.    CTA head (09.23.18):  Occluded proximal M1 segment right middle cerebral artery.   Remainder of the intracranial circulation is patent.    MR Head w/wo IV Cont (09.28.18 )   Subacute right MCA infarct with associated areas of   hemorrhagic transformation suspected.    Unremarkable MRA of the neck and [place].

## 2018-10-07 NOTE — PROGRESS NOTE ADULT - SUBJECTIVE AND OBJECTIVE BOX
Subjective: Patient seen and examined. No new events except as noted.     SUBJECTIVE/ROS:  No chest pain, dyspnea, palpitation, or dizziness.       MEDICATIONS:  MEDICATIONS  (STANDING):  acetaminophen  IVPB .. 1000 milliGRAM(s) IV Intermittent once  aspirin  chewable 81 milliGRAM(s) Oral daily  atorvastatin 80 milliGRAM(s) Oral at bedtime  buDESOnide  80 MICROgram(s)/formoterol 4.5 MICROgram(s) Inhaler 2 Puff(s) Inhalation two times a day  enoxaparin Injectable 40 milliGRAM(s) SubCutaneous daily  FLUoxetine 20 milliGRAM(s) Oral daily  fluticasone propionate 50 MICROgram(s)/spray Nasal Spray 1 Spray(s) Both Nostrils two times a day  influenza   Vaccine 0.5 milliLiter(s) IntraMuscular once  nicotine -   7 mG/24Hr(s) Patch 1 patch Transdermal daily  pantoprazole    Tablet 40 milliGRAM(s) Oral before breakfast  thiamine Injectable 100 milliGRAM(s) IV Push daily  tiotropium 18 MICROgram(s) Capsule 1 Capsule(s) Inhalation daily      PHYSICAL EXAM:  T(C): 36.4 (10-07-18 @ 08:05), Max: 37.2 (10-07-18 @ 04:36)  HR: 68 (10-07-18 @ 08:05) (68 - 90)  BP: 125/75 (10-07-18 @ 08:05) (125/75 - 159/95)  RR: 18 (10-07-18 @ 08:05) (18 - 20)  SpO2: 94% (10-07-18 @ 08:05) (94% - 98%)  Wt(kg): --  I&O's Summary    06 Oct 2018 07:01  -  07 Oct 2018 07:00  --------------------------------------------------------  IN: 1440 mL / OUT: 0 mL / NET: 1440 mL          Appearance: Normal	  HEENT:   no gross abnormality   Cardiovascular: Normal S1 S2,    Murmur:   Neck: JVP normal  Respiratory: Lungs clear   Gastrointestinal:  Soft, Non-tender  Skin: normal   Neuro: No gross deficits.   Psychiatry:  Mood & affect flat  Ext: No edema      LABS/DATA:    CARDIAC MARKERS:                                11.2   7.50  )-----------( 200      ( 07 Oct 2018 08:06 )             36.9     10-07    138  |  102  |  19  ----------------------------<  100<H>  3.7   |  25  |  0.91    Ca    9.1      07 Oct 2018 06:47      proBNP:   Lipid Profile:   HgA1c:   TSH:     TELE:  EKG:

## 2018-10-08 DIAGNOSIS — K86.9 DISEASE OF PANCREAS, UNSPECIFIED: ICD-10-CM

## 2018-10-08 LAB
ANION GAP SERPL CALC-SCNC: 9 MMOL/L — SIGNIFICANT CHANGE UP (ref 5–17)
BLD GP AB SCN SERPL QL: NEGATIVE — SIGNIFICANT CHANGE UP
BUN SERPL-MCNC: 20 MG/DL — SIGNIFICANT CHANGE UP (ref 7–23)
CALCIUM SERPL-MCNC: 8.9 MG/DL — SIGNIFICANT CHANGE UP (ref 8.4–10.5)
CHLORIDE SERPL-SCNC: 103 MMOL/L — SIGNIFICANT CHANGE UP (ref 96–108)
CO2 SERPL-SCNC: 26 MMOL/L — SIGNIFICANT CHANGE UP (ref 22–31)
CREAT SERPL-MCNC: 0.95 MG/DL — SIGNIFICANT CHANGE UP (ref 0.5–1.3)
GLUCOSE SERPL-MCNC: 101 MG/DL — HIGH (ref 70–99)
HCT VFR BLD CALC: 36.7 % — LOW (ref 39–50)
HGB BLD-MCNC: 11.3 G/DL — LOW (ref 13–17)
MCHC RBC-ENTMCNC: 21.1 PG — LOW (ref 27–34)
MCHC RBC-ENTMCNC: 30.8 GM/DL — LOW (ref 32–36)
MCV RBC AUTO: 68.6 FL — LOW (ref 80–100)
PLATELET # BLD AUTO: 220 K/UL — SIGNIFICANT CHANGE UP (ref 150–400)
POTASSIUM SERPL-MCNC: 3.8 MMOL/L — SIGNIFICANT CHANGE UP (ref 3.5–5.3)
POTASSIUM SERPL-SCNC: 3.8 MMOL/L — SIGNIFICANT CHANGE UP (ref 3.5–5.3)
RBC # BLD: 5.35 M/UL — SIGNIFICANT CHANGE UP (ref 4.2–5.8)
RBC # FLD: 14.5 % — SIGNIFICANT CHANGE UP (ref 10.3–14.5)
RH IG SCN BLD-IMP: NEGATIVE — SIGNIFICANT CHANGE UP
SODIUM SERPL-SCNC: 138 MMOL/L — SIGNIFICANT CHANGE UP (ref 135–145)
WBC # BLD: 7.55 K/UL — SIGNIFICANT CHANGE UP (ref 3.8–10.5)
WBC # FLD AUTO: 7.55 K/UL — SIGNIFICANT CHANGE UP (ref 3.8–10.5)

## 2018-10-08 PROCEDURE — 99233 SBSQ HOSP IP/OBS HIGH 50: CPT

## 2018-10-08 RX ORDER — SODIUM CHLORIDE 9 MG/ML
1000 INJECTION, SOLUTION INTRAVENOUS
Qty: 0 | Refills: 0 | Status: DISCONTINUED | OUTPATIENT
Start: 2018-10-08 | End: 2018-10-09

## 2018-10-08 RX ADMIN — Medication 1 SPRAY(S): at 06:12

## 2018-10-08 RX ADMIN — Medication 1 SPRAY(S): at 17:40

## 2018-10-08 RX ADMIN — Medication 20 MILLIGRAM(S): at 13:47

## 2018-10-08 RX ADMIN — Medication 81 MILLIGRAM(S): at 13:46

## 2018-10-08 RX ADMIN — Medication 650 MILLIGRAM(S): at 21:33

## 2018-10-08 RX ADMIN — SODIUM CHLORIDE 100 MILLILITER(S): 9 INJECTION, SOLUTION INTRAVENOUS at 09:34

## 2018-10-08 RX ADMIN — BUDESONIDE AND FORMOTEROL FUMARATE DIHYDRATE 2 PUFF(S): 160; 4.5 AEROSOL RESPIRATORY (INHALATION) at 17:40

## 2018-10-08 RX ADMIN — Medication 1 PATCH: at 12:00

## 2018-10-08 RX ADMIN — Medication 100 MILLIGRAM(S): at 13:47

## 2018-10-08 RX ADMIN — Medication 650 MILLIGRAM(S): at 22:03

## 2018-10-08 RX ADMIN — ENOXAPARIN SODIUM 40 MILLIGRAM(S): 100 INJECTION SUBCUTANEOUS at 13:47

## 2018-10-08 RX ADMIN — ATORVASTATIN CALCIUM 80 MILLIGRAM(S): 80 TABLET, FILM COATED ORAL at 21:32

## 2018-10-08 RX ADMIN — TIOTROPIUM BROMIDE 1 CAPSULE(S): 18 CAPSULE ORAL; RESPIRATORY (INHALATION) at 13:47

## 2018-10-08 RX ADMIN — BUDESONIDE AND FORMOTEROL FUMARATE DIHYDRATE 2 PUFF(S): 160; 4.5 AEROSOL RESPIRATORY (INHALATION) at 06:14

## 2018-10-08 NOTE — CHART NOTE - NSCHARTNOTEFT_GEN_A_CORE
Was informed sister Juliana requesting call back.  Called and spoke to her.   Her questions and concerns were addressed. She acknowledged understanding.

## 2018-10-08 NOTE — PROGRESS NOTE ADULT - SUBJECTIVE AND OBJECTIVE BOX
Chief Complaint:    pt. feels ok, NPO after MN  Interval Events:     Allergies:  No Known Allergies      Home Medications:    Hospital Medications:  acetaminophen   Tablet .. 650 milliGRAM(s) Oral every 6 hours PRN  acetaminophen  IVPB .. 1000 milliGRAM(s) IV Intermittent once  ALBUTerol    90 MICROgram(s) HFA Inhaler 2 Puff(s) Inhalation every 6 hours PRN  aspirin  chewable 81 milliGRAM(s) Oral daily  atorvastatin 80 milliGRAM(s) Oral at bedtime  buDESOnide  80 MICROgram(s)/formoterol 4.5 MICROgram(s) Inhaler 2 Puff(s) Inhalation two times a day  enoxaparin Injectable 40 milliGRAM(s) SubCutaneous daily  FLUoxetine 20 milliGRAM(s) Oral daily  fluticasone propionate 50 MICROgram(s)/spray Nasal Spray 1 Spray(s) Both Nostrils two times a day  influenza   Vaccine 0.5 milliLiter(s) IntraMuscular once  ketorolac   Injectable 15 milliGRAM(s) IV Push every 4 hours PRN  nicotine -   7 mG/24Hr(s) Patch 1 patch Transdermal daily  pantoprazole    Tablet 40 milliGRAM(s) Oral before breakfast  thiamine Injectable 100 milliGRAM(s) IV Push daily  tiotropium 18 MICROgram(s) Capsule 1 Capsule(s) Inhalation daily    ROS  GI: [- ] Nausea, [- ] vomiting, [ -]abdominal pain    PHYSICAL EXAM:   Vital Signs:  Vital Signs Last 24 Hrs  T(C): 37.2 (08 Oct 2018 08:19), Max: 37.2 (08 Oct 2018 04:51)  T(F): 98.9 (08 Oct 2018 08:19), Max: 98.9 (08 Oct 2018 04:51)  HR: 74 (08 Oct 2018 08:19) (67 - 88)  BP: 122/74 (08 Oct 2018 08:19) (113/70 - 136/79)  BP(mean): --  RR: 18 (08 Oct 2018 08:19) (18 - 19)  SpO2: 97% (08 Oct 2018 08:19) (94% - 98%)  Daily     Daily     GENERAL:  Appears stated age, well-groomed, well-nourished, no distress  HEENT:  NC/AT,  conjunctivae clear and pink, no thyromegaly, nodules, adenopathy, no JVD, sclera -anicteric  CHEST:  Full & symmetric excursion, no increased effort, breath sounds clear  HEART:  Regular rhythm, S1, S2, no murmur/rub/S3/S4, no abdominal bruit, no edema  ABDOMEN:  Soft, non-tender, non-distended, normoactive bowel sounds,  no masses ,no hepato-splenomegaly, no signs of chronic liver disease  EXTEREMITIES:  no cyanosis,clubbing or edema  SKIN:  No rash/erythema/ecchymoses/petechiae/wounds/abscess/warm/dry  NEURO:  Alert, oriented, no asterixis, no tremor, no encephalopathy    LABS:                        11.2   7.50  )-----------( 200      ( 07 Oct 2018 08:06 )             36.9     10-08    138  |  103  |  20  ----------------------------<  101<H>  3.8   |  26  |  0.95    Ca    8.9      08 Oct 2018 06:26        PT/INR - ( 07 Oct 2018 07:17 )   PT: 11.7 sec;   INR: 1.07 ratio         PTT - ( 07 Oct 2018 07:17 )  PTT:27.7 sec        Imaging:

## 2018-10-08 NOTE — CHART NOTE - NSCHARTNOTEFT_GEN_A_CORE
Unable to complete the procedure due to hypoxia.  The procedure will be rescheduled for an outpatient elective procedure once his clinical status is optimized. Unable to complete the procedure due to hypoxia.   The procedure will be rescheduled for an outpatient elective procedure once his clinical status is optimized.  Per neurology, elective general anesthesia should be postponed until at least 1 month post CVA as well as after repeating a CT head to rule out hemorrhagic transformation. Unable to complete the procedure due to hypoxia.   The procedure will be rescheduled for an outpatient elective procedure once his clinical status is optimized.  Per neurology, elective general anesthesia should be postponed until at least 1 month post CVA as well as after repeating a CT head to rule out hemorrhagic transformation.  can order a chromogrannin A and consider a Doatate scan to evaluate for possible neuroendocrine tumor.

## 2018-10-08 NOTE — PROGRESS NOTE ADULT - ASSESSMENT
62 year-old  male with past medical history of HTN and ETOH abuse. On 9/23/18 he presented to [facility] with AMS and intoxication. Upon further evaluation by the ED he wasn't moving L arm. On telestroke eval he was found with  left facial droop, left upper extremity monoplegia and lower extremity weakness, significant dysarthria and left hemibody hypoesthesia. Head CT showed area of hypodensity at right fronto parietal lobe and acute/ subacute hypodensity at the inferior temporal/parietal lobe middle cerebral artery territory. CT angiography demonstrated right middle cerebral artery proximal M1 segment occlusion. Patient not candidate for IV-tPA given unknown last known well as well as appearance of subacute infarct. He was transferred to [facility] for evaluation for endovascular therapy. CT perfusion showed a small core infarct with large area of penumbra. He was deemed candidate for endovascular therapy with mechanical thrombectomy with TICI 2b recanalization. MRI brain subsequently showed right MCA distribution infarct with hemorrhagic transformation (HI 2). TTE did not show any obvious structural cardiac source of embolism. AYO did not show any obvious structural cardiac source of embolism nor showed any evidence of a PFO. 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      Impression:   Cerebral embolism with cerebral infarction. R MCA distribution stroke with mild hemorrhagic concentration (HI 2) - likely etiology being cryptogenic stroke, probably related to embolism from a proximal source like cardiac source of embolism.    NEURO: Neurologically overall with improvement, Continue close monitoring for neurologic deterioration, BP goal gradual normotension in the setting of recent successful revascularization, continue with home statin medication if applicable considering likely non-atheroembolic etiology of his stroke and age, repeat fasting lipid profile to better characterize LDL results, Physical therapy/OT - acute TBI.      ANTITHROMBOTIC THERAPY: Aspirin for secondary stroke prevention     PULMONARY: CXR on 9/26 Right lower lobe nodules, largest of which measures 5 mm, pulm following- plan for repeat CT chest in 6 months, probable COPD, wheezing, continue Duoneb, symbicort     CARDIOVASCULAR: TTE showed - LVEF: 63%, Grossly normal left ventricular internal dimensions and wall thicknesses. Grossly hyperdynamic left ventricular systolic function. Reversal of the E-A  waves of the mitral inflow pattern is consistent with diastolic LV dysfunction. Grossly normal right ventricular size and function.  Cardiac monitoring without any documented events. AYO: negative for thrombus or PFO. Plan for  prolonged cardiac monitoring with ICM to screen for occult cardiac arrhythmias and atrial fibrillation during the cardiac source of embolism. s/p ICM placement for prolonged cardiac monitoring to screen for occult cardiac arrhythmias like atrial fibrillation     SBP goal: gradual normotension    GASTROINTESTINAL:  dysphagia screen - failed, s/p MBSon 9/27, with recommendation for Dysphagia 2 nectar consistency, tolerating diet, EGD: Chronic gastritis. Biopsied. Multiple non-bleeding duodenal ulcers with no stigmata of bleeding, will continue Protonix (pantoprazole) 40 mg PO daily. CT A/P showed pancreatic tail lesion, MRI of abdomen with Howard (MRCP protocol) ordered to delineate the pancreatic tail lesion ordered but patient was unable to tolerate the test. Plan for EUS to biopsy the pancreatic tail lesion today, however unable to complete the procedure due to hypoxia. The procedure will be rescheduled as an outpatient elective procedure at least 1 month post CVA.     Diet: dysphagia II nectar consistency    RENAL: BUN/Cr stable, Good urine output      Na Goal: Greater than 135     Strong: no    HEMATOLOGY: H/H 11.2/36.9, Platelets 200, no signs or symptoms of active bleeding. History of renal CA-,     DVT ppx: Heparin s.c [] LMWH [x]     ID: afebrile, no signs or symptoms of infection.      OTHER: Psychiatry Consult: continue fluoxetine for improved motor recovery, melatonin for insomnia, no need for CIWA, monitor for si/sx of withdrawal,  also discussed with patient and wife that he needs a sleep study as an outpatient as we suspect he has SHILO. All questions and concerns addressed. Patient reported chronic Low Back Pain and takes Gabapentin for this reason and chronic Neuropathy. His wife plans to bring in his home dose so he can restart this medication.     DISPOSITION: Acute TBI pending auth    CORE MEASURES:        Admission NIHSS: 12     TPA: [] YES [x] NO      LDL/HDL: 17/36     Depression Screen: p     Statin Therapy: yes     Dysphagia Screen: [] PASS [x] FAIL     Smoking [] YES [x] NO      Afib [] YES [x] NO     Stroke Education [] YES [] NO -

## 2018-10-08 NOTE — PROGRESS NOTE ADULT - SUBJECTIVE AND OBJECTIVE BOX
Follow-up Pulm Progress Note    No new respiratory events overnight.  Denies SOB/CP.   96% on RA    Medications:  MEDICATIONS  (STANDING):  acetaminophen  IVPB .. 1000 milliGRAM(s) IV Intermittent once  aspirin  chewable 81 milliGRAM(s) Oral daily  atorvastatin 80 milliGRAM(s) Oral at bedtime  buDESOnide  80 MICROgram(s)/formoterol 4.5 MICROgram(s) Inhaler 2 Puff(s) Inhalation two times a day  enoxaparin Injectable 40 milliGRAM(s) SubCutaneous daily  FLUoxetine 20 milliGRAM(s) Oral daily  fluticasone propionate 50 MICROgram(s)/spray Nasal Spray 1 Spray(s) Both Nostrils two times a day  influenza   Vaccine 0.5 milliLiter(s) IntraMuscular once  lactated ringers. 1000 milliLiter(s) (100 mL/Hr) IV Continuous <Continuous>  nicotine -   7 mG/24Hr(s) Patch 1 patch Transdermal daily  pantoprazole    Tablet 40 milliGRAM(s) Oral before breakfast  thiamine Injectable 100 milliGRAM(s) IV Push daily  tiotropium 18 MICROgram(s) Capsule 1 Capsule(s) Inhalation daily    MEDICATIONS  (PRN):  acetaminophen   Tablet .. 650 milliGRAM(s) Oral every 6 hours PRN Temp greater or equal to 38C (100.4F), Mild Pain (1 - 3)  ALBUTerol    90 MICROgram(s) HFA Inhaler 2 Puff(s) Inhalation every 6 hours PRN Shortness of Breath and/or Wheezing  ketorolac   Injectable 15 milliGRAM(s) IV Push every 4 hours PRN Moderate Pain (4 - 6)          Vital Signs Last 24 Hrs  T(C): 37.2 (08 Oct 2018 13:37), Max: 37.2 (08 Oct 2018 04:51)  T(F): 98.9 (08 Oct 2018 13:37), Max: 98.9 (08 Oct 2018 04:51)  HR: 88 (08 Oct 2018 13:37) (67 - 88)  BP: 125/85 (08 Oct 2018 13:37) (113/70 - 136/79)  BP(mean): --  RR: 18 (08 Oct 2018 13:37) (18 - 19)  SpO2: 97% (08 Oct 2018 13:37) (94% - 98%) on RA          10-07 @ 07:01  -  10-08 @ 07:00  --------------------------------------------------------  IN: 240 mL / OUT: 200 mL / NET: 40 mL          LABS:                        11.3   7.55  )-----------( 220      ( 08 Oct 2018 08:04 )             36.7     10-08    138  |  103  |  20  ----------------------------<  101<H>  3.8   |  26  |  0.95    Ca    8.9      08 Oct 2018 06:26            CAPILLARY BLOOD GLUCOSE        PT/INR - ( 07 Oct 2018 07:17 )   PT: 11.7 sec;   INR: 1.07 ratio         PTT - ( 07 Oct 2018 07:17 )  PTT:27.7 sec          Physical Examination:  PULM: Trace exp wheeze-clears with cough   CVS: S1, S2 heard    RADIOLOGY REVIEWED

## 2018-10-08 NOTE — PROGRESS NOTE ADULT - ASSESSMENT
62 Y.O. WM admitted with an acute Right MCA CVA with noted dysphagia s/p S&S eval with MBS which revealed a filling defect in the anterior esophagus. Patient was planned for AYO to rule out cardiac anatomic abnormality. Concern for esophageal web on MBS. GI consulted for EGD prior to AYO.  Pt. also noted with mild anemia, but significant Microcytic and Hypochromic anemia.  Pt. now with a Pancreatic tail lesion.  Appreciate Advanced GI consultation for EUS.  Upper Endoscopy (10.03.18 @ 07:13) >                       - Normal esophagus. Biopsied.                       - Small hiatus hernia.                       - Chronic gastritis. Biopsied.                       - Non-bleeding gastric ulcers with no stigmata of bleeding.                       - Non-bleeding gastric ulcers with no stigmata of bleeding. Biopsied.                       - Multiple non-bleeding duodenal ulcers with no stigmata of bleeding.  Recommendation:                             - Continue Protonix (pantoprazole) 40 mg PO daily daily.                           Recent colonoscopy report reviewed: pt. with many colonic polyps (Tubular Adenomas) s/p removal.  Continue diet with dysphagia 2 with supervision.  Continue Protonix 40 mg PO QD 1/2 hr. AC.  Pt. scheduled for EUS (Endoscopic Ultrasound) to biopsy the pancreatic tail lesion today at 10 :30 AM.  R/O Pancreatic Cancer.  Cont. NPO   IVF    I had a prolonged conversation with pt. and wife re. likely diagnosis and plan who verbalizes clear understanding,

## 2018-10-08 NOTE — PROGRESS NOTE ADULT - ASSESSMENT
63 y/o M with PMH of HTN, HLD, renal cancer s/p partial R nephrectomy (10/2017 at [facility], patient denies having received chemo/XRT), current smoker (45 pack yrs), depression, anxiety presented to [facility] 9/23 with AMS and found to have R MCA CVA s/p embolectomy. Extubated 9/24.

## 2018-10-08 NOTE — PROGRESS NOTE ADULT - ASSESSMENT
Acute CVA  HTN    BP stable  AYO is neg for thrombus   s/p ILR     Pancreatic mass  plan for EUS and biopsy   CV stable

## 2018-10-08 NOTE — PROGRESS NOTE ADULT - SUBJECTIVE AND OBJECTIVE BOX
Pre-Endoscopy Evaluation      Referring Physician:                                    Procedure:    Indication for Procedure:    Pertinent History:    Sedation by Anesthesia [ ]    PAST MEDICAL & SURGICAL HISTORY:  HTN (hypertension)  TIA (transient ischemic attack)      PMH of Gastroparesis [ ]  Gastric Surgery [ ]  Gastric Outlet Obstruction [ ]    Allergies    No Known Allergies    Intolerances        Latex allergy: [ ] yes [ ] no    Medications:MEDICATIONS  (STANDING):  acetaminophen  IVPB .. 1000 milliGRAM(s) IV Intermittent once  aspirin  chewable 81 milliGRAM(s) Oral daily  atorvastatin 80 milliGRAM(s) Oral at bedtime  buDESOnide  80 MICROgram(s)/formoterol 4.5 MICROgram(s) Inhaler 2 Puff(s) Inhalation two times a day  enoxaparin Injectable 40 milliGRAM(s) SubCutaneous daily  FLUoxetine 20 milliGRAM(s) Oral daily  fluticasone propionate 50 MICROgram(s)/spray Nasal Spray 1 Spray(s) Both Nostrils two times a day  influenza   Vaccine 0.5 milliLiter(s) IntraMuscular once  lactated ringers. 1000 milliLiter(s) (100 mL/Hr) IV Continuous <Continuous>  nicotine -   7 mG/24Hr(s) Patch 1 patch Transdermal daily  pantoprazole    Tablet 40 milliGRAM(s) Oral before breakfast  thiamine Injectable 100 milliGRAM(s) IV Push daily  tiotropium 18 MICROgram(s) Capsule 1 Capsule(s) Inhalation daily    MEDICATIONS  (PRN):  acetaminophen   Tablet .. 650 milliGRAM(s) Oral every 6 hours PRN Temp greater or equal to 38C (100.4F), Mild Pain (1 - 3)  ALBUTerol    90 MICROgram(s) HFA Inhaler 2 Puff(s) Inhalation every 6 hours PRN Shortness of Breath and/or Wheezing  ketorolac   Injectable 15 milliGRAM(s) IV Push every 4 hours PRN Moderate Pain (4 - 6)      Smoking: [ ] yes  [ ] no    AICD/PPM: [ ] yes   [ ] no    Pertinent lab data:                        11.3   7.55  )-----------( 220      ( 08 Oct 2018 08:04 )             36.7     10-08    138  |  103  |  20  ----------------------------<  101<H>  3.8   |  26  |  0.95    Ca    8.9      08 Oct 2018 06:26      PT/INR - ( 07 Oct 2018 07:17 )   PT: 11.7 sec;   INR: 1.07 ratio         PTT - ( 07 Oct 2018 07:17 )  PTT:27.7 sec        < from: Transesophageal Echocardiogram w/o TTE (10.03.18 @ 14:23) >    ------------------------------------------------------------------------  Observations:  Mitral Valve: Mitral annular calcification, otherwise  normal mitral valve. Mild mitral regurgitation.  Aortic Valve/Aorta: Calcified trileaflet aortic valve with  normal opening. No aortic valve regurgitation seen.  The aorta is not well visualized at the end of the study  due to patient coughing.  Left Atrium: Normal left atrium. No left atrial or left  atrial appendage thrombus. Lipomatous hypertrophy of  interatrial septum.  Left Ventricle: Normal left ventricular systolic function.  No segmental wall motion abnormalities.  Right Heart: Normal right atrium. Normal right ventricular  size and function. Normal tricuspid valve. Mild tricuspid  regurgitation. Pulmonic valve not well visualized.  Pericardium/Pleura: Normal pericardium with no pericardial  effusion.  Hemodynamic: Contrast injection demonstrates no evidence of  a patent foramen ovale.  ------------------------------------------------------------------------  Conclusions:  1. Normal left atrium. No left atrial or left atrial  appendage thrombus. Lipomatous hypertrophy of interatrial  septum.  2. Normal left ventricular systolic function. Nosegmental  wall motion abnormalities.  3. Normal right ventricular size and function.  4. Contrast injection demonstrates no evidence of a patent  foramen ovale.  *** Compared with echocardiogram of 9/24/2018, no  significant changes noted.  -------------------------------------------------------------------------        Physical Examination:      Daily   Vital Signs Last 24 Hrs  T(C): 37.2 (08 Oct 2018 08:19), Max: 37.2 (08 Oct 2018 04:51)  T(F): 98.9 (08 Oct 2018 08:19), Max: 98.9 (08 Oct 2018 04:51)  HR: 74 (08 Oct 2018 08:19) (67 - 88)  BP: 122/74 (08 Oct 2018 08:19) (113/70 - 136/79)  BP(mean): --  RR: 18 (08 Oct 2018 08:19) (18 - 19)  SpO2: 97% (08 Oct 2018 08:19) (94% - 98%)    Drug Dosing Weight  Height (cm): 182.88 (28 Sep 2018 14:01)  Weight (kg): 121.9 (23 Sep 2018 04:24)  BMI (kg/m2): 36.4 (28 Sep 2018 14:01)  BSA (m2): 2.42 (28 Sep 2018 14:01)    Constitutional: NAD     Neck:  No JVD    Respiratory: CTAB/L    Cardiovascular: S1 and S2    Gastrointestinal: BS+, soft, NT/ND    Extremities: No peripheral edema    Neurological: A/O x 3    : No Strong    Skin: No rashes    Comments:    ASA Class: I [ ]  II [ ]  III [ ]  IV [ ]    The patient is a suitable candidate for the planned procedure unless box checked [ ]  No, explain: Pre-Endoscopy Evaluation      Referring Physician:  dr. richard libman                                Procedure: eus    Indication for Procedure: pancreatic lesion    Pertinent History: 62y male admitted with an acute Right MCA CVA now with pancreatic tail lesion      Sedation by Anesthesia [X]    PAST MEDICAL & SURGICAL HISTORY:  HTN (hypertension)  TIA (transient ischemic attack)      PMH of Gastroparesis [ ]  Gastric Surgery [ ]  Gastric Outlet Obstruction [ ]    Allergies    No Known Allergies    Intolerances    Latex allergy: [ ] yes [X] no    Medications:MEDICATIONS  (STANDING):  acetaminophen  IVPB .. 1000 milliGRAM(s) IV Intermittent once  aspirin  chewable 81 milliGRAM(s) Oral daily  atorvastatin 80 milliGRAM(s) Oral at bedtime  buDESOnide  80 MICROgram(s)/formoterol 4.5 MICROgram(s) Inhaler 2 Puff(s) Inhalation two times a day  enoxaparin Injectable 40 milliGRAM(s) SubCutaneous daily  FLUoxetine 20 milliGRAM(s) Oral daily  fluticasone propionate 50 MICROgram(s)/spray Nasal Spray 1 Spray(s) Both Nostrils two times a day  influenza   Vaccine 0.5 milliLiter(s) IntraMuscular once  lactated ringers. 1000 milliLiter(s) (100 mL/Hr) IV Continuous <Continuous>  nicotine -   7 mG/24Hr(s) Patch 1 patch Transdermal daily  pantoprazole    Tablet 40 milliGRAM(s) Oral before breakfast  thiamine Injectable 100 milliGRAM(s) IV Push daily  tiotropium 18 MICROgram(s) Capsule 1 Capsule(s) Inhalation daily    MEDICATIONS  (PRN):  acetaminophen   Tablet .. 650 milliGRAM(s) Oral every 6 hours PRN Temp greater or equal to 38C (100.4F), Mild Pain (1 - 3)  ALBUTerol    90 MICROgram(s) HFA Inhaler 2 Puff(s) Inhalation every 6 hours PRN Shortness of Breath and/or Wheezing  ketorolac   Injectable 15 milliGRAM(s) IV Push every 4 hours PRN Moderate Pain (4 - 6)      Smoking: [ ] yes  [X] no    AICD/PPM: [ ] yes   [X] no    Pertinent lab data:                        11.3   7.55  )-----------( 220      ( 08 Oct 2018 08:04 )             36.7     10-08    138  |  103  |  20  ----------------------------<  101<H>  3.8   |  26  |  0.95    Ca    8.9      08 Oct 2018 06:26      PT/INR - ( 07 Oct 2018 07:17 )   PT: 11.7 sec;   INR: 1.07 ratio         PTT - ( 07 Oct 2018 07:17 )  PTT:27.7 sec        < from: Transesophageal Echocardiogram w/o TTE (10.03.18 @ 14:23) >    ------------------------------------------------------------------------  Observations:  Mitral Valve: Mitral annular calcification, otherwise  normal mitral valve. Mild mitral regurgitation.  Aortic Valve/Aorta: Calcified trileaflet aortic valve with  normal opening. No aortic valve regurgitation seen.  The aorta is not well visualized at the end of the study  due to patient coughing.  Left Atrium: Normal left atrium. No left atrial or left  atrial appendage thrombus. Lipomatous hypertrophy of  interatrial septum.  Left Ventricle: Normal left ventricular systolic function.  No segmental wall motion abnormalities.  Right Heart: Normal right atrium. Normal right ventricular  size and function. Normal tricuspid valve. Mild tricuspid  regurgitation. Pulmonic valve not well visualized.  Pericardium/Pleura: Normal pericardium with no pericardial  effusion.  Hemodynamic: Contrast injection demonstrates no evidence of  a patent foramen ovale.  ------------------------------------------------------------------------  Conclusions:  1. Normal left atrium. No left atrial or left atrial  appendage thrombus. Lipomatous hypertrophy of interatrial  septum.  2. Normal left ventricular systolic function. Nosegmental  wall motion abnormalities.  3. Normal right ventricular size and function.  4. Contrast injection demonstrates no evidence of a patent  foramen ovale.  *** Compared with echocardiogram of 9/24/2018, no  significant changes noted.  -------------------------------------------------------------------------        Physical Examination:      Daily   Vital Signs Last 24 Hrs  T(C): 37.2 (08 Oct 2018 08:19), Max: 37.2 (08 Oct 2018 04:51)  T(F): 98.9 (08 Oct 2018 08:19), Max: 98.9 (08 Oct 2018 04:51)  HR: 74 (08 Oct 2018 08:19) (67 - 88)  BP: 122/74 (08 Oct 2018 08:19) (113/70 - 136/79)  BP(mean): --  RR: 18 (08 Oct 2018 08:19) (18 - 19)  SpO2: 97% (08 Oct 2018 08:19) (94% - 98%)    Drug Dosing Weight  Height (cm): 182.88 (28 Sep 2018 14:01)  Weight (kg): 121.9 (23 Sep 2018 04:24)  BMI (kg/m2): 36.4 (28 Sep 2018 14:01)  BSA (m2): 2.42 (28 Sep 2018 14:01)    Constitutional: NAD     Neck:  No JVD    Respiratory: CTAB/L    Cardiovascular: S1 and S2    Gastrointestinal: BS+, soft, NT/ND    Extremities: No peripheral edema    Neurological: A/O x 3    : No Strong    Skin: No rashes    Comments:    ASA Class: I [ ]  II [ ]  III [ ]  IV [X]    The patient is a suitable candidate for the planned procedure unless box checked [ ]  No, explain:

## 2018-10-08 NOTE — PROGRESS NOTE ADULT - ATTENDING COMMENTS
In addition to above I spoke to the gastrointestinal medicine attending in detail. It was decided not to pursue GI endoscopy at this point given high risk for general anesthesia. Patient will follow up with GI as out patient.
agree with above; ROS otherwise negative
as above  PSG as outpt

## 2018-10-08 NOTE — PROGRESS NOTE ADULT - SUBJECTIVE AND OBJECTIVE BOX
THE PATIENT WAS SEEN AND EXAMINED BY ME WITH THE HOUSESTAFF AND STROKE TEAM DURING MORNING ROUNDS.     HPI:  63 y/o male with past medical history of HTN presented initially to [facility] ED for AMS with suspicion of EtOH intoxication. Patient's mental status did not improve and CT/CTA head was done demonstrating Large area acute Right MCA infarct with occluded proximal M1 segment R MCA. Patient subsequently transferred to [facility] for possible Endovascular intervention. LKN is unknown but suspected to be 11pm (9/22/2018). tPA deferred due to unclear LKN time  THE PATIENT WAS SEEN AND EXAMINED BY ME WITH THE HOUSESTAFF AND STROKE TEAM DURING MORNING ROUNDS.   HPI:  63 y/o male with past medical history of HTN presented initially to [facility] ED for AMS with suspicion of EtOH intoxication. Patient's mental status did not improve and CT/CTA head was done demonstrating Large area acute Right MCA infarct with occluded proximal M1 segment R MCA. Patient subsequently transferred to [facility] for possible Endovascular intervention. LKN is unknown but suspected to be 11pm (9/22/2018). tPA deferred due to unclear LKN time. (23 Sep 2018 05:05)      SUBJECTIVE: No events overnight.  No new neurologic complaints.      acetaminophen   Tablet .. 650 milliGRAM(s) Oral every 6 hours PRN  acetaminophen  IVPB .. 1000 milliGRAM(s) IV Intermittent once  ALBUTerol    90 MICROgram(s) HFA Inhaler 2 Puff(s) Inhalation every 6 hours PRN  aspirin  chewable 81 milliGRAM(s) Oral daily  atorvastatin 80 milliGRAM(s) Oral at bedtime  buDESOnide  80 MICROgram(s)/formoterol 4.5 MICROgram(s) Inhaler 2 Puff(s) Inhalation two times a day  enoxaparin Injectable 40 milliGRAM(s) SubCutaneous daily  FLUoxetine 20 milliGRAM(s) Oral daily  fluticasone propionate 50 MICROgram(s)/spray Nasal Spray 1 Spray(s) Both Nostrils two times a day  influenza   Vaccine 0.5 milliLiter(s) IntraMuscular once  ketorolac   Injectable 15 milliGRAM(s) IV Push every 4 hours PRN  lactated ringers. 1000 milliLiter(s) IV Continuous <Continuous>  nicotine -   7 mG/24Hr(s) Patch 1 patch Transdermal daily  pantoprazole    Tablet 40 milliGRAM(s) Oral before breakfast  thiamine Injectable 100 milliGRAM(s) IV Push daily  tiotropium 18 MICROgram(s) Capsule 1 Capsule(s) Inhalation daily      PHYSICAL EXAM:   Vital Signs Last 24 Hrs  T(C): 37.2 (08 Oct 2018 13:37), Max: 37.2 (08 Oct 2018 04:51)  T(F): 98.9 (08 Oct 2018 13:37), Max: 98.9 (08 Oct 2018 04:51)  HR: 88 (08 Oct 2018 13:37) (67 - 88)  BP: 125/85 (08 Oct 2018 13:37) (113/70 - 136/79)  RR: 18 (08 Oct 2018 13:37) (18 - 19)  SpO2: 97% (08 Oct 2018 13:37) (94% - 98%)    General: No acute distress  HEENT: EOM intact, visual fields full, partial left gaze palsy, incomplete saccade to right   Abdomen: Soft, nontender, nondistended   Extremities: No edema    NEUROLOGICAL EXAM:  Mental status: Awake, alert, oriented to hospital, year, month, fluent speech, no neglect - aware of deficit, able to follow commands.   Cranial Nerves: Moderate left facial palsy, no nystagmus, no dysarthria, tongue midline  Motor exam: Normal tone, RUE 5/5,  RLE 5/5, LUE deltoid 3-/5, biceps 3/5, wrist extension 0/5,  LLE 4+/5 with slight drift  Sensation: Intact to light touch   Coordination/ Gait: No dysmetria, gait deferred.     LABS:                        11.3   7.55  )-----------( 220      ( 08 Oct 2018 08:04 )             36.7    10-08    138  |  103  |  20  ----------------------------<  101<H>  3.8   |  26  |  0.95    Ca    8.9      08 Oct 2018 06:26    PT/INR - ( 07 Oct 2018 07:17 )   PT: 11.7 sec;   INR: 1.07 ratio         PTT - ( 07 Oct 2018 07:17 )  PTT:27.7 sec  Hemoglobin A1C, Whole Blood: 6.6 % (09-23 @ 11:05)  Hemoglobin A1C, Whole Blood: 6.5 % (09-07 @ 18:01)  LDL Cholesterol, Direct: 53 mg/dL (10-02 @ 10:56)      IMAGING: Reviewed by me.   CT Head No Cont (09.24.18):  Previously noted acute right MCA infarct has demonstrated expected evolutionary changes with areas of hemorrhagic transformation again seen.    CT Head No Cont (09.23.18):  Redemonstration of evolving right MCA territory infarct, faint areas of increasing attenuation may reflect contrast staining, tiny foci of petechial hemorrhage not excluded.    CT perfusion w/ Maps w/ IV Cont (09.23.18):   Large right-sided penumbra  with a small infarct in the large mismatch volume.    Noncontrast head CT (09.23.18):   Large area acute right MCA territory infarct. Posterior temporal lobe infarct appears slightly more subacute with questionable petechial hemorrhage.    CTA neck (09.23.18):   No major vessel occlusion or hemodynamically significant stenosis. No evidence of dissection.    CTA head (09.23.18):  Occluded proximal M1 segment right middle cerebral artery.   Remainder of the intracranial circulation is patent.    MR Head w/wo IV Cont (09.28.18 )   Subacute right MCA infarct with associated areas of   hemorrhagic transformation suspected.    Unremarkable MRA of the neck and [place].

## 2018-10-08 NOTE — PROGRESS NOTE ADULT - SUBJECTIVE AND OBJECTIVE BOX
Subjective: Patient seen and examined. No new events except as noted.     SUBJECTIVE/ROS:  no cp or sob       MEDICATIONS:  MEDICATIONS  (STANDING):  acetaminophen  IVPB .. 1000 milliGRAM(s) IV Intermittent once  aspirin  chewable 81 milliGRAM(s) Oral daily  atorvastatin 80 milliGRAM(s) Oral at bedtime  buDESOnide  80 MICROgram(s)/formoterol 4.5 MICROgram(s) Inhaler 2 Puff(s) Inhalation two times a day  enoxaparin Injectable 40 milliGRAM(s) SubCutaneous daily  FLUoxetine 20 milliGRAM(s) Oral daily  fluticasone propionate 50 MICROgram(s)/spray Nasal Spray 1 Spray(s) Both Nostrils two times a day  influenza   Vaccine 0.5 milliLiter(s) IntraMuscular once  lactated ringers. 1000 milliLiter(s) (100 mL/Hr) IV Continuous <Continuous>  nicotine -   7 mG/24Hr(s) Patch 1 patch Transdermal daily  pantoprazole    Tablet 40 milliGRAM(s) Oral before breakfast  thiamine Injectable 100 milliGRAM(s) IV Push daily  tiotropium 18 MICROgram(s) Capsule 1 Capsule(s) Inhalation daily      PHYSICAL EXAM:  T(C): 37.2 (10-08-18 @ 08:19), Max: 37.2 (10-08-18 @ 04:51)  HR: 74 (10-08-18 @ 08:19) (67 - 88)  BP: 122/74 (10-08-18 @ 08:19) (113/70 - 136/79)  RR: 18 (10-08-18 @ 08:19) (18 - 19)  SpO2: 97% (10-08-18 @ 08:19) (94% - 98%)  Wt(kg): --  I&O's Summary    07 Oct 2018 07:01  -  08 Oct 2018 07:00  --------------------------------------------------------  IN: 240 mL / OUT: 200 mL / NET: 40 mL          Appearance: Normal	  HEENT:   no gross abnormality   Cardiovascular: Normal S1 S2,    Murmur:   Neck: JVP normal  Respiratory: Lungs clear   Gastrointestinal:  Soft, Non-tender  Skin: normal   Neuro: No gross deficits.   Psychiatry:  Mood & affect flat  Ext: No edema      LABS/DATA:    CARDIAC MARKERS:                                11.2   7.50  )-----------( 200      ( 07 Oct 2018 08:06 )             36.9     10-08    138  |  103  |  20  ----------------------------<  101<H>  3.8   |  26  |  0.95    Ca    8.9      08 Oct 2018 06:26      proBNP:   Lipid Profile:   HgA1c:   TSH:     TELE:  EKG:

## 2018-10-09 VITALS
RESPIRATION RATE: 18 BRPM | DIASTOLIC BLOOD PRESSURE: 68 MMHG | TEMPERATURE: 98 F | HEART RATE: 76 BPM | OXYGEN SATURATION: 97 % | SYSTOLIC BLOOD PRESSURE: 127 MMHG

## 2018-10-09 PROCEDURE — 84484 ASSAY OF TROPONIN QUANT: CPT

## 2018-10-09 PROCEDURE — 80048 BASIC METABOLIC PNL TOTAL CA: CPT

## 2018-10-09 PROCEDURE — C1757: CPT

## 2018-10-09 PROCEDURE — 85384 FIBRINOGEN ACTIVITY: CPT

## 2018-10-09 PROCEDURE — 93970 EXTREMITY STUDY: CPT

## 2018-10-09 PROCEDURE — 61645 PERQ ART M-THROMBECT &/NFS: CPT

## 2018-10-09 PROCEDURE — 82607 VITAMIN B-12: CPT

## 2018-10-09 PROCEDURE — 83721 ASSAY OF BLOOD LIPOPROTEIN: CPT

## 2018-10-09 PROCEDURE — 83735 ASSAY OF MAGNESIUM: CPT

## 2018-10-09 PROCEDURE — 93306 TTE W/DOPPLER COMPLETE: CPT

## 2018-10-09 PROCEDURE — 0042T: CPT

## 2018-10-09 PROCEDURE — 70544 MR ANGIOGRAPHY HEAD W/O DYE: CPT

## 2018-10-09 PROCEDURE — 94002 VENT MGMT INPAT INIT DAY: CPT

## 2018-10-09 PROCEDURE — 93312 ECHO TRANSESOPHAGEAL: CPT

## 2018-10-09 PROCEDURE — 80076 HEPATIC FUNCTION PANEL: CPT

## 2018-10-09 PROCEDURE — 86301 IMMUNOASSAY TUMOR CA 19-9: CPT

## 2018-10-09 PROCEDURE — C1760: CPT

## 2018-10-09 PROCEDURE — 71045 X-RAY EXAM CHEST 1 VIEW: CPT | Mod: 26

## 2018-10-09 PROCEDURE — 97116 GAIT TRAINING THERAPY: CPT

## 2018-10-09 PROCEDURE — 93325 DOPPLER ECHO COLOR FLOW MAPG: CPT

## 2018-10-09 PROCEDURE — 82435 ASSAY OF BLOOD CHLORIDE: CPT

## 2018-10-09 PROCEDURE — 93005 ELECTROCARDIOGRAM TRACING: CPT

## 2018-10-09 PROCEDURE — 86900 BLOOD TYPING SEROLOGIC ABO: CPT

## 2018-10-09 PROCEDURE — 94003 VENT MGMT INPAT SUBQ DAY: CPT

## 2018-10-09 PROCEDURE — 82378 CARCINOEMBRYONIC ANTIGEN: CPT

## 2018-10-09 PROCEDURE — 88312 SPECIAL STAINS GROUP 1: CPT

## 2018-10-09 PROCEDURE — 83036 HEMOGLOBIN GLYCOSYLATED A1C: CPT

## 2018-10-09 PROCEDURE — 97166 OT EVAL MOD COMPLEX 45 MIN: CPT

## 2018-10-09 PROCEDURE — 76380 CAT SCAN FOLLOW-UP STUDY: CPT | Mod: 59

## 2018-10-09 PROCEDURE — 99285 EMERGENCY DEPT VISIT HI MDM: CPT

## 2018-10-09 PROCEDURE — 99232 SBSQ HOSP IP/OBS MODERATE 35: CPT

## 2018-10-09 PROCEDURE — A9585: CPT

## 2018-10-09 PROCEDURE — 74177 CT ABD & PELVIS W/CONTRAST: CPT

## 2018-10-09 PROCEDURE — 74181 MRI ABDOMEN W/O CONTRAST: CPT

## 2018-10-09 PROCEDURE — 97530 THERAPEUTIC ACTIVITIES: CPT

## 2018-10-09 PROCEDURE — 80307 DRUG TEST PRSMV CHEM ANLYZR: CPT

## 2018-10-09 PROCEDURE — 99233 SBSQ HOSP IP/OBS HIGH 50: CPT

## 2018-10-09 PROCEDURE — 85730 THROMBOPLASTIN TIME PARTIAL: CPT

## 2018-10-09 PROCEDURE — 84100 ASSAY OF PHOSPHORUS: CPT

## 2018-10-09 PROCEDURE — C1769: CPT

## 2018-10-09 PROCEDURE — 97760 ORTHOTIC MGMT&TRAING 1ST ENC: CPT

## 2018-10-09 PROCEDURE — 82947 ASSAY GLUCOSE BLOOD QUANT: CPT

## 2018-10-09 PROCEDURE — 70549 MR ANGIOGRAPH NECK W/O&W/DYE: CPT

## 2018-10-09 PROCEDURE — 85027 COMPLETE CBC AUTOMATED: CPT

## 2018-10-09 PROCEDURE — 82330 ASSAY OF CALCIUM: CPT

## 2018-10-09 PROCEDURE — 86850 RBC ANTIBODY SCREEN: CPT

## 2018-10-09 PROCEDURE — 97110 THERAPEUTIC EXERCISES: CPT

## 2018-10-09 PROCEDURE — 84436 ASSAY OF TOTAL THYROXINE: CPT

## 2018-10-09 PROCEDURE — 94640 AIRWAY INHALATION TREATMENT: CPT

## 2018-10-09 PROCEDURE — 82272 OCCULT BLD FECES 1-3 TESTS: CPT

## 2018-10-09 PROCEDURE — 86901 BLOOD TYPING SEROLOGIC RH(D): CPT

## 2018-10-09 PROCEDURE — 80061 LIPID PANEL: CPT

## 2018-10-09 PROCEDURE — C1887: CPT

## 2018-10-09 PROCEDURE — 83550 IRON BINDING TEST: CPT

## 2018-10-09 PROCEDURE — 83605 ASSAY OF LACTIC ACID: CPT

## 2018-10-09 PROCEDURE — 82746 ASSAY OF FOLIC ACID SERUM: CPT

## 2018-10-09 PROCEDURE — 93320 DOPPLER ECHO COMPLETE: CPT

## 2018-10-09 PROCEDURE — 82803 BLOOD GASES ANY COMBINATION: CPT

## 2018-10-09 PROCEDURE — 71250 CT THORAX DX C-: CPT

## 2018-10-09 PROCEDURE — 84443 ASSAY THYROID STIM HORMONE: CPT

## 2018-10-09 PROCEDURE — 84480 ASSAY TRIIODOTHYRONINE (T3): CPT

## 2018-10-09 PROCEDURE — 82728 ASSAY OF FERRITIN: CPT

## 2018-10-09 PROCEDURE — 84295 ASSAY OF SERUM SODIUM: CPT

## 2018-10-09 PROCEDURE — 85014 HEMATOCRIT: CPT

## 2018-10-09 PROCEDURE — 74230 X-RAY XM SWLNG FUNCJ C+: CPT

## 2018-10-09 PROCEDURE — 70553 MRI BRAIN STEM W/O & W/DYE: CPT

## 2018-10-09 PROCEDURE — 70450 CT HEAD/BRAIN W/O DYE: CPT

## 2018-10-09 PROCEDURE — 88305 TISSUE EXAM BY PATHOLOGIST: CPT

## 2018-10-09 PROCEDURE — 97161 PT EVAL LOW COMPLEX 20 MIN: CPT

## 2018-10-09 PROCEDURE — 92610 EVALUATE SWALLOWING FUNCTION: CPT

## 2018-10-09 PROCEDURE — 82962 GLUCOSE BLOOD TEST: CPT

## 2018-10-09 PROCEDURE — 92526 ORAL FUNCTION THERAPY: CPT

## 2018-10-09 PROCEDURE — 85610 PROTHROMBIN TIME: CPT

## 2018-10-09 PROCEDURE — 71045 X-RAY EXAM CHEST 1 VIEW: CPT

## 2018-10-09 PROCEDURE — 84132 ASSAY OF SERUM POTASSIUM: CPT

## 2018-10-09 PROCEDURE — 80053 COMPREHEN METABOLIC PANEL: CPT

## 2018-10-09 PROCEDURE — 71260 CT THORAX DX C+: CPT

## 2018-10-09 PROCEDURE — 92611 MOTION FLUOROSCOPY/SWALLOW: CPT

## 2018-10-09 RX ORDER — FLUOXETINE HCL 10 MG
1 CAPSULE ORAL
Qty: 0 | Refills: 0 | COMMUNITY
Start: 2018-10-09

## 2018-10-09 RX ORDER — ESCITALOPRAM OXALATE 10 MG/1
1 TABLET, FILM COATED ORAL
Qty: 0 | Refills: 0 | COMMUNITY

## 2018-10-09 RX ORDER — FLUTICASONE PROPIONATE 50 MCG
1 SPRAY, SUSPENSION NASAL
Qty: 0 | Refills: 0 | COMMUNITY
Start: 2018-10-09

## 2018-10-09 RX ORDER — ASPIRIN/CALCIUM CARB/MAGNESIUM 324 MG
1 TABLET ORAL
Qty: 0 | Refills: 0 | COMMUNITY
Start: 2018-10-09

## 2018-10-09 RX ORDER — TIOTROPIUM BROMIDE 18 UG/1
1 CAPSULE ORAL; RESPIRATORY (INHALATION)
Qty: 0 | Refills: 0 | COMMUNITY
Start: 2018-10-09

## 2018-10-09 RX ORDER — ARIPIPRAZOLE 15 MG/1
1 TABLET ORAL
Qty: 0 | Refills: 0 | COMMUNITY

## 2018-10-09 RX ORDER — GABAPENTIN 400 MG/1
1 CAPSULE ORAL
Qty: 0 | Refills: 0 | COMMUNITY

## 2018-10-09 RX ORDER — NICOTINE POLACRILEX 2 MG
1 GUM BUCCAL
Qty: 0 | Refills: 0 | COMMUNITY
Start: 2018-10-09

## 2018-10-09 RX ORDER — PANTOPRAZOLE SODIUM 20 MG/1
1 TABLET, DELAYED RELEASE ORAL
Qty: 0 | Refills: 0 | COMMUNITY
Start: 2018-10-09

## 2018-10-09 RX ORDER — ATORVASTATIN CALCIUM 80 MG/1
1 TABLET, FILM COATED ORAL
Qty: 0 | Refills: 0 | COMMUNITY
Start: 2018-10-09

## 2018-10-09 RX ORDER — ALBUTEROL 90 UG/1
2 AEROSOL, METERED ORAL
Qty: 0 | Refills: 0 | COMMUNITY
Start: 2018-10-09

## 2018-10-09 RX ADMIN — PANTOPRAZOLE SODIUM 40 MILLIGRAM(S): 20 TABLET, DELAYED RELEASE ORAL at 05:01

## 2018-10-09 RX ADMIN — TIOTROPIUM BROMIDE 1 CAPSULE(S): 18 CAPSULE ORAL; RESPIRATORY (INHALATION) at 11:19

## 2018-10-09 RX ADMIN — Medication 15 MILLIGRAM(S): at 05:07

## 2018-10-09 RX ADMIN — ENOXAPARIN SODIUM 40 MILLIGRAM(S): 100 INJECTION SUBCUTANEOUS at 11:18

## 2018-10-09 RX ADMIN — Medication 20 MILLIGRAM(S): at 11:18

## 2018-10-09 RX ADMIN — Medication 100 MILLIGRAM(S): at 11:19

## 2018-10-09 RX ADMIN — Medication 1 SPRAY(S): at 05:01

## 2018-10-09 RX ADMIN — Medication 15 MILLIGRAM(S): at 05:40

## 2018-10-09 RX ADMIN — BUDESONIDE AND FORMOTEROL FUMARATE DIHYDRATE 2 PUFF(S): 160; 4.5 AEROSOL RESPIRATORY (INHALATION) at 05:01

## 2018-10-09 RX ADMIN — Medication 81 MILLIGRAM(S): at 11:19

## 2018-10-09 NOTE — CONSULT NOTE ADULT - SUBJECTIVE AND OBJECTIVE BOX
Patient is a 62y old  Male who presents with a chief complaint of R MCA Infarct now s/p Endovascular (09 Oct 2018 08:28)      HPI:  63 y/o male with past medical history of HTN presented initially to [facility] ED for AMS with suspicion of EtOH intoxication. Patient's mental status did not improve and CT/CTA head was done demonstrating Large area acute Right MCA infarct with occluded proximal M1 segment R MCA. Patient subsequently transferred to [facility] for possible Endovascular intervention. LKN is unknown but suspected to be 11pm (9/22/2018). tPA deferred due to unclear LKN time. (23 Sep 2018 05:05).    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  EUS attempted, but sampling not possible.       ROS: as above       PAST MEDICAL & SURGICAL HISTORY:  HTN (hypertension)  TIA (transient ischemic attack)      SOCIAL HISTORY:    FAMILY HISTORY:      MEDICATIONS  (STANDING):  acetaminophen  IVPB .. 1000 milliGRAM(s) IV Intermittent once  aspirin  chewable 81 milliGRAM(s) Oral daily  atorvastatin 80 milliGRAM(s) Oral at bedtime  buDESOnide  80 MICROgram(s)/formoterol 4.5 MICROgram(s) Inhaler 2 Puff(s) Inhalation two times a day  enoxaparin Injectable 40 milliGRAM(s) SubCutaneous daily  FLUoxetine 20 milliGRAM(s) Oral daily  fluticasone propionate 50 MICROgram(s)/spray Nasal Spray 1 Spray(s) Both Nostrils two times a day  influenza   Vaccine 0.5 milliLiter(s) IntraMuscular once  lactated ringers. 1000 milliLiter(s) (100 mL/Hr) IV Continuous <Continuous>  nicotine -   7 mG/24Hr(s) Patch 1 patch Transdermal daily  pantoprazole    Tablet 40 milliGRAM(s) Oral before breakfast  thiamine Injectable 100 milliGRAM(s) IV Push daily  tiotropium 18 MICROgram(s) Capsule 1 Capsule(s) Inhalation daily    MEDICATIONS  (PRN):  acetaminophen   Tablet .. 650 milliGRAM(s) Oral every 6 hours PRN Temp greater or equal to 38C (100.4F), Mild Pain (1 - 3)  ALBUTerol    90 MICROgram(s) HFA Inhaler 2 Puff(s) Inhalation every 6 hours PRN Shortness of Breath and/or Wheezing  ketorolac   Injectable 15 milliGRAM(s) IV Push every 4 hours PRN Moderate Pain (4 - 6)      Allergies    No Known Allergies    Intolerances        Vital Signs Last 24 Hrs  T(C): 36.7 (09 Oct 2018 12:00), Max: 37.1 (08 Oct 2018 23:37)  T(F): 98 (09 Oct 2018 12:00), Max: 98.7 (08 Oct 2018 23:37)  HR: 76 (09 Oct 2018 12:00) (65 - 95)  BP: 127/68 (09 Oct 2018 12:00) (94/69 - 127/68)  BP(mean): --  RR: 18 (09 Oct 2018 12:00) (18 - 19)  SpO2: 97% (09 Oct 2018 12:00) (95% - 97%)    PHYSICAL EXAM  General: adult in NAD  HEENT: clear oropharynx, anicteric sclera, pink conjunctiva  Neck: supple  CV: normal S1/S2 with no murmur rubs or gallops  Lungs: positive air movement b/l ant lungs,clear to auscultation, no wheezes, no rales  Abdomen: soft non-tender non-distended, no hepatosplenomegaly  Ext: no clubbing cyanosis or edema  Skin: no rashes and no petechiae  Neuro: alert and oriented X 4, no focal deficits      LABS:                          11.3   7.55  )-----------( 220      ( 08 Oct 2018 08:04 )             36.7         Mean Cell Volume : 68.6 fl  Mean Cell Hemoglobin : 21.1 pg  Mean Cell Hemoglobin Concentration : 30.8 gm/dL  Auto Neutrophil # : x  Auto Lymphocyte # : x  Auto Monocyte # : x  Auto Eosinophil # : x  Auto Basophil # : x  Auto Neutrophil % : x  Auto Lymphocyte % : x  Auto Monocyte % : x  Auto Eosinophil % : x  Auto Basophil % : x      Serial CBC's  10-08 @ 08:04  Hct-36.7 / Hgb-11.3 / Plat-220 / RBC-5.35 / WBC-7.55  Serial CBC's  10-07 @ 08:06  Hct-36.9 / Hgb-11.2 / Plat-200 / RBC-5.34 / WBC-7.50      10-08    138  |  103  |  20  ----------------------------<  101<H>  3.8   |  26  |  0.95    Ca    8.9      08 Oct 2018 06:26            Iron - Total Binding Capacity.: 251 ug/dL (10-03 @ 03:42)  Folate, Serum: 12.3 ng/mL (10-03 @ 03:36)  Ferritin, Serum: 342 ng/mL (10-03 @ 03:36)  Vitamin B12, Serum: 593 pg/mL (10-03 @ 03:36)              RADIOLOGY & ADDITIONAL STUDIES:    EGD 10/3/18 --     < from: Upper Endoscopy (10.03.18 @ 07:13) >  Impression:          - Normal esophagus. Biopsied.                       - Small hiatus hernia.                       - Chronic gastritis. Biopsied.                       - Non-bleeding gastric ulcers with no stigmata of bleeding.                       - Non-bleeding gastric ulcers with no stigmata of bleeding. Biopsied.             - Multiple non-bleeding duodenal ulcers with no stigmata of bleeding.  Recommendation:      - Use Protonix (pantoprazole) 40 mg PO daily daily.                       - Await pathology results.                       - Observe patient in GI recovery unit for observation.    < end of copied text >        CT Ch/A/P    < from: CT Abdomen and Pelvis w/ IV Cont (10.02.18 @ 21:10) >    EXAM:  CT CHEST IC                          EXAM:  CT ABDOMEN AND PELVIS IC                            PROCEDURE DATE:  10/02/2018            INTERPRETATION:  CLINICAL INFORMATION: Right MCA stroke with right M1   occlusion.    COMPARISON: None available.    PROCEDURE:   CT of the Chest, Abdomen and Pelvis was performed with intravenous   contrast.   Intravenous contrast: 90 ml Omnipaque 350. 10 ml discarded.  Oral contrast: None.  Sagittal and coronal reformats were performed.    FINDINGS:    CHEST:     LUNGS AND LARGE AIRWAYS: Patent central airways. Dependent atelectasis in   lower lobe lobes bilaterally with small areas of mucoid impaction in the   lower lobes and right middle lobe. Redemonstration of right lower lobe   nodules measuring up to 5 mm as seen on chest CT 9/26/2018.  PLEURA: No pleural effusion.  VESSELS: Normal caliber aorta and main pulmonary artery. Aortic   calcifications.  HEART: Heart size is normal. No pericardial effusion. Coronary artery   calcifications.  MEDIASTINUM AND THU: No lymphadenopathy.  CHEST WALL AND LOWER NECK: Within normal limits.    ABDOMEN AND PELVIS:    LIVER: Within normal limits.  BILE DUCTS: Normal caliber.  GALLBLADDER: Within normal limits.  SPLEEN: Within normal limits.  PANCREAS: A3.7 x 3.3 cm thick-walled hypoattenuating lesion in the tail   the pancreas (series 3 image 156). Normal caliber main pancreatic duct.  ADRENALS: Bilateral adrenal gland thickening.  KIDNEYS/URETERS: Within the right lower pole there is a heterogeneous   region with foci of increased attenuation and overlying cortical   irregularity (series 5 image 63 and series 6 image 105). This may   represent postsurgical material in the setting of prior intervention.   Bilateral subcentimeter hypoattenuatingfoci, too small to characterize.   No hydronephrosis.    BLADDER: Anterior bladder wall thickening likely related to   underdistention which limits evaluation.  REPRODUCTIVE ORGANS: Prostate measures normal in size.    BOWEL: No bowel obstruction. Retained barium in the colon. Colonic   diverticulosis, predominantly in the sigmoid colon, without inflammatory   changes. Appendix is normal.  PERITONEUM: No ascites.  VESSELS:  Within normal limits.  RETROPERITONEUM: 1.4 x 0.9 cm aortocaval lymph node (3, 201).    ABDOMINAL WALL: Small foci of air in the anterior abdominal wall, likely   secondary to subcutaneous injections.  BONES: Degenerative changes of the spine.    IMPRESSION:     A 3.7 x 3.3 cm thick-walled cystic mass in the tail of the pancreas.    Ill-defined heterogeneity of the right lower pole with high density   material may be postsurgical.     Contrast-enhanced MRI is recommended for further characterization of the   pancreas and right kidney.    These findings were discussed with Dr. Najera at 10/3/2018 11:10 AM by Dr. Saavedra with read back confirmation.                  BRIELLE SHERWOOD M.D., RADIOLOGY RESIDENT  This document has been electronically signed.  IRIS SAAVEDRA M.D., ATTENDING RADIOLOGIST  This document has been electronically signed. Oct  3 2018 11:17AM                < end of copied text > Patient is a 62y old  Male who presents with a chief complaint of R MCA Infarct now s/p Endovascular (09 Oct 2018 08:28)      HPI:  61 y/o male with past medical history of HTN presented initially to [facility] ED for AMS with suspicion of EtOH intoxication. Patient's mental status did not improve and CT/CTA head was done demonstrating Large area acute Right MCA infarct with occluded proximal M1 segment R MCA. Patient subsequently transferred to [facility] for possible Endovascular intervention. LKN is unknown but suspected to be 11pm (9/22/2018). tPA deferred due to unclear LKN time. (23 Sep 2018 05:05).    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  EUS considered, but sampling not possible due to clinical sdtatus (hypoxia)       ROS: as above       PAST MEDICAL & SURGICAL HISTORY:  HTN (hypertension)  TIA (transient ischemic attack)      SOCIAL HISTORY:    FAMILY HISTORY:      MEDICATIONS  (STANDING):  acetaminophen  IVPB .. 1000 milliGRAM(s) IV Intermittent once  aspirin  chewable 81 milliGRAM(s) Oral daily  atorvastatin 80 milliGRAM(s) Oral at bedtime  buDESOnide  80 MICROgram(s)/formoterol 4.5 MICROgram(s) Inhaler 2 Puff(s) Inhalation two times a day  enoxaparin Injectable 40 milliGRAM(s) SubCutaneous daily  FLUoxetine 20 milliGRAM(s) Oral daily  fluticasone propionate 50 MICROgram(s)/spray Nasal Spray 1 Spray(s) Both Nostrils two times a day  influenza   Vaccine 0.5 milliLiter(s) IntraMuscular once  lactated ringers. 1000 milliLiter(s) (100 mL/Hr) IV Continuous <Continuous>  nicotine -   7 mG/24Hr(s) Patch 1 patch Transdermal daily  pantoprazole    Tablet 40 milliGRAM(s) Oral before breakfast  thiamine Injectable 100 milliGRAM(s) IV Push daily  tiotropium 18 MICROgram(s) Capsule 1 Capsule(s) Inhalation daily    MEDICATIONS  (PRN):  acetaminophen   Tablet .. 650 milliGRAM(s) Oral every 6 hours PRN Temp greater or equal to 38C (100.4F), Mild Pain (1 - 3)  ALBUTerol    90 MICROgram(s) HFA Inhaler 2 Puff(s) Inhalation every 6 hours PRN Shortness of Breath and/or Wheezing  ketorolac   Injectable 15 milliGRAM(s) IV Push every 4 hours PRN Moderate Pain (4 - 6)      Allergies    No Known Allergies    Intolerances        Vital Signs Last 24 Hrs  T(C): 36.7 (09 Oct 2018 12:00), Max: 37.1 (08 Oct 2018 23:37)  T(F): 98 (09 Oct 2018 12:00), Max: 98.7 (08 Oct 2018 23:37)  HR: 76 (09 Oct 2018 12:00) (65 - 95)  BP: 127/68 (09 Oct 2018 12:00) (94/69 - 127/68)  BP(mean): --  RR: 18 (09 Oct 2018 12:00) (18 - 19)  SpO2: 97% (09 Oct 2018 12:00) (95% - 97%)    PHYSICAL EXAM  General: adult in NAD  HEENT: clear oropharynx, anicteric sclera, pink conjunctiva  Neck: supple  CV: normal S1/S2 with no murmur rubs or gallops  Lungs: positive air movement b/l ant lungs,clear to auscultation, no wheezes, no rales  Abdomen: soft non-tender non-distended, no hepatosplenomegaly  Ext: no clubbing cyanosis or edema  Skin: no rashes and no petechiae  Neuro: alert and oriented X 4, no focal deficits      LABS:                          11.3   7.55  )-----------( 220      ( 08 Oct 2018 08:04 )             36.7         Mean Cell Volume : 68.6 fl  Mean Cell Hemoglobin : 21.1 pg  Mean Cell Hemoglobin Concentration : 30.8 gm/dL  Auto Neutrophil # : x  Auto Lymphocyte # : x  Auto Monocyte # : x  Auto Eosinophil # : x  Auto Basophil # : x  Auto Neutrophil % : x  Auto Lymphocyte % : x  Auto Monocyte % : x  Auto Eosinophil % : x  Auto Basophil % : x      Serial CBC's  10-08 @ 08:04  Hct-36.7 / Hgb-11.3 / Plat-220 / RBC-5.35 / WBC-7.55  Serial CBC's  10-07 @ 08:06  Hct-36.9 / Hgb-11.2 / Plat-200 / RBC-5.34 / WBC-7.50      10-08    138  |  103  |  20  ----------------------------<  101<H>  3.8   |  26  |  0.95    Ca    8.9      08 Oct 2018 06:26            Iron - Total Binding Capacity.: 251 ug/dL (10-03 @ 03:42)  Folate, Serum: 12.3 ng/mL (10-03 @ 03:36)  Ferritin, Serum: 342 ng/mL (10-03 @ 03:36)  Vitamin B12, Serum: 593 pg/mL (10-03 @ 03:36)              RADIOLOGY & ADDITIONAL STUDIES:    EGD 10/3/18 --     < from: Upper Endoscopy (10.03.18 @ 07:13) >  Impression:          - Normal esophagus. Biopsied.                       - Small hiatus hernia.                       - Chronic gastritis. Biopsied.                       - Non-bleeding gastric ulcers with no stigmata of bleeding.                       - Non-bleeding gastric ulcers with no stigmata of bleeding. Biopsied.             - Multiple non-bleeding duodenal ulcers with no stigmata of bleeding.  Recommendation:      - Use Protonix (pantoprazole) 40 mg PO daily daily.                       - Await pathology results.                       - Observe patient in GI recovery unit for observation.    < end of copied text >        CT Ch/A/P    < from: CT Abdomen and Pelvis w/ IV Cont (10.02.18 @ 21:10) >    EXAM:  CT CHEST IC                          EXAM:  CT ABDOMEN AND PELVIS IC                            PROCEDURE DATE:  10/02/2018            INTERPRETATION:  CLINICAL INFORMATION: Right MCA stroke with right M1   occlusion.    COMPARISON: None available.    PROCEDURE:   CT of the Chest, Abdomen and Pelvis was performed with intravenous   contrast.   Intravenous contrast: 90 ml Omnipaque 350. 10 ml discarded.  Oral contrast: None.  Sagittal and coronal reformats were performed.    FINDINGS:    CHEST:     LUNGS AND LARGE AIRWAYS: Patent central airways. Dependent atelectasis in   lower lobe lobes bilaterally with small areas of mucoid impaction in the   lower lobes and right middle lobe. Redemonstration of right lower lobe   nodules measuring up to 5 mm as seen on chest CT 9/26/2018.  PLEURA: No pleural effusion.  VESSELS: Normal caliber aorta and main pulmonary artery. Aortic   calcifications.  HEART: Heart size is normal. No pericardial effusion. Coronary artery   calcifications.  MEDIASTINUM AND THU: No lymphadenopathy.  CHEST WALL AND LOWER NECK: Within normal limits.    ABDOMEN AND PELVIS:    LIVER: Within normal limits.  BILE DUCTS: Normal caliber.  GALLBLADDER: Within normal limits.  SPLEEN: Within normal limits.  PANCREAS: A3.7 x 3.3 cm thick-walled hypoattenuating lesion in the tail   the pancreas (series 3 image 156). Normal caliber main pancreatic duct.  ADRENALS: Bilateral adrenal gland thickening.  KIDNEYS/URETERS: Within the right lower pole there is a heterogeneous   region with foci of increased attenuation and overlying cortical   irregularity (series 5 image 63 and series 6 image 105). This may   represent postsurgical material in the setting of prior intervention.   Bilateral subcentimeter hypoattenuatingfoci, too small to characterize.   No hydronephrosis.    BLADDER: Anterior bladder wall thickening likely related to   underdistention which limits evaluation.  REPRODUCTIVE ORGANS: Prostate measures normal in size.    BOWEL: No bowel obstruction. Retained barium in the colon. Colonic   diverticulosis, predominantly in the sigmoid colon, without inflammatory   changes. Appendix is normal.  PERITONEUM: No ascites.  VESSELS:  Within normal limits.  RETROPERITONEUM: 1.4 x 0.9 cm aortocaval lymph node (3, 201).    ABDOMINAL WALL: Small foci of air in the anterior abdominal wall, likely   secondary to subcutaneous injections.  BONES: Degenerative changes of the spine.    IMPRESSION:     A 3.7 x 3.3 cm thick-walled cystic mass in the tail of the pancreas.    Ill-defined heterogeneity of the right lower pole with high density   material may be postsurgical.     Contrast-enhanced MRI is recommended for further characterization of the   pancreas and right kidney.    These findings were discussed with Dr. Najera at 10/3/2018 11:10 AM by Dr. Saavedra with read back confirmation.                  BRIELLE SHERWOOD M.D., RADIOLOGY RESIDENT  This document has been electronically signed.  IRIS SAAVEDRA M.D., ATTENDING RADIOLOGIST  This document has been electronically signed. Oct  3 2018 11:17AM                < end of copied text >

## 2018-10-09 NOTE — PROGRESS NOTE ADULT - ASSESSMENT
62 year-old  male with past medical history of HTN and ETOH abuse. On 9/23/18 he presented to [facility] with AMS and intoxication. Upon further evaluation by the ED he wasn't moving L arm. On telestroke eval he was found with  left facial droop, left upper extremity monoplegia and lower extremity weakness, significant dysarthria and left hemibody hypoesthesia. Head CT showed area of hypodensity at right fronto parietal lobe and acute/ subacute hypodensity at the inferior temporal/parietal lobe middle cerebral artery territory. CT angiography demonstrated right middle cerebral artery proximal M1 segment occlusion. Patient not candidate for IV-tPA given unknown last known well as well as appearance of subacute infarct. He was transferred to [facility] for evaluation for endovascular therapy. CT perfusion showed a small core infarct with large area of penumbra. He was deemed candidate for endovascular therapy with mechanical thrombectomy with TICI 2b recanalization. MRI brain subsequently showed right MCA distribution infarct with hemorrhagic transformation (HI 2). TTE did not show any obvious structural cardiac source of embolism. AYO did not show any obvious structural cardiac source of embolism nor showed any evidence of a PFO. 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      Impression:   Cerebral embolism with cerebral infarction. R MCA distribution stroke with mild hemorrhagic concentration (HI 2) - likely etiology being cryptogenic stroke, probably related to embolism from a proximal source like cardiac source of embolism.    NEURO: Neurologically overall with improvement, Continue close monitoring for neurologic deterioration, BP goal gradual normotension in the setting of recent successful revascularization, continue with home statin medication if applicable considering likely non-atheroembolic etiology of his stroke and age, repeat fasting lipid profile to better characterize LDL results, Physical therapy/OT - acute TBI.      ANTITHROMBOTIC THERAPY: Aspirin for secondary stroke prevention     PULMONARY: CXR on 9/26 Right lower lobe nodules, largest of which measures 5 mm, pulm following- plan for repeat CT chest in 6 months, probable COPD, wheezing, continue Duoneb, symbicort     CARDIOVASCULAR: TTE showed - LVEF: 63%, Grossly normal left ventricular internal dimensions and wall thicknesses. Grossly hyperdynamic left ventricular systolic function. Reversal of the E-A  waves of the mitral inflow pattern is consistent with diastolic LV dysfunction. Grossly normal right ventricular size and function.  Cardiac monitoring without any documented events. AYO: negative for thrombus or PFO. Plan for  prolonged cardiac monitoring with ICM to screen for occult cardiac arrhythmias and atrial fibrillation during the cardiac source of embolism. s/p ICM placement for prolonged cardiac monitoring to screen for occult cardiac arrhythmias like atrial fibrillation     SBP goal: gradual normotension    GASTROINTESTINAL:  dysphagia screen - failed, s/p MBSon 9/27, with recommendation for Dysphagia 2 nectar consistency, tolerating diet, EGD: Chronic gastritis. Biopsied. Multiple non-bleeding duodenal ulcers with no stigmata of bleeding, will continue Protonix (pantoprazole) 40 mg PO daily. CT A/P showed pancreatic tail lesion, MRI of abdomen with Howard (MRCP protocol) ordered to delineate the pancreatic tail lesion ordered but patient was unable to tolerate the test. Plan for EUS to biopsy the pancreatic tail lesion today, however unable to complete the procedure due to hypoxia. The procedure will be rescheduled as an outpatient elective procedure at least 1 month post CVA.     Diet: dysphagia II nectar consistency    RENAL: BUN/Cr stable, Good urine output      Na Goal: Greater than 135     Strong: no    HEMATOLOGY: H/H 11.2/36.9, Platelets 200, no signs or symptoms of active bleeding. History of renal CA-,     DVT ppx: Heparin s.c [] LMWH [x]     ID: afebrile, no signs or symptoms of infection.      OTHER: Psychiatry Consult: continue fluoxetine for improved motor recovery, melatonin for insomnia, no need for CIWA, monitor for si/sx of withdrawal,  also discussed with patient and wife that he needs a sleep study as an outpatient as we suspect he has SHILO. All questions and concerns addressed. Patient reported chronic Low Back Pain and takes Gabapentin for this reason and chronic Neuropathy. His wife plans to bring in his home dose so he can restart this medication.     DISPOSITION: Acute TBI pending auth    CORE MEASURES:        Admission NIHSS: 12     TPA: [] YES [x] NO      LDL/HDL: 17/36     Depression Screen: p     Statin Therapy: yes     Dysphagia Screen: [] PASS [x] FAIL     Smoking [] YES [x] NO      Afib [] YES [x] NO     Stroke Education [] YES [] NO - 62 year-old  male with past medical history of HTN and ETOH abuse. On 9/23/18 he presented to [facility] with AMS and intoxication. Upon further evaluation by the ED he wasn't moving L arm. On telestroke eval he was found with  left facial droop, left upper extremity monoplegia and lower extremity weakness, significant dysarthria and left hemibody hypoesthesia. Head CT showed area of hypodensity at right fronto parietal lobe and acute/ subacute hypodensity at the inferior temporal/parietal lobe middle cerebral artery territory. CT angiography demonstrated right middle cerebral artery proximal M1 segment occlusion. Patient not candidate for IV-tPA given unknown last known well as well as appearance of subacute infarct. He was transferred to [facility] for evaluation for endovascular therapy. CT perfusion showed a small core infarct with large area of penumbra. He was deemed candidate for endovascular therapy with mechanical thrombectomy with TICI 2b recanalization. MRI brain subsequently showed right MCA distribution infarct with hemorrhagic transformation (HI 2). TTE did not show any obvious structural cardiac source of embolism. AYO did not show any obvious structural cardiac source of embolism nor showed any evidence of a PFO. 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      Impression:   Cerebral embolism with cerebral infarction. R MCA distribution stroke with mild hemorrhagic concentration (HI 2) - likely etiology being cryptogenic stroke, probably related to embolism from a proximal source like cardiac source of embolism.    NEURO: Neurologically overall with improvement,  BP goal gradual normotension in the setting of recent successful revascularization, continue with home statin medication if applicable considering likely non-atheroembolic etiology of his stroke and age, repeat fasting lipid profile to better characterize LDL results, Physical therapy/OT - acute TBI.      ANTITHROMBOTIC THERAPY: Aspirin for secondary stroke prevention     PULMONARY: CXR on 9/26 Right lower lobe nodules, largest of which measures 5 mm, pulm following- plan for repeat CT chest in 6 months, probable COPD, wheezing, continue Duoneb, symbicort     CARDIOVASCULAR: TTE showed - LVEF: 63%, Grossly normal left ventricular internal dimensions and wall thicknesses. Grossly hyperdynamic left ventricular systolic function. Reversal of the E-A  waves of the mitral inflow pattern is consistent with diastolic LV dysfunction. Grossly normal right ventricular size and function.  Cardiac monitoring without any documented events. AYO: negative for thrombus or PFO. Plan for  prolonged cardiac monitoring with ICM to screen for occult cardiac arrhythmias and atrial fibrillation during the cardiac source of embolism. s/p ICM placement for prolonged cardiac monitoring to screen for occult cardiac arrhythmias like atrial fibrillation     SBP goal: gradual normotension    GASTROINTESTINAL:  dysphagia screen - failed, s/p MBSon 9/27, with recommendation for Dysphagia 2 nectar consistency, tolerating diet, EGD: Chronic gastritis. Biopsied. Multiple non-bleeding duodenal ulcers with no stigmata of bleeding, will continue Protonix (pantoprazole) 40 mg PO daily. CT A/P showed pancreatic tail lesion, MRI of abdomen with Howard (MRCP protocol) ordered to delineate the pancreatic tail lesion ordered but patient was unable to tolerate the test. Plan was for EUS to biopsy the pancreatic tail lesion on 10/08 however unable to complete the procedure due to hypoxia. The procedure will be rescheduled as an outpatient elective procedure at least 1 month post CVA.     Diet: dysphagia II nectar consistency    RENAL: BUN/Cr stable, Good urine output      Na Goal: Greater than 135     Strong: no    HEMATOLOGY: H/H 11.2/36.9, Platelets 200, no signs or symptoms of active bleeding. History of renal CA-,     DVT ppx: Heparin s.c [] LMWH [x]     ID: afebrile, no signs or symptoms of infection.      OTHER: Psychiatry Consult: continue fluoxetine for improved motor recovery, melatonin for insomnia, no need for CIWA, monitor for si/sx of withdrawal,  also discussed with patient and wife that he needs a sleep study as an outpatient as we suspect he has SHILO. All questions and concerns addressed. Patient reported chronic Low Back Pain and takes Gabapentin for this reason and chronic Neuropathy. His wife plans to bring in his home dose so he can restart this medication.     DISPOSITION: Acute TBI today    CORE MEASURES:        Admission NIHSS: 12     TPA: [] YES [x] NO      LDL/HDL: 17/36     Depression Screen: p     Statin Therapy: yes     Dysphagia Screen: [] PASS [x] FAIL     Smoking [] YES [x] NO      Afib [] YES [x] NO     Stroke Education [] YES [] NO -

## 2018-10-09 NOTE — CONSULT NOTE ADULT - CONSULT REASON
Dysphagia, Filling defect on MBS
CVA
Stroke Code
concern for esophageal web
pancreas lesion
r/o SHILO
Evaluate Rehabilitation Needs
panc mass

## 2018-10-09 NOTE — PROGRESS NOTE ADULT - PROBLEM SELECTOR PROBLEM 4
COPD (chronic obstructive pulmonary disease)
COPD (chronic obstructive pulmonary disease)
R/O Pneumonia
R/O SHILO (obstructive sleep apnea)
R/O SHILO (obstructive sleep apnea)

## 2018-10-09 NOTE — PROGRESS NOTE ADULT - PROBLEM SELECTOR PROBLEM 3
COPD (chronic obstructive pulmonary disease)
Lung nodule < 6cm on CT
Lung nodule < 6cm on CT
COPD (chronic obstructive pulmonary disease)

## 2018-10-09 NOTE — CONSULT NOTE ADULT - REASON FOR ADMISSION
R MCA Infarct now s/p Endovascular
R MCA Infarct
R MCA Infarct now s/p Endovascular

## 2018-10-09 NOTE — PROGRESS NOTE ADULT - REASON FOR ADMISSION
R MCA Infarct now s/p Endovascular

## 2018-10-09 NOTE — CONSULT NOTE ADULT - CONSULT REQUESTED DATE/TIME
01-Oct-2018 10:04
05-Oct-2018 16:09
23-Sep-2018 03:39
23-Sep-2018 09:48
25-Sep-2018
05-Oct-2018
09-Oct-2018 15:35
26-Sep-2018 13:46

## 2018-10-09 NOTE — PROGRESS NOTE ADULT - PROBLEM SELECTOR PLAN 6
Dysphagia 2 diet  -MBS results noted   -No documented findings of esophageal web on EGD
45 pack yrs  -Nicotine patch
Dysphagia 1 diet  -No documented findings of esophageal web on EGD

## 2018-10-09 NOTE — PROGRESS NOTE ADULT - PROBLEM SELECTOR PROBLEM 1
CVA (cerebral vascular accident)

## 2018-10-09 NOTE — PROGRESS NOTE ADULT - PROBLEM SELECTOR PLAN 5
45 pack yrs  -Nicotine patch
45 pack yrs  -Nicotine patch
7/8 positive on STOP-Bang questionnaire: High risk for SHILO  -No hypercarbia on ABG  -Outpt PSG.

## 2018-10-09 NOTE — PROGRESS NOTE ADULT - ASSESSMENT
62 Y.O. WM admitted with an acute Right MCA CVA with noted dysphagia s/p S&S eval with MBS which revealed a filling defect in the anterior esophagus. Patient was planned for AYO to rule out cardiac anatomic abnormality. Concern for esophageal web on MBS. GI consulted for EGD prior to AYO.  Pt. also noted with mild anemia, but significant Microcytic and Hypochromic anemia.  Pt. now with a Pancreatic tail lesion that is slowly increasing in size.  Appreciate Advanced GI attempt for EUS.  S/P Upper Endoscopy (10.03.18 @ 07:13) >                       - Normal esophagus. Biopsied.                       - Small hiatus hernia.                       - Chronic gastritis. Biopsied.                       - Non-bleeding gastric ulcers with no stigmata of bleeding.                       - Non-bleeding gastric ulcers with no stigmata of bleeding. Biopsied.                       - Multiple non-bleeding duodenal ulcers with no stigmata of bleeding.  Recommendation:                             - Continue Protonix (pantoprazole) 40 mg PO daily daily.  Recent colonoscopy report reviewed: pt. with many colonic polyps (Tubular Adenomas) s/p removal.    Unable to complete the EUS due to hypoxia.   The procedure will be rescheduled for an outpatient elective procedure once his clinical neurological status is optimized.  Per neurology, elective general anesthesia should be postponed until at least 1 month post CVA as well as after repeating a CT head to rule out hemorrhagic transformation.  Will order a chromogrannin A in a month when off Protonix for 7 days and also  will schedule a Doatate scan to evaluate for possible neuroendocrine tumor  R/O Pancreatic Cancer.  Continue diet with dysphagia 2 with supervision.  Continue Protonix 40 mg PO QD 1/2 hr. AC.Pt. may be discharged to rehab and follow up as outpatient    I had a prolonged conversation with pt. and wife re. likely diagnosis and plan who verbalizes clear understanding,

## 2018-10-09 NOTE — CONSULT NOTE ADULT - CONSULT REQUESTED BY NAME
Dr. Alvares
Dr. Flowers
Dr. Raman
Emergency Department
[place]
[facility]
Dr. Noe Alvares
[place]
Neuro/ Cardiology

## 2018-10-09 NOTE — CHART NOTE - NSCHARTNOTEFT_GEN_A_CORE
No fever hypoxia dyspnea overnight. Pt feels well this am.  Eventual outpatient EUS/FNA of pancreas lesion.

## 2018-10-09 NOTE — PROGRESS NOTE ADULT - SUBJECTIVE AND OBJECTIVE BOX
Follow-up Pulm Progress Note    No new respiratory events overnight.  Denies SOB/CP.   94% on RA    Medications:  MEDICATIONS  (STANDING):  acetaminophen  IVPB .. 1000 milliGRAM(s) IV Intermittent once  aspirin  chewable 81 milliGRAM(s) Oral daily  atorvastatin 80 milliGRAM(s) Oral at bedtime  buDESOnide  80 MICROgram(s)/formoterol 4.5 MICROgram(s) Inhaler 2 Puff(s) Inhalation two times a day  enoxaparin Injectable 40 milliGRAM(s) SubCutaneous daily  FLUoxetine 20 milliGRAM(s) Oral daily  fluticasone propionate 50 MICROgram(s)/spray Nasal Spray 1 Spray(s) Both Nostrils two times a day  influenza   Vaccine 0.5 milliLiter(s) IntraMuscular once  lactated ringers. 1000 milliLiter(s) (100 mL/Hr) IV Continuous <Continuous>  nicotine -   7 mG/24Hr(s) Patch 1 patch Transdermal daily  pantoprazole    Tablet 40 milliGRAM(s) Oral before breakfast  thiamine Injectable 100 milliGRAM(s) IV Push daily  tiotropium 18 MICROgram(s) Capsule 1 Capsule(s) Inhalation daily    MEDICATIONS  (PRN):  acetaminophen   Tablet .. 650 milliGRAM(s) Oral every 6 hours PRN Temp greater or equal to 38C (100.4F), Mild Pain (1 - 3)  ALBUTerol    90 MICROgram(s) HFA Inhaler 2 Puff(s) Inhalation every 6 hours PRN Shortness of Breath and/or Wheezing  ketorolac   Injectable 15 milliGRAM(s) IV Push every 4 hours PRN Moderate Pain (4 - 6)          Vital Signs Last 24 Hrs  T(C): 36.7 (09 Oct 2018 12:00), Max: 37.1 (08 Oct 2018 23:37)  T(F): 98 (09 Oct 2018 12:00), Max: 98.7 (08 Oct 2018 23:37)  HR: 76 (09 Oct 2018 12:00) (65 - 87)  BP: 127/68 (09 Oct 2018 12:00) (110/66 - 127/68)  BP(mean): --  RR: 18 (09 Oct 2018 12:00) (18 - 19)  SpO2: 97% (09 Oct 2018 12:00) (95% - 97%) on RA          10-08 @ 07:01  -  10-09 @ 07:00  --------------------------------------------------------  IN: 600 mL / OUT: 0 mL / NET: 600 mL          LABS:                        11.3   7.55  )-----------( 220      ( 08 Oct 2018 08:04 )             36.7     10-08    138  |  103  |  20  ----------------------------<  101<H>  3.8   |  26  |  0.95    Ca    8.9      08 Oct 2018 06:26            CAPILLARY BLOOD GLUCOSE                            CULTURES: (if applicable)          Physical Examination:  PULM: Clear to auscultation bilaterally, no significant sputum production  CVS: S1, S2 heard    RADIOLOGY REVIEWED  CXR: Clear lungs

## 2018-10-09 NOTE — PROGRESS NOTE ADULT - SUBJECTIVE AND OBJECTIVE BOX
Chief Complaint:   Pt. s/p attempted EUS yesterday, but developed Hypoxemia   pt. feels ok now, ready to go to rehab    Interval Events:     Allergies:  No Known Allergies      Home Medications:    Hospital Medications:  acetaminophen   Tablet .. 650 milliGRAM(s) Oral every 6 hours PRN  acetaminophen  IVPB .. 1000 milliGRAM(s) IV Intermittent once  ALBUTerol    90 MICROgram(s) HFA Inhaler 2 Puff(s) Inhalation every 6 hours PRN  aspirin  chewable 81 milliGRAM(s) Oral daily  atorvastatin 80 milliGRAM(s) Oral at bedtime  buDESOnide  80 MICROgram(s)/formoterol 4.5 MICROgram(s) Inhaler 2 Puff(s) Inhalation two times a day  enoxaparin Injectable 40 milliGRAM(s) SubCutaneous daily  FLUoxetine 20 milliGRAM(s) Oral daily  fluticasone propionate 50 MICROgram(s)/spray Nasal Spray 1 Spray(s) Both Nostrils two times a day  influenza   Vaccine 0.5 milliLiter(s) IntraMuscular once  ketorolac   Injectable 15 milliGRAM(s) IV Push every 4 hours PRN  lactated ringers. 1000 milliLiter(s) IV Continuous <Continuous>  nicotine -   7 mG/24Hr(s) Patch 1 patch Transdermal daily  pantoprazole    Tablet 40 milliGRAM(s) Oral before breakfast  thiamine Injectable 100 milliGRAM(s) IV Push daily  tiotropium 18 MICROgram(s) Capsule 1 Capsule(s) Inhalation daily    ROS  GI: [- ] Nausea, [- ] vomiting, [ -]abdominal pain    PHYSICAL EXAM:   Vital Signs:  Vital Signs Last 24 Hrs  T(C): 36.7 (09 Oct 2018 12:00), Max: 37.1 (08 Oct 2018 23:37)  T(F): 98 (09 Oct 2018 12:00), Max: 98.7 (08 Oct 2018 23:37)  HR: 76 (09 Oct 2018 12:00) (65 - 87)  BP: 127/68 (09 Oct 2018 12:00) (115/67 - 127/68)  BP(mean): --  RR: 18 (09 Oct 2018 12:00) (18 - 19)  SpO2: 97% (09 Oct 2018 12:00) (96% - 97%)  Daily     Daily     GENERAL:  Appears stated age, well-groomed, well-nourished, no distress  HEENT:  NC/AT,  conjunctivae clear and pink, no thyromegaly, nodules, adenopathy, no JVD, sclera -anicteric  CHEST:  Full & symmetric excursion, no increased effort, breath sounds clear  HEART:  Regular rhythm, S1, S2, no murmur/rub/S3/S4, no abdominal bruit, no edema  ABDOMEN:  Soft, non-tender, non-distended, normoactive bowel sounds,  no masses ,no hepato-splenomegaly, no signs of chronic liver disease  EXTEREMITIES:  no cyanosis,clubbing or edema  SKIN:  No rash/erythema/ecchymoses/petechiae/wounds/abscess/warm/dry  NEURO:  Alert, oriented, no asterixis, no tremor, no encephalopathy    LABS:                        11.3   7.55  )-----------( 220      ( 08 Oct 2018 08:04 )             36.7     10-08    138  |  103  |  20  ----------------------------<  101<H>  3.8   |  26  |  0.95    Ca    8.9      08 Oct 2018 06:26                Imaging:

## 2018-10-09 NOTE — PROGRESS NOTE ADULT - PROBLEM SELECTOR PLAN 3
Likely COPD  -Duoneb q6  -Symbicort BID.
Small scattered pulmonary nodules, largest 5mm RLL  -Repeat CT chest in 6 months
Small scattered pulmonary nodules, largest 5mm RLL  -Repeat CT chest in 6 months
Likely COPD  -Albuterol PRN  -Symbicort BID  -Start Spiriva qd

## 2018-10-09 NOTE — PROGRESS NOTE ADULT - SUBJECTIVE AND OBJECTIVE BOX
THE PATIENT WAS SEEN AND EXAMINED BY ME WITH THE HOUSESTAFF AND STROKE TEAM DURING MORNING ROUNDS.   HPI:  61 y/o male with past medical history of HTN presented initially to [facility] ED for AMS with suspicion of EtOH intoxication. Patient's mental status did not improve and CT/CTA head was done demonstrating Large area acute Right MCA infarct with occluded proximal M1 segment R MCA. Patient subsequently transferred to [facility] for possible Endovascular intervention. LKN is unknown but suspected to be 11pm (9/22/2018). tPA deferred due to unclear LKN time.    SUBJECTIVE: No events overnight.  No new neurologic complaints.      acetaminophen   Tablet .. 650 milliGRAM(s) Oral every 6 hours PRN  acetaminophen  IVPB .. 1000 milliGRAM(s) IV Intermittent once  ALBUTerol    90 MICROgram(s) HFA Inhaler 2 Puff(s) Inhalation every 6 hours PRN  aspirin  chewable 81 milliGRAM(s) Oral daily  atorvastatin 80 milliGRAM(s) Oral at bedtime  buDESOnide  80 MICROgram(s)/formoterol 4.5 MICROgram(s) Inhaler 2 Puff(s) Inhalation two times a day  enoxaparin Injectable 40 milliGRAM(s) SubCutaneous daily  FLUoxetine 20 milliGRAM(s) Oral daily  fluticasone propionate 50 MICROgram(s)/spray Nasal Spray 1 Spray(s) Both Nostrils two times a day  influenza   Vaccine 0.5 milliLiter(s) IntraMuscular once  ketorolac   Injectable 15 milliGRAM(s) IV Push every 4 hours PRN  lactated ringers. 1000 milliLiter(s) IV Continuous <Continuous>  nicotine -   7 mG/24Hr(s) Patch 1 patch Transdermal daily  pantoprazole    Tablet 40 milliGRAM(s) Oral before breakfast  thiamine Injectable 100 milliGRAM(s) IV Push daily  tiotropium 18 MICROgram(s) Capsule 1 Capsule(s) Inhalation daily      PHYSICAL EXAM:   Vital Signs Last 24 Hrs  T(C): 36.6 (09 Oct 2018 07:21), Max: 37.2 (08 Oct 2018 13:37)  T(F): 97.8 (09 Oct 2018 07:21), Max: 98.9 (08 Oct 2018 13:37)  HR: 73 (09 Oct 2018 07:21) (65 - 95)  BP: 124/74 (09 Oct 2018 07:21) (94/69 - 126/74)  RR: 18 (09 Oct 2018 07:21) (18 - 19)  SpO2: 97% (09 Oct 2018 07:21) (95% - 97%)    General: No acute distress  HEENT: EOM intact, visual fields full, partial left gaze palsy, incomplete saccade to right   Abdomen: Soft, nontender, nondistended   Extremities: No edema    NEUROLOGICAL EXAM:  Mental status: Awake, alert, oriented to hospital, year, month, fluent speech, no neglect - aware of deficit, able to follow commands.   Cranial Nerves: Moderate left facial palsy, no nystagmus, no dysarthria, tongue midline  Motor exam: Normal tone, RUE 5/5,  RLE 5/5, LUE deltoid 3-/5, biceps 3/5, wrist extension 0/5,  LLE 4+/5 with slight drift  Sensation: Intact to light touch   Coordination/ Gait: No dysmetria, gait deferred.     LABS:                        11.3   7.55  )-----------( 220      ( 08 Oct 2018 08:04 )             36.7    10-08    138  |  103  |  20  ----------------------------<  101<H>  3.8   |  26  |  0.95    Ca    8.9      08 Oct 2018 06:26      Hemoglobin A1C, Whole Blood: 6.6 % (09-23 @ 11:05)  Hemoglobin A1C, Whole Blood: 6.5 % (09-07 @ 18:01)  LDL Cholesterol, Direct: 53 mg/dL (10-02 @ 10:56)      IMAGING: Reviewed by me.   CT Head No Cont (09.24.18):  Previously noted acute right MCA infarct has demonstrated expected evolutionary changes with areas of hemorrhagic transformation again seen.    CT Head No Cont (09.23.18):  Redemonstration of evolving right MCA territory infarct, faint areas of increasing attenuation may reflect contrast staining, tiny foci of petechial hemorrhage not excluded.    CT perfusion w/ Maps w/ IV Cont (09.23.18):   Large right-sided penumbra  with a small infarct in the large mismatch volume.    Noncontrast head CT (09.23.18):   Large area acute right MCA territory infarct. Posterior temporal lobe infarct appears slightly more subacute with questionable petechial hemorrhage.    CTA neck (09.23.18):   No major vessel occlusion or hemodynamically significant stenosis. No evidence of dissection.    CTA head (09.23.18):  Occluded proximal M1 segment right middle cerebral artery.   Remainder of the intracranial circulation is patent.    MR Head w/wo IV Cont (09.28.18 )   Subacute right MCA infarct with associated areas of   hemorrhagic transformation suspected.    Unremarkable MRA of the neck and [place].

## 2018-10-09 NOTE — PROGRESS NOTE ADULT - PROBLEM SELECTOR PLAN 2
-Pancreatic tail mass on MRI abd. Biopsy cancelled 2nd hypoxia  -CXR clear, patient not hypoxic on my exam yesterday or today   -Send chromogranin A ?neuroendocrine tumor  -Plan for biopsy as outpt
-Pancreatic tail mass on MRI abd. Biopsy cancelled today 2nd hypoxia  -Patient has mild exp wheeze that clears with cough, white sputum per patient  -88-90% on RA, improves to 96% on RA with deep breathing  -Send chromogranin A ?neuroendocrine tumor
Small scattered pulmonary nodules, largest 5mm RLL  -Repeat CT chest in 6 months
Small scattered pulmonary nodules, largest 5mm RLL  -Repeat CT chest in 6 months
Small scattered pulmonary nodules, largest 5mm RLL  -Will attempt to obtain most recent CT chest for comparison from PCP  -Repeat CT chest in 6 months
Small scattered pulmonary nodules, largest 5mm RLL  -Repeat CT chest in 6 months

## 2018-10-09 NOTE — PROGRESS NOTE ADULT - ASSESSMENT
61 y/o M with PMH of HTN, HLD, renal cancer s/p partial R nephrectomy (10/2017 at [facility] [place], patient denies having received chemo/XRT), current smoker (45 pack yrs), depression, anxiety presented to [facility] 9/23 with AMS and found to have R MCA CVA s/p embolectomy. Extubated 9/24.

## 2018-10-09 NOTE — PROGRESS NOTE ADULT - PROBLEM SELECTOR PLAN 4
7/8 positive on STOP-Bang questionnaire: High risk for SHILO  -No hypercarbia on ABG  -Outpt PSG.
Likely COPD  -Albuterol PRN  -Symbicort BID  -Start Spiriva qd
Likely COPD  -Albuterol PRN  -Symbicort BID  -Start Spiriva qd
No evidence of PNA on CT chest   -Afebrile, normal WBC.
No evidence of PNA on CT chest   -Afebrile, normal WBC. Cough 2nd smoking?
7/8 positive on STOP-Bang questionnaire: High risk for SHILO  -No hypercarbia on ABG  -Outpt PSG.

## 2018-10-09 NOTE — CHART NOTE - NSCHARTNOTESELECT_GEN_ALL_CORE
Neurointerventional Surgery/Event Note
Event Note
Event Note/GI Fellow
Event Note/Gastro
Event Note/Neurointerventional Surgery
Event Note/Neurology
Nutrition Services

## 2018-10-09 NOTE — PROGRESS NOTE ADULT - PROBLEM SELECTOR PROBLEM 5
Current smoker
Current smoker
R/O SHILO (obstructive sleep apnea)

## 2018-10-09 NOTE — PROGRESS NOTE ADULT - PROBLEM SELECTOR PROBLEM 2
Lung nodule < 6cm on CT
Pancreatic mass
Pancreatic mass
Lung nodule < 6cm on CT

## 2018-10-09 NOTE — PROGRESS NOTE ADULT - PROBLEM SELECTOR PLAN 1
R MCA stroke of unclear etiology  -3.7cm pancreatic tail lesion, MRI abdomen to further characterize  -f/u CA 19-9, CEA
R MCA stroke of unclear etiology  -Can consider CT A/P to r/o malignancy given his history of renal cancer
R MCA stroke of unclear etiology  -Can consider CT A/P to r/o malignancy given his history of renal cancer  -AYO pending
R MCA stroke of unclear etiology  -Can consider CT A/P to r/o malignancy given his history of renal cancer  -AYO pending
R MCA stroke of unclear etiology  -Possible pancreatic tumor   -s/p ILR placement
R MCA stroke of unclear etiology  -Possible pancreatic tumor   -s/p ILR placement
R MCA stroke of unclear etiology  -3.7cm pancreatic tail lesion, MRI abdomen to further characterize  -s/p ILR placement today

## 2018-10-09 NOTE — CONSULT NOTE ADULT - PROVIDER SPECIALTY LIST ADULT
Cardiology
Gastroenterology
Neurology
Pulmonology
Electrophysiology
Rehab Medicine
Surgery

## 2018-10-09 NOTE — PROGRESS NOTE ADULT - PROBLEM SELECTOR PROBLEM 6
Dysphagia
Current smoker
Dysphagia

## 2018-10-09 NOTE — PROGRESS NOTE ADULT - PROVIDER SPECIALTY LIST ADULT
Cardiology
Gastroenterology
NSICU
Neurology
Neurosurgery
Pulmonology
Rehab Medicine
Rehab Medicine
Cardiology
Neurology
Pulmonology

## 2018-10-29 ENCOUNTER — APPOINTMENT (OUTPATIENT)
Dept: ELECTROPHYSIOLOGY | Facility: CLINIC | Age: 63
End: 2018-10-29

## 2018-11-21 ENCOUNTER — APPOINTMENT (OUTPATIENT)
Dept: SURGICAL ONCOLOGY | Facility: CLINIC | Age: 63
End: 2018-11-21
Payer: COMMERCIAL

## 2018-11-21 VITALS
BODY MASS INDEX: 34.27 KG/M2 | RESPIRATION RATE: 16 BRPM | SYSTOLIC BLOOD PRESSURE: 125 MMHG | OXYGEN SATURATION: 97 % | HEART RATE: 75 BPM | DIASTOLIC BLOOD PRESSURE: 78 MMHG | WEIGHT: 253 LBS | HEIGHT: 72 IN

## 2018-11-21 DIAGNOSIS — Z86.2 PERSONAL HISTORY OF DISEASES OF THE BLOOD AND BLOOD-FORMING ORGANS AND CERTAIN DISORDERS INVOLVING THE IMMUNE MECHANISM: ICD-10-CM

## 2018-11-21 PROCEDURE — 99244 OFF/OP CNSLTJ NEW/EST MOD 40: CPT

## 2018-11-21 RX ORDER — FOLIC ACID 1 MG/1
1 TABLET ORAL
Qty: 30 | Refills: 0 | Status: ACTIVE | COMMUNITY
Start: 2018-09-07

## 2018-11-21 RX ORDER — ASPIRIN 81 MG/1
81 TABLET ORAL
Refills: 0 | Status: ACTIVE | COMMUNITY

## 2018-11-23 ENCOUNTER — APPOINTMENT (OUTPATIENT)
Dept: NEUROLOGY | Facility: CLINIC | Age: 63
End: 2018-11-23
Payer: COMMERCIAL

## 2018-11-23 VITALS
HEIGHT: 72 IN | WEIGHT: 253 LBS | BODY MASS INDEX: 34.27 KG/M2 | SYSTOLIC BLOOD PRESSURE: 123 MMHG | HEART RATE: 11 BPM | DIASTOLIC BLOOD PRESSURE: 76 MMHG

## 2018-11-23 PROCEDURE — 99215 OFFICE O/P EST HI 40 MIN: CPT

## 2018-11-23 RX ORDER — ATORVASTATIN CALCIUM 10 MG/1
10 TABLET, FILM COATED ORAL
Qty: 30 | Refills: 0 | Status: DISCONTINUED | COMMUNITY
Start: 2018-08-16 | End: 2018-11-23

## 2018-11-23 RX ORDER — QUINAPRIL HYDROCHLORIDE 10 MG/1
10 TABLET, FILM COATED ORAL
Qty: 90 | Refills: 0 | Status: DISCONTINUED | COMMUNITY
Start: 2018-07-16 | End: 2018-11-23

## 2018-11-23 RX ORDER — BISACODYL 10 MG/1
10 SUPPOSITORY RECTAL
Refills: 0 | Status: DISCONTINUED | COMMUNITY
End: 2018-11-23

## 2018-11-23 RX ORDER — ARIPIPRAZOLE 5 MG/1
5 TABLET ORAL
Qty: 30 | Refills: 0 | Status: DISCONTINUED | COMMUNITY
Start: 2018-08-15 | End: 2018-11-23

## 2018-11-23 RX ORDER — ATORVASTATIN CALCIUM 80 MG/1
80 TABLET, FILM COATED ORAL
Qty: 30 | Refills: 0 | Status: ACTIVE | COMMUNITY
Start: 2018-09-07

## 2018-11-23 RX ORDER — ESCITALOPRAM OXALATE 20 MG/1
20 TABLET ORAL
Qty: 30 | Refills: 0 | Status: DISCONTINUED | COMMUNITY
Start: 2018-08-15 | End: 2018-11-23

## 2018-12-04 ENCOUNTER — APPOINTMENT (OUTPATIENT)
Dept: ELECTROPHYSIOLOGY | Facility: CLINIC | Age: 63
End: 2018-12-04

## 2018-12-06 ENCOUNTER — OTHER (OUTPATIENT)
Age: 63
End: 2018-12-06

## 2018-12-07 ENCOUNTER — OTHER (OUTPATIENT)
Age: 63
End: 2018-12-07

## 2018-12-12 ENCOUNTER — FORM ENCOUNTER (OUTPATIENT)
Age: 63
End: 2018-12-12

## 2018-12-13 ENCOUNTER — APPOINTMENT (OUTPATIENT)
Dept: NUCLEAR MEDICINE | Facility: CLINIC | Age: 63
End: 2018-12-13

## 2018-12-13 ENCOUNTER — OUTPATIENT (OUTPATIENT)
Dept: OUTPATIENT SERVICES | Facility: HOSPITAL | Age: 63
LOS: 1 days | End: 2018-12-13
Payer: COMMERCIAL

## 2018-12-13 ENCOUNTER — APPOINTMENT (OUTPATIENT)
Dept: NEUROSURGERY | Facility: CLINIC | Age: 63
End: 2018-12-13

## 2018-12-13 DIAGNOSIS — Z00.8 ENCOUNTER FOR OTHER GENERAL EXAMINATION: ICD-10-CM

## 2018-12-13 PROCEDURE — 78815 PET IMAGE W/CT SKULL-THIGH: CPT | Mod: 26,PI

## 2018-12-13 PROCEDURE — 78815 PET IMAGE W/CT SKULL-THIGH: CPT

## 2018-12-13 PROCEDURE — A9587: CPT

## 2019-01-11 ENCOUNTER — APPOINTMENT (OUTPATIENT)
Dept: NEUROLOGY | Facility: CLINIC | Age: 64
End: 2019-01-11

## 2019-01-11 ENCOUNTER — APPOINTMENT (OUTPATIENT)
Dept: NEUROLOGY | Facility: CLINIC | Age: 64
End: 2019-01-11
Payer: COMMERCIAL

## 2019-01-11 ENCOUNTER — OUTPATIENT (OUTPATIENT)
Dept: OUTPATIENT SERVICES | Facility: HOSPITAL | Age: 64
LOS: 1 days | End: 2019-01-11
Payer: COMMERCIAL

## 2019-01-11 VITALS
WEIGHT: 240.08 LBS | DIASTOLIC BLOOD PRESSURE: 88 MMHG | SYSTOLIC BLOOD PRESSURE: 128 MMHG | TEMPERATURE: 97 F | RESPIRATION RATE: 18 BRPM | HEART RATE: 86 BPM | OXYGEN SATURATION: 97 % | HEIGHT: 72 IN

## 2019-01-11 VITALS
HEART RATE: 78 BPM | HEIGHT: 72 IN | BODY MASS INDEX: 19.23 KG/M2 | DIASTOLIC BLOOD PRESSURE: 81 MMHG | WEIGHT: 142 LBS | SYSTOLIC BLOOD PRESSURE: 128 MMHG

## 2019-01-11 DIAGNOSIS — G45.9 TRANSIENT CEREBRAL ISCHEMIC ATTACK, UNSPECIFIED: ICD-10-CM

## 2019-01-11 DIAGNOSIS — K86.9 DISEASE OF PANCREAS, UNSPECIFIED: ICD-10-CM

## 2019-01-11 DIAGNOSIS — Z98.890 OTHER SPECIFIED POSTPROCEDURAL STATES: Chronic | ICD-10-CM

## 2019-01-11 DIAGNOSIS — Z85.528 PERSONAL HISTORY OF OTHER MALIGNANT NEOPLASM OF KIDNEY: Chronic | ICD-10-CM

## 2019-01-11 DIAGNOSIS — Z95.818 PRESENCE OF OTHER CARDIAC IMPLANTS AND GRAFTS: Chronic | ICD-10-CM

## 2019-01-11 DIAGNOSIS — Z01.818 ENCOUNTER FOR OTHER PREPROCEDURAL EXAMINATION: ICD-10-CM

## 2019-01-11 DIAGNOSIS — M62.838 OTHER MUSCLE SPASM: ICD-10-CM

## 2019-01-11 LAB
ANION GAP SERPL CALC-SCNC: 14 MMOL/L — SIGNIFICANT CHANGE UP (ref 5–17)
BUN SERPL-MCNC: 15 MG/DL — SIGNIFICANT CHANGE UP (ref 7–23)
CALCIUM SERPL-MCNC: 9.4 MG/DL — SIGNIFICANT CHANGE UP (ref 8.4–10.5)
CHLORIDE SERPL-SCNC: 98 MMOL/L — SIGNIFICANT CHANGE UP (ref 96–108)
CO2 SERPL-SCNC: 23 MMOL/L — SIGNIFICANT CHANGE UP (ref 22–31)
CREAT SERPL-MCNC: 0.89 MG/DL — SIGNIFICANT CHANGE UP (ref 0.5–1.3)
GLUCOSE SERPL-MCNC: 155 MG/DL — HIGH (ref 70–99)
HCT VFR BLD CALC: 41 % — SIGNIFICANT CHANGE UP (ref 39–50)
HGB BLD-MCNC: 12.9 G/DL — LOW (ref 13–17)
MCHC RBC-ENTMCNC: 20.4 PG — LOW (ref 27–34)
MCHC RBC-ENTMCNC: 31.5 GM/DL — LOW (ref 32–36)
MCV RBC AUTO: 64.9 FL — LOW (ref 80–100)
PLATELET # BLD AUTO: 211 K/UL — SIGNIFICANT CHANGE UP (ref 150–400)
POTASSIUM SERPL-MCNC: 4.3 MMOL/L — SIGNIFICANT CHANGE UP (ref 3.5–5.3)
POTASSIUM SERPL-SCNC: 4.3 MMOL/L — SIGNIFICANT CHANGE UP (ref 3.5–5.3)
RBC # BLD: 6.32 M/UL — HIGH (ref 4.2–5.8)
RBC # FLD: 15.4 % — HIGH (ref 10.3–14.5)
SODIUM SERPL-SCNC: 135 MMOL/L — SIGNIFICANT CHANGE UP (ref 135–145)
WBC # BLD: 7.41 K/UL — SIGNIFICANT CHANGE UP (ref 3.8–10.5)
WBC # FLD AUTO: 7.41 K/UL — SIGNIFICANT CHANGE UP (ref 3.8–10.5)

## 2019-01-11 PROCEDURE — 99215 OFFICE O/P EST HI 40 MIN: CPT

## 2019-01-11 RX ORDER — CHOLECALCIFEROL (VITAMIN D3) 125 MCG
1 CAPSULE ORAL
Qty: 0 | Refills: 0 | COMMUNITY

## 2019-01-11 RX ORDER — BUDESONIDE AND FORMOTEROL FUMARATE DIHYDRATE 160; 4.5 UG/1; UG/1
2 AEROSOL RESPIRATORY (INHALATION)
Qty: 0 | Refills: 0 | COMMUNITY

## 2019-01-11 NOTE — H&P PST ADULT - PSH
History of renal carcinoma  S/P excision of right renal tumor  S/P arthroscopic knee surgery    Status post placement of implantable loop recorder  9/2018

## 2019-01-11 NOTE — REVIEW OF SYSTEMS
[Feeling Poorly] : not feeling poorly [Feeling Tired] : not feeling tired [Suicidal] : not suicidal [Anxiety] : no anxiety [Depression] : depression [Confused or Disoriented] : no confusion [Memory Lapses or Loss] : no memory loss [Decr. Concentrating Ability] : no decrease in concentrating ability [Arm Weakness] : arm weakness [Hand Weakness] :  hand weakness [Leg Weakness] : no leg weakness [Numbness] : numbness [Tingling] : no tingling [Dizziness] : no dizziness [Fainting] : no fainting [Difficulty Walking] : difficulty walking [Eyesight Problems] : no eyesight problems [Loss Of Hearing] : no hearing loss [Chest Pain] : no chest pain [Palpitations] : no palpitations [Shortness Of Breath] : no shortness of breath [Abdominal Pain] : no abdominal pain [Easy Bleeding] : no tendency for easy bleeding [Easy Bruising] : no tendency for easy bruising [Negative] : Endocrine [FreeTextEntry3] : Left superior quadrantanopsia

## 2019-01-11 NOTE — DISCUSSION/SUMMARY
[FreeTextEntry1] : Impression: \par Cerebral embolism with cerebral infarction. R MCA distribution stroke with mild hemorrhagic concentration (HI 2) - likely etiology being cryptogenic stroke, probably related to embolism from a proximal source like cardiac source of embolism.\par \par Events of noises turning loud unclear etiology will rule out seizures. \par \par -Flexeril 10mg TID provided as muscle relaxant\par - Continue ASA 81 mg once daily for secondary stroke prevention\par - Continue to monitor BP at home and notify PCP/Cardiology for readings >140/90. Educated on medication compliance and BP monitoring to prevent secondary stroke and systemic problems \par - Continue Speech therapy/Physical therapy/Occupational therapy as directed. \par - Continue statin therapy for secondary stroke prevention [Atorvastatin 80 mg - LDL 90]\par - Follow up with PCP for statin management and primary care needs such as regular blood work including monitoring of HbA1c [6.5] and cholesterol profile, to modify vascular risk factors \par - Follow up with cardiology for management of ILR  to screen for occult cardiac arrhythmias like atrial fibrillation which could be the cardiac source of embolism. He has had difficulty with approval of loop monitor although he has device in place. I contacted Dr. Al regarding this matters.\par - Follow up with Dr. Gonzales to evaluation and management of incidental 3.7 cm cystic pancreatic tail mass and a 1.4 cm aortocaval lymph node\par -Continue Fluoxetine 40 mg\par - Neuroopthalmology evaluation provided \par - Follow up in 4 months\par - Carotid Doppler's referral given\par - Educated on stroke/TIA signs/bleeding signs and symptoms and to call 911 if experiencing any of these symptoms\par \par All concerns and questions were addressed with both the patient and wife [Antithrombotic therapy with ___] : antithrombotic therapy with  [unfilled] [Intensive Blood Pressure Control] : intensive blood pressure control [Lipid Lowering Therapy] : lipid lowering therapy [Patient encouraged to discuss with Primary MD] : I encouraged the patient to discuss these important issues with ~his/her~ primary care doctor [Goals and Counseling] : I have reviewed the goals of stroke risk factor modification. I counseled the patient on measures to reduce stroke risk, including the importance of medication compliance, risk factor control, exercise, healthy diet and avoidance of smoking. I reviewed stroke warning signs and symptoms and appropriate actions to take if such occur.

## 2019-01-11 NOTE — H&P PST ADULT - HISTORY OF PRESENT ILLNESS
64 y/o M PMH renal CA, S/P excision of right renal tumor, TIA with residual left hand weakness 9/2018, S/P loop recorder placement during hospitalization [facility], incidentally found to have a pancreatic tumor on imaging.  Presents today for upper endoscopy with FNA.

## 2019-01-11 NOTE — HISTORY OF PRESENT ILLNESS
[FreeTextEntry1] : Mr. Larsen is a 63 year-old  male with past medical history of HTN, HLD, DM, ETOH abuse, spinal stenosis, former smoker, RCC s/p R partial nephrectomy (2017)  who presents for follow up after a  R MCA distribution stroke with mild hemorrhagic transformation (HI 2) on 9/23/18 s/p mechanical thrombectomy. \par He refers having regained the functionality of his arm currently completing physical therapy. \par Today he reports some anxiety and believing he may have had another stroke because he had an event very similar to when his symptoms started back in Dec. He reports noises turning very very loud all of a sudden in which case he had an event years ago where he felt the same symptoms associated with LUE weakness. On that occasion the symptoms resolved within hours and he was diagnosed with TIA. He refers a similar episode of noises turning very loud that lasted about 15 minutes days ago and not associated to any other neurological deficit. \par He refers being complaint with medication regimen he is on AS for secondary stroke prevention measures and statin. \par He is undergoing work up for pancreatic lesion.\par \par \par \par HOSPITAL COURSE:\par On 9/23/18 he presented to [facility] with AMS and intoxication. Upon further evaluation by the ED he wasn't moving L arm. On telestroke eval he was found with  left facial droop, left upper extremity monoplegia and lower extremity weakness, significant dysarthria and left hemibody hypoesthesia. Head CT showed area of hypodensity at right fronto parietal lobe and acute/ subacute hypodensity at the inferior temporal/parietal lobe middle cerebral artery territory. CT angiography demonstrated right middle cerebral artery proximal M1 segment occlusion. Patient not candidate for IV-tPA given unknown last known well as well as appearance of subacute infarct. He was transferred to [facility] for evaluation for endovascular therapy. CT perfusion showed a small core infarct with large area of penumbra. He was deemed candidate for endovascular therapy with mechanical thrombectomy with TICI 2b recanalization. MRI brain subsequently showed right MCA distribution infarct with hemorrhagic transformation (HI 2). TTE did not show any obvious structural cardiac source of embolism. AYO did not show any obvious structural cardiac source of embolism nor showed any evidence of a PFO. 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  s/p ILR placment \par \par WORKUP:\par CT Head No Cont (09.24.18):\par Previously noted acute right MCA infarct has demonstrated expected evolutionary changes with areas of hemorrhagic transformation again seen.\par CT Head No Cont (09.23.18):\par Redemonstration of evolving right MCA territory infarct, faint areas of increasing attenuation may reflect contrast staining, tiny foci of petechial hemorrhage not excluded.\par CT perfusion w/ Maps w/ IV Cont (09.23.18): \par Large right-sided penumbra  with a small infarct in the large mismatch volume.\par Noncontrast head CT (09.23.18): \par Large area acute right MCA territory infarct. Posterior temporal lobe infarct appears slightly more subacute with questionable petechial hemorrhage.\par CTA neck (09.23.18): \par No major vessel occlusion or hemodynamically significant stenosis. No evidence of dissection.\par CTA head (09.23.18):\par Occluded proximal M1 segment right middle cerebral artery. Remainder of the intracranial circulation is patent.\par MR Head w/wo IV Cont (09.28.18 ) \par Subacute right MCA infarct with associated areas of hemorrhagic transformation suspected. Unremarkable MRA of the neck and [place].\par \par

## 2019-01-11 NOTE — H&P PST ADULT - PMH
Former cigarette smoker    HTN (hypertension)    SHILO (obstructive sleep apnea)    Renal carcinoma, right    TIA (transient ischemic attack)

## 2019-01-11 NOTE — DATA REVIEWED
[de-identified] : EXAM: MR ANGIO NECK WAW IC \par \par EXAM: MR ANGIO BRAIN \par \par EXAM: MR BRAIN WAW IC \par \par \par PROCEDURE DATE: 09/28/2018 \par \par \par \par \par INTERPRETATION: History: Follow-up right MCA infarct. \par \par MRI of the brain was performed using sagittal T1, axial SPGR, T2, FLAIR, \par diffusion and gradient echo sequence. The patient was injected with \par approximately 10 cc of Gadavist IV with no IV contrast discarded. Sagittal \par coronal axial T1-weighted sequences were performed. \par \par MRA of the neck was formed using 2-D and 3-D time-of-flight technique. \par Gadolinium infusion MRA of the neck was performed. \par \par MRA of the [place] was performed using 3-D [place] technique. \par \par This exam is compared with prior noncontrast head CT performed on September \par 24, 2018. \par \par Extensive T2 prolongation with associated restricted diffusion is seen \par involving the right temporal frontal parietal cortex. Involvement of the \par left basal ganglia and corona radiata region is seen as well. These findings \par do have some associated areas of susceptibility which is compatible with \par underlying areas of hemorrhage. Gyriform type of enhancement is also seen. \par This is compatible with a subacute right MCA infarct. There is localized \par mass effect seen consisting sulcal effacement. Mild mass effect on the right \par lateral ventricle seen. \par \par There are no abnormal intra-axial or extra-axial collections seen. \par \par The large vessels demonstrate normal flow voids \par \par Minimal mucosal thickening seen involving right maxillary sinus. \par \par The patient is status post bilateral cataract surgery. \par \par Both mastoid and middle ear regions appear clear. \par \par Both distal common carotid, proximal internal and external carotid arteries \par appear normal as well as both vertebral arteries. No significant stenosis is \par seen. \par \par Both distal internal carotid, anterior cerebral, middle cerebral, and \par bilateral posterior cerebral arteries appears normal. There is no evidence \par of aneurysms or significant stenosis. \par \par Impression: Subacute right MCA infarct with associated areas of hemorrhagic \par transformation suspected. \par \par Unremarkable MRA of the neck and [place]. \par \par \par \par \par \par \par \par \par \par PAULA BARRY M.D., ATTENDING RADIOLOGIST \par This document has been electronically signed. Sep 29 2018 9:25AM \par \par  [de-identified] : EXAM: IR PROCEDURE NEURO \par \par \par \par \par PROCEDURE DATE: 09/23/2018 \par \par \par \par INTERPRETATION: Pre-procedure diagnosis: Ischemic infarct secondary to \par right middle cerebral artery occlusion \par \par Post-procedure diagnosis: Successful revascularization of right middle \par cerebral artery occlusion \par \par Procedure: Mechanical thrombectomy \par \par Interventionalist: Justin Dawson MD \par \par Assistant: Lorene Carlton MD \par \par Anesthesiologist: Alexandre Poon MD \par \par Contrast: Omnipaque 240, 45 cc \par \par Radiation Dose: A: 14.6 min, 580.7 mGy B: 11.1 min, 248.9 mGy Total: 25.8 \par min, 837.6 mGy \par \par Indications: The patient is a 62-year-old male with a past medical history \par of hypertension who initially presented with an altered mental status which \par progressed to left-sided weakness to [facility]. A noncontrast \par head CT demonstrated an evolving infarct in the right middle cerebral artery \par territory. CT angiography demonstrated a right cerebral artery occlusion. He \par was transferred to [facility] for further evaluation \par management. A CT perfusion demonstrated a significant amount of salvageable \par penumbra. The patient now presents for mechanical thrombectomy. The risks, \par benefits, alternatives, complications and personnel associated with the \par procedure was discussed with the patient and the patient's family in great \par detail. They request that we proceed. \par \par Procedure: The patient was brought into the angiography suite at 4:25 PM and \par positioned on the table supine at 4:32 PM. The patient was intubated and \par placed under general anesthesia. The baseline activated clotting time was \par 144 seconds. Both femoral regions were prepped and draped in the standard \par sterile fashion. The right femoral artery was accessed using a micropuncture \par kit and modified Seldinger technique at 4:54 PM. A 5 Guyanese long sheath was \par inserted. A 5 Guyanese Cordis Vert diagnostic catheter over an angled \par Glidewire was navigated into the right internal carotid artery and \par angiography of the intracranial circulation was performed at 4:59 PM. \par Nicardipine 3 mg was administered into the right internal carotid artery to \par prevent iatrogenic vasospasm. \par \par The diagnostic catheter and sheath was exchanged for a 6F Neuron Max and \par attached to a heparinized flush and a Toy-Mimi adapter. A coaxial ACE68 \par reperfusion catheter and a Marksman microcatheter over a Synchro2 \par microguidewire was navigated in the distal right middle cerebral artery. A \par superselective angiogram was performed. Heparin 2000 units was administered \par intra-arterially. A Solitaire 4 x 20 mm was deployed across the occlusion. \par The Marksman was removed. Aspiration was applied to the reperfusion catheter \par which was advanced to the clot and the Solitaire was partially re-sheathed. \par The reperfusion catheter and the Solitaire were withdrawn under constant \par aspiration. As the Solitaire reached the tip of the Neuron Max aspiration \par was also applied to the guide catheter. Revascularization of right middle \par cerebral artery was achieved at 5:12 PM however the inferior division was \par still occluded. A coaxial ACE68 reperfusion catheter and a Marksman \par microcatheter over a Synchro2 microguidewire was navigated in the distal \par inferior division of the right middle cerebral artery. A superselective \par angiogram was performed. Aspiration was applied to the reperfusion catheter \par which was advanced to the clot. The reperfusion catheter was withdrawn \par under constant aspiration. As the catheter reached the tip of the Neuron \par Max, aspiration was also applied to the Neuron Max sheath. Post procedure \par angiography was performed. TICI 2B reperfusion was achieved. A \par postprocedure PASTORA CT was performed. Three-dimensional reconstructed images \par were evaluated on an independent workstation. The guide catheter was drawn \par into the right femoral artery and angiography was performed. The femoral \par artery was deemed of sufficient caliber for a femoral access closure device. \par The activated clotting time at the end of the procedure was 194 seconds. \par \par All catheters and guidewires were removed and hemostasis was obtained with a \par 6F Starclose, manual compression, Quickclot and the Safeguard dressing \par \par Findings: \par \par Right internal artery: \par \par Pre-procedure: There is normal transit of contrast through the arterial, \par capillary and venous phases of the right anterior cerebral artery territory. \par A right middle cerebral artery occlusion is visualized. There is evidence of \par leptomeningeal collateral circulation from the right anterior cerebral \par artery opacifying the right middle cerebral artery territory. There is \par opacification across the anterior communicating complex. A posterior \par communicating artery was not opacified. There is no evidence of \par intracranial aneurysm, arteriovenous malformation or arteriovenous shunting. \par Multiple superficial cortical veins are identified. The superior sagittal \par sinus drains into both transverse and sigmoid sinuses. The basal vein of \par [place] is identified. The internal cerebral vein drains into the great \par vein of [place] and the straight sinus. \par \par Post-procedure: The right middle cerebral artery is now recanalized and \par there is significant improvement in the flow through the middle cerebral \par artery territory. There is resultant TICI 2B reperfusion. The sylvian \par venous system and the vein of Anca are now opacified. There is no evidence \par of contrast extravasation or vascular injury. \par \par PASTORA CT: The cerebral vasculature is now faintly opacified with contrast. \par There is patchy contrast enhancement in the right middle cerebral artery \par territory in the regions of the patient's known prior evolving infarctions. \par There is no evidence of subarachnoid or intraparenchymal hemorrhage. The \par ventricles are normal in their configuration. \par \par Impression: Successful revascularization of right middle cerebral artery \par occlusion \par \par \par \par \par \par \par \par \par JUSTIN DAWSON M.D., NEUROSURGERY \par This document has been electronically signed. Oct 2 2018 5:40PM \par \par

## 2019-01-11 NOTE — PHYSICAL EXAM
[FreeTextEntry1] : Neurologic examination:\par Mental status:\par The patient is alert, attentive, and oriented. Speech is mildly dysarthric but fluent with good repetition, comprehension, and naming. \par \par Cranial nerves:\par CN II: Left superior quadrantanopsia \par CN III, IV, VI: At primary gaze, there is no eye deviation.EOMI. No ptosis.\par CN V: Facial sensation is intact to pinprick in all 3 divisions bilaterally.\par CN VII: Face is symmetric with normal eye closure and smile.\par CN VII: Hearing is normal to rubbing fingers\par CN IX, X: Palate elevates symmetrically. Phonation is normal.\par CN XI: Head turning and shoulder shrug are intact\par CN XII: Tongue is midline with normal movements and no atrophy.\par Motor:\par \par There is left upper extremity pronator drift of out-stretched arms. Tone is a little is increase at left wrist. \par \par 	Deltoid	Biceps	Triceps	Hand \par L	4	4	4	3	\par R	5	5	5	5		\par  Hip flexion Hip extension	Knee flexion Knee extension\par L	5	5	5		 5\par R	5	5	5	                 5 \par \par \par Sensory:\par Light touch intact in all 4 extremities. \par \par Coordination:\par  There is no dysmetria on finger-to-nose.\par \par Gait/Stance:\par Posture is normal. Gait is steady with normal steps, base, arm swing, and turning.\par

## 2019-01-25 ENCOUNTER — RESULT REVIEW (OUTPATIENT)
Age: 64
End: 2019-01-25

## 2019-01-25 ENCOUNTER — APPOINTMENT (OUTPATIENT)
Dept: GASTROENTEROLOGY | Facility: HOSPITAL | Age: 64
End: 2019-01-25

## 2019-01-25 ENCOUNTER — OUTPATIENT (OUTPATIENT)
Dept: OUTPATIENT SERVICES | Facility: HOSPITAL | Age: 64
LOS: 1 days | End: 2019-01-25
Payer: COMMERCIAL

## 2019-01-25 VITALS — HEIGHT: 72 IN | WEIGHT: 240.08 LBS

## 2019-01-25 DIAGNOSIS — Z85.528 PERSONAL HISTORY OF OTHER MALIGNANT NEOPLASM OF KIDNEY: Chronic | ICD-10-CM

## 2019-01-25 DIAGNOSIS — K86.9 DISEASE OF PANCREAS, UNSPECIFIED: ICD-10-CM

## 2019-01-25 DIAGNOSIS — Z95.818 PRESENCE OF OTHER CARDIAC IMPLANTS AND GRAFTS: Chronic | ICD-10-CM

## 2019-01-25 DIAGNOSIS — Z98.890 OTHER SPECIFIED POSTPROCEDURAL STATES: Chronic | ICD-10-CM

## 2019-01-25 PROCEDURE — 43239 EGD BIOPSY SINGLE/MULTIPLE: CPT | Mod: XS

## 2019-01-25 PROCEDURE — 43242 EGD US FINE NEEDLE BX/ASPIR: CPT

## 2019-01-25 PROCEDURE — 43239 EGD BIOPSY SINGLE/MULTIPLE: CPT | Mod: GC,59

## 2019-01-25 PROCEDURE — 88312 SPECIAL STAINS GROUP 1: CPT

## 2019-01-25 PROCEDURE — 88305 TISSUE EXAM BY PATHOLOGIST: CPT | Mod: 26

## 2019-01-25 PROCEDURE — 88307 TISSUE EXAM BY PATHOLOGIST: CPT | Mod: 26

## 2019-01-25 PROCEDURE — 88341 IMHCHEM/IMCYTCHM EA ADD ANTB: CPT

## 2019-01-25 PROCEDURE — 88342 IMHCHEM/IMCYTCHM 1ST ANTB: CPT

## 2019-01-25 PROCEDURE — G0463: CPT

## 2019-01-25 PROCEDURE — 88342 IMHCHEM/IMCYTCHM 1ST ANTB: CPT | Mod: 26

## 2019-01-25 PROCEDURE — 88341 IMHCHEM/IMCYTCHM EA ADD ANTB: CPT | Mod: 26

## 2019-01-25 PROCEDURE — 85027 COMPLETE CBC AUTOMATED: CPT

## 2019-01-25 PROCEDURE — 88312 SPECIAL STAINS GROUP 1: CPT | Mod: 26

## 2019-01-25 PROCEDURE — C1769: CPT

## 2019-01-25 PROCEDURE — 88305 TISSUE EXAM BY PATHOLOGIST: CPT

## 2019-01-25 PROCEDURE — 43242 EGD US FINE NEEDLE BX/ASPIR: CPT | Mod: GC

## 2019-01-25 PROCEDURE — 80048 BASIC METABOLIC PNL TOTAL CA: CPT

## 2019-01-25 PROCEDURE — 88307 TISSUE EXAM BY PATHOLOGIST: CPT

## 2019-01-31 LAB — SURGICAL PATHOLOGY STUDY: SIGNIFICANT CHANGE UP

## 2019-02-05 ENCOUNTER — OTHER (OUTPATIENT)
Age: 64
End: 2019-02-05

## 2019-02-06 ENCOUNTER — OTHER (OUTPATIENT)
Age: 64
End: 2019-02-06

## 2019-03-05 ENCOUNTER — OTHER (OUTPATIENT)
Age: 64
End: 2019-03-05

## 2019-03-18 ENCOUNTER — MOBILE ON CALL (OUTPATIENT)
Age: 64
End: 2019-03-18

## 2019-03-19 ENCOUNTER — OTHER (OUTPATIENT)
Age: 64
End: 2019-03-19

## 2019-03-28 PROBLEM — G47.33 OBSTRUCTIVE SLEEP APNEA (ADULT) (PEDIATRIC): Chronic | Status: ACTIVE | Noted: 2019-01-11

## 2019-03-28 PROBLEM — C64.1 MALIGNANT NEOPLASM OF RIGHT KIDNEY, EXCEPT RENAL PELVIS: Chronic | Status: ACTIVE | Noted: 2019-01-11

## 2019-04-05 ENCOUNTER — APPOINTMENT (OUTPATIENT)
Dept: NEUROLOGY | Facility: CLINIC | Age: 64
End: 2019-04-05
Payer: COMMERCIAL

## 2019-04-05 VITALS
BODY MASS INDEX: 33.86 KG/M2 | HEART RATE: 84 BPM | DIASTOLIC BLOOD PRESSURE: 76 MMHG | SYSTOLIC BLOOD PRESSURE: 115 MMHG | HEIGHT: 72 IN | WEIGHT: 250 LBS

## 2019-04-05 PROCEDURE — 99214 OFFICE O/P EST MOD 30 MIN: CPT

## 2019-04-05 NOTE — REVIEW OF SYSTEMS
[Depression] : depression [Arm Weakness] : arm weakness [Hand Weakness] :  hand weakness [Numbness] : numbness [Difficulty Walking] : difficulty walking [Feeling Poorly] : not feeling poorly [Feeling Tired] : not feeling tired [As Noted in HPI] : as noted in HPI [Suicidal] : not suicidal [Sleep Disturbances] : sleep disturbances [Anxiety] : no anxiety [Change In Personality] : personality change [Emotional Problems] : emotional problems [Confused or Disoriented] : no confusion [Memory Lapses or Loss] : no memory loss [Decr. Concentrating Ability] : no decrease in concentrating ability [Leg Weakness] : no leg weakness [Tingling] : no tingling [Dizziness] : no dizziness [Fainting] : no fainting [Eyesight Problems] : no eyesight problems [Loss Of Hearing] : no hearing loss [Chest Pain] : no chest pain [Palpitations] : no palpitations [Shortness Of Breath] : no shortness of breath [Abdominal Pain] : no abdominal pain [Arthralgias] : arthralgias [Joint Stiffness] : joint stiffness [Easy Bleeding] : no tendency for easy bleeding [Easy Bruising] : no tendency for easy bruising [Negative] : Heme/Lymph [FreeTextEntry3] : Left superior quadrantanopsia

## 2019-04-05 NOTE — REASON FOR VISIT
[Follow-Up: _____] : a [unfilled] follow-up visit [Other: _____] : [unfilled] [FreeTextEntry1] : R MCA stroke s/p Mechanical thrombectomy

## 2019-04-05 NOTE — DISCUSSION/SUMMARY
[Antithrombotic therapy with ___] : antithrombotic therapy with  [unfilled] [Intensive Blood Pressure Control] : intensive blood pressure control [Lipid Lowering Therapy] : lipid lowering therapy [Patient encouraged to discuss with Primary MD] : I encouraged the patient to discuss these important issues with ~his/her~ primary care doctor [Goals and Counseling] : I have reviewed the goals of stroke risk factor modification. I counseled the patient on measures to reduce stroke risk, including the importance of medication compliance, risk factor control, exercise, healthy diet and avoidance of smoking. I reviewed stroke warning signs and symptoms and appropriate actions to take if such occur. [FreeTextEntry1] : Impression: \par Cerebral embolism with cerebral infarction. R MCA distribution stroke with mild hemorrhagic concentration (HI 2) - likely etiology being cryptogenic stroke, probably related to embolism from a proximal source like cardiac source of embolism.\par \par Events of noises turning loud unclear etiology were not even a concern during this evaluation.\par \par Depression-etiology multifactorial he will benefit from psychiatric management. I called Dr. Diaz left a message to discuss further treatment.\par \par Memory loss- R/out secondary causes of dementia, EEG again ordered, perhaps related to depression\par \par \par - Continue ASA 81 mg once daily for secondary stroke prevention\par - Continue to monitor BP at home and notify PCP/Cardiology for readings >140/90. Educated on medication compliance and BP monitoring to prevent secondary stroke and systemic problems \par - Continue Speech therapy/Physical therapy/Occupational therapy as directed. \par - Continue statin therapy for secondary stroke prevention [Atorvastatin 80 mg - LDL 90]\par - Follow up with PCP for statin management and primary care needs such as regular blood work including monitoring of HbA1c and cholesterol profile, to modify vascular risk factors \par - Follow up with cardiology for management of ILR  to screen for occult cardiac arrhythmias like atrial fibrillation which could be the cardiac source of embolism. \par -Follow up 6 months\par - Educated on stroke/TIA signs/bleeding signs and symptoms and to call 911 if experiencing any of these symptoms\par \par All concerns and questions were addressed with both the patient and wife

## 2019-04-05 NOTE — HISTORY OF PRESENT ILLNESS
[FreeTextEntry1] : Mr. Larsen is a 63 year-old  male with past medical history of HTN, HLD, DM, ETOH abuse, spinal stenosis, former smoker, RCC s/p R partial nephrectomy (2017)  who presents for follow up after a  R MCA distribution stroke with mild hemorrhagic transformation (HI 2) on 9/23/18 s/p mechanical thrombectomy. \par Last evaluation patient was very stable completing physical therapy and complaining of events describe as noises turning loud. Assessment and plan was to r/out seizures. EEG was not completed. \par However, during this evaluation his ex. wife brings to my attention that the last 2 months he has had multiple episodes of disorientation and memory loss. Patient was tearful and anxious stating things like "I wish Dr. Koo have never pulled the clot out of my brain" "Life like this is not worth it". Some suicidal ideations but no concrete plan referred. He was evaluated by psychiatrist yesterday. As per ex. wife he recommended some changes in medication regimen but she could not specify for me. \par  He refers being complaint with medication regimen except not recently on statin therapy last LDL 90, I have to follow up on recommendations by other physicians. \par Work up for pancreatic lesion came back negative.\par Also complaining of constant lightheadedness and a feeling as if he was about to pass out. He states has episodes during therapies were he gets lightheaded he starts sweating and feels as if he was about to loose consciousness.\par There was an issue with the ILR approval which delayed the initiation of monitoring and he just recently started to be monitor. Carotid dopplers completed by cardiology, ex. wife states results were faxed. \par  \par \par \par HOSPITAL COURSE:\par On 9/23/18 he presented to [facility] with AMS and intoxication. Upon further evaluation by the ED he wasn't moving L arm. On telestroke eval he was found with  left facial droop, left upper extremity monoplegia and lower extremity weakness, significant dysarthria and left hemibody hypoesthesia. Head CT showed area of hypodensity at right fronto parietal lobe and acute/ subacute hypodensity at the inferior temporal/parietal lobe middle cerebral artery territory. CT angiography demonstrated right middle cerebral artery proximal M1 segment occlusion. Patient not candidate for IV-tPA given unknown last known well as well as appearance of subacute infarct. He was transferred to [facility] for evaluation for endovascular therapy. CT perfusion showed a small core infarct with large area of penumbra. He was deemed candidate for endovascular therapy with mechanical thrombectomy with TICI 2b recanalization. MRI brain subsequently showed right MCA distribution infarct with hemorrhagic transformation (HI 2). TTE did not show any obvious structural cardiac source of embolism. AYO did not show any obvious structural cardiac source of embolism nor showed any evidence of a PFO. 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  s/p ILR placment \par \par WORKUP:\par CT Head No Cont (09.24.18):\par Previously noted acute right MCA infarct has demonstrated expected evolutionary changes with areas of hemorrhagic transformation again seen.\par CT Head No Cont (09.23.18):\par Redemonstration of evolving right MCA territory infarct, faint areas of increasing attenuation may reflect contrast staining, tiny foci of petechial hemorrhage not excluded.\par CT perfusion w/ Maps w/ IV Cont (09.23.18): \par Large right-sided penumbra  with a small infarct in the large mismatch volume.\par Noncontrast head CT (09.23.18): \par Large area acute right MCA territory infarct. Posterior temporal lobe infarct appears slightly more subacute with questionable petechial hemorrhage.\par CTA neck (09.23.18): \par No major vessel occlusion or hemodynamically significant stenosis. No evidence of dissection.\par CTA head (09.23.18):\par Occluded proximal M1 segment right middle cerebral artery. Remainder of the intracranial circulation is patent.\par MR Head w/wo IV Cont (09.28.18 ) \par Subacute right MCA infarct with associated areas of hemorrhagic transformation suspected. Unremarkable MRA of the neck and [place].\par \par

## 2019-04-05 NOTE — DATA REVIEWED
[de-identified] : EXAM: MR ANGIO NECK WAW IC \par \par EXAM: MR ANGIO BRAIN \par \par EXAM: MR BRAIN WAW IC \par \par \par PROCEDURE DATE: 09/28/2018 \par \par \par \par \par INTERPRETATION: History: Follow-up right MCA infarct. \par \par MRI of the brain was performed using sagittal T1, axial SPGR, T2, FLAIR, \par diffusion and gradient echo sequence. The patient was injected with \par approximately 10 cc of Gadavist IV with no IV contrast discarded. Sagittal \par coronal axial T1-weighted sequences were performed. \par \par MRA of the neck was formed using 2-D and 3-D time-of-flight technique. \par Gadolinium infusion MRA of the neck was performed. \par \par MRA of the [place] was performed using 3-D [place] technique. \par \par This exam is compared with prior noncontrast head CT performed on September \par 24, 2018. \par \par Extensive T2 prolongation with associated restricted diffusion is seen \par involving the right temporal frontal parietal cortex. Involvement of the \par left basal ganglia and corona radiata region is seen as well. These findings \par do have some associated areas of susceptibility which is compatible with \par underlying areas of hemorrhage. Gyriform type of enhancement is also seen. \par This is compatible with a subacute right MCA infarct. There is localized \par mass effect seen consisting sulcal effacement. Mild mass effect on the right \par lateral ventricle seen. \par \par There are no abnormal intra-axial or extra-axial collections seen. \par \par The large vessels demonstrate normal flow voids \par \par Minimal mucosal thickening seen involving right maxillary sinus. \par \par The patient is status post bilateral cataract surgery. \par \par Both mastoid and middle ear regions appear clear. \par \par Both distal common carotid, proximal internal and external carotid arteries \par appear normal as well as both vertebral arteries. No significant stenosis is \par seen. \par \par Both distal internal carotid, anterior cerebral, middle cerebral, and \par bilateral posterior cerebral arteries appears normal. There is no evidence \par of aneurysms or significant stenosis. \par \par Impression: Subacute right MCA infarct with associated areas of hemorrhagic \par transformation suspected. \par \par Unremarkable MRA of the neck and [place]. \par \par \par \par \par \par \par \par \par \par PAULA BARRY M.D., ATTENDING RADIOLOGIST \par This document has been electronically signed. Sep 29 2018 9:25AM \par \par  [de-identified] : EXAM: IR PROCEDURE NEURO \par \par \par \par \par PROCEDURE DATE: 09/23/2018 \par \par \par \par INTERPRETATION: Pre-procedure diagnosis: Ischemic infarct secondary to \par right middle cerebral artery occlusion \par \par Post-procedure diagnosis: Successful revascularization of right middle \par cerebral artery occlusion \par \par Procedure: Mechanical thrombectomy \par \par Interventionalist: Justin Dawson MD \par \par Assistant: Lorene Carlton MD \par \par Anesthesiologist: Alexandre Poon MD \par \par Contrast: Omnipaque 240, 45 cc \par \par Radiation Dose: A: 14.6 min, 580.7 mGy B: 11.1 min, 248.9 mGy Total: 25.8 \par min, 837.6 mGy \par \par Indications: The patient is a 62-year-old male with a past medical history \par of hypertension who initially presented with an altered mental status which \par progressed to left-sided weakness to [facility]. A noncontrast \par head CT demonstrated an evolving infarct in the right middle cerebral artery \par territory. CT angiography demonstrated a right cerebral artery occlusion. He \par was transferred to [facility] for further evaluation \par management. A CT perfusion demonstrated a significant amount of salvageable \par penumbra. The patient now presents for mechanical thrombectomy. The risks, \par benefits, alternatives, complications and personnel associated with the \par procedure was discussed with the patient and the patient's family in great \par detail. They request that we proceed. \par \par Procedure: The patient was brought into the angiography suite at 4:25 PM and \par positioned on the table supine at 4:32 PM. The patient was intubated and \par placed under general anesthesia. The baseline activated clotting time was \par 144 seconds. Both femoral regions were prepped and draped in the standard \par sterile fashion. The right femoral artery was accessed using a micropuncture \par kit and modified Seldinger technique at 4:54 PM. A 5 Trinid[place] long sheath was \par inserted. A 5 Trin[place] Cordis Vert diagnostic catheter over an angled \par Glidewire was navigated into the right internal carotid artery and \par angiography of the intracranial circulation was performed at 4:59 PM. \par Nicardipine 3 mg was administered into the right internal carotid artery to \par prevent iatrogenic vasospasm. \par \par The diagnostic catheter and sheath was exchanged for a 6F Neuron Max and \par attached to a heparinized flush and a Toy-Mimi adapter. A coaxial ACE68 \par reperfusion catheter and a Marksman microcatheter over a Synchro2 \par microguidewire was navigated in the distal right middle cerebral artery. A \par superselective angiogram was performed. Heparin 2000 units was administered \par intra-arterially. A Solitaire 4 x 20 mm was deployed across the occlusion. \par The Marksman was removed. Aspiration was applied to the reperfusion catheter \par which was advanced to the clot and the Solitaire was partially re-sheathed. \par The reperfusion catheter and the Solitaire were withdrawn under constant \par aspiration. As the Solitaire reached the tip of the Neuron Max aspiration \par was also applied to the guide catheter. Revascularization of right middle \par cerebral artery was achieved at 5:12 PM however the inferior division was \par still occluded. A coaxial ACE68 reperfusion catheter and a Marksman \par microcatheter over a Synchro2 microguidewire was navigated in the distal \par inferior division of the right middle cerebral artery. A superselective \par angiogram was performed. Aspiration was applied to the reperfusion catheter \par which was advanced to the clot. The reperfusion catheter was withdrawn \par under constant aspiration. As the catheter reached the tip of the Neuron \par Max, aspiration was also applied to the Neuron Max sheath. Post procedure \par angiography was performed. TICI 2B reperfusion was achieved. A \par postprocedure PASTORA CT was performed. Three-dimensional reconstructed images \par were evaluated on an independent workstation. The guide catheter was drawn \par into the right femoral artery and angiography was performed. The femoral \par artery was deemed of sufficient caliber for a femoral access closure device. \par The activated clotting time at the end of the procedure was 194 seconds. \par \par All catheters and guidewires were removed and hemostasis was obtained with a \par 6F Starclose, manual compression, Quickclot and the Safeguard dressing \par \par Findings: \par \par Right internal artery: \par \par Pre-procedure: There is normal transit of contrast through the arterial, \par capillary and venous phases of the right anterior cerebral artery territory. \par A right middle cerebral artery occlusion is visualized. There is evidence of \par leptomeningeal collateral circulation from the right anterior cerebral \par artery opacifying the right middle cerebral artery territory. There is \par opacification across the anterior communicating complex. A posterior \par communicating artery was not opacified. There is no evidence of \par intracranial aneurysm, arteriovenous malformation or arteriovenous shunting. \par Multiple superficial cortical veins are identified. The superior sagittal \par sinus drains into both transverse and sigmoid sinuses. The basal vein of \par [place] is identified. The internal cerebral vein drains into the great \par vein of [place] and the straight sinus. \par \par Post-procedure: The right middle cerebral artery is now recanalized and \par there is significant improvement in the flow through the middle cerebral \par artery territory. There is resultant TICI 2B reperfusion. The sylvian \par venous system and the vein of [place] are now opacified. There is no evidence \par of contrast extravasation or vascular injury. \par \par PASTORA CT: The cerebral vasculature is now faintly opacified with contrast. \par There is patchy contrast enhancement in the right middle cerebral artery \par territory in the regions of the patient's known prior evolving infarctions. \par There is no evidence of subarachnoid or intraparenchymal hemorrhage. The \par ventricles are normal in their configuration. \par \par Impression: Successful revascularization of right middle cerebral artery \par occlusion \par \par \par \par \par \par \par \par \par JUSTIN DAWSON M.D., NEUROSURGERY \par This document has been electronically signed. Oct 2 2018 5:40PM \par \par

## 2019-04-08 ENCOUNTER — OTHER (OUTPATIENT)
Age: 64
End: 2019-04-08

## 2019-04-09 ENCOUNTER — OTHER (OUTPATIENT)
Age: 64
End: 2019-04-09

## 2019-04-12 NOTE — DISCHARGE NOTE ADULT - NSSTROKEREHABRD_NEU_A_CORE
yes, rehabilitation indicated Pupils equal, round and reactive to light, Extra-ocular movement intact, eyes are clear b/l, +quick horizontal nystagmus b/l on lateral views

## 2019-05-02 ENCOUNTER — OTHER (OUTPATIENT)
Age: 64
End: 2019-05-02

## 2019-07-24 NOTE — PRE-ANESTHESIA EVALUATION ADULT - NSANTHSNORERD_ENT_A_CORE
[>50% of Time Spent on Counseling for ____] : Greater than 50% of the encounter time was spent on counseling for [unfilled] [Time Spent: ___ minutes] : I have spent [unfilled] minutes of face to face time with the patient Yes

## 2019-08-26 PROBLEM — I10 ESSENTIAL (PRIMARY) HYPERTENSION: Chronic | Status: ACTIVE | Noted: 2019-06-20

## 2019-08-26 PROBLEM — I63.9 CEREBRAL INFARCTION, UNSPECIFIED: Chronic | Status: ACTIVE | Noted: 2019-06-20

## 2019-08-30 ENCOUNTER — APPOINTMENT (OUTPATIENT)
Dept: NEUROSURGERY | Facility: CLINIC | Age: 64
End: 2019-08-30
Payer: COMMERCIAL

## 2019-08-30 VITALS
WEIGHT: 250 LBS | HEIGHT: 72 IN | HEART RATE: 71 BPM | BODY MASS INDEX: 33.86 KG/M2 | SYSTOLIC BLOOD PRESSURE: 117 MMHG | TEMPERATURE: 97.9 F | DIASTOLIC BLOOD PRESSURE: 76 MMHG

## 2019-08-30 DIAGNOSIS — Z86.73 PERSONAL HISTORY OF TRANSIENT ISCHEMIC ATTACK (TIA), AND CEREBRAL INFARCTION W/OUT RESIDUAL DEFICITS: ICD-10-CM

## 2019-08-30 PROCEDURE — 99215 OFFICE O/P EST HI 40 MIN: CPT

## 2019-08-30 NOTE — HISTORY OF PRESENT ILLNESS
[FreeTextEntry1] : Mr. Larsen is a 63 year-old  male with past medical history of HTN, HLD, DM, ETOH abuse, spinal stenosis, former smoker, RCC s/p R partial nephrectomy (2017)  due to malignancy who presents for follow up after a  R MCA distribution stroke with mild hemorrhagic transformation (HI 2) on 9/23/18 s/p mechanical thrombectomy. \par Recently patient has a hospitalization due to seizures. He is on therapy with Depakote 500mg BID. No further incidents reported. \par During this evaluation again his ex. wife brings to my attention multiple episodes of disorientation and memory loss. Also patient has been anxious and with suicidal ideations with concrete plan. However when I explore his  intentions of really doing it were null. He was evaluated by psychiatrist but they express some discontent  with management and secondary effects from medication regimen. He discontinue therapy with cyclobenzaprine, quetiapine and Sertraline.\par He refers being complaint with medication regimen otherwise.  \par He reports constantly being startle by loud noises with panic attacks. He expresses frustration with inability to dress or complete activities of daily living.  All of these frustration translate into a sense of helplessness, sadness, unwillingness to eat, bathe and constant fear of having another seizure or feeling lost. \par As for secondary stroke prevention he is on Aspirin 81 mg and statin?? unsure. \par \par \par HOSPITAL COURSE:\par On 9/23/18 he presented to [facility] with AMS and intoxication. Upon further evaluation by the ED he wasn't moving L arm. On telestroke eval he was found with  left facial droop, left upper extremity monoplegia and lower extremity weakness, significant dysarthria and left hemibody hypoesthesia. Head CT showed area of hypodensity at right fronto parietal lobe and acute/ subacute hypodensity at the inferior temporal/parietal lobe middle cerebral artery territory. CT angiography demonstrated right middle cerebral artery proximal M1 segment occlusion. Patient not candidate for IV-tPA given unknown last known well as well as appearance of subacute infarct. He was transferred to [facility] for evaluation for endovascular therapy. CT perfusion showed a small core infarct with large area of penumbra. He was deemed candidate for endovascular therapy with mechanical thrombectomy with TICI 2b recanalization. MRI brain subsequently showed right MCA distribution infarct with hemorrhagic transformation (HI 2). TTE did not show any obvious structural cardiac source of embolism. AYO did not show any obvious structural cardiac source of embolism nor showed any evidence of a PFO. 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  s/p ILR placment \par \par WORKUP:\par CT Head No Cont (09.24.18):\par Previously noted acute right MCA infarct has demonstrated expected evolutionary changes with areas of hemorrhagic transformation again seen.\par CT Head No Cont (09.23.18):\par Redemonstration of evolving right MCA territory infarct, faint areas of increasing attenuation may reflect contrast staining, tiny foci of petechial hemorrhage not excluded.\par CT perfusion w/ Maps w/ IV Cont (09.23.18): \par Large right-sided penumbra  with a small infarct in the large mismatch volume.\par Noncontrast head CT (09.23.18): \par Large area acute right MCA territory infarct. Posterior temporal lobe infarct appears slightly more subacute with questionable petechial hemorrhage.\par CTA neck (09.23.18): \par No major vessel occlusion or hemodynamically significant stenosis. No evidence of dissection.\par CTA head (09.23.18):\par Occluded proximal M1 segment right middle cerebral artery. Remainder of the intracranial circulation is patent.\par MR Head w/wo IV Cont (09.28.18 ) \par Subacute right MCA infarct with associated areas of hemorrhagic transformation suspected. Unremarkable MRA of the neck and [place].\par \par

## 2019-08-30 NOTE — REVIEW OF SYSTEMS
[As Noted in HPI] : as noted in HPI [Sleep Disturbances] : sleep disturbances [Suicidal] : suicidal [Anxiety] : anxiety [Depression] : depression [Emotional Problems] : emotional problems [Change In Personality] : personality change [Arm Weakness] : arm weakness [Hand Weakness] :  hand weakness [Numbness] : numbness [Difficulty Walking] : difficulty walking [Arthralgias] : arthralgias [Joint Stiffness] : joint stiffness [Negative] : Heme/Lymph [Feeling Poorly] : not feeling poorly [Feeling Tired] : not feeling tired [Memory Lapses or Loss] : no memory loss [Confused or Disoriented] : no confusion [Decr. Concentrating Ability] : no decrease in concentrating ability [Leg Weakness] : no leg weakness [Tingling] : no tingling [Dizziness] : no dizziness [Fainting] : no fainting [Loss Of Hearing] : no hearing loss [Eyesight Problems] : no eyesight problems [Chest Pain] : no chest pain [Shortness Of Breath] : no shortness of breath [Palpitations] : no palpitations [Abdominal Pain] : no abdominal pain [Easy Bleeding] : no tendency for easy bleeding [Easy Bruising] : no tendency for easy bruising [FreeTextEntry3] : Left superior quadrantanopsia

## 2019-08-30 NOTE — ASSESSMENT
[FreeTextEntry1] : Impression: \par Cerebral embolism with cerebral infarction. R MCA distribution stroke with mild hemorrhagic concentration (HI 2) - likely etiology being cryptogenic stroke, probably related to embolism from a proximal source like cardiac source of embolism.\par \par Recent episode of seizures continue therapy as indicate seek evaluation by Epilepsy specialist. \par \par Major Depressive Disorder-etiology multifactorial he will benefit from psychiatric management and support he is impaired to complete activities of daily living due to unremitting depression. \par \par Memory loss- Probably related to depression, advice to apply for a day program where he could exercise and meet other people. Perhaps follow up assistance for 911 survivors given anxiety and panic attacks. I symptoms worsen will refer to general neurologist. \par \par \par \par - Continue ASA 81 mg once daily for secondary stroke prevention\par - Continue to monitor BP at home and notify PCP/Cardiology for readings >140/90. Educated on medication compliance and BP monitoring to prevent secondary stroke and systemic problems \par -Physical therapy/Occupational therapy \par -Home aid\par - Continue statin therapy for secondary stroke prevention and lipid panel follow up\par - Follow up with PCP for statin management and primary care needs such as regular blood work including monitoring of HbA1c and cholesterol profile, to modify vascular risk factors \par -Follow up 4 months\par - Educated on stroke/TIA signs/bleeding signs and symptoms and to call 911 if experiencing any of these symptoms\par \par All concerns and questions were addressed with both the patient and  ex- wife. \par \par \par

## 2019-08-30 NOTE — REASON FOR VISIT
[Follow-Up: _____] : a [unfilled] follow-up visit [Family Member] : family member [FreeTextEntry1] : MCA stroke s/p Mechanical thrombectomy.

## 2019-08-30 NOTE — DATA REVIEWED
[de-identified] : EXAM: MR ANGIO NECK WAW IC \par \par EXAM: MR ANGIO BRAIN \par \par EXAM: MR BRAIN WAW IC \par \par \par PROCEDURE DATE: 09/28/2018 \par \par \par \par \par INTERPRETATION: History: Follow-up right MCA infarct. \par \par MRI of the brain was performed using sagittal T1, axial SPGR, T2, FLAIR, \par diffusion and gradient echo sequence. The patient was injected with \par approximately 10 cc of Gadavist IV with no IV contrast discarded. Sagittal \par coronal axial T1-weighted sequences were performed. \par \par MRA of the neck was formed using 2-D and 3-D time-of-flight technique. \par Gadolinium infusion MRA of the neck was performed. \par \par MRA of the [place] was performed using 3-D [place] technique. \par \par This exam is compared with prior noncontrast head CT performed on September \par 24, 2018. \par \par Extensive T2 prolongation with associated restricted diffusion is seen \par involving the right temporal frontal parietal cortex. Involvement of the \par left basal ganglia and corona radiata region is seen as well. These findings \par do have some associated areas of susceptibility which is compatible with \par underlying areas of hemorrhage. Gyriform type of enhancement is also seen. \par This is compatible with a subacute right MCA infarct. There is localized \par mass effect seen consisting sulcal effacement. Mild mass effect on the right \par lateral ventricle seen. \par \par There are no abnormal intra-axial or extra-axial collections seen. \par \par The large vessels demonstrate normal flow voids \par \par Minimal mucosal thickening seen involving right maxillary sinus. \par \par The patient is status post bilateral cataract surgery. \par \par Both mastoid and middle ear regions appear clear. \par \par Both distal common carotid, proximal internal and external carotid arteries \par appear normal as well as both vertebral arteries. No significant stenosis is \par seen. \par \par Both distal internal carotid, anterior cerebral, middle cerebral, and \par bilateral posterior cerebral arteries appears normal. There is no evidence \par of aneurysms or significant stenosis. \par \par Impression: Subacute right MCA infarct with associated areas of hemorrhagic \par transformation suspected. \par \par Unremarkable MRA of the neck and [place]. \par \par \par \par \par \par \par \par \par \par PAULA BARRY M.D., ATTENDING RADIOLOGIST \par This document has been electronically signed. Sep 29 2018 9:25AM \par \par  [de-identified] : EXAM: IR PROCEDURE NEURO \par \par \par \par \par PROCEDURE DATE: 09/23/2018 \par \par \par \par INTERPRETATION: Pre-procedure diagnosis: Ischemic infarct secondary to \par right middle cerebral artery occlusion \par \par Post-procedure diagnosis: Successful revascularization of right middle \par cerebral artery occlusion \par \par Procedure: Mechanical thrombectomy \par \par Interventionalist: Justin Dawson MD \par \par Assistant: Lorene Carlton MD \par \par Anesthesiologist: Alexandre Poon MD \par \par Contrast: Omnipaque 240, 45 cc \par \par Radiation Dose: A: 14.6 min, 580.7 mGy B: 11.1 min, 248.9 mGy Total: 25.8 \par min, 837.6 mGy \par \par Indications: The patient is a 62-year-old male with a past medical history \par of hypertension who initially presented with an altered mental status which \par progressed to left-sided weakness to [facility]. A noncontrast \par head CT demonstrated an evolving infarct in the right middle cerebral artery \par territory. CT angiography demonstrated a right cerebral artery occlusion. He \par was transferred to [facility] for further evaluation \par management. A CT perfusion demonstrated a significant amount of salvageable \par penumbra. The patient now presents for mechanical thrombectomy. The risks, \par benefits, alternatives, complications and personnel associated with the \par procedure was discussed with the patient and the patient's family in great \par detail. They request that we proceed. \par \par Procedure: The patient was brought into the angiography suite at 4:25 PM and \par positioned on the table supine at 4:32 PM. The patient was intubated and \par placed under general anesthesia. The baseline activated clotting time was \par 144 seconds. Both femoral regions were prepped and draped in the standard \par sterile fashion. The right femoral artery was accessed using a micropuncture \par kit and modified Seldinger technique at 4:54 PM. A 5 [place] long sheath was \par inserted. A 5 [place] Cordis Vert diagnostic catheter over an angled \par Glidewire was navigated into the right internal carotid artery and \par angiography of the intracranial circulation was performed at 4:59 PM. \par Nicardipine 3 mg was administered into the right internal carotid artery to \par prevent iatrogenic vasospasm. \par \par The diagnostic catheter and sheath was exchanged for a 6F Neuron Max and \par attached to a heparinized flush and a Toy-Mimi adapter. A coaxial ACE68 \par reperfusion catheter and a Marksman microcatheter over a Synchro2 \par microguidewire was navigated in the distal right middle cerebral artery. A \par superselective angiogram was performed. Heparin 2000 units was administered \par intra-arterially. A Solitaire 4 x 20 mm was deployed across the occlusion. \par The Marksman was removed. Aspiration was applied to the reperfusion catheter \par which was advanced to the clot and the Solitaire was partially re-sheathed. \par The reperfusion catheter and the Solitaire were withdrawn under constant \par aspiration. As the Solitaire reached the tip of the Neuron Max aspiration \par was also applied to the guide catheter. Revascularization of right middle \par cerebral artery was achieved at 5:12 PM however the inferior division was \par still occluded. A coaxial ACE68 reperfusion catheter and a Marksman \par microcatheter over a Synchro2 microguidewire was navigated in the distal \par inferior division of the right middle cerebral artery. A superselective \par angiogram was performed. Aspiration was applied to the reperfusion catheter \par which was advanced to the clot. The reperfusion catheter was withdrawn \par under constant aspiration. As the catheter reached the tip of the Neuron \par Max, aspiration was also applied to the Neuron Max sheath. Post procedure \par angiography was performed. TICI 2B reperfusion was achieved. A \par postprocedure PASTORA CT was performed. Three-dimensional reconstructed images \par were evaluated on an independent workstation. The guide catheter was drawn \par into the right femoral artery and angiography was performed. The femoral \par artery was deemed of sufficient caliber for a femoral access closure device. \par The activated clotting time at the end of the procedure was 194 seconds. \par \par All catheters and guidewires were removed and hemostasis was obtained with a \par 6F Starclose, manual compression, Quickclot and the Safeguard dressing \par \par Findings: \par \par Right internal artery: \par \par Pre-procedure: There is normal transit of contrast through the arterial, \par capillary and venous phases of the right anterior cerebral artery territory. \par A right middle cerebral artery occlusion is visualized. There is evidence of \par leptomeningeal collateral circulation from the right anterior cerebral \par artery opacifying the right middle cerebral artery territory. There is \par opacification across the anterior communicating complex. A posterior \par communicating artery was not opacified. There is no evidence of \par intracranial aneurysm, arteriovenous malformation or arteriovenous shunting. \par Multiple superficial cortical veins are identified. The superior sagittal \par sinus drains into both transverse and sigmoid sinuses. The basal vein of \par Teodora is identified. The internal cerebral vein drains into the great \par vein of [place] and the straight sinus. \par \par Post-procedure: The right middle cerebral artery is now recanalized and \par there is significant improvement in the flow through the middle cerebral \par artery territory. There is resultant TICI 2B reperfusion. The sylvian \par venous system and the vein of Anca are now opacified. There is no evidence \par of contrast extravasation or vascular injury. \par \par PASTORA CT: The cerebral vasculature is now faintly opacified with contrast. \par There is patchy contrast enhancement in the right middle cerebral artery \par territory in the regions of the patient's known prior evolving infarctions. \par There is no evidence of subarachnoid or intraparenchymal hemorrhage. The \par ventricles are normal in their configuration. \par \par Impression: Successful revascularization of right middle cerebral artery \par occlusion \par \par \par \par \par \par \par \par \par JUSTIN DAWSON M.D., NEUROSURGERY \par This document has been electronically signed. Oct 2 2018 5:40PM \par \par

## 2019-09-13 NOTE — BEHAVIORAL HEALTH ASSESSMENT NOTE - NS ED BHA MSE SPEECH RATE
Normal Hydroquinone Counseling:  Patient advised that medication may result in skin irritation, lightening (hypopigmentation), dryness, and burning.  In the event of skin irritation, the patient was advised to reduce the amount of the drug applied or use it less frequently.  Rarely, spots that are treated with hydroquinone can become darker (pseudoochronosis).  Should this occur, patient instructed to stop medication and call the office. The patient verbalized understanding of the proper use and possible adverse effects of hydroquinone.  All of the patient's questions and concerns were addressed.

## 2019-10-10 ENCOUNTER — APPOINTMENT (OUTPATIENT)
Dept: NEUROLOGY | Facility: CLINIC | Age: 64
End: 2019-10-10
Payer: COMMERCIAL

## 2019-10-10 VITALS
BODY MASS INDEX: 31.56 KG/M2 | DIASTOLIC BLOOD PRESSURE: 80 MMHG | HEIGHT: 72 IN | WEIGHT: 233 LBS | SYSTOLIC BLOOD PRESSURE: 128 MMHG

## 2019-10-10 PROCEDURE — 99215 OFFICE O/P EST HI 40 MIN: CPT

## 2019-10-11 NOTE — ASSESSMENT
[FreeTextEntry1] : LALIT ZARAGOZA is a 64 year-old RH male (Par[place]) with a history of anxiety, depression, R MCA stroke with mild hemorrhagic transformation s/p mechanical thrombectomy 9/23/18, HTN, HLD, DM II, h/o EtOH abuse, RCC s/p R partial nephrectomy 2017 who presented with focal epilepsy characterized by one lifetime focal to bilateral tonic clonic sz. \par \par Pt and wife believe the sz was due to Seroquel use; both are reluctant to continue VPA. Spent extensive amount of time educating pt and wife that the sz was most likely due to prior R MCA stroke, given semiology. Pt now meets criteria for epilepsy, and he will need to stay on AED lifelong. Will obtain aEEG. Patient and wife were educated regarding risks and driving privileges associated with the [place] State Guidelines; patient and wife were explicitly and repeatedly instructed not to drive. All questions and concerns answered and addressed in detail to patient's and wife's complete satisfaction. Patient and wife verbalized understanding and agreed to plan.\par \par - continue VPA 1000/500mg\par - 48hr aEEG\par - CBC, CMP, trough VPA levels\par - PT/OT for L hemiparesis\par - no driving \par - f/u in 1 month\par \par \par All relevant epilepsy AAN quality care measures were addressed and discussed with the patient and wife.\par \par More than 50% of time spent counseling and educating patient and wife about epilepsy specific safety issues including AED side effects and interactions, alcohol consumption, sleep deprivation, risks and driving privileges associated with the [facility] Guidelines, death related to seizures/SUDEP, seizure 1st aid and risks. Patient and wife are educated on seizure precautions, including no driving, no operating heavy machinery, no unsupervised swimming or bathing, no unsupervised use of stove, no climbing heights, and no unsupervised use of sharp objects.

## 2019-10-11 NOTE — PHYSICAL EXAM
[FreeTextEntry1] : GENERAL PHYSICAL EXAM:\par GEN: no distress, normal affect\par HEENT: NCAT, OP clear\par EYES: sclera white, conjunctiva clear, no nystagmus\par NECK: supple\par CV: RRR, no murmur     		\par PULM: CTAB, no wheezing\par GI: soft ABD, +BS, NT, ND\par EXT: peripheral pulse intact, no cyanosis\par MSK: muscle tone and strength normal\par SKIN: warm, dry, no rash or lesion on exposed skin \par \par NEUROLOGICAL EXAM:\par Mental Status\par Orientation: alert and oriented to person, place, time, and situation \par Language: clear and fluent, intact comprehension and repetition, intact naming and reading\par \par Cranial Nerves\par II: full visual fields intact \par III, IV, VI: PERRL, EOMI\par V, VII: facial sensation and movement intact and symmetric \par VIII: hearing intact \par IX, X: uvula midline, soft palate elevates normally \par XI: BL shoulder shrug intact \par XII: tongue midline\par \par Motor\par R EXT: 5/5\par LUE: 3-4/5\par LLE: 4/5      \par \par Sensation\par Hyperesthesia and sensory extinction in the LUE\par \par Reflex\par 2+ in BL biceps, triceps, brachioradiali, patella, ankle                                    \par \par Coordination\par Normal FTN bilaterally\par \par Gait\par Wt bearing with RLE\par Negative Romberg\par \par

## 2019-10-11 NOTE — HISTORY OF PRESENT ILLNESS
[FreeTextEntry1] : This is a 64 year-old RH male (Parag) with a history of anxiety, depression, R MCA stroke with mild hemorrhagic transformation s/p mechanical thrombectomy 9/23/18, HTN, HLD, DM II, h/o EtOH abuse, RCC s/p R partial nephrectomy 2017 who is referred by LISA MACDONALD MD,YADIEL for evaluation and management of epilepsy. Pt today is accompanied by wife.\par \par In July, pt had his first seizure, witnessed by his aide Marcia. Fortunately, he was at the passenger's seat in a car driven by Marcia. He was taken to [place] and discharged on VPA. Tolerating VPA well. No further sz. Pt was on Seroquel at the time, so pt and wife believe sz was induced by Seroquel.\par \par SEIZURE DESCRIPTION AND TYPE:\par Type #1\par Severity: focal to bilateral tonic clonic sz \par Onset: 7/2019\par Quality & associated signs/symptoms: loss of awareness, LUE clonic jerking -> full body convulsion\par Duration: few min\par Timing: once\par Modifying factors: unknown trigger  \par Diurnal Variation: none\par \par EPILEPSY TYPE: focal epilepsy, structural etiology\par HISTORY OF TONIC-CLONIC SZ: yes\par HISTORY OF STATUS EPILEPTICUS: no  \par \par SEIZURE RISK FACTORS:\par R MCA stroke with mild hemorrhagic transformation. Patient was a product of normal pregnancy and delivery. No history of febrile seizure, TBI, CNS infection, or FH of seizures.\par \par CURRENT AED:\par VPA ER 1000/500mg - started 7/2019\par GBP 300mg BID - started 2019 for neuropathy\par \par PREVIOUS AED:\par none\par \par IMAGING: \par MRI w/wo 9/28/18 ([facility]): Extensive T2 prolongation with associated restricted diffusion is seen involving the right temporal frontal parietal cortex. Involvement of the left basal ganglia and corona radiata region is seen as well. These findings do have some associated areas of susceptibility which is compatible with underlying areas of hemorrhage. Gyriform type of enhancement is also seen. This is compatible with a subacute right MCA infarct.\par \par NEUROPHYSIOLOGY:\par Had EEG at [place]; unknown result\par \par NEUROPSYCHOLOGY: \par none

## 2019-10-14 ENCOUNTER — LABORATORY RESULT (OUTPATIENT)
Age: 64
End: 2019-10-14

## 2019-10-15 ENCOUNTER — RX CHANGE (OUTPATIENT)
Age: 64
End: 2019-10-15

## 2019-10-15 LAB
ALBUMIN SERPL ELPH-MCNC: 4.2 G/DL
ALP BLD-CCNC: 60 U/L
ALT SERPL-CCNC: 17 U/L
ANION GAP SERPL CALC-SCNC: 12 MMOL/L
AST SERPL-CCNC: 18 U/L
BASOPHILS # BLD AUTO: 0.05 K/UL
BASOPHILS NFR BLD AUTO: 0.7 %
BILIRUB SERPL-MCNC: 0.3 MG/DL
BUN SERPL-MCNC: 21 MG/DL
CALCIUM SERPL-MCNC: 9.4 MG/DL
CHLORIDE SERPL-SCNC: 103 MMOL/L
CO2 SERPL-SCNC: 27 MMOL/L
CREAT SERPL-MCNC: 0.9 MG/DL
EOSINOPHIL # BLD AUTO: 0.18 K/UL
EOSINOPHIL NFR BLD AUTO: 2.7 %
GLUCOSE SERPL-MCNC: 111 MG/DL
HCT VFR BLD CALC: 41 %
HGB BLD-MCNC: 11.8 G/DL
IMM GRANULOCYTES NFR BLD AUTO: 0.7 %
LYMPHOCYTES # BLD AUTO: 3.16 K/UL
LYMPHOCYTES NFR BLD AUTO: 46.9 %
MAN DIFF?: NORMAL
MCHC RBC-ENTMCNC: 19.9 PG
MCHC RBC-ENTMCNC: 28.8 GM/DL
MCV RBC AUTO: 69.3 FL
MONOCYTES # BLD AUTO: 0.58 K/UL
MONOCYTES NFR BLD AUTO: 8.6 %
NEUTROPHILS # BLD AUTO: 2.72 K/UL
NEUTROPHILS NFR BLD AUTO: 40.4 %
PLATELET # BLD AUTO: 202 K/UL
POTASSIUM SERPL-SCNC: 4.5 MMOL/L
PROT SERPL-MCNC: 6.6 G/DL
RBC # BLD: 5.92 M/UL
RBC # FLD: 17.5 %
SODIUM SERPL-SCNC: 142 MMOL/L
VALPROATE SERPL-MCNC: 23 UG/ML
WBC # FLD AUTO: 6.74 K/UL

## 2019-10-29 ENCOUNTER — OTHER (OUTPATIENT)
Age: 64
End: 2019-10-29

## 2019-10-31 ENCOUNTER — APPOINTMENT (OUTPATIENT)
Dept: NEUROLOGY | Facility: CLINIC | Age: 64
End: 2019-10-31
Payer: COMMERCIAL

## 2019-10-31 PROCEDURE — 95812 EEG 41-60 MINUTES: CPT

## 2019-11-03 ENCOUNTER — OTHER (OUTPATIENT)
Age: 64
End: 2019-11-03

## 2019-11-13 ENCOUNTER — APPOINTMENT (OUTPATIENT)
Dept: NEUROLOGY | Facility: CLINIC | Age: 64
End: 2019-11-13

## 2019-11-14 ENCOUNTER — RX RENEWAL (OUTPATIENT)
Age: 64
End: 2019-11-14

## 2019-11-17 ENCOUNTER — RX RENEWAL (OUTPATIENT)
Age: 64
End: 2019-11-17

## 2020-01-14 ENCOUNTER — APPOINTMENT (OUTPATIENT)
Dept: NEUROSURGERY | Facility: CLINIC | Age: 65
End: 2020-01-14

## 2020-02-03 ENCOUNTER — APPOINTMENT (OUTPATIENT)
Dept: NEUROSURGERY | Facility: CLINIC | Age: 65
End: 2020-02-03
Payer: COMMERCIAL

## 2020-02-03 VITALS
BODY MASS INDEX: 30.61 KG/M2 | HEIGHT: 72 IN | SYSTOLIC BLOOD PRESSURE: 125 MMHG | TEMPERATURE: 98 F | HEART RATE: 69 BPM | DIASTOLIC BLOOD PRESSURE: 75 MMHG | WEIGHT: 226 LBS | OXYGEN SATURATION: 98 %

## 2020-02-03 DIAGNOSIS — E11.9 TYPE 2 DIABETES MELLITUS W/OUT COMPLICATIONS: ICD-10-CM

## 2020-02-03 DIAGNOSIS — Z85.528 PERSONAL HISTORY OF OTHER MALIGNANT NEOPLASM OF KIDNEY: ICD-10-CM

## 2020-02-03 DIAGNOSIS — Z95.818 PRESENCE OF OTHER CARDIAC IMPLANTS AND GRAFTS: ICD-10-CM

## 2020-02-03 DIAGNOSIS — I10 ESSENTIAL (PRIMARY) HYPERTENSION: ICD-10-CM

## 2020-02-03 PROCEDURE — 99215 OFFICE O/P EST HI 40 MIN: CPT

## 2020-02-03 RX ORDER — FLUTICASONE PROPIONATE 50 UG/1
50 SPRAY, METERED NASAL
Refills: 0 | Status: DISCONTINUED | COMMUNITY
End: 2020-02-03

## 2020-02-03 RX ORDER — CYCLOBENZAPRINE HYDROCHLORIDE 10 MG/1
10 TABLET, FILM COATED ORAL 3 TIMES DAILY
Qty: 42 | Refills: 0 | Status: DISCONTINUED | COMMUNITY
Start: 2019-01-11 | End: 2020-02-03

## 2020-02-03 RX ORDER — ALBUTEROL SULFATE 108 UG/1
108 (90 BASE) AEROSOL, METERED RESPIRATORY (INHALATION)
Refills: 0 | Status: DISCONTINUED | COMMUNITY
End: 2020-02-03

## 2020-02-03 RX ORDER — ENEMA 19; 7 G/133ML; G/133ML
7-19 ENEMA RECTAL
Refills: 0 | Status: DISCONTINUED | COMMUNITY
End: 2020-02-03

## 2020-02-03 RX ORDER — CIPROFLOXACIN HYDROCHLORIDE 500 MG/1
500 TABLET, FILM COATED ORAL TWICE DAILY
Qty: 6 | Refills: 0 | Status: DISCONTINUED | COMMUNITY
Start: 2019-01-25 | End: 2020-02-03

## 2020-02-03 RX ORDER — ERGOCALCIFEROL 1.25 MG/1
1.25 MG CAPSULE ORAL
Refills: 0 | Status: DISCONTINUED | COMMUNITY
End: 2020-02-03

## 2020-02-03 RX ORDER — FLUOXETINE HYDROCHLORIDE 40 MG/1
40 CAPSULE ORAL
Qty: 30 | Refills: 0 | Status: DISCONTINUED | COMMUNITY
Start: 2019-01-03 | End: 2020-02-03

## 2020-02-03 RX ORDER — PANTOPRAZOLE 40 MG/1
40 TABLET, DELAYED RELEASE ORAL
Refills: 0 | Status: DISCONTINUED | COMMUNITY
End: 2020-02-03

## 2020-02-03 RX ORDER — MAGNESIUM HYDROXIDE 400 MG/5ML
7.75 SUSPENSION, ORAL (FINAL DOSE FORM) ORAL
Refills: 0 | Status: DISCONTINUED | COMMUNITY
End: 2020-02-03

## 2020-02-03 RX ORDER — HUMAN INSULIN 100 [IU]/ML
100 INJECTION, SUSPENSION SUBCUTANEOUS
Refills: 0 | Status: DISCONTINUED | COMMUNITY
End: 2020-02-03

## 2020-02-03 RX ORDER — ACETAMINOPHEN 325 MG/1
325 TABLET, FILM COATED ORAL
Refills: 0 | Status: DISCONTINUED | COMMUNITY
End: 2020-02-03

## 2020-02-03 RX ORDER — CHOLECALCIFEROL (VITAMIN D3) 1250 MCG
1.25 MG CAPSULE ORAL
Qty: 4 | Refills: 0 | Status: DISCONTINUED | COMMUNITY
Start: 2018-08-16 | End: 2020-02-03

## 2020-02-03 RX ORDER — ENOXAPARIN SODIUM 100 MG/ML
40 INJECTION SUBCUTANEOUS
Refills: 0 | Status: DISCONTINUED | COMMUNITY
End: 2020-02-03

## 2020-02-03 RX ORDER — TRAMADOL HYDROCHLORIDE 50 MG/1
50 TABLET, COATED ORAL
Refills: 0 | Status: DISCONTINUED | COMMUNITY
End: 2020-02-03

## 2020-02-03 RX ORDER — FLUTICASONE FUROATE AND VILANTEROL TRIFENATATE 200; 25 UG/1; UG/1
200-25 POWDER RESPIRATORY (INHALATION)
Refills: 0 | Status: DISCONTINUED | COMMUNITY
End: 2020-02-03

## 2020-02-03 RX ORDER — CIPROFLOXACIN HYDROCHLORIDE 500 MG/1
500 TABLET, FILM COATED ORAL
Qty: 6 | Refills: 0 | Status: DISCONTINUED | COMMUNITY
Start: 2019-01-25 | End: 2020-02-03

## 2020-02-03 RX ORDER — GABAPENTIN 300 MG/1
300 CAPSULE ORAL
Qty: 68 | Refills: 0 | Status: DISCONTINUED | COMMUNITY
Start: 2018-07-10 | End: 2020-02-03

## 2020-02-03 RX ORDER — THIAMINE HCL 100 MG
100 TABLET ORAL
Refills: 0 | Status: DISCONTINUED | COMMUNITY
End: 2020-02-03

## 2020-02-03 RX ORDER — FLUOXETINE HYDROCHLORIDE 20 MG/1
20 CAPSULE ORAL DAILY
Refills: 0 | Status: DISCONTINUED | COMMUNITY
End: 2020-02-03

## 2020-02-03 RX ORDER — ENOXAPARIN SODIUM 40 MG/.4ML
40 INJECTION SUBCUTANEOUS
Refills: 0 | Status: DISCONTINUED | COMMUNITY
End: 2020-02-03

## 2020-02-03 RX ORDER — NICOTINE 7 MG/24H
7 PATCH, EXTENDED RELEASE TRANSDERMAL
Refills: 0 | Status: DISCONTINUED | COMMUNITY
End: 2020-02-03

## 2020-02-04 PROBLEM — E11.9 DIABETES: Status: RESOLVED | Noted: 2019-08-30 | Resolved: 2020-02-04

## 2020-02-04 PROBLEM — I10 HIGH BLOOD PRESSURE: Status: RESOLVED | Noted: 2019-08-30 | Resolved: 2020-02-04

## 2020-02-04 PROBLEM — Z85.528 HISTORY OF MALIGNANT NEOPLASM OF KIDNEY: Status: RESOLVED | Noted: 2018-11-21 | Resolved: 2020-02-04

## 2020-02-04 RX ORDER — SERTRALINE HYDROCHLORIDE 50 MG/1
50 TABLET, FILM COATED ORAL
Refills: 0 | Status: ACTIVE | COMMUNITY

## 2020-02-04 RX ORDER — DILTIAZEM HYDROCHLORIDE 120 MG/1
120 TABLET ORAL
Refills: 0 | Status: ACTIVE | COMMUNITY

## 2020-02-04 NOTE — PHYSICAL EXAM
[FreeTextEntry1] : Neurologic examination: Mental status: The patient is alert, attentive, and oriented to person, place, month and year. Speech is clear, fluent with good repetition, comprehension, and naming.\par \par    Cranial nerves: \par CN II: Difficult to asses by confrontation perhaps improvement of quadrantanopsia??\par CN III, IV, VI: At primary gaze, there is no eye deviation.EOMI. No ptosis.  \par CN V: Facial sensation is intact to pinprick in all 3 divisions bilaterally. \par CN VII: Face is symmetric with normal eye closure and smile.\par  CN VII: Hearing is normal to rubbing fingers \par CN IX, X: Palate elevates symmetrically. Phonation is normal. \par CN XI: Head turning and shoulder shrug are intact\par  CN XII: Tongue is midline with normal movements and no atrophy. \par \par Motor:  There is left upper extremity pronator drift of out-stretched arms. Tone is increase at left upper extremity.\par \par  	Deltoid	Biceps	Triceps	Hand \par L	5	5	5	3	\par R	5	5	5	5		\par  Hip flexion Hip extension	Knee flexion Knee extension\par L	5	5	5	5	 5\par R	5	5	5	5               5\par  \par \par Sensory:\par Refers some hyperesthesia to light touch at left upper extremity\par \par Coordination:\par Rapid alternating movements and fine finger movements are intact on the right on the left slow and impaired apposition. There is no dysmetria on finger-to-nose.\par \par Gait/Stance:\par Wide base. Gait is steady with normal steps, base, R arm swing diminished, adequate turning.\par \par \par

## 2020-02-04 NOTE — REVIEW OF SYSTEMS
[As Noted in HPI] : as noted in HPI [Sleep Disturbances] : sleep disturbances [Anxiety] : anxiety [Depression] : depression [Change In Personality] : personality change [Emotional Problems] : emotional problems [Arm Weakness] : arm weakness [Hand Weakness] :  hand weakness [Numbness] : numbness [Difficulty Walking] : difficulty walking [Arthralgias] : arthralgias [Joint Stiffness] : joint stiffness [Feeling Poorly] : not feeling poorly [Feeling Tired] : not feeling tired [Confused or Disoriented] : no confusion [Memory Lapses or Loss] : no memory loss [Decr. Concentrating Ability] : no decrease in concentrating ability [Leg Weakness] : no leg weakness [Tingling] : no tingling [Dizziness] : no dizziness [Eyesight Problems] : no eyesight problems [Fainting] : no fainting [Loss Of Hearing] : no hearing loss [Chest Pain] : no chest pain [Palpitations] : no palpitations [Abdominal Pain] : no abdominal pain [Shortness Of Breath] : no shortness of breath [Easy Bruising] : no tendency for easy bruising [Easy Bleeding] : no tendency for easy bleeding [Negative] : Gastrointestinal [FreeTextEntry3] : Left superior quadrantanopsia

## 2020-02-04 NOTE — DATA REVIEWED
[de-identified] : EXAM: IR PROCEDURE NEURO \par \par \par \par \par PROCEDURE DATE: 09/23/2018 \par \par \par \par INTERPRETATION: Pre-procedure diagnosis: Ischemic infarct secondary to \par right middle cerebral artery occlusion \par \par Post-procedure diagnosis: Successful revascularization of right middle \par cerebral artery occlusion \par \par Procedure: Mechanical thrombectomy \par \par Interventionalist: Justin Dawson MD \par \par Assistant: Lorene Carlton MD \par \par Anesthesiologist: Alexandre Poon MD \par \par Contrast: Omnipaque 240, 45 cc \par \par Radiation Dose: A: 14.6 min, 580.7 mGy B: 11.1 min, 248.9 mGy Total: 25.8 \par min, 837.6 mGy \par \par Indications: The patient is a 62-year-old male with a past medical history \par of hypertension who initially presented with an altered mental status which \par progressed to left-sided weakness to [facility]. A noncontrast \par head CT demonstrated an evolving infarct in the right middle cerebral artery \par territory. CT angiography demonstrated a right cerebral artery occlusion. He \par was transferred to [facility] for further evaluation \par management. A CT perfusion demonstrated a significant amount of salvageable \par penumbra. The patient now presents for mechanical thrombectomy. The risks, \par benefits, alternatives, complications and personnel associated with the \par procedure was discussed with the patient and the patient's family in great \par detail. They request that we proceed. \par \par Procedure: The patient was brought into the angiography suite at 4:25 PM and \par positioned on the table supine at 4:32 PM. The patient was intubated and \par placed under general anesthesia. The baseline activated clotting time was \par 144 seconds. Both femoral regions were prepped and draped in the standard \par sterile fashion. The right femoral artery was accessed using a micropuncture \par kit and modified Seldinger technique at 4:54 PM. A 5 Lao long sheath was \par inserted. A 5 Lao Cordis Vert diagnostic catheter over an angled \par Glidewire was navigated into the right internal carotid artery and \par angiography of the intracranial circulation was performed at 4:59 PM. \par Nicardipine 3 mg was administered into the right internal carotid artery to \par prevent iatrogenic vasospasm. \par \par The diagnostic catheter and sheath was exchanged for a 6F Neuron Max and \par attached to a heparinized flush and a Toy-Mimi adapter. A coaxial ACE68 \par reperfusion catheter and a Marksman microcatheter over a Synchro2 \par microguidewire was navigated in the distal right middle cerebral artery. A \par superselective angiogram was performed. Heparin 2000 units was administered \par intra-arterially. A Solitaire 4 x 20 mm was deployed across the occlusion. \par The Marksman was removed. Aspiration was applied to the reperfusion catheter \par which was advanced to the clot and the Solitaire was partially re-sheathed. \par The reperfusion catheter and the Solitaire were withdrawn under constant \par aspiration. As the Solitaire reached the tip of the Neuron Max aspiration \par was also applied to the guide catheter. Revascularization of right middle \par cerebral artery was achieved at 5:12 PM however the inferior division was \par still occluded. A coaxial ACE68 reperfusion catheter and a Marksman \par microcatheter over a Synchro2 microguidewire was navigated in the distal \par inferior division of the right middle cerebral artery. A superselective \par angiogram was performed. Aspiration was applied to the reperfusion catheter \par which was advanced to the clot. The reperfusion catheter was withdrawn \par under constant aspiration. As the catheter reached the tip of the Neuron \par Max, aspiration was also applied to the Neuron Max sheath. Post procedure \par angiography was performed. TICI 2B reperfusion was achieved. A \par postprocedure PASTORA CT was performed. Three-dimensional reconstructed images \par were evaluated on an independent workstation. The guide catheter was drawn \par into the right femoral artery and angiography was performed. The femoral \par artery was deemed of sufficient caliber for a femoral access closure device. \par The activated clotting time at the end of the procedure was 194 seconds. \par \par All catheters and guidewires were removed and hemostasis was obtained with a \par 6F Starclose, manual compression, Quickclot and the Safeguard dressing \par \par Findings: \par \par Right internal artery: \par \par Pre-procedure: There is normal transit of contrast through the arterial, \par capillary and venous phases of the right anterior cerebral artery territory. \par A right middle cerebral artery occlusion is visualized. There is evidence of \par leptomeningeal collateral circulation from the right anterior cerebral \par artery opacifying the right middle cerebral artery territory. There is \par opacification across the anterior communicating complex. A posterior \par communicating artery was not opacified. There is no evidence of \par intracranial aneurysm, arteriovenous malformation or arteriovenous shunting. \par Multiple superficial cortical veins are identified. The superior sagittal \par sinus drains into both transverse and sigmoid sinuses. The basal vein of \par Teodora is identified. The internal cerebral vein drains into the great \par vein of [place] and the straight sinus. \par \par Post-procedure: The right middle cerebral artery is now recanalized and \par there is significant improvement in the flow through the middle cerebral \par artery territory. There is resultant TICI 2B reperfusion. The sylvian \par venous system and the vein of Anca are now opacified. There is no evidence \par of contrast extravasation or vascular injury. \par \par PASTORA CT: The cerebral vasculature is now faintly opacified with contrast. \par There is patchy contrast enhancement in the right middle cerebral artery \par territory in the regions of the patient's known prior evolving infarctions. \par There is no evidence of subarachnoid or intraparenchymal hemorrhage. The \par ventricles are normal in their configuration. \par \par Impression: Successful revascularization of right middle cerebral artery \par occlusion \par \par \par \par \par \par \par \par \par JUSTIN DAWSON M.D., NEUROSURGERY \par This document has been electronically signed. Oct 2 2018 5:40PM \par \par  [de-identified] : EXAM: MR ANGIO NECK WAW IC \par \par EXAM: MR ANGIO BRAIN \par \par EXAM: MR BRAIN WAW IC \par \par \par PROCEDURE DATE: 09/28/2018 \par \par \par \par \par INTERPRETATION: History: Follow-up right MCA infarct. \par \par MRI of the brain was performed using sagittal T1, axial SPGR, T2, FLAIR, \par diffusion and gradient echo sequence. The patient was injected with \par approximately 10 cc of Gadavist IV with no IV contrast discarded. Sagittal \par coronal axial T1-weighted sequences were performed. \par \par MRA of the neck was formed using 2-D and 3-D time-of-flight technique. \par Gadolinium infusion MRA of the neck was performed. \par \par MRA of the [place] was performed using 3-D [place] technique. \par \par This exam is compared with prior noncontrast head CT performed on September \par 24, 2018. \par \par Extensive T2 prolongation with associated restricted diffusion is seen \par involving the right temporal frontal parietal cortex. Involvement of the \par left basal ganglia and corona radiata region is seen as well. These findings \par do have some associated areas of susceptibility which is compatible with \par underlying areas of hemorrhage. Gyriform type of enhancement is also seen. \par This is compatible with a subacute right MCA infarct. There is localized \par mass effect seen consisting sulcal effacement. Mild mass effect on the right \par lateral ventricle seen. \par \par There are no abnormal intra-axial or extra-axial collections seen. \par \par The large vessels demonstrate normal flow voids \par \par Minimal mucosal thickening seen involving right maxillary sinus. \par \par The patient is status post bilateral cataract surgery. \par \par Both mastoid and middle ear regions appear clear. \par \par Both distal common carotid, proximal internal and external carotid arteries \par appear normal as well as both vertebral arteries. No significant stenosis is \par seen. \par \par Both distal internal carotid, anterior cerebral, middle cerebral, and \par bilateral posterior cerebral arteries appears normal. There is no evidence \par of aneurysms or significant stenosis. \par \par Impression: Subacute right MCA infarct with associated areas of hemorrhagic \par transformation suspected. \par \par Unremarkable MRA of the neck and [place]. \par \par \par \par \par \par \par \par \par \par PAULA BARRY M.D., ATTENDING RADIOLOGIST \par This document has been electronically signed. Sep 29 2018 9:25AM \par \par

## 2020-02-04 NOTE — DISCUSSION/SUMMARY
[Intensive Blood Pressure Control] : intensive blood pressure control [Antithrombotic therapy with ___] : antithrombotic therapy with  [unfilled] [Lipid Lowering Therapy] : lipid lowering therapy [Goals and Counseling] : I have reviewed the goals of stroke risk factor modification. I counseled the patient on measures to reduce stroke risk, including the importance of medication compliance, risk factor control, exercise, healthy diet and avoidance of smoking. I reviewed stroke warning signs and symptoms and appropriate actions to take if such occur. [Patient encouraged to discuss with Primary MD] : I encouraged the patient to discuss these important issues with ~his/her~ primary care doctor [FreeTextEntry1] : Impression: \par Cerebral embolism with cerebral infarction. R MCA distribution stroke with mild hemorrhagic concentration (HI 2) - likely etiology being cryptogenic stroke. \par \par \par \par Plan:\par - Continue ASA 81 mg once daily for secondary stroke prevention\par - Continue to monitor BP at home and notify PCP/Cardiology for readings >140/90. Educated on medication compliance and BP monitoring to prevent secondary stroke and systemic problems \par - Continue statin therapy for secondary stroke prevention [Atorvastatin 80 mg - LDL 90] needs follow up LDL by PCP\par - Follow up with PCP for statin management and primary care needs such as regular blood work including monitoring of HbA1c and cholesterol profile, to modify vascular risk factors \par - Follow up with cardiology for management of ILR  to screen for occult cardiac arrhythmias -I refer him to [facility] and possible removal of LOOP recorder since 22 months ago is not being monitored and as per wife there is a problem with previous cardiologist\par -Follow up as needed with carotid doppler\par - Educated on stroke/TIA signs/bleeding signs and symptoms and to call 911 if experiencing any of these symptoms\par -Patient with persistent memory issues will consult with Dr. Colindres initiation of Namenda or Aricept since could represent symptoms of progressive cognitive deterioration \par \par All concerns and questions were addressed with both the patient and wife

## 2020-02-04 NOTE — HISTORY OF PRESENT ILLNESS
[FreeTextEntry1] : Mr. Larsen is a 64 year-old  male with past medical history of HTN, HLD, DM, ETOH abuse, spinal stenosis, former smoker, renal cell carcinoma a s/p R partial nephrectomy (2017)  due to malignancy who presents for follow up after a right middle cerebral artery distribution stroke with mild hemorrhagic transformation (HI 2) on 9/23/18 s/p mechanical thrombectomy. \par Being followed by Dr. Colindres due to Epilepsy. \par Again patient seems fairly stable. He states since the stroke everything is more difficult, simple tasks as walking or putting his shirt on, he says he has to over think everything and feels overwhelmed. \par Again his ex. wife brings to my attention multiple episodes of disorientation and memory loss. He gets into the wrong house, he could not recognize someone he had a long friendship with... However, he lives with his son and states he invented an exercise to improve his handgrip where he wears helmet and his right hand is tied and his son trows him a pin pong ball and he aims to catch it. \par The wife states they tried for him to do some voluntary work but the place felt like he was a liability and could not offer further time there. \par Also they explored the alternative of assisted living facility but they claim they don’t have the economic resources. \par He refers being complaint with medication regimen otherwise but despite the medications are dividen by day and night sometimes he forgets and takes all the doses at once. \par \par As for secondary stroke prevention he is on Aspirin 81 mg no statin currently last LDL adequate levels.  \par \par SUMMARY: \par On 9/23/18 he presented to [facility] with AMS and intoxication. Upon further evaluation by the ED he wasn't moving L arm. On telestroke eval he was found with  left facial droop, left upper extremity monoplegia and lower extremity weakness, significant dysarthria and left hemibody hypoesthesia. Head CT showed area of hypodensity at right fronto parietal lobe and acute/ subacute hypodensity at the inferior temporal/parietal lobe middle cerebral artery territory. CT angiography demonstrated right middle cerebral artery proximal M1 segment occlusion. Patient not candidate for IV-tPA given unknown last known well as well as appearance of subacute infarct. He was transferred to [facility] for evaluation for endovascular therapy. CT perfusion showed a small core infarct with large area of penumbra. He was deemed candidate for endovascular therapy with mechanical thrombectomy with TICI 2b recanalization. MRI brain subsequently showed right MCA distribution infarct with hemorrhagic transformation (HI 2). TTE did not show any obvious structural cardiac source of embolism. AYO did not show any obvious structural cardiac source of embolism nor showed any evidence of a PFO. 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  s/p ILR placment \par \par WORKUP:\par CT Head No Cont (09.24.18):\par Previously noted acute right MCA infarct has demonstrated expected evolutionary changes with areas of hemorrhagic transformation again seen.\par CT Head No Cont (09.23.18):\par Redemonstration of evolving right MCA territory infarct, faint areas of increasing attenuation may reflect contrast staining, tiny foci of petechial hemorrhage not excluded.\par CT perfusion w/ Maps w/ IV Cont (09.23.18): \par Large right-sided penumbra  with a small infarct in the large mismatch volume.\par Noncontrast head CT (09.23.18): \par Large area acute right MCA territory infarct. Posterior temporal lobe infarct appears slightly more subacute with questionable petechial hemorrhage.\par CTA neck (09.23.18): \par No major vessel occlusion or hemodynamically significant stenosis. No evidence of dissection.\par CTA head (09.23.18):\par Occluded proximal M1 segment right middle cerebral artery. Remainder of the intracranial circulation is patent.\par MR Head w/wo IV Cont (09.28.18 ) \par Subacute right MCA infarct with associated areas of hemorrhagic transformation suspected. Unremarkable MRA of the neck and [place] of [place].\par \par

## 2020-08-14 ENCOUNTER — APPOINTMENT (OUTPATIENT)
Dept: NEUROLOGY | Facility: CLINIC | Age: 65
End: 2020-08-14

## 2020-09-15 ENCOUNTER — APPOINTMENT (OUTPATIENT)
Dept: NEUROLOGY | Facility: CLINIC | Age: 65
End: 2020-09-15
Payer: COMMERCIAL

## 2020-09-15 ENCOUNTER — APPOINTMENT (OUTPATIENT)
Dept: NEUROSURGERY | Facility: CLINIC | Age: 65
End: 2020-09-15
Payer: MEDICARE

## 2020-09-15 VITALS
TEMPERATURE: 98.2 F | DIASTOLIC BLOOD PRESSURE: 67 MMHG | HEIGHT: 72 IN | SYSTOLIC BLOOD PRESSURE: 115 MMHG | OXYGEN SATURATION: 98 % | HEART RATE: 66 BPM | WEIGHT: 218 LBS | BODY MASS INDEX: 29.53 KG/M2

## 2020-09-15 DIAGNOSIS — F41.9 ANXIETY DISORDER, UNSPECIFIED: ICD-10-CM

## 2020-09-15 DIAGNOSIS — I10 ESSENTIAL (PRIMARY) HYPERTENSION: ICD-10-CM

## 2020-09-15 DIAGNOSIS — Z86.59 PERSONAL HISTORY OF OTHER MENTAL AND BEHAVIORAL DISORDERS: ICD-10-CM

## 2020-09-15 DIAGNOSIS — R41.3 OTHER AMNESIA: ICD-10-CM

## 2020-09-15 DIAGNOSIS — Z87.09 PERSONAL HISTORY OF OTHER DISEASES OF THE RESPIRATORY SYSTEM: ICD-10-CM

## 2020-09-15 DIAGNOSIS — F10.21 ALCOHOL DEPENDENCE, IN REMISSION: ICD-10-CM

## 2020-09-15 DIAGNOSIS — Z80.3 FAMILY HISTORY OF MALIGNANT NEOPLASM OF BREAST: ICD-10-CM

## 2020-09-15 DIAGNOSIS — N28.9 DISORDER OF KIDNEY AND URETER, UNSPECIFIED: ICD-10-CM

## 2020-09-15 DIAGNOSIS — E11.9 TYPE 2 DIABETES MELLITUS W/OUT COMPLICATIONS: ICD-10-CM

## 2020-09-15 DIAGNOSIS — Z87.891 PERSONAL HISTORY OF NICOTINE DEPENDENCE: ICD-10-CM

## 2020-09-15 DIAGNOSIS — E78.5 HYPERLIPIDEMIA, UNSPECIFIED: ICD-10-CM

## 2020-09-15 PROCEDURE — 99215 OFFICE O/P EST HI 40 MIN: CPT

## 2020-09-15 PROCEDURE — 99214 OFFICE O/P EST MOD 30 MIN: CPT

## 2020-09-15 NOTE — HISTORY OF PRESENT ILLNESS
[FreeTextEntry1] : LAST CLINIC VISIT: 10/10/19\par \par PCP: Dr. Laguna\par Pt goes by: Parag\par Wife: Tresa\par \par INTERIM HISTORY:\par Pt today is accompanied by his wife, Tresa. Had planned for 48hr aEEG November of 2019, but pt ripped off electrodes 3 hrs into the study; only captured intermittent slowing in the R FT region. No sz since last visit. However, Tresa notes worsening memory, cognitive processing, attention span getting worse, more impulsive and impatient. Tresa titrated VPA down, thinking it was a medication side effect, but did not notice any change. Takes Valium PRN. \par \par CURRENT AED:\par VPA ER 1000mg BID – started 7/2019\par Valium prn agitation\par \par \par PRIOR EPILEPSY HISTORY:\par 9/15/20: This is a 64 year-old RH male (Parag) with a history of anxiety, depression, R MCA stroke with mild hemorrhagic transformation s/p mechanical thrombectomy 9/23/18, HTN, HLD, DM II, h/o EtOH abuse, RCC s/p R partial nephrectomy 2017 who is referred by LISA MACDONALD MD,[place] for evaluation and management of epilepsy. Pt today is accompanied by wife.\par \par In July, pt had his first seizure, witnessed by his aide Marcia. Fortunately, he was at the passenger's seat in a car driven by Marcia. He was taken to [facility] and discharged on VPA. Tolerating VPA well. No further sz. Pt was on Seroquel at the time, so pt and wife believe sz was induced by Seroquel. CURRENT AEDs: VPA ER 1000/500mg - started 7/2019; GBP 300mg BID - started 2019 for neuropathy.\par \par \par SEIZURE DESCRIPTION AND TYPE:\par Type #1\par Severity: focal to bilateral tonic clonic sz \par Onset: 7/2019\par Quality & associated signs/symptoms: loss of awareness, LUE clonic jerking -> full body convulsion\par Duration: few min\par Timing: once\par Modifying factors: unknown trigger  \par Diurnal Variation: none\par \par EPILEPSY TYPE: focal epilepsy, structural etiology\par HISTORY OF TONIC-CLONIC SZ: yes\par HISTORY OF STATUS EPILEPTICUS: no  \par \par SEIZURE RISK FACTORS:\par R MCA stroke with mild hemorrhagic transformation. Patient was a product of normal pregnancy and delivery. No history of febrile seizure, TBI, CNS infection, or FH of seizures.\par \par PREVIOUS AED:\par none\par \par IMAGING: \par MRI w/wo 9/28/18 ([facility]): Extensive T2 prolongation with associated restricted diffusion is seen involving the right temporal frontal parietal cortex. Involvement of the left basal ganglia and corona radiata region is seen as well. These findings do have some associated areas of susceptibility which is compatible with underlying areas of hemorrhage. Gyriform type of enhancement is also seen. This is compatible with a subacute right MCA infarct.\par \par NEUROPHYSIOLOGY:\par 3hr aEEG 10/31/19 (370): intermittent slowing R FT\par \par NEUROPSYCHOLOGY: \par none

## 2020-09-15 NOTE — REVIEW OF SYSTEMS
[Loss Of Hearing] : hearing loss [Anxiety] : anxiety [Change In Personality] : personality change [Emotional Problems] : emotional problems [Confused or Disoriented] : confusion [Memory Lapses or Loss] : memory loss [Decr. Concentrating Ability] : decreased concentrating ability [Changed Thought Patterns] : changed thought patterns [Arm Weakness] : arm weakness [Hand Weakness] :  hand weakness [Leg Weakness] : leg weakness [Numbness] : numbness [Tingling] : tingling [Abnormal Sensation] : an abnormal sensation [Difficulty Walking] : difficulty walking [Frequent Falls] : frequent falls [Eyesight Problems] : eyesight problems [Depression] : depression [As Noted in HPI] : as noted in HPI [Facial Weakness] : no facial weakness [Poor Coordination] : good coordination [Seizures] : no convulsions [Dizziness] : no dizziness [Cough] : no cough [Fainting] : no fainting [SOB on Exertion] : no shortness of breath during exertion [Easy Bleeding] : no tendency for easy bleeding [Easy Bruising] : no tendency for easy bruising [Negative] : Endocrine [FreeTextEntry3] : Reports left eye vision loss. [FreeTextEntry6] : Reports shortness of breath when anxious. [FreeTextEntry8] : Reports urinary frequency.

## 2020-09-15 NOTE — HISTORY OF PRESENT ILLNESS
[FreeTextEntry1] : LAST CLINIC VISIT: 10/10/19\par \par PCP: Dr. Laguna\par Pt goes by: Parag\par Wife: Tresa\par \par INTERIM HISTORY:\par Pt today is accompanied by his wife, Tresa. Had planned for 48hr aEEG November of 2019, but pt ripped off electrodes 3 hrs into the study; only captured intermittent slowing in the R FT region. No sz since last visit. However, Tresa notes worsening memory, cognitive processing, attention span getting worse, more impulsive and impatient. Tresa titrated VPA down, thinking it was a medication side effect, but did not notice any change. Takes Valium PRN. \par \par CURRENT AED:\par VPA ER 1000mg BID – started 7/2019\par Valium prn agitation\par \par \par PRIOR EPILEPSY HISTORY:\par 9/15/20: This is a 64 year-old RH male (Parag) with a history of anxiety, depression, R MCA stroke with mild hemorrhagic transformation s/p mechanical thrombectomy 9/23/18, HTN, HLD, DM II, h/o EtOH abuse, RCC s/p R partial nephrectomy 2017 who is referred by LISA MACDONALD MD,[place] for evaluation and management of epilepsy. Pt today is accompanied by wife.\par \par In July, pt had his first seizure, witnessed by his aide Marcia. Fortunately, he was at the passenger's seat in a car driven by Marcia. He was taken to [facility] and discharged on VPA. Tolerating VPA well. No further sz. Pt was on Seroquel at the time, so pt and wife believe sz was induced by Seroquel. CURRENT AEDs: VPA ER 1000/500mg - started 7/2019; GBP 300mg BID - started 2019 for neuropathy.\par \par \par SEIZURE DESCRIPTION AND TYPE:\par Type #1\par Severity: focal to bilateral tonic clonic sz \par Onset: 7/2019\par Quality & associated signs/symptoms: loss of awareness, LUE clonic jerking -> full body convulsion\par Duration: few min\par Timing: once\par Modifying factors: unknown trigger  \par Diurnal Variation: none\par \par EPILEPSY TYPE: focal epilepsy, structural etiology\par HISTORY OF TONIC-CLONIC SZ: yes\par HISTORY OF STATUS EPILEPTICUS: no  \par \par SEIZURE RISK FACTORS:\par R MCA stroke with mild hemorrhagic transformation. Patient was a product of normal pregnancy and delivery. No history of febrile seizure, TBI, CNS infection, or FH of seizures.\par \par PREVIOUS AED:\par none\par \par IMAGING: \par MRI w/wo 9/28/18 ([facility]): Extensive T2 prolongation with associated restricted diffusion is seen involving the right temporal frontal parietal cortex. Involvement of the left basal ganglia and corona radiata region is seen as well. These findings do have some associated areas of susceptibility which is compatible with underlying areas of hemorrhage. Gyriform type of enhancement is also seen. This is compatible with a subacute right MCA infarct.\par \par NEUROPHYSIOLOGY:\par 3hr aEEG 10/31/19 (370): intermittent slowing R FT\par \par NEUROPSYCHOLOGY: \par none

## 2020-09-15 NOTE — ASSESSMENT
[FreeTextEntry1] : LALIT ZARAGOZA is a 64 year-old RH male with a history of anxiety, depression, R MCA stroke with mild hemorrhagic transformation s/p mechanical thrombectomy 9/23/18, HTN, HLD, DM II, h/o EtOH abuse, RCC s/p R partial nephrectomy 2017 who presents for followup for focal epilepsy characterized by focal to bilateral tonic clonic sz. \par \par No sz, but pt with worsening cognitive impairment and change in behavior/personality, concerning for vascular dementia vs other neurodegenerative disease. Refer to cog/behavioral clinic. All questions and concerns answered and addressed in detail to patient's and wife's complete satisfaction. Patient and wife verbalized understanding and agreed to plan.\par \par - continue VPA 1000mg BID\par - CBC, CMP, trough VPA levels\par - referral to cog/beh clinic at [address] [place] [address], [place]\par - no driving \par - f/u in 3 months\par \par \par All relevant epilepsy AAN quality care measures were addressed and discussed with the patient and wife.\par \par More than 50% of time spent counseling and educating patient and wife about epilepsy specific safety issues including AED side effects and interactions, alcohol consumption, sleep deprivation, risks and driving privileges associated with the [place] Guidelines, death related to seizures/SUDEP, seizure 1st aid and risks. Patient is educated on seizure precautions, including instruction not to do following after a breakthrough sz - no driving, no operating machinery, no swimming or bathing, no climbing heights, or engage in any risky activities during which a seizure could cause further injury to pt or others.

## 2020-09-15 NOTE — ASSESSMENT
[FreeTextEntry1] : LALIT ZARAGOZA is a 64 year-old RH male with a history of anxiety, depression, R MCA stroke with mild hemorrhagic transformation s/p mechanical thrombectomy 9/23/18, HTN, HLD, DM II, h/o EtOH abuse, RCC s/p R partial nephrectomy 2017 who presents for followup for focal epilepsy characterized by focal to bilateral tonic clonic sz. \par \par No sz, but pt with worsening cognitive impairment and change in behavior/personality, concerning for vascular dementia vs other neurodegenerative disease. Refer to cog/behavioral clinic. All questions and concerns answered and addressed in detail to patient's and wife's complete satisfaction. Patient and wife verbalized understanding and agreed to plan.\par \par - continue VPA 1000mg BID\par - CBC, CMP, trough VPA levels\par - referral to cog/beh clinic at [address] [place] [address], [place]\par - no driving \par - f/u in 3 months\par \par \par All relevant epilepsy AAN quality care measures were addressed and discussed with the patient and wife.\par \par More than 50% of time spent counseling and educating patient and wife about epilepsy specific safety issues including AED side effects and interactions, alcohol consumption, sleep deprivation, risks and driving privileges associated with the [place] Guidelines, death related to seizures/SUDEP, seizure 1st aid and risks. Patient is educated on seizure precautions, including instruction not to do following after a breakthrough sz - no driving, no operating machinery, no swimming or bathing, no climbing heights, or engage in any risky activities during which a seizure could cause further injury to pt or others.

## 2020-09-15 NOTE — REVIEW OF SYSTEMS
[Loss Of Hearing] : hearing loss [Anxiety] : anxiety [Change In Personality] : personality change [Emotional Problems] : emotional problems [Memory Lapses or Loss] : memory loss [Confused or Disoriented] : confusion [Decr. Concentrating Ability] : decreased concentrating ability [Changed Thought Patterns] : changed thought patterns [Arm Weakness] : arm weakness [Hand Weakness] :  hand weakness [Leg Weakness] : leg weakness [Numbness] : numbness [Tingling] : tingling [Abnormal Sensation] : an abnormal sensation [Difficulty Walking] : difficulty walking [Frequent Falls] : frequent falls [Eyesight Problems] : eyesight problems [Depression] : depression [As Noted in HPI] : as noted in HPI [Facial Weakness] : no facial weakness [Poor Coordination] : good coordination [Seizures] : no convulsions [Dizziness] : no dizziness [Cough] : no cough [Fainting] : no fainting [SOB on Exertion] : no shortness of breath during exertion [Easy Bleeding] : no tendency for easy bleeding [Easy Bruising] : no tendency for easy bruising [Negative] : Heme/Lymph [FreeTextEntry3] : Reports left eye vision loss. [FreeTextEntry6] : Reports shortness of breath when anxious. [FreeTextEntry8] : Reports urinary frequency.

## 2020-09-27 PROBLEM — F41.9 ANXIETY: Status: RESOLVED | Noted: 2019-08-30 | Resolved: 2020-09-27

## 2020-09-27 PROBLEM — R41.3 MEMORY LOSS: Status: ACTIVE | Noted: 2019-04-05

## 2020-09-27 PROBLEM — I10 HYPERTENSION, UNSPECIFIED TYPE: Status: ACTIVE | Noted: 2018-11-23

## 2020-09-27 PROBLEM — Z86.59 HISTORY OF DEPRESSION: Status: RESOLVED | Noted: 2018-11-21 | Resolved: 2020-09-27

## 2020-09-27 PROBLEM — E78.5 HLD (HYPERLIPIDEMIA): Status: ACTIVE | Noted: 2018-11-23

## 2020-09-27 PROBLEM — E11.9 DIABETES MELLITUS: Status: ACTIVE | Noted: 2018-11-21

## 2020-09-27 PROBLEM — N28.9 KIDNEY DISEASE: Status: RESOLVED | Noted: 2019-08-30 | Resolved: 2020-09-27

## 2020-09-27 PROBLEM — Z87.891 FORMER SMOKER: Status: ACTIVE | Noted: 2018-11-21

## 2020-09-27 PROBLEM — Z80.3 FAMILY HISTORY OF MALIGNANT NEOPLASM OF BREAST: Status: ACTIVE | Noted: 2018-11-21

## 2020-09-27 PROBLEM — F10.21 ALCOHOLISM IN RECOVERY: Status: ACTIVE | Noted: 2018-11-23

## 2020-09-27 PROBLEM — Z87.09 HISTORY OF PULMONARY EMPHYSEMA: Status: RESOLVED | Noted: 2018-11-21 | Resolved: 2020-09-27

## 2020-09-27 NOTE — ASSESSMENT
[FreeTextEntry1] : Impression: \par Cerebral embolism with cerebral infarction. R MCA distribution stroke with mild hemorrhagic concentration (HI 2) - likely etiology being cryptogenic stroke, probably related to embolism from a proximal source like cardiac source of embolism.\par \par Depression-etiology multifactorial \par \par Memory loss- R/out secondary causes of dementia behavioral neurologist evaluation\par \par \par - Continue ASA 81 mg once daily for secondary stroke prevention\par - Continue to monitor BP at home and notify PCP/Cardiology for readings >140/90. Educated on medication compliance and BP monitoring to prevent secondary stroke and systemic problems \par - Continue Physical therapy/Occupational therapy as directed. \par - Continue statin therapy for secondary stroke prevention [Atorvastatin 80 mg - LDL 90]\par - Follow up with PCP for statin management and primary care needs such as regular blood work including monitoring of HbA1c and cholesterol profile, to modify vascular risk factors \par -Follow up as necessary \par - Educated on stroke/TIA signs/bleeding signs and symptoms and to call 911 if experiencing any of these symptoms\par \par

## 2020-09-27 NOTE — HISTORY OF PRESENT ILLNESS
[FreeTextEntry1] : Mr. Larsen is a 64 year-old man with PMHof HTN, HLD, DM, ETOH abuse, spinal stenosis, former smoker, RCC s/p R partial nephrectomy (2017), right MCA distribution stroke with mild hemorrhagic conversion (HI 2) and thrombectomy (9/23/18) and seizures. He is accompanied by his wife who states that his memory and concentration have declined and he has become impulsive and easy agitated. He attends PT and OT therapies weekly for left-sided weakness, psychiatry, and Dr. Colindres for seizure management. His wife states that he had become increasing difficult to care for at home with agency help and requests assistance filling out [facility] paperwork.

## 2020-09-27 NOTE — REVIEW OF SYSTEMS
[Facial Weakness] : no facial weakness [Poor Coordination] : good coordination [Seizures] : no convulsions [Dizziness] : no dizziness [Fainting] : no fainting [Cough] : no cough [SOB on Exertion] : no shortness of breath during exertion [Easy Bleeding] : no tendency for easy bleeding [Easy Bruising] : no tendency for easy bruising [FreeTextEntry3] : Reports left eye vision loss. [FreeTextEntry6] : Reports shortness of breath when anxious. [FreeTextEntry8] : Reports urinary frequency.

## 2020-09-27 NOTE — PHYSICAL EXAM
[Neck Appearance] : the appearance of the neck was normal [Oriented To Time, Place, And Person] : oriented to person, place, and time [General Appearance - In No Acute Distress] : in no acute distress [General Appearance - Alert] : alert [General Appearance - Well-Appearing] : healthy appearing [Person] : oriented to person [Place] : oriented to place [Time] : oriented to time [Naming Objects] : no difficulty naming common objects [Fluency] : fluency intact [Comprehension] : comprehension intact [Current Events] : adequate knowledge of current events [Past History] : adequate knowledge of personal past history [Cranial Nerves Optic (II)] : visual acuity intact bilaterally,  visual fields full to confrontation, pupils equal round and reactive to light [Cranial Nerves Oculomotor (III)] : extraocular motion intact [Cranial Nerves Facial (VII)] : face symmetrical [Cranial Nerves Trigeminal (V)] : facial sensation intact symmetrically [Cranial Nerves Vestibulocochlear (VIII)] : hearing was intact bilaterally [Cranial Nerves Glossopharyngeal (IX)] : tongue and palate midline [Cranial Nerves Accessory (XI - Cranial And Spinal)] : head turning and shoulder shrug symmetric [Cranial Nerves Hypoglossal (XII)] : there was no tongue deviation with protrusion [Motor Handedness Right-Handed] : the patient is right hand dominant [Paresis Pronator Drift Left-Sided] : a left-sided pronator drift was present [Motor Strength Upper Extremities Left] : there was weakness of the left upper extremity [Motor Strength Lower Extremities Left] : there was weakness of the left lower extremity [Hyperesthesia] : hyperesthesia was present [2+] : Patella left 2+ [Sclera] : the sclera and conjunctiva were normal [PERRL With Normal Accommodation] : pupils were equal in size, round, reactive to light, with normal accommodation [Extraocular Movements] : extraocular movements were intact [Full Visual Field] : full visual field [Outer Ear] : the ears and nose were normal in appearance [Hearing Threshold Finger Rub Not [place]] : hearing was normal [Respiration, Rhythm And Depth] : normal respiratory rhythm and effort [] : no respiratory distress [Heart Rate And Rhythm] : heart rate was normal and rhythm regular [Edema] : there was no peripheral edema [Skin Color & Pigmentation] : normal skin color and pigmentation [FreeTextEntry1] : Unsteady gait. [Span Intact] : the attention span was decreased [Concentration Intact] : a decrease in concentrating ability was observed [Paresis Pronator Drift Right-Sided] : no pronator drift on the right [Motor Strength Upper Extremities Right] : strength was normal in the right upper extremity [Motor Strength Lower Extremities Right] : strength was normal in the right lower extremity [Limited Balance] : balance was intact [Dysdiadochokinesia Bilaterally] : not present [Coordination - Dysmetria Impaired Finger-to-Nose Bilateral] : not present [FreeTextEntry4] : 2/3 words recalled after 5 minutes. Requires repeated redirection.  [FreeTextEntry6] : RUE 5/5, LUE 3/5, RLE 4/5, LLE 5/5 [FreeTextEntry7] : Diminished sensation on LUE and LLE.

## 2020-09-27 NOTE — PHYSICAL EXAM
[Neck Appearance] : the appearance of the neck was normal [Oriented To Time, Place, And Person] : oriented to person, place, and time [General Appearance - Alert] : alert [General Appearance - In No Acute Distress] : in no acute distress [General Appearance - Well-Appearing] : healthy appearing [Person] : oriented to person [Place] : oriented to place [Time] : oriented to time [Naming Objects] : no difficulty naming common objects [Fluency] : fluency intact [Comprehension] : comprehension intact [Current Events] : adequate knowledge of current events [Past History] : adequate knowledge of personal past history [Cranial Nerves Optic (II)] : visual acuity intact bilaterally,  visual fields full to confrontation, pupils equal round and reactive to light [Cranial Nerves Oculomotor (III)] : extraocular motion intact [Cranial Nerves Trigeminal (V)] : facial sensation intact symmetrically [Cranial Nerves Facial (VII)] : face symmetrical [Cranial Nerves Vestibulocochlear (VIII)] : hearing was intact bilaterally [Cranial Nerves Glossopharyngeal (IX)] : tongue and palate midline [Cranial Nerves Accessory (XI - Cranial And Spinal)] : head turning and shoulder shrug symmetric [Cranial Nerves Hypoglossal (XII)] : there was no tongue deviation with protrusion [Motor Handedness Right-Handed] : the patient is right hand dominant [Paresis Pronator Drift Left-Sided] : a left-sided pronator drift was present [Motor Strength Upper Extremities Left] : there was weakness of the left upper extremity [Motor Strength Lower Extremities Left] : there was weakness of the left lower extremity [Hyperesthesia] : hyperesthesia was present [2+] : Patella left 2+ [Sclera] : the sclera and conjunctiva were normal [PERRL With Normal Accommodation] : pupils were equal in size, round, reactive to light, with normal accommodation [Extraocular Movements] : extraocular movements were intact [Full Visual Field] : full visual field [Outer Ear] : the ears and nose were normal in appearance [Hearing Threshold Finger Rub Not [place]] : hearing was normal [Respiration, Rhythm And Depth] : normal respiratory rhythm and effort [] : no respiratory distress [Heart Rate And Rhythm] : heart rate was normal and rhythm regular [Edema] : there was no peripheral edema [Skin Color & Pigmentation] : normal skin color and pigmentation [FreeTextEntry1] : Unsteady gait. [Span Intact] : the attention span was decreased [Concentration Intact] : a decrease in concentrating ability was observed [Paresis Pronator Drift Right-Sided] : no pronator drift on the right [Motor Strength Upper Extremities Right] : strength was normal in the right upper extremity [Motor Strength Lower Extremities Right] : strength was normal in the right lower extremity [Limited Balance] : balance was intact [Dysdiadochokinesia Bilaterally] : not present [Coordination - Dysmetria Impaired Finger-to-Nose Bilateral] : not present [FreeTextEntry4] : 2/3 words recalled after 5 minutes. Requires repeated redirection.  [FreeTextEntry6] : RUE 5/5, LUE 3/5, RLE 4/5, LLE 5/5 [FreeTextEntry7] : Diminished sensation on LUE and LLE.

## 2020-11-06 ENCOUNTER — APPOINTMENT (OUTPATIENT)
Dept: NEUROLOGY | Facility: CLINIC | Age: 65
End: 2020-11-06

## 2021-05-14 ENCOUNTER — APPOINTMENT (OUTPATIENT)
Dept: NEUROLOGY | Facility: CLINIC | Age: 66
End: 2021-05-14

## 2021-06-30 ENCOUNTER — RX RENEWAL (OUTPATIENT)
Age: 66
End: 2021-06-30

## 2021-07-30 ENCOUNTER — APPOINTMENT (OUTPATIENT)
Dept: NEUROSURGERY | Facility: CLINIC | Age: 66
End: 2021-07-30
Payer: COMMERCIAL

## 2021-07-30 ENCOUNTER — APPOINTMENT (OUTPATIENT)
Dept: NEUROLOGY | Facility: CLINIC | Age: 66
End: 2021-07-30
Payer: COMMERCIAL

## 2021-07-30 VITALS
SYSTOLIC BLOOD PRESSURE: 131 MMHG | DIASTOLIC BLOOD PRESSURE: 77 MMHG | HEART RATE: 67 BPM | TEMPERATURE: 98 F | OXYGEN SATURATION: 98 %

## 2021-07-30 PROCEDURE — ZZZZZ: CPT

## 2021-07-30 PROCEDURE — 99214 OFFICE O/P EST MOD 30 MIN: CPT

## 2021-07-30 NOTE — PHYSICAL EXAM
[FreeTextEntry1] : GENERAL PHYSICAL EXAM:\par GEN: no distress, normal affect\par HEENT: NCAT, OP clear\par EYES: sclera white, conjunctiva clear, no nystagmus\par NECK: supple\par CV: RRR, no murmur 		\par PULM: CTAB, no wheezing\par GI: soft ABD, +BS, NT, ND\par EXT: peripheral pulse intact, no cyanosis\par MSK: muscle tone and strength normal\par SKIN: warm, dry, no rash or lesion on exposed skin \par \par NEUROLOGICAL EXAM:\par Mental Status\par Orientation: alert and oriented to person, place, time, and situation \par Language: clear and fluent, intact comprehension and repetition, intact naming and reading\par \par Cranial Nerves\par II: full visual fields intact \par III, IV, VI: PERRL, EOMI\par V, VII: facial sensation and movement intact and symmetric \par VIII: hearing intact \par IX, X: uvula midline, soft palate elevates normally \par XI: BL shoulder shrug intact \par XII: tongue midline\par \par Motor\par R EXT: 5/5\par LUE: 4/5\par LLE: 4-/5 \par \par Sensation\par Hyperesthesia and sensory extinction in the LUE\par \par Reflex\par 2+ in BL biceps, triceps, brachioradiali, patella, ankle \par \par Coordination\par Normal FTN bilaterally\par \par Gait\par In wheelchair

## 2021-07-30 NOTE — CONSULT LETTER
[Dear  ___] : Dear  [unfilled], [Sincerely,] : Sincerely, [FreeTextEntry1] : I had the pleasure of seeing your patient, LALIT ZARAGOZA, in my office today. Please see my note below. \par \par If you have any questions, please do not hesitate to contact me.  [FreeTextEntry3] : Ana Colindres MD\par Director, Epilepsy/EMU\par [facility] \par [facility] [place]\par [address] [place] [address] \par Tel: [phone]; Fax: [phone]

## 2021-07-30 NOTE — ASSESSMENT
[FreeTextEntry1] : LALIT ZARAGOZA is a 65 year-old RH male with a history of anxiety, depression, R MCA stroke with mild hemorrhagic transformation s/p mechanical thrombectomy 9/23/18, HTN, HLD, DM II, h/o EtOH abuse, RCC s/p R partial nephrectomy 2017 who presents for followup for focal epilepsy characterized by focal to bilateral tonic clonic sz. \par \par Sz free on VPA monotherapy, but needs followup with behavioral health. All questions and concerns answered and addressed in detail to patient's and wife's complete satisfaction. Patient and wife verbalized understanding and agreed to plan.\par \par - continue VPA ER 1000mg BID\par - will be referral to  behavioral health\par - no driving \par - f/u in 12 months\par \par \par All relevant epilepsy AAN quality care measures were addressed and discussed with the patient and wife.\par \par More than 50% of time spent counseling and educating patient and wife about epilepsy specific safety issues including AED side effects and interactions, alcohol consumption, sleep deprivation, risks and driving privileges associated with the [place] Guidelines, death related to seizures/SUDEP, seizure 1st aid and risks. Patient is educated on seizure precautions, including instruction not to do following after a breakthrough sz - no driving, no operating machinery, no swimming or bathing, no climbing heights, or engage in any risky activities during which a seizure could cause further injury to pt or others.

## 2021-08-02 NOTE — ASSESSMENT
[FreeTextEntry1] : Impression: \par Cerebral embolism with cerebral infarction. R MCA distribution stroke with mild hemorrhagic concentration (HI 2) - likely etiology being cryptogenic stroke, probably related to embolism from a proximal source like cardiac source of embolism.\par \par Depression-etiology multifactorial \par \par Memory loss- R/out secondary causes of dementia behavioral neurologist evaluation\par \par \par - Continue ASA 81 mg once daily for secondary stroke prevention\par - Continue to monitor BP at home and notify PCP/Cardiology for readings >140/90. Educated on medication compliance and BP monitoring to prevent secondary stroke and systemic problems \par - Continue Physical therapy/Occupational therapy as directed. \par - Continue statin therapy for secondary stroke prevention [Atorvastatin 80 mg - LDL 90]\par - Follow up with PCP for statin management and primary care needs such as regular blood work including monitoring of HbA1c and cholesterol profile, to modify vascular risk factors \par -Follow up as necessary \par - Educated on stroke/TIA signs/bleeding signs and symptoms and to call 911 if experiencing any of these symptoms\par \par . \par \par  \par Pl

## 2021-08-02 NOTE — REASON FOR VISIT
[Follow-Up: _____] : a [unfilled] follow-up visit [Spouse] : spouse [FreeTextEntry1] : story of Present Illness\par Mr. Larsen is a 64 year-old man with PMHof HTN, HLD, DM, ETOH abuse, spinal stenosis, former smoker, RCC s/p R partial nephrectomy (2017), right MCA distribution stroke with mild hemorrhagic conversion (HI 2) and thrombectomy (9/23/18) and seizures. He is accompanied by his wife who states that his memory and concentration have declined and he has become impulsive and easy agitated. He attends PT and OT therapies weekly for left-sided weakness, psychiatry, and Dr. Colindres for seizure management. His wife states that he had become increasing difficult to care for at home with agency help and requests assistance filling out [facility] paperwork.

## 2021-08-02 NOTE — PHYSICAL EXAM
[General Appearance - Alert] : alert [General Appearance - In No Acute Distress] : in no acute distress [General Appearance - Well Nourished] : well nourished [Oriented To Time, Place, And Person] : oriented to person, place, and time [Impaired Insight] : insight and judgment were intact [FreeTextEntry1] : Neurologic examination:Mental status:The patient is alert, attentive, and oriented to person, place, month, not to year. Pressured speech but fluent with good repetition, comprehension, and naming.\par \par  Cranial nerves:\par CN II: Difficult to asses by confrontation perhaps improvement of quadrantanopsia??\par CN III, IV, VI: At primary gaze, there is no eye deviation.EOMI. No ptosis. \par CN V: Facial sensation is intact to pinprick in all 3 divisions bilaterally.\par CN VII: Face is symmetric with normal eye closure and smile.\par CN VII: Hearing is normal to rubbing fingers\par CN IX, X: Palate elevates symmetrically. Phonation is normal.\par CN XI: Head turning and shoulder shrug are intact\par CN XII: Tongue is midline with normal movements and no atrophy.\par \par Motor:There is left upper extremity pronator drift of out-stretched arms. Tone is increase at left upper extremity.\par \par  	Deltoid	Biceps	Triceps	Hand \par L	4	4	4	3	\par R	5	5	5	5		\par  Hip flexion Hip extension	Knee flexion Knee extension\par L	5	5	5	5	 5\par R	5	5	5	5 5\par \par \par Sensory:\par Refers some hyperesthesia to light touch at left upper extremity\par \par Coordination:\par Rapid alternating movements and fine finger movements are intact. There is no dysmetria on finger-to-nose.\par \par Gait/Stance:\par Stooped posture. Gait is steady with normal steps, base, R arm swing diminished, adequate turning.\par \par \par

## 2021-10-15 ENCOUNTER — NON-APPOINTMENT (OUTPATIENT)
Age: 66
End: 2021-10-15

## 2021-10-26 NOTE — ED PROVIDER NOTE - CARDIAC, MLM
Have You Had Botox Before?: has had botox
When Was Your Last Botox Treatment?: 8 months ago
Additional History: New pt here for Botox \\nShe had had it in the past; last treatment was about 8 months ago \\nShe typically gets about 20 units in the forehead
Normal rate, regular rhythm.  Heart sounds S1, S2.  No murmurs, rubs or gallops.

## 2022-04-21 NOTE — OCCUPATIONAL THERAPY INITIAL EVALUATION ADULT - PHYSICAL ASSIST/NONPHYSICAL ASSIST:DRESS LOWER BODY, OT EVAL
Medication to be refilled: lisdexamfetamine (Vyvanse) 60 MG capsule; Take one capsule by mouth every morning    Last Refill: 3/24/22 #30   x0  refills  Last office visit: 1/18/22     nonverbal cues (demo/gestures)/1 person assist/verbal cues

## 2022-06-09 ENCOUNTER — RX RENEWAL (OUTPATIENT)
Age: 67
End: 2022-06-09

## 2022-06-10 ENCOUNTER — RX RENEWAL (OUTPATIENT)
Age: 67
End: 2022-06-10

## 2022-06-29 ENCOUNTER — APPOINTMENT (OUTPATIENT)
Dept: ORTHOPEDIC SURGERY | Facility: CLINIC | Age: 67
End: 2022-06-29

## 2022-06-29 ENCOUNTER — NON-APPOINTMENT (OUTPATIENT)
Age: 67
End: 2022-06-29

## 2022-06-29 DIAGNOSIS — M54.9 DORSALGIA, UNSPECIFIED: ICD-10-CM

## 2022-06-29 DIAGNOSIS — G89.29 DORSALGIA, UNSPECIFIED: ICD-10-CM

## 2022-06-29 DIAGNOSIS — M54.16 RADICULOPATHY, LUMBAR REGION: ICD-10-CM

## 2022-06-29 PROCEDURE — 99072 ADDL SUPL MATRL&STAF TM PHE: CPT

## 2022-06-29 PROCEDURE — 99204 OFFICE O/P NEW MOD 45 MIN: CPT

## 2022-06-29 PROCEDURE — 72050 X-RAY EXAM NECK SPINE 4/5VWS: CPT

## 2022-06-29 PROCEDURE — 72110 X-RAY EXAM L-2 SPINE 4/>VWS: CPT

## 2022-06-30 NOTE — HISTORY OF PRESENT ILLNESS
[de-identified] : 66 year old male presents with acute exacerbation of chronic neck and low back pain.  He had a fall off the back of a cement truck which caused his injury. He worked as a .  He was found to have a R MCA stroke in September 2018.  This stroke left him with left sided weakness and numbness.  He has also had seizures.  He is followed closely by neurology.  He reports his low back pain has worsened and it interferes with his activities.  He is accompanied by an aide.  He has tried spinal injections in the past without sustained relief.

## 2022-06-30 NOTE — PHYSICAL EXAM
[de-identified] : General: No acute distress, conversant, well-nourished.\par Head: Normocephalic, atraumatic\par Neck: trachea midline, FROM\par Heart: normotensive and normal rate and rhythm\par Lungs: No labored breathing\par Skin: No abrasions, no rashes, no edema\par Psych: Alert and oriented to person, place and time\par Extremities: no peripheral edema or digital cyanosis\par Gait: Normal gait. Can perform tandem gait.  \par Vascular: warm and well perfused distally, palpable distal pulses\par \par MSK:\par Spine: \par No tenderness to palpation.  No step-off, no deformity.\par \par NEURO:\par Sensation \par          Left           \par C5     1/2               \par C6     1/2               \par C7     1/2               \par C8     1/2              \par T1     1/2             \par \par          Right         \par C5     2/2               \par C6     2/2               \par C7     2/2               \par C8     2/2              \par T1     2/2      \par \par Motor: \par                                                Left             \par C5 (deltoid abduction)             4/5               \par C6 (biceps flexion)                   3/5                \par C7 (triceps extension)             3/5               \par C8 (finger flexion)                     1/5               \par T1 (interosseous)                     0/5           \par \par                                                Right           \par C5 (deltoid abduction)             5/5               \par C6 (biceps flexion)                   5/5                \par C7 (triceps extension)             5/5               \par C8 (finger flexion)                     5/5               \par T1 (interosseous)                     5/5                     \par \par Sensation \par Left L2  -  1/2            \par Left L3  -  1/2\par Left L4  -  1/2\par Left L5  -  1/2\par Left S1  -  1/2\par \par Right L2  -  2/2            \par Right L3  -  2/2\par Right L4  -  2/2\par Right L5  -  2/2\par Right S1  -  2/2\par \par Motor: \par Left L2 (hip flexion)                            4/5                \par Left L3 (knee extension)                   4/5                \par Left L4 (ankle dorsiflexion)                 4/5                \par Left L5 (long toe extensor)                4/5                \par Left S1 (ankle plantar flexion)           4/5\par \par Right L2 (hip flexion)                            5/5                \par Right L3 (knee extension)                   5/5                \par Right L4 (ankle dorsiflexion)                 5/5                \par Right L5 (long toe extensor)                5/5                \par Right S1 (ankle plantar flexion)           5/5 [de-identified] : I ordered radiographs to evaluate the patient's symptoms.\par \par Cervical 4 view radiographs taken in the office today show no dislocation or fracture. Cervical spondylosis.  No instability on dynamic series.\par \par Lumbar 4 view radiographs taken in the office today show no dislocation or fracture.  Lumbar spondylosis.  No instability on dynamic series.\par \par Cervical MRI (11/1/21): Degenerative changes without cord compression.

## 2022-06-30 NOTE — HISTORY OF PRESENT ILLNESS
[de-identified] : 66 year old male presents with acute exacerbation of chronic neck and low back pain.  He had a fall off the back of a cement truck which caused his injury. He worked as a .  He was found to have a R MCA stroke in September 2018.  This stroke left him with left sided weakness and numbness.  He has also had seizures.  He is followed closely by neurology.  He reports his low back pain has worsened and it interferes with his activities.  He is accompanied by an aide.  He has tried spinal injections in the past without sustained relief.

## 2022-06-30 NOTE — ASSESSMENT
[FreeTextEntry1] : 66 year old male presents with acute exacerbation of chronic neck and low back pain.  He had a fall off the back of a cement truck which caused his injury. He worked as a .  He was found to have a R MCA stroke in September 2018.  This stroke left him with left sided weakness and numbness.  He has also had seizures.  He is followed closely by neurology.  He is at his neurologic baseline.  He reports his low back pain has worsened and it interferes with his activities.  We reviewed his radiographs and recent cervical MRI.  No evidence of cord compression.  He will be sent for a lumbar MRI.  His back and leg pain bothers him the most.   We discussed that his left-sided weakness and numbness was caused by his stroke and not his spine. He can consider additional spinal injections. He can continue PT.   He will followup once his MRI is completed.  We discussed red flag symptoms that would require emergent evaluation. He knows to call with any questions or concerns or if his symptoms acutely worsen.

## 2022-06-30 NOTE — PHYSICAL EXAM
[de-identified] : General: No acute distress, conversant, well-nourished.\par Head: Normocephalic, atraumatic\par Neck: trachea midline, FROM\par Heart: normotensive and normal rate and rhythm\par Lungs: No labored breathing\par Skin: No abrasions, no rashes, no edema\par Psych: Alert and oriented to person, place and time\par Extremities: no peripheral edema or digital cyanosis\par Gait: Normal gait. Can perform tandem gait.  \par Vascular: warm and well perfused distally, palpable distal pulses\par \par MSK:\par Spine: \par No tenderness to palpation.  No step-off, no deformity.\par \par NEURO:\par Sensation \par          Left           \par C5     1/2               \par C6     1/2               \par C7     1/2               \par C8     1/2              \par T1     1/2             \par \par          Right         \par C5     2/2               \par C6     2/2               \par C7     2/2               \par C8     2/2              \par T1     2/2      \par \par Motor: \par                                                Left             \par C5 (deltoid abduction)             4/5               \par C6 (biceps flexion)                   3/5                \par C7 (triceps extension)             3/5               \par C8 (finger flexion)                     1/5               \par T1 (interosseous)                     0/5           \par \par                                                Right           \par C5 (deltoid abduction)             5/5               \par C6 (biceps flexion)                   5/5                \par C7 (triceps extension)             5/5               \par C8 (finger flexion)                     5/5               \par T1 (interosseous)                     5/5                     \par \par Sensation \par Left L2  -  1/2            \par Left L3  -  1/2\par Left L4  -  1/2\par Left L5  -  1/2\par Left S1  -  1/2\par \par Right L2  -  2/2            \par Right L3  -  2/2\par Right L4  -  2/2\par Right L5  -  2/2\par Right S1  -  2/2\par \par Motor: \par Left L2 (hip flexion)                            4/5                \par Left L3 (knee extension)                   4/5                \par Left L4 (ankle dorsiflexion)                 4/5                \par Left L5 (long toe extensor)                4/5                \par Left S1 (ankle plantar flexion)           4/5\par \par Right L2 (hip flexion)                            5/5                \par Right L3 (knee extension)                   5/5                \par Right L4 (ankle dorsiflexion)                 5/5                \par Right L5 (long toe extensor)                5/5                \par Right S1 (ankle plantar flexion)           5/5 [de-identified] : I ordered radiographs to evaluate the patient's symptoms.\par \par Cervical 4 view radiographs taken in the office today show no dislocation or fracture. Cervical spondylosis.  No instability on dynamic series.\par \par Lumbar 4 view radiographs taken in the office today show no dislocation or fracture.  Lumbar spondylosis.  No instability on dynamic series.\par \par Cervical MRI (11/1/21): Degenerative changes without cord compression.

## 2022-07-11 ENCOUNTER — FORM ENCOUNTER (OUTPATIENT)
Age: 67
End: 2022-07-11

## 2022-07-15 ENCOUNTER — APPOINTMENT (OUTPATIENT)
Dept: NEUROLOGY | Facility: CLINIC | Age: 67
End: 2022-07-15

## 2022-07-15 ENCOUNTER — APPOINTMENT (OUTPATIENT)
Dept: NEUROSURGERY | Facility: CLINIC | Age: 67
End: 2022-07-15

## 2022-07-15 VITALS
WEIGHT: 218 LBS | HEIGHT: 72 IN | OXYGEN SATURATION: 95 % | BODY MASS INDEX: 29.53 KG/M2 | SYSTOLIC BLOOD PRESSURE: 104 MMHG | DIASTOLIC BLOOD PRESSURE: 64 MMHG | HEART RATE: 72 BPM | TEMPERATURE: 98.2 F

## 2022-07-15 DIAGNOSIS — I63.511 CEREBRAL INFARCTION DUE TO UNSPECIFIED OCCLUSION OR STENOSIS OF RIGHT MIDDLE CEREBRAL ARTERY: ICD-10-CM

## 2022-07-15 DIAGNOSIS — I63.411 CEREBRAL INFARCTION DUE TO EMBOLISM OF RIGHT MIDDLE CEREBRAL ARTERY: ICD-10-CM

## 2022-07-15 DIAGNOSIS — I77.9 DISORDER OF ARTERIES AND ARTERIOLES, UNSPECIFIED: ICD-10-CM

## 2022-07-15 PROCEDURE — 99214 OFFICE O/P EST MOD 30 MIN: CPT

## 2022-07-15 RX ORDER — TAMSULOSIN HYDROCHLORIDE 0.4 MG/1
0.4 CAPSULE ORAL
Qty: 60 | Refills: 0 | Status: ACTIVE | COMMUNITY
Start: 2022-01-14

## 2022-07-15 RX ORDER — SERTRALINE HYDROCHLORIDE 100 MG/1
100 TABLET, FILM COATED ORAL
Qty: 30 | Refills: 0 | Status: ACTIVE | COMMUNITY
Start: 2022-01-14

## 2022-07-15 RX ORDER — RAMELTEON 8 MG/1
8 TABLET ORAL
Qty: 30 | Refills: 0 | Status: ACTIVE | COMMUNITY
Start: 2022-05-19

## 2022-07-15 RX ORDER — FINASTERIDE 5 MG/1
5 TABLET, FILM COATED ORAL
Qty: 30 | Refills: 0 | Status: ACTIVE | COMMUNITY
Start: 2022-01-14

## 2022-07-15 RX ORDER — FLUTICASONE PROPIONATE 50 UG/1
50 POWDER, METERED RESPIRATORY (INHALATION)
Qty: 60 | Refills: 0 | Status: ACTIVE | COMMUNITY
Start: 2022-05-19

## 2022-07-15 RX ORDER — BACLOFEN 5 MG/1
5 TABLET ORAL
Qty: 30 | Refills: 0 | Status: ACTIVE | COMMUNITY
Start: 2022-05-19

## 2022-07-15 RX ORDER — OXYBUTYNIN CHLORIDE 5 MG/1
5 TABLET ORAL
Qty: 60 | Refills: 0 | Status: ACTIVE | COMMUNITY
Start: 2022-03-19

## 2022-07-15 RX ORDER — AMLODIPINE BESYLATE 2.5 MG/1
2.5 TABLET ORAL
Qty: 30 | Refills: 0 | Status: ACTIVE | COMMUNITY
Start: 2022-01-14

## 2022-07-15 NOTE — CONSULT LETTER
[Dear  ___] : Dear  [unfilled], [Sincerely,] : Sincerely, [FreeTextEntry1] : I had the pleasure of seeing your patient, LALIT ZARAGOZA, in my office today. Please see my note below. \par \par If you have any questions, please do not hesitate to contact me.  [FreeTextEntry3] : Ana Colindres MD\par Director, Epilepsy/EMU\par [facility] \par [facility] at [place]\par [address] [place] [address] \par Tel: [phone]; Fax: [phone]

## 2022-07-15 NOTE — HISTORY OF PRESENT ILLNESS
[FreeTextEntry1] : LAST OFFICE VISIT: 6/30/21\par \par PCP: Dr. Laguna\par Pt goes by: Parag\par Wife: Tresa\par \par INTERIM HISTORY:\par Pt today is accompanied by his ex-wife, Tresa. No sz, but still struggling with a lot of mood/behavioral problems like impulsivity, irritability, depression. Seeing a psychiatrist weekly. He frequently falls and attempts to wander the house at night.\par \par CURRENT AEDs:\par VPA ER 1000mg BID – started 7/2019, level 96 on 9/29/2020\par PRG 50mg BID – for neuropathy\par \par \par PRIOR EPILEPSY HISTORY:\par 9/15/20: This is a 64 year-old RH male (Parag) with a history of anxiety, depression, R MCA stroke with mild hemorrhagic transformation s/p mechanical thrombectomy 9/23/18, HTN, HLD, DM II, h/o EtOH abuse, RCC s/p R partial nephrectomy 2017 who is referred by LISA MACDONALD MD,[place] for evaluation and management of epilepsy. Pt today is accompanied by wife.\par \par In July, pt had his first seizure, witnessed by his aide Marcia. Fortunately, he was at the passenger's seat in a car driven by Marcia. He was taken to [place] and discharged on VPA. Tolerating VPA well. No further sz. Pt was on Seroquel at the time, so pt and wife believe sz was induced by Seroquel. CURRENT AEDs: VPA ER 1000/500mg - started 7/2019; GBP 300mg BID - started 2019 for neuropathy.\par \par === 9/15/20 ===\par Pt today is accompanied by his wife, Tresa. Had planned for 48hr aEEG November of 2019, but pt ripped off electrodes 3 hrs into the study; only captured intermittent slowing in the R FT region. No sz since last visit. However, Tresa notes worsening memory, cognitive processing, attention span getting worse, more impulsive and impatient. Tresa titrated VPA down, thinking it was a medication side effect, but did not notice any change. Takes Valium PRN. CURRENT AED: VPA ER 1000mg BID – started 7/2019; Valium prn agitation.\par \par ===6/30/21===\par Pt today is accompanied by his wife, Tresa. No sz, but struggling with a lot of mood/behavioral problems like impulsivity, irritability, depression. In search of a psychiatrist. No acute changes on recent MRI brain 6/15/21. CURRENT AEDs: VPA ER 1000mg BID – started 7/2019, level 96 on 9/29/2020 PRG 50mg BID – for neuropathy\par \par SEIZURE DESCRIPTION AND TYPE:\par Type #1\par Severity: focal to bilateral tonic clonic sz \par Onset: 7/2019\par Quality & associated signs/symptoms: loss of awareness, LUE clonic jerking -> full body convulsion\par Duration: few min\par Timing: once\par Modifying factors: unknown trigger  \par Diurnal Variation: none\par \par EPILEPSY TYPE: focal epilepsy, structural etiology\par HISTORY OF TONIC-CLONIC SZ: yes\par HISTORY OF STATUS EPILEPTICUS: no  \par \par SEIZURE RISK FACTORS:\par R MCA stroke with mild hemorrhagic transformation. Patient was a product of normal pregnancy and delivery. No history of febrile seizure, TBI, CNS infection, or FH of seizures.\par \par PREVIOUS AED:\par none\par \par IMAGING: \par MRI brain w/o 6/15/21 ([facility]): chronic R MCA territory infarct\par \par MRI w/wo 9/28/18 ([facility]): Extensive T2 prolongation with associated restricted diffusion is seen involving the right temporal frontal parietal cortex. Involvement of the left basal ganglia and corona radiata region is seen as well. These findings do have some associated areas of susceptibility which is compatible with underlying areas of hemorrhage. Gyriform type of enhancement is also seen. This is compatible with a subacute right MCA infarct.\par \par NEUROPHYSIOLOGY:\par 3hr aEEG 10/31/19 (370): intermittent slowing R FT\par \par NEUROPSYCHOLOGY: \par none

## 2022-07-15 NOTE — ASSESSMENT
[FreeTextEntry1] : LALIT ZARAGOZA is a 65 year-old RH male with a history of anxiety, depression, R MCA stroke with mild hemorrhagic transformation s/p mechanical thrombectomy 9/23/18, HTN, HLD, DM II, h/o EtOH abuse, RCC s/p R partial nephrectomy 2017 who presents for followup for focal epilepsy characterized by focal to bilateral tonic clonic sz. \par \par Sz free on VPA monotherapy, but needs continued followup for behavioral health. All questions and concerns answered and addressed in detail to patient's and wife's complete satisfaction. He may benefit from longer hours with home care aide due to falls and wandering at night. Patient and his ex-wife verbalized understanding and agreed to plan.\par \par - continue VPA ER 1000mg BID\par - follow-up with psychiatry \par - no driving \par - f/u in 12 months\par \par \par All relevant epilepsy AAN quality care measures were addressed and discussed with the patient and wife.\par \par More than 50% of time spent counseling and educating patient and wife about epilepsy specific safety issues including AED side effects and interactions, alcohol consumption, sleep deprivation, risks and driving privileges associated with the [place] Guidelines, death related to seizures/SUDEP, seizure 1st aid and risks. Patient is educated on seizure precautions, including instruction not to do following after a breakthrough sz - no driving, no operating machinery, no swimming or bathing, no climbing heights, or engage in any risky activities during which a seizure could cause further injury to pt or others.

## 2022-07-21 ENCOUNTER — FORM ENCOUNTER (OUTPATIENT)
Age: 67
End: 2022-07-21

## 2022-07-21 ENCOUNTER — NON-APPOINTMENT (OUTPATIENT)
Age: 67
End: 2022-07-21

## 2022-09-14 NOTE — PROGRESS NOTE ADULT - SUBJECTIVE AND OBJECTIVE BOX
Subjective: Patient seen and examined. No new events except as noted.     SUBJECTIVE/ROS:  No chest pain, dyspnea, palpitation, or dizziness.       MEDICATIONS:  MEDICATIONS  (STANDING):  acetaminophen  IVPB .. 1000 milliGRAM(s) IV Intermittent once  aspirin  chewable 81 milliGRAM(s) Oral daily  atorvastatin 80 milliGRAM(s) Oral at bedtime  buDESOnide  80 MICROgram(s)/formoterol 4.5 MICROgram(s) Inhaler 2 Puff(s) Inhalation two times a day  enoxaparin Injectable 40 milliGRAM(s) SubCutaneous daily  FLUoxetine 20 milliGRAM(s) Oral daily  fluticasone propionate 50 MICROgram(s)/spray Nasal Spray 1 Spray(s) Both Nostrils two times a day  influenza   Vaccine 0.5 milliLiter(s) IntraMuscular once  lactated ringers. 1000 milliLiter(s) (100 mL/Hr) IV Continuous <Continuous>  nicotine -   7 mG/24Hr(s) Patch 1 patch Transdermal daily  pantoprazole    Tablet 40 milliGRAM(s) Oral before breakfast  thiamine Injectable 100 milliGRAM(s) IV Push daily  tiotropium 18 MICROgram(s) Capsule 1 Capsule(s) Inhalation daily      PHYSICAL EXAM:  T(C): 36.7 (10-09-18 @ 12:00), Max: 37.1 (10-08-18 @ 23:37)  HR: 76 (10-09-18 @ 12:00) (65 - 87)  BP: 127/68 (10-09-18 @ 12:00) (110/66 - 127/68)  RR: 18 (10-09-18 @ 12:00) (18 - 19)  SpO2: 97% (10-09-18 @ 12:00) (95% - 97%)  Wt(kg): --  I&O's Summary    08 Oct 2018 07:01  -  09 Oct 2018 07:00  --------------------------------------------------------  IN: 600 mL / OUT: 0 mL / NET: 600 mL    09 Oct 2018 07:01  -  09 Oct 2018 17:23  --------------------------------------------------------  IN: 440 mL / OUT: 350 mL / NET: 90 mL          JVP: Normal  Neck: supple  Lung: clear   CV: S1 S2 , Murmur:  Abd: soft  Ext: No edema  neuro: Awake / alert  Psych: flat affect  Skin: normal        LABS/DATA:    CARDIAC MARKERS:                                11.3   7.55  )-----------( 220      ( 08 Oct 2018 08:04 )             36.7     10-08    138  |  103  |  20  ----------------------------<  101<H>  3.8   |  26  |  0.95    Ca    8.9      08 Oct 2018 06:26      proBNP:   Lipid Profile:   HgA1c:   TSH:     TELE:  EKG: No

## 2022-09-26 NOTE — H&P ADULT - NSCORESITESY/N_GEN_A_CORE_RD
NP rec'vd msg from RN to call Tiara # [phone].    NP called Tiara, per Tiara she is the pt's granddtr.  Tiara had numerous questions, some of which she answered as she was asking NP the same questions.  NP answered all questions.  NP reassured Tiara that grandmother was being cared for and attended to.  Tiara asking to bring ice cream or icy.  NP informed Tiara that pt has just started clear liquid and was being observed for her food tolerance.  Tiara stated that her family really wanted to bring her something b/c pt keeps asking them for food.  NP suggested soup.  Tiara asked, "chicken soup."  NP said that would be a good start.  Tiara verbalized understanding.      NP informed Tiara that NP would discuss w/ Attending if pt could have ice cream or an icy. NP rec'vd msg from RN to call Tiara # [phone].    NP called Tiara, per Tiara she is the pt's granddtr.  Tiara had numerous questions, some of which she answered as she was asking NP the same questions.  NP answered all questions.  NP reassured Tiara that grandmother was being cared for and attended to.  Tiara asking to bring ice cream or icy.  NP informed Tiara that pt has just started clear liquid and was being observed for her food tolerance.  Tiara stated that her family really wanted to bring her something b/c pt keeps asking them for food.  NP suggested soup.  Tiara asked, "chicken soup."  NP said that would be a good start.  Tiara verbalized understanding.      NP informed Tiara that NP would discuss w/ Attending if pt could have ice cream or an icy.  Tiara verbalized understanding. No

## 2022-10-19 ENCOUNTER — APPOINTMENT (OUTPATIENT)
Dept: NEUROLOGY | Facility: CLINIC | Age: 67
End: 2022-10-19

## 2023-01-19 RX ORDER — DIVALPROEX SODIUM 500 MG/1
500 TABLET, DELAYED RELEASE ORAL
Qty: 60 | Refills: 3 | Status: ACTIVE | COMMUNITY
Start: 2022-05-19 | End: 1900-01-01

## 2023-01-30 ENCOUNTER — APPOINTMENT (OUTPATIENT)
Dept: ORTHOPEDIC SURGERY | Facility: CLINIC | Age: 68
End: 2023-01-30

## 2023-02-09 NOTE — DISCHARGE NOTE ADULT - SMOKING EVEN A SINGLE PUFF INCREASES THE LIKELIHOOD OF A FULL RELAPSE, WITHDRAWAL SYMPTOMS PEAK WITHIN 1-2 WEEKS, BUT CAN PERSIST FOR MONTHS
SwiftKey message sent to patient.    MISAEL Rosario RN  [facility]     Statement Selected

## 2023-02-10 ENCOUNTER — NON-APPOINTMENT (OUTPATIENT)
Age: 68
End: 2023-02-10

## 2023-02-27 RX ORDER — DIVALPROEX SODIUM 500 MG/1
500 TABLET, DELAYED RELEASE ORAL TWICE DAILY
Qty: 60 | Refills: 0 | Status: ACTIVE | COMMUNITY
Start: 2023-02-27 | End: 1900-01-01

## 2023-04-26 NOTE — H&P PST ADULT - PAIN SCALE PREFERRED, PROFILE
The anesthesia care team has confirmed the patient ID and re-evaluated the patient and anesthesia plan confirming it is suitable for the patient's condition and procedure.   numerical 0-10

## 2023-05-09 ENCOUNTER — RX RENEWAL (OUTPATIENT)
Age: 68
End: 2023-05-09

## 2023-05-09 RX ORDER — DIVALPROEX SODIUM 500 1/1
500 TABLET, EXTENDED RELEASE ORAL
Qty: 360 | Refills: 2 | Status: ACTIVE | COMMUNITY
Start: 2019-09-20 | End: 1900-01-01

## 2023-08-16 ENCOUNTER — APPOINTMENT (OUTPATIENT)
Dept: NEUROLOGY | Facility: CLINIC | Age: 68
End: 2023-08-16
Payer: MEDICARE

## 2023-08-16 VITALS
HEART RATE: 91 BPM | SYSTOLIC BLOOD PRESSURE: 143 MMHG | WEIGHT: 230 LBS | HEIGHT: 73 IN | TEMPERATURE: 97.6 F | DIASTOLIC BLOOD PRESSURE: 78 MMHG | OXYGEN SATURATION: 96 % | BODY MASS INDEX: 30.48 KG/M2

## 2023-08-16 DIAGNOSIS — I69.354 HEMIPLEGIA AND HEMIPARESIS FOLLOWING CEREBRAL INFARCTION AFFECTING LEFT NON-DOMINANT SIDE: ICD-10-CM

## 2023-08-16 DIAGNOSIS — F41.9 ANXIETY DISORDER, UNSPECIFIED: ICD-10-CM

## 2023-08-16 DIAGNOSIS — R56.9 UNSPECIFIED CONVULSIONS: ICD-10-CM

## 2023-08-16 DIAGNOSIS — F32.A ANXIETY DISORDER, UNSPECIFIED: ICD-10-CM

## 2023-08-16 PROCEDURE — 99214 OFFICE O/P EST MOD 30 MIN: CPT

## 2023-08-16 NOTE — HISTORY OF PRESENT ILLNESS
[FreeTextEntry1] : LAST OFFICE VISIT: 7/15/22  PCP: Dr. Laguna Pt goes by: Parag Wife: Tersa  INTERIM HISTORY: Has "good days and bad days" in regard to his mood and behavior. He easily becomes depressed, impatient and agitated. He has residual left-sided hemiparesis and difficulty walking. He would love to begin skiing again but he no longer participates in PT and ambulation is limited. He sees pain management who recommended he restart PT. His HHA was recently killed in a MVA. He denies interval seizures or seizure-like activity. He sees physiatry and is currently taking sertraline.   PRIOR EPILEPSY HISTORY: 9/15/20: This is a 64 year-old RH male (Parag) with a history of anxiety, depression, R MCA stroke with mild hemorrhagic transformation s/p mechanical thrombectomy 9/23/18, HTN, HLD, DM II, h/o EtOH abuse, RCC s/p R partial nephrectomy 2017 who is referred by LISA MACDONALD MD,[place] for evaluation and management of epilepsy. Pt today is accompanied by wife.  In July, pt had his first seizure, witnessed by his aide Marcia. Fortunately, he was at the passenger's seat in a car driven by Marcia. He was taken to [place] and discharged on VPA. Tolerating VPA well. No further sz. Pt was on Seroquel at the time, so pt and wife believe sz was induced by Seroquel. CURRENT AEDs: VPA ER 1000/500mg - started 7/2019; GBP 300mg BID - started 2019 for neuropathy.  === 9/15/20 === Pt today is accompanied by his wife, Tresa. Had planned for 48hr aEEG November of 2019, but pt ripped off electrodes 3 hrs into the study; only captured intermittent slowing in the R FT region. No sz since last visit. However, Tresa notes worsening memory, cognitive processing, attention span getting worse, more impulsive and impatient. Tresa titrated VPA down, thinking it was a medication side effect, but did not notice any change. Takes Valium PRN. CURRENT AED: VPA ER 1000mg BID - started 7/2019; Valium prn agitation.  ===6/30/21=== Pt today is accompanied by his wife, Tresa. No sz, but struggling with a lot of mood/behavioral problems like impulsivity, irritability, depression. In search of a psychiatrist. No acute changes on recent MRI brain 6/15/21. CURRENT AEDs: VPA ER 1000mg BID - started 7/2019, level 96 on 9/29/2020 PRG 50mg BID - for neuropathy  ===7/15/22=== Pt today is accompanied by his ex-wife, Tresa. No sz, but still struggling with a lot of mood/behavioral problems like impulsivity, irritability, depression. Seeing a psychiatrist weekly. He frequently falls and attempts to wander the house at night. CURRENT AEDs: VPA ER 1000mg BID - started 7/2019, level 96 on 9/29/2020, PRG 50mg BID - for neuropathy  SEIZURE DESCRIPTION AND TYPE: Type #1 Severity: focal to bilateral tonic clonic sz  Onset: 7/2019 Quality & associated signs/symptoms: loss of awareness, LUE clonic jerking -> full body convulsion Duration: few min Timing: once Modifying factors: unknown trigger   Diurnal Variation: none  EPILEPSY TYPE: focal epilepsy, structural etiology HISTORY OF TONIC-CLONIC SZ: yes HISTORY OF STATUS EPILEPTICUS: no    SEIZURE RISK FACTORS: R MCA stroke with mild hemorrhagic transformation. Patient was a product of normal pregnancy and delivery. No history of febrile seizure, TBI, CNS infection, or FH of seizures.  PREVIOUS AED: none  IMAGING:  MRI brain w/o 6/15/21 ([facility]): chronic R MCA territory infarct  MRI w/wo 9/28/18 ([facility]): Extensive T2 prolongation with associated restricted diffusion is seen involving the right temporal frontal parietal cortex. Involvement of the left basal ganglia and corona radiata region is seen as well. These findings do have some associated areas of susceptibility which is compatible with underlying areas of hemorrhage. Gyriform type of enhancement is also seen. This is compatible with a subacute right MCA infarct.  NEUROPHYSIOLOGY: 3hr aEEG 10/31/19 (370): intermittent slowing R FT  NEUROPSYCHOLOGY:  none

## 2023-08-16 NOTE — ASSESSMENT
[FreeTextEntry1] : LALIT ZARAGOZA is a 67 year-old RH male with a history of anxiety, depression, R MCA stroke with mild hemorrhagic transformation s/p mechanical thrombectomy 9/23/18, HTN, HLD, DM II, h/o EtOH abuse, RCC s/p R partial nephrectomy 2017 who presents for followup for focal epilepsy characterized by focal to bilateral tonic clonic sz.   Sz free on VPA monotherapy, but needs continued followup for behavioral health. All questions and concerns answered and addressed in detail to patient's and wife's complete satisfaction. He may benefit from longer hours with home care aide due to falls and wandering at night. Patient and his ex-wife verbalized understanding and agreed to plan.  - continue VPA ER 1000mg BID - follow-up with psychiatry, may consider addition of low dose quetiapine or change antidepressant medication - encouraged to resume PT - no driving  - f/u in 12 months   All relevant epilepsy AAN quality care measures were addressed and discussed with the patient and wife.  More than 50% of time spent counseling and educating patient and wife about epilepsy specific safety issues including AED side effects and interactions, alcohol consumption, sleep deprivation, risks and driving privileges associated with the [place] Guidelines, death related to seizures/SUDEP, seizure 1st aid and risks. Patient is educated on seizure precautions, including instruction not to do following after a breakthrough sz - no driving, no operating machinery, no swimming or bathing, no climbing heights, or engage in any risky activities during which a seizure could cause further injury to pt or others.

## 2023-08-16 NOTE — CONSULT LETTER
[Dear  ___] : Dear  [unfilled], [Sincerely,] : Sincerely, [FreeTextEntry1] : I had the pleasure of seeing your patient, LALIT ZARAGOZA, in my office today. Please see my note below. \par  \par  If you have any questions, please do not hesitate to contact me.  [FreeTextEntry3] : Ana Colindres MD\par  Director, Epilepsy/EMU\par  [facility] \par  [facility] at [place]\par  [address] [place] [address] \par  Tel: [phone]; Fax: [phone]

## 2023-09-22 NOTE — DISCHARGE NOTE ADULT - FUNCTIONAL SCREEN CURRENT LEVEL: AMBULATION, MLM
How often is she taking OTCs like excedrin, ibuprofen, tylenol? What day is she due her Emgality shot?  Could be that the first shot wore off early since she's just starting the med and she's having breakthrough migraines. (0) independent

## 2023-12-18 NOTE — PATIENT PROFILE ADULT. - NS PRO PT RIGHT SUPPORT PERSON
The patient is here for a follow up visit. Her difficulty swallowing to solids has resolved s/p EGD with dilation and GERD symptoms have resolved on pantoprazole and pepcid. She is wanting to come off of pantoprazole at this time. Associated symptoms: constipation and bloating. She has had on average 1 BM every 3 days chronically and lifelong. Current stress: yes Any recent weight changes: weight gain Any recent changes in diet: none Last colonoscopy: 6 years ago, no polyps noted, told to follow up in 10 years. Patient elicits having a positive family history of colon polyps with her father Patient denies colon cancer or stomach cancer in the family. 2023 EGD revealed gastritis and she was dilated, a medium sized hiatal hernia was noted 2023 barium swallow test revealed  1. Small hiatal hernia. 2.Mild/moderate gastroesophageal reflux to the level of the lower/mid esophagus with the patient in supine position. 3.Mild circumferential narrowing at the level of the gastroesophageal junction. The 13 mm barium pill was lodged in this location for approximately 15 seconds as the patient drank water. 4. Degenerative changes within the cervical spine at the C5-C6 level with mild mass effect along the posterior wall of the upper esophagus secondary to anterior osteophytes. 2023 colonoscopy was normal, no specimens were collected, advised to repeat it in 1 year due to inadequate prep.
same name as above

## 2024-03-15 ENCOUNTER — APPOINTMENT (OUTPATIENT)
Dept: NEUROLOGY | Facility: CLINIC | Age: 69
End: 2024-03-15
Payer: MEDICARE

## 2024-03-15 VITALS
SYSTOLIC BLOOD PRESSURE: 140 MMHG | HEART RATE: 86 BPM | DIASTOLIC BLOOD PRESSURE: 82 MMHG | OXYGEN SATURATION: 97 % | WEIGHT: 213 LBS | HEIGHT: 73 IN | BODY MASS INDEX: 28.23 KG/M2

## 2024-03-15 DIAGNOSIS — G40.802 OTHER EPILEPSY, NOT INTRACTABLE, W/OUT STATUS EPILEPTICUS: ICD-10-CM

## 2024-03-15 DIAGNOSIS — Z86.59 PERSONAL HISTORY OF OTHER MENTAL AND BEHAVIORAL DISORDERS: ICD-10-CM

## 2024-03-15 DIAGNOSIS — Z87.898 PERSONAL HISTORY OF OTHER SPECIFIED CONDITIONS: ICD-10-CM

## 2024-03-15 PROCEDURE — 99215 OFFICE O/P EST HI 40 MIN: CPT

## 2024-03-19 PROBLEM — Z86.59 HISTORY OF CLAUSTROPHOBIA: Status: ACTIVE | Noted: 2024-03-19

## 2024-03-19 PROBLEM — G40.802 EPILEPSY WITH BOTH GENERALIZED AND FOCAL FEATURES: Status: ACTIVE | Noted: 2019-10-10

## 2024-03-19 RX ORDER — ALPRAZOLAM 0.25 MG/1
0.25 TABLET ORAL
Qty: 60 | Refills: 0 | Status: DISCONTINUED | COMMUNITY
Start: 2022-03-19 | End: 2024-03-19

## 2024-03-19 RX ORDER — DIAZEPAM 5 MG/1
5 TABLET ORAL
Qty: 2 | Refills: 0 | Status: ACTIVE | COMMUNITY
Start: 2024-03-19 | End: 1900-01-01

## 2024-03-19 RX ORDER — DIAZEPAM 5 MG/1
5 TABLET ORAL
Qty: 2 | Refills: 0 | Status: DISCONTINUED | OUTPATIENT
Start: 2024-03-19 | End: 2024-03-19

## 2024-03-19 NOTE — HISTORY OF PRESENT ILLNESS
[FreeTextEntry1] : LAST OFFICE VISIT: 8/16/2023  PCP: Dr. Laguna Pt goes by: Parag Wife: Tresa  INTERIM HISTORY: Mr. Larsen is a 68-year-old male who presents today for follow up, previously seen by JOSEPHINE Marcelo. Since last visit, his wife reports that 2 months ago he had an episode of slurred speech. She does not remember how long it lasted. He has broken his left wrist two times in the past few months. He continues to become easily agitated and reports difficulty sleeping. He remains on sertraline and is tolerating it well. His only seizure was in 7/2019. He has been compliant with VPA ER 1000mg BID. He denies interval seizures or seizure-like activity.   8/16/2023: Has "good days and bad days" in regard to his mood and behavior. He easily becomes depressed, impatient and agitated. He has residual left-sided hemiparesis and difficulty walking. He would love to begin skiing again but he no longer participates in PT and ambulation is limited. He sees pain management who recommended he restart PT. His HHA was recently killed in a MVA. He denies interval seizures or seizure-like activity. He sees physiatry and is currently taking sertraline.   PRIOR EPILEPSY HISTORY: 9/15/20: This is a 64-year-old RH male (Parag) with a history of anxiety, depression, R MCA stroke with mild hemorrhagic transformation s/p mechanical thrombectomy 9/23/18, HTN, HLD, DM II, h/o EtOH abuse, RCC s/p R partial nephrectomy 2017 who is referred by LISA MACDONALD MD,YADIEL for evaluation and management of epilepsy. Pt today is accompanied by wife.  In July, pt had his first seizure, witnessed by his aide Marcia. Fortunately, he was at the passenger's seat in a car driven by Marcia. He was taken to [facility] and discharged on VPA. Tolerating VPA well. No further sz. Pt was on Seroquel at the time, so pt and wife believe sz was induced by Seroquel. CURRENT AEDs: VPA ER 1000/500mg - started 7/2019; GBP 300mg BID - started 2019 for neuropathy.  === 9/15/20 === Pt today is accompanied by his wife, Tresa. Had planned for 48hr aEEG November of 2019, but pt ripped off electrodes 3 hrs into the study; only captured intermittent slowing in the R FT region. No sz since last visit. However, Tresa notes worsening memory, cognitive processing, attention span getting worse, more impulsive and impatient. Tresa titrated VPA down, thinking it was a medication side effect, but did not notice any change. Takes Valium PRN. CURRENT AED: VPA ER 1000mg BID - started 7/2019; Valium prn agitation.  ===6/30/21=== Pt today is accompanied by his wife, Tresa. No sz, but struggling with a lot of mood/behavioral problems like impulsivity, irritability, depression. In search of a psychiatrist. No acute changes on recent MRI brain 6/15/21. CURRENT AEDs: VPA ER 1000mg BID - started 7/2019, level 96 on 9/29/2020 PRG 50mg BID - for neuropathy  ===7/15/22=== Pt today is accompanied by his ex-wife, Tresa. No sz, but still struggling with a lot of mood/behavioral problems like impulsivity, irritability, depression. Seeing a psychiatrist weekly. He frequently falls and attempts to wander the house at night. CURRENT AEDs: VPA ER 1000mg BID - started 7/2019, level 96 on 9/29/2020, PRG 50mg BID - for neuropathy  SEIZURE DESCRIPTION AND TYPE: Type #1 Severity: focal to bilateral tonic clonic sz  Onset: 7/2019 Quality & associated signs/symptoms: loss of awareness, LUE clonic jerking -> full body convulsion Duration: few min Timing: once Modifying factors: unknown trigger   Diurnal Variation: none  EPILEPSY TYPE: focal epilepsy, structural etiology HISTORY OF TONIC-CLONIC SZ: yes HISTORY OF STATUS EPILEPTICUS: no    SEIZURE RISK FACTORS: R MCA stroke with mild hemorrhagic transformation. Patient was a product of normal pregnancy and delivery. No history of febrile seizure, TBI, CNS infection, or FH of seizures.  PREVIOUS AED: none  IMAGING:  MRI brain w/o 6/15/21 ([facility]): chronic R MCA territory infarct  MRI w/wo 9/28/18 ([facility]): Extensive T2 prolongation with associated restricted diffusion is seen involving the right temporal frontal parietal cortex. Involvement of the left basal ganglia and corona radiata region is seen as well. These findings do have some associated areas of susceptibility which is compatible with underlying areas of hemorrhage. Gyriform type of enhancement is also seen. This is compatible with a subacute right MCA infarct.  NEUROPHYSIOLOGY: 3hr aEEG 10/31/19 ([facility]): intermittent slowing R FT  NEUROPSYCHOLOGY:  none

## 2024-03-19 NOTE — PHYSICAL EXAM
[FreeTextEntry1] : GENERAL PHYSICAL EXAM: GEN: no distress, normal affect  NEUROLOGICAL EXAM: Mental Status Orientation: alert and oriented to person, place, time, and situation  Language: clear and fluent, intact comprehension and repetition, intact naming and reading  Cranial Nerves II: full visual fields intact  III, IV, VI: PERRL, EOMI V, VII: facial sensation and movement intact and symmetric  VIII: hearing intact  IX, X: uvula midline, soft palate elevates normally  XI: BL shoulder shrug intact  XII: tongue midline  Motor RUE: 5/5 RLE: 5/5  LUE: 4/5 LLE: 4-/5   Sensation Hyperesthesia and sensory extinction in the LUE  Coordination Normal FTN on the right, unable to assess on the left due to wrist injury  Gait In wheelchair n/a

## 2024-03-19 NOTE — DISCUSSION/SUMMARY
[FreeTextEntry1] : LALIT ZARAGOZA is a 68-year-old RH male with a history of anxiety, depression, R MCA stroke with mild hemorrhagic transformation s/p mechanical thrombectomy 9/23/18, HTN, HLD, DM II, h/o EtOH abuse, RCC s/p R partial nephrectomy 2017 who presents for follow up for focal epilepsy characterized by focal to bilateral tonic clonic sz.   Given the patient's episode of dysarthria, will obtain MRI head wo contrast to evaluate for possible stroke. Prescribed diazepam 5 mg and advised to take one tablet 1 hour prior to the MRI. He may repeat the dose if needed.   Sz free on VPA monotherapy but needs continued follow up for behavioral health. He may benefit from longer hours with home care aide due to falls and wandering at night. All questions and concerns answered and addressed in detail to patient's and wife's complete satisfaction. Patient and his ex-wife verbalized understanding and agreed to plan.  - continue VPA ER 1000mg BID - MRI head wo contrast  - Diazepam 5 mg, 1 tablet 1 hour prior to MRI, may repeat dose if needed MDD: 2 tablets - follow-up with psychiatry, continue on sertraline for now - encouraged to resume PT - no driving  - f/u in 3 months with JOSEPHINE Marcelo  All relevant epilepsy AAN quality care measures were addressed and discussed with the patient and wife.  More than 50% of time spent counseling and educating patient and wife about epilepsy specific safety issues including AED side effects and interactions, alcohol consumption, sleep deprivation, risks and driving privileges associated with the [place] Guidelines, death related to seizures/SUDEP, seizure 1st aid and risks. Patient is educated on seizure precautions, including instruction not to do following after a breakthrough sz - no driving, no operating machinery, no swimming or bathing, no climbing heights, or engage in any risky activities during which a seizure could cause further injury to pt or others.

## 2024-04-09 ENCOUNTER — TRANSCRIPTION ENCOUNTER (OUTPATIENT)
Age: 69
End: 2024-04-09

## 2024-04-15 NOTE — SWALLOW BEDSIDE ASSESSMENT ADULT - SWALLOW EVAL: RECOMMENDED DIET
NPO, with non-oral nutrition/hydration/medications. Discussed at length with pt. Noted that pt refused an NGT during this hospitalization.  Reviewed aspiration risk and potential related complications. Pt comprehends same and related complications, continues to refused enteral feeds and wishes for a po diet to be restarted. Reviewed with Dr. Fan from Neuro and RN 15-Apr-2024

## 2024-05-16 NOTE — H&P PST ADULT - HEIGHT IN FEET
Notified patient per Dr. Ga that the nursing home does not need to access patient's port for labs.  Advised him they can draw peripherally (through his arm) or patient can come to clinic for lab draw.  Patient may complete ordered UA at [facility] if he wishes to have labs through port.  He verbalized understanding and placed nursing staff, Dary Stein LPN, on the phone and this nurse advised her per MD.  She verbalized understanding.  Patient decided to be drawn peripherally tomorrow at [facility].     6

## 2024-09-06 ENCOUNTER — APPOINTMENT (OUTPATIENT)
Dept: NEUROLOGY | Facility: CLINIC | Age: 69
End: 2024-09-06

## 2024-10-11 ENCOUNTER — APPOINTMENT (OUTPATIENT)
Dept: NEUROLOGY | Facility: CLINIC | Age: 69
End: 2024-10-11
Payer: MEDICARE

## 2024-10-11 VITALS
SYSTOLIC BLOOD PRESSURE: 139 MMHG | DIASTOLIC BLOOD PRESSURE: 85 MMHG | WEIGHT: 213 LBS | OXYGEN SATURATION: 97 % | HEIGHT: 72 IN | HEART RATE: 85 BPM | BODY MASS INDEX: 28.85 KG/M2

## 2024-10-11 DIAGNOSIS — R41.3 OTHER AMNESIA: ICD-10-CM

## 2024-10-11 DIAGNOSIS — F01.518 VASCULAR DEMENTIA, UNSPECIFIED SEVERITY, WITH OTHER BEHAVIORAL DISTURBANCE: ICD-10-CM

## 2024-10-11 DIAGNOSIS — G40.802 OTHER EPILEPSY, NOT INTRACTABLE, W/OUT STATUS EPILEPTICUS: ICD-10-CM

## 2024-10-11 DIAGNOSIS — F41.9 ANXIETY DISORDER, UNSPECIFIED: ICD-10-CM

## 2024-10-11 DIAGNOSIS — F32.A ANXIETY DISORDER, UNSPECIFIED: ICD-10-CM

## 2024-10-11 DIAGNOSIS — R56.9 UNSPECIFIED CONVULSIONS: ICD-10-CM

## 2024-10-11 PROCEDURE — G2211 COMPLEX E/M VISIT ADD ON: CPT

## 2024-10-11 PROCEDURE — 99214 OFFICE O/P EST MOD 30 MIN: CPT

## 2024-10-17 PROBLEM — F01.518: Status: ACTIVE | Noted: 2024-10-17

## 2024-11-27 ENCOUNTER — APPOINTMENT (OUTPATIENT)
Dept: NEUROLOGY | Facility: CLINIC | Age: 69
End: 2024-11-27
Payer: MEDICARE

## 2024-11-27 DIAGNOSIS — R56.9 UNSPECIFIED CONVULSIONS: ICD-10-CM

## 2024-11-27 DIAGNOSIS — G40.802 OTHER EPILEPSY, NOT INTRACTABLE, W/OUT STATUS EPILEPTICUS: ICD-10-CM

## 2024-11-27 DIAGNOSIS — R41.3 OTHER AMNESIA: ICD-10-CM

## 2024-11-27 DIAGNOSIS — F01.518 VASCULAR DEMENTIA, UNSPECIFIED SEVERITY, WITH OTHER BEHAVIORAL DISTURBANCE: ICD-10-CM

## 2024-11-27 DIAGNOSIS — M54.16 RADICULOPATHY, LUMBAR REGION: ICD-10-CM

## 2024-11-27 PROCEDURE — 99213 OFFICE O/P EST LOW 20 MIN: CPT | Mod: 95

## 2024-11-27 PROCEDURE — G2211 COMPLEX E/M VISIT ADD ON: CPT

## 2024-11-27 RX ORDER — GABAPENTIN 300 MG/1
300 CAPSULE ORAL
Qty: 60 | Refills: 2 | Status: ACTIVE | COMMUNITY
Start: 2024-11-27 | End: 1900-01-01

## 2024-11-27 RX ORDER — FLUOXETINE HYDROCHLORIDE 20 MG/1
20 CAPSULE ORAL
Refills: 0 | Status: ACTIVE | COMMUNITY

## 2024-12-21 NOTE — REVIEW OF SYSTEMS
Problem: At Risk for Falls  Goal: Patient does not fall  Outcome: Monitoring/Evaluating progress  Goal: Patient takes action to control fall-related risks  Outcome: Monitoring/Evaluating progress     Problem: At Risk for Injury Due to Fall  Goal: Patient does not fall  Outcome: Monitoring/Evaluating progress  Goal: Takes action to control condition specific risks  Outcome: Monitoring/Evaluating progress  Goal: Verbalizes understanding of fall-related injury personal risks  Description: Document education using the patient education activity  Outcome: Monitoring/Evaluating progress     Problem: Pain  Goal: Acceptable pain level achieved/maintained at rest using appropriate pain scale for the patient  Outcome: Monitoring/Evaluating progress  Goal: Acceptable pain level achieved/maintained with activity using appropriate pain scale for the patient  Outcome: Monitoring/Evaluating progress  Goal: Acceptable pain level achieved/maintained without oversedation  Outcome: Monitoring/Evaluating progress     Problem: At Risk for Falls  Goal: Patient does not fall  12/21/2024 1509 by Gilberto Morocho RN  Outcome: Monitoring/Evaluating progress  12/21/2024 1506 by Gilberto Morocho RN  Outcome: Monitoring/Evaluating progress  Goal: Patient takes action to control fall-related risks  12/21/2024 1509 by Gilberto Morocho RN  Outcome: Monitoring/Evaluating progress  12/21/2024 1506 by Gilberto Morocho RN  Outcome: Monitoring/Evaluating progress     Problem: At Risk for Injury Due to Fall  Goal: Patient does not fall  12/21/2024 1509 by Gilberto Morocho RN  Outcome: Monitoring/Evaluating progress  12/21/2024 1506 by Gilberto Morocho RN  Outcome: Monitoring/Evaluating progress  Goal: Takes action to control condition specific risks  12/21/2024 1509 by Gilberto Morocho RN  Outcome: Monitoring/Evaluating progress  12/21/2024 1506 by Gilberto Morocho RN  Outcome: Monitoring/Evaluating progress  Goal: Verbalizes understanding of  fall-related injury personal risks  Description: Document education using the patient education activity  12/21/2024 1509 by Gilberto Morocho RN  Outcome: Monitoring/Evaluating progress  12/21/2024 1506 by Gilberto Morocho RN  Outcome: Monitoring/Evaluating progress     Problem: Pain  Goal: Acceptable pain level achieved/maintained at rest using appropriate pain scale for the patient  12/21/2024 1509 by Gilberto Morocho RN  Outcome: Monitoring/Evaluating progress  12/21/2024 1506 by Gilberto Morocho RN  Outcome: Monitoring/Evaluating progress  Goal: Acceptable pain level achieved/maintained with activity using appropriate pain scale for the patient  12/21/2024 1509 by Gilberto Morocho RN  Outcome: Monitoring/Evaluating progress  12/21/2024 1506 by Gilberto Morocho RN  Outcome: Monitoring/Evaluating progress  Goal: Acceptable pain level achieved/maintained without oversedation  12/21/2024 1509 by Gilberto Morocho RN  Outcome: Monitoring/Evaluating progress  12/21/2024 1506 by Gilberto Morocho RN  Outcome: Monitoring/Evaluating progress     Problem: Impaired Physical Mobility  Goal: Functional status is maintained or returned to baseline during hospitalization  Outcome: Monitoring/Evaluating progress  Goal: Tolerates activity for discharge setting with no abnormal symptoms  Outcome: Monitoring/Evaluating progress     Problem: Skin Integrity Alteration  Goal: Skin remains intact with no new/deterioration of wound or pressure injury  Outcome: Monitoring/Evaluating progress  Goal: Participates in wound care activities  Outcome: Monitoring/Evaluating progress     Problem: Postoperative Care  Goal: Vital signs are maintained within parameters  Outcome: Monitoring/Evaluating progress  Goal: Elimination status is maintained/returned to baseline  Outcome: Monitoring/Evaluating progress  Goal: Oral intake is resumed and tolerated  Outcome: Monitoring/Evaluating progress  Goal: Activity level is resumed to level needed  for d/c  Outcome: Monitoring/Evaluating progress  Goal: Verbalizes understanding of postoperative care in the hospital and after d/c  Description: Document on Patient Education Activity  Outcome: Monitoring/Evaluating progress     Problem: Breathing Pattern Ineffective  Goal: Air exchange is effective, demonstrated by Sp02 sat of greater then or = 92% (or as ordered)  Outcome: Monitoring/Evaluating progress  Goal: Respiratory pattern is quiet and regular without report of SOB  Outcome: Monitoring/Evaluating progress  Goal: Breathing pattern demonstrates minimal apnea during sleep with appropriate use of airway pressure support devices  Outcome: Monitoring/Evaluating progress  Goal: Verbalizes/demonstrates effective breathing management strategies  Description: Document education using the patient education activity.   Outcome: Monitoring/Evaluating progress  Goal: Minimize respiratory effort related to dyspnea/shortness of breath (Hospice)  Outcome: Monitoring/Evaluating progress      [Anxiety] : anxiety [Change In Personality] : personality change [Emotional Problems] : emotional problems [Confused or Disoriented] : confusion [Memory Lapses or Loss] : memory loss [Decr. Concentrating Ability] : decreased concentrating ability [Changed Thought Patterns] : changed thought patterns [Arm Weakness] : arm weakness [Hand Weakness] :  hand weakness [Leg Weakness] : leg weakness [Numbness] : numbness [Tingling] : tingling [Abnormal Sensation] : an abnormal sensation [Difficulty Walking] : difficulty walking [Frequent Falls] : frequent falls [Eyesight Problems] : eyesight problems [Loss Of Hearing] : hearing loss [Facial Weakness] : no facial weakness [Poor Coordination] : good coordination [Seizures] : no convulsions [Dizziness] : no dizziness [Fainting] : no fainting [Cough] : no cough [SOB on Exertion] : no shortness of breath during exertion [Easy Bleeding] : no tendency for easy bleeding [Easy Bruising] : no tendency for easy bruising [FreeTextEntry3] : Reports left eye vision loss. [FreeTextEntry6] : Reports shortness of breath when anxious. [FreeTextEntry8] : Reports urinary frequency. [As Noted in HPI] : as noted in HPI [Negative] : Heme/Lymph

## 2025-01-24 ENCOUNTER — TRANSCRIPTION ENCOUNTER (OUTPATIENT)
Age: 70
End: 2025-01-24

## 2025-01-24 ENCOUNTER — INPATIENT (INPATIENT)
Facility: HOSPITAL | Age: 70
LOS: 0 days | Discharge: HOME | DRG: 322 | End: 2025-01-25
Attending: INTERNAL MEDICINE | Admitting: INTERNAL MEDICINE
Payer: MEDICARE

## 2025-01-24 VITALS
HEART RATE: 60 BPM | OXYGEN SATURATION: 99 % | DIASTOLIC BLOOD PRESSURE: 81 MMHG | TEMPERATURE: 98 F | RESPIRATION RATE: 17 BRPM | SYSTOLIC BLOOD PRESSURE: 113 MMHG

## 2025-01-24 DIAGNOSIS — I25.10 ATHEROSCLEROTIC HEART DISEASE OF NATIVE CORONARY ARTERY WITHOUT ANGINA PECTORIS: ICD-10-CM

## 2025-01-24 DIAGNOSIS — Z95.818 PRESENCE OF OTHER CARDIAC IMPLANTS AND GRAFTS: Chronic | ICD-10-CM

## 2025-01-24 DIAGNOSIS — Z98.890 OTHER SPECIFIED POSTPROCEDURAL STATES: Chronic | ICD-10-CM

## 2025-01-24 DIAGNOSIS — I25.118 ATHEROSCLEROTIC HEART DISEASE OF NATIVE CORONARY ARTERY WITH OTHER FORMS OF ANGINA PECTORIS: ICD-10-CM

## 2025-01-24 DIAGNOSIS — Z90.5 ACQUIRED ABSENCE OF KIDNEY: Chronic | ICD-10-CM

## 2025-01-24 DIAGNOSIS — Z85.528 PERSONAL HISTORY OF OTHER MALIGNANT NEOPLASM OF KIDNEY: Chronic | ICD-10-CM

## 2025-01-24 PROCEDURE — 93010 ELECTROCARDIOGRAM REPORT: CPT

## 2025-01-24 RX ORDER — GABAPENTIN 400 MG/1
300 CAPSULE ORAL THREE TIMES A DAY
Refills: 0 | Status: DISCONTINUED | OUTPATIENT
Start: 2025-01-24 | End: 2025-01-25

## 2025-01-24 RX ORDER — ACETAMINOPHEN 500 MG/5ML
1000 LIQUID (ML) ORAL ONCE
Refills: 0 | Status: COMPLETED | OUTPATIENT
Start: 2025-01-24 | End: 2025-01-24

## 2025-01-24 RX ORDER — ATORVASTATIN CALCIUM 80 MG/1
80 TABLET, FILM COATED ORAL AT BEDTIME
Refills: 0 | Status: DISCONTINUED | OUTPATIENT
Start: 2025-01-24 | End: 2025-01-25

## 2025-01-24 RX ORDER — ALBUTEROL SULFATE 2.5 MG/3ML
2 VIAL, NEBULIZER (ML) INHALATION EVERY 6 HOURS
Refills: 0 | Status: DISCONTINUED | OUTPATIENT
Start: 2025-01-24 | End: 2025-01-25

## 2025-01-24 RX ORDER — CLOPIDOGREL BISULFATE 75 MG/1
75 TABLET, FILM COATED ORAL DAILY
Refills: 0 | Status: DISCONTINUED | OUTPATIENT
Start: 2025-01-25 | End: 2025-01-25

## 2025-01-24 RX ORDER — MELATONIN 5 MG
3 TABLET ORAL AT BEDTIME
Refills: 0 | Status: DISCONTINUED | OUTPATIENT
Start: 2025-01-24 | End: 2025-01-25

## 2025-01-24 RX ORDER — FLUTICASONE PROPIONATE 50 UG/1
1 SPRAY, METERED NASAL
Refills: 0 | Status: DISCONTINUED | OUTPATIENT
Start: 2025-01-24 | End: 2025-01-25

## 2025-01-24 RX ORDER — ASPIRIN 325 MG
81 TABLET ORAL DAILY
Refills: 0 | Status: DISCONTINUED | OUTPATIENT
Start: 2025-01-24 | End: 2025-01-25

## 2025-01-24 RX ORDER — FOLIC ACID 1 MG/1
1 TABLET ORAL DAILY
Refills: 0 | Status: DISCONTINUED | OUTPATIENT
Start: 2025-01-24 | End: 2025-01-25

## 2025-01-24 RX ORDER — FLUOXETINE HYDROCHLORIDE 20 MG/1
20 CAPSULE ORAL DAILY
Refills: 0 | Status: DISCONTINUED | OUTPATIENT
Start: 2025-01-24 | End: 2025-01-25

## 2025-01-24 RX ADMIN — Medication 250 MILLILITER(S): at 20:21

## 2025-01-24 RX ADMIN — Medication 1000 MILLIGRAM(S): at 23:12

## 2025-01-24 RX ADMIN — Medication 400 MILLIGRAM(S): at 22:12

## 2025-01-24 RX ADMIN — Medication 250 MILLILITER(S): at 17:02

## 2025-01-24 RX ADMIN — FLUTICASONE PROPIONATE 1 SPRAY(S): 50 SPRAY, METERED NASAL at 22:30

## 2025-01-24 RX ADMIN — GABAPENTIN 300 MILLIGRAM(S): 400 CAPSULE ORAL at 22:17

## 2025-01-24 RX ADMIN — ATORVASTATIN CALCIUM 80 MILLIGRAM(S): 80 TABLET, FILM COATED ORAL at 22:17

## 2025-01-24 RX ADMIN — Medication 3 MILLIGRAM(S): at 22:18

## 2025-01-25 ENCOUNTER — TRANSCRIPTION ENCOUNTER (OUTPATIENT)
Age: 70
End: 2025-01-25

## 2025-01-25 VITALS
RESPIRATION RATE: 18 BRPM | SYSTOLIC BLOOD PRESSURE: 100 MMHG | HEART RATE: 70 BPM | TEMPERATURE: 97 F | OXYGEN SATURATION: 93 % | DIASTOLIC BLOOD PRESSURE: 63 MMHG

## 2025-01-25 LAB
A1C WITH ESTIMATED AVERAGE GLUCOSE RESULT: 5.4 % — SIGNIFICANT CHANGE UP (ref 4–5.6)
ALBUMIN SERPL ELPH-MCNC: 3.9 G/DL — SIGNIFICANT CHANGE UP (ref 3.3–5.2)
ALP SERPL-CCNC: 52 U/L — SIGNIFICANT CHANGE UP (ref 40–120)
ALT FLD-CCNC: 8 U/L — SIGNIFICANT CHANGE UP
ANION GAP SERPL CALC-SCNC: 13 MMOL/L — SIGNIFICANT CHANGE UP (ref 5–17)
AST SERPL-CCNC: 11 U/L — SIGNIFICANT CHANGE UP
BASOPHILS # BLD AUTO: 0.04 K/UL — SIGNIFICANT CHANGE UP (ref 0–0.2)
BASOPHILS NFR BLD AUTO: 0.5 % — SIGNIFICANT CHANGE UP (ref 0–2)
BILIRUB SERPL-MCNC: 0.5 MG/DL — SIGNIFICANT CHANGE UP (ref 0.4–2)
BUN SERPL-MCNC: 15.6 MG/DL — SIGNIFICANT CHANGE UP (ref 8–20)
CALCIUM SERPL-MCNC: 8.9 MG/DL — SIGNIFICANT CHANGE UP (ref 8.4–10.5)
CHLORIDE SERPL-SCNC: 103 MMOL/L — SIGNIFICANT CHANGE UP (ref 96–108)
CHOLEST SERPL-MCNC: 90 MG/DL — SIGNIFICANT CHANGE UP
CO2 SERPL-SCNC: 23 MMOL/L — SIGNIFICANT CHANGE UP (ref 22–29)
CREAT SERPL-MCNC: 0.83 MG/DL — SIGNIFICANT CHANGE UP (ref 0.5–1.3)
EGFR: 96 ML/MIN/1.73M2 — SIGNIFICANT CHANGE UP
EGFR: 96 ML/MIN/1.73M2 — SIGNIFICANT CHANGE UP
EOSINOPHIL # BLD AUTO: 0.09 K/UL — SIGNIFICANT CHANGE UP (ref 0–0.5)
EOSINOPHIL NFR BLD AUTO: 1.2 % — SIGNIFICANT CHANGE UP (ref 0–6)
ESTIMATED AVERAGE GLUCOSE: 108 MG/DL — SIGNIFICANT CHANGE UP (ref 68–114)
GLUCOSE SERPL-MCNC: 76 MG/DL — SIGNIFICANT CHANGE UP (ref 70–99)
HCT VFR BLD CALC: 37.1 % — LOW (ref 39–50)
HDLC SERPL-MCNC: 34 MG/DL — LOW
HGB BLD-MCNC: 11.6 G/DL — LOW (ref 13–17)
IMM GRANULOCYTES NFR BLD AUTO: 0.8 % — SIGNIFICANT CHANGE UP (ref 0–0.9)
LDLC SERPL-MCNC: 34 MG/DL — SIGNIFICANT CHANGE UP
LIPID PNL WITH DIRECT LDL SERPL: 34 MG/DL — SIGNIFICANT CHANGE UP
LYMPHOCYTES # BLD AUTO: 2.73 K/UL — SIGNIFICANT CHANGE UP (ref 1–3.3)
LYMPHOCYTES # BLD AUTO: 37.5 % — SIGNIFICANT CHANGE UP (ref 13–44)
MAGNESIUM SERPL-MCNC: 2.1 MG/DL — SIGNIFICANT CHANGE UP (ref 1.6–2.6)
MCHC RBC-ENTMCNC: 20.6 PG — LOW (ref 27–34)
MCHC RBC-ENTMCNC: 31.3 G/DL — LOW (ref 32–36)
MCV RBC AUTO: 65.8 FL — LOW (ref 80–100)
MONOCYTES # BLD AUTO: 0.71 K/UL — SIGNIFICANT CHANGE UP (ref 0–0.9)
MONOCYTES NFR BLD AUTO: 9.8 % — SIGNIFICANT CHANGE UP (ref 2–14)
NEUTROPHILS # BLD AUTO: 3.65 K/UL — SIGNIFICANT CHANGE UP (ref 1.8–7.4)
NEUTROPHILS NFR BLD AUTO: 50.2 % — SIGNIFICANT CHANGE UP (ref 43–77)
NONHDLC SERPL-MCNC: 56 MG/DL — SIGNIFICANT CHANGE UP
PLATELET # BLD AUTO: 185 K/UL — SIGNIFICANT CHANGE UP (ref 150–400)
POTASSIUM SERPL-MCNC: 4 MMOL/L — SIGNIFICANT CHANGE UP (ref 3.5–5.3)
POTASSIUM SERPL-SCNC: 4 MMOL/L — SIGNIFICANT CHANGE UP (ref 3.5–5.3)
PROT SERPL-MCNC: 6.3 G/DL — LOW (ref 6.6–8.7)
RBC # BLD: 5.64 M/UL — SIGNIFICANT CHANGE UP (ref 4.2–5.8)
RBC # FLD: 16.9 % — HIGH (ref 10.3–14.5)
SODIUM SERPL-SCNC: 139 MMOL/L — SIGNIFICANT CHANGE UP (ref 135–145)
TRIGL SERPL-MCNC: 123 MG/DL — SIGNIFICANT CHANGE UP
WBC # BLD: 7.28 K/UL — SIGNIFICANT CHANGE UP (ref 3.8–10.5)
WBC # FLD AUTO: 7.28 K/UL — SIGNIFICANT CHANGE UP (ref 3.8–10.5)

## 2025-01-25 PROCEDURE — 36415 COLL VENOUS BLD VENIPUNCTURE: CPT

## 2025-01-25 PROCEDURE — 93005 ELECTROCARDIOGRAM TRACING: CPT

## 2025-01-25 PROCEDURE — C1725: CPT

## 2025-01-25 PROCEDURE — 85025 COMPLETE CBC W/AUTO DIFF WBC: CPT

## 2025-01-25 PROCEDURE — C1874: CPT

## 2025-01-25 PROCEDURE — 92978 ENDOLUMINL IVUS OCT C 1ST: CPT | Mod: LD

## 2025-01-25 PROCEDURE — C1769: CPT

## 2025-01-25 PROCEDURE — C9600: CPT | Mod: LD

## 2025-01-25 PROCEDURE — 80053 COMPREHEN METABOLIC PANEL: CPT

## 2025-01-25 PROCEDURE — 93010 ELECTROCARDIOGRAM REPORT: CPT

## 2025-01-25 PROCEDURE — 83036 HEMOGLOBIN GLYCOSYLATED A1C: CPT

## 2025-01-25 PROCEDURE — 94640 AIRWAY INHALATION TREATMENT: CPT

## 2025-01-25 PROCEDURE — 80061 LIPID PANEL: CPT

## 2025-01-25 PROCEDURE — 83735 ASSAY OF MAGNESIUM: CPT

## 2025-01-25 PROCEDURE — C1887: CPT

## 2025-01-25 PROCEDURE — C1894: CPT

## 2025-01-25 PROCEDURE — C1753: CPT

## 2025-01-25 RX ORDER — CLOPIDOGREL BISULFATE 75 MG/1
1 TABLET, FILM COATED ORAL
Qty: 180 | Refills: 0
Start: 2025-01-25

## 2025-01-25 RX ADMIN — Medication 40 MILLIGRAM(S): at 05:25

## 2025-01-25 RX ADMIN — FLUOXETINE HYDROCHLORIDE 20 MILLIGRAM(S): 20 CAPSULE ORAL at 10:37

## 2025-01-25 RX ADMIN — GABAPENTIN 300 MILLIGRAM(S): 400 CAPSULE ORAL at 13:45

## 2025-01-25 RX ADMIN — GABAPENTIN 300 MILLIGRAM(S): 400 CAPSULE ORAL at 05:25

## 2025-01-25 RX ADMIN — CLOPIDOGREL BISULFATE 75 MILLIGRAM(S): 75 TABLET, FILM COATED ORAL at 10:37

## 2025-01-25 RX ADMIN — Medication 100 MILLIGRAM(S): at 10:37

## 2025-01-25 RX ADMIN — FOLIC ACID 1 MILLIGRAM(S): 1 TABLET ORAL at 10:37

## 2025-01-25 RX ADMIN — FLUTICASONE PROPIONATE 1 SPRAY(S): 50 SPRAY, METERED NASAL at 13:46

## 2025-01-25 RX ADMIN — Medication 81 MILLIGRAM(S): at 10:38

## 2025-06-10 PROBLEM — R56.9 UNSPECIFIED CONVULSIONS: Chronic | Status: ACTIVE | Noted: 2025-01-24

## 2025-06-10 PROBLEM — E03.9 HYPOTHYROIDISM, UNSPECIFIED: Chronic | Status: ACTIVE | Noted: 2025-01-24

## 2025-06-10 PROBLEM — I25.118 ATHEROSCLEROTIC HEART DISEASE OF NATIVE CORONARY ARTERY WITH OTHER FORMS OF ANGINA PECTORIS: Chronic | Status: ACTIVE | Noted: 2025-01-24

## 2025-06-17 ENCOUNTER — APPOINTMENT (OUTPATIENT)
Dept: ORTHOPEDIC SURGERY | Facility: CLINIC | Age: 70
End: 2025-06-17

## 2025-06-17 PROBLEM — M75.112 INCOMPLETE TEAR OF LEFT ROTATOR CUFF, UNSPECIFIED WHETHER TRAUMATIC: Status: ACTIVE | Noted: 2025-06-17

## 2025-06-17 PROCEDURE — 99203 OFFICE O/P NEW LOW 30 MIN: CPT | Mod: 25

## 2025-06-17 PROCEDURE — 20610 DRAIN/INJ JOINT/BURSA W/O US: CPT | Mod: LT

## 2025-06-17 PROCEDURE — 73030 X-RAY EXAM OF SHOULDER: CPT | Mod: LT

## 2025-07-23 ENCOUNTER — NON-APPOINTMENT (OUTPATIENT)
Age: 70
End: 2025-07-23

## 2025-07-25 ENCOUNTER — APPOINTMENT (OUTPATIENT)
Dept: NEUROLOGY | Facility: CLINIC | Age: 70
End: 2025-07-25
Payer: MEDICARE

## 2025-07-25 DIAGNOSIS — M54.9 DORSALGIA, UNSPECIFIED: ICD-10-CM

## 2025-07-25 DIAGNOSIS — I63.511 CEREBRAL INFARCTION DUE TO UNSPECIFIED OCCLUSION OR STENOSIS OF RIGHT MIDDLE CEREBRAL ARTERY: ICD-10-CM

## 2025-07-25 DIAGNOSIS — G40.802 OTHER EPILEPSY, NOT INTRACTABLE, W/OUT STATUS EPILEPTICUS: ICD-10-CM

## 2025-07-25 DIAGNOSIS — F01.518 VASCULAR DEMENTIA, UNSPECIFIED SEVERITY, WITH OTHER BEHAVIORAL DISTURBANCE: ICD-10-CM

## 2025-07-25 DIAGNOSIS — G89.29 DORSALGIA, UNSPECIFIED: ICD-10-CM

## 2025-07-25 PROCEDURE — G2211 COMPLEX E/M VISIT ADD ON: CPT | Mod: 93

## 2025-07-25 PROCEDURE — 99213 OFFICE O/P EST LOW 20 MIN: CPT | Mod: 93
